# Patient Record
Sex: FEMALE | Race: WHITE | Employment: OTHER | URBAN - METROPOLITAN AREA
[De-identification: names, ages, dates, MRNs, and addresses within clinical notes are randomized per-mention and may not be internally consistent; named-entity substitution may affect disease eponyms.]

---

## 2017-07-25 PROBLEM — I10 ESSENTIAL HYPERTENSION: Status: ACTIVE | Noted: 2017-07-25

## 2017-07-25 PROBLEM — E66.9 OBESITY: Status: ACTIVE | Noted: 2017-07-25

## 2017-07-25 PROBLEM — M79.7 FIBROMYALGIA: Status: ACTIVE | Noted: 2017-07-25

## 2017-07-25 PROBLEM — R60.0 EDEMA EXTREMITIES: Status: ACTIVE | Noted: 2017-07-25

## 2017-08-09 PROBLEM — M75.02 ADHESIVE CAPSULITIS OF LEFT SHOULDER: Status: ACTIVE | Noted: 2017-08-09

## 2017-08-09 PROBLEM — N30.01 ACUTE CYSTITIS WITH HEMATURIA: Status: ACTIVE | Noted: 2017-08-09

## 2017-08-09 PROBLEM — M75.80 ROTATOR CUFF TENDINITIS: Status: ACTIVE | Noted: 2017-08-09

## 2017-08-09 PROBLEM — R73.01 IFG (IMPAIRED FASTING GLUCOSE): Status: ACTIVE | Noted: 2017-08-09

## 2017-09-25 ENCOUNTER — HOSPITAL ENCOUNTER (OUTPATIENT)
Dept: GENERAL RADIOLOGY | Age: 62
Discharge: HOME | End: 2017-09-25
Payer: COMMERCIAL

## 2017-09-25 ENCOUNTER — HOSPITAL ENCOUNTER (OUTPATIENT)
Age: 62
Discharge: HOME | End: 2017-09-25
Payer: COMMERCIAL

## 2017-09-25 DIAGNOSIS — R52 PAIN: ICD-10-CM

## 2017-09-25 PROCEDURE — 72040 X-RAY EXAM NECK SPINE 2-3 VW: CPT

## 2017-09-25 PROCEDURE — 73030 X-RAY EXAM OF SHOULDER: CPT

## 2018-07-09 ENCOUNTER — HOSPITAL ENCOUNTER (OUTPATIENT)
Age: 63
Setting detail: SPECIMEN
Discharge: HOME | End: 2018-07-09
Payer: MEDICARE

## 2018-07-09 DIAGNOSIS — R73.01 IFG (IMPAIRED FASTING GLUCOSE): ICD-10-CM

## 2018-07-09 DIAGNOSIS — Z13.220 SCREENING FOR LIPID DISORDERS: ICD-10-CM

## 2018-07-09 DIAGNOSIS — I10 ESSENTIAL HYPERTENSION: ICD-10-CM

## 2018-07-09 LAB
ALBUMIN SERPL-MCNC: 4.4 G/DL (ref 3.5–5.2)
ALBUMIN/GLOBULIN RATIO: 1.7 (ref 1–2.5)
ALP BLD-CCNC: 74 U/L (ref 35–104)
ALT SERPL-CCNC: 25 U/L (ref 5–33)
ANION GAP SERPL CALCULATED.3IONS-SCNC: 13 MMOL/L (ref 9–17)
AST SERPL-CCNC: 19 U/L
BILIRUB SERPL-MCNC: 0.67 MG/DL (ref 0.3–1.2)
BUN BLDV-MCNC: 21 MG/DL (ref 8–23)
BUN/CREAT BLD: ABNORMAL (ref 9–20)
CALCIUM SERPL-MCNC: 9.6 MG/DL (ref 8.6–10.4)
CHLORIDE BLD-SCNC: 100 MMOL/L (ref 98–107)
CHOLESTEROL/HDL RATIO: 4.2
CHOLESTEROL: 203 MG/DL
CO2: 28 MMOL/L (ref 20–31)
CREAT SERPL-MCNC: 0.71 MG/DL (ref 0.5–0.9)
ESTIMATED AVERAGE GLUCOSE: 126 MG/DL
GFR AFRICAN AMERICAN: >60 ML/MIN
GFR NON-AFRICAN AMERICAN: >60 ML/MIN
GFR SERPL CREATININE-BSD FRML MDRD: ABNORMAL ML/MIN/{1.73_M2}
GFR SERPL CREATININE-BSD FRML MDRD: ABNORMAL ML/MIN/{1.73_M2}
GLUCOSE FASTING: 105 MG/DL (ref 70–99)
HBA1C MFR BLD: 6 % (ref 4–6)
HDLC SERPL-MCNC: 48 MG/DL
LDL CHOLESTEROL: 122 MG/DL (ref 0–130)
POTASSIUM SERPL-SCNC: 4.2 MMOL/L (ref 3.7–5.3)
SODIUM BLD-SCNC: 141 MMOL/L (ref 135–144)
TOTAL PROTEIN: 7 G/DL (ref 6.4–8.3)
TRIGL SERPL-MCNC: 167 MG/DL
VLDLC SERPL CALC-MCNC: ABNORMAL MG/DL (ref 1–30)

## 2018-07-30 ENCOUNTER — HOSPITAL ENCOUNTER (OUTPATIENT)
Dept: GENERAL RADIOLOGY | Facility: CLINIC | Age: 63
Discharge: HOME | End: 2018-08-01
Payer: MEDICARE

## 2018-07-30 ENCOUNTER — HOSPITAL ENCOUNTER (OUTPATIENT)
Dept: MAMMOGRAPHY | Facility: CLINIC | Age: 63
Discharge: HOME | End: 2018-08-01
Payer: MEDICARE

## 2018-07-30 DIAGNOSIS — M25.562 ACUTE PAIN OF LEFT KNEE: ICD-10-CM

## 2018-07-30 DIAGNOSIS — Z12.31 SCREENING MAMMOGRAM, ENCOUNTER FOR: ICD-10-CM

## 2018-07-30 PROCEDURE — 73562 X-RAY EXAM OF KNEE 3: CPT

## 2018-07-30 PROCEDURE — 77067 SCR MAMMO BI INCL CAD: CPT

## 2018-10-10 ENCOUNTER — HOSPITAL ENCOUNTER (OUTPATIENT)
Dept: GENERAL RADIOLOGY | Facility: CLINIC | Age: 63
Discharge: HOME | End: 2018-10-12
Payer: MEDICARE

## 2018-10-10 DIAGNOSIS — R22.41 MASS OF RIGHT FOOT: ICD-10-CM

## 2018-10-10 DIAGNOSIS — M79.671 RIGHT FOOT PAIN: ICD-10-CM

## 2018-10-10 PROCEDURE — 73630 X-RAY EXAM OF FOOT: CPT

## 2018-10-16 ENCOUNTER — OFFICE VISIT (OUTPATIENT)
Dept: PODIATRY | Age: 63
End: 2018-10-16
Payer: MEDICARE

## 2018-10-16 VITALS — WEIGHT: 234 LBS | HEIGHT: 64 IN | BODY MASS INDEX: 39.95 KG/M2

## 2018-10-16 DIAGNOSIS — M79.672 PAIN IN BOTH FEET: Primary | ICD-10-CM

## 2018-10-16 DIAGNOSIS — D17.23 LIPOMA OF RIGHT LOWER EXTREMITY: ICD-10-CM

## 2018-10-16 DIAGNOSIS — R23.8 PIEZOGENIC PEDAL PAPULE: ICD-10-CM

## 2018-10-16 DIAGNOSIS — M79.671 PAIN IN BOTH FEET: Primary | ICD-10-CM

## 2018-10-16 PROCEDURE — 3017F COLORECTAL CA SCREEN DOC REV: CPT | Performed by: PODIATRIST

## 2018-10-16 PROCEDURE — 4040F PNEUMOC VAC/ADMIN/RCVD: CPT | Performed by: PODIATRIST

## 2018-10-16 PROCEDURE — 99203 OFFICE O/P NEW LOW 30 MIN: CPT | Performed by: PODIATRIST

## 2018-10-16 PROCEDURE — G8400 PT W/DXA NO RESULTS DOC: HCPCS | Performed by: PODIATRIST

## 2018-10-16 PROCEDURE — 1036F TOBACCO NON-USER: CPT | Performed by: PODIATRIST

## 2018-10-16 PROCEDURE — G8484 FLU IMMUNIZE NO ADMIN: HCPCS | Performed by: PODIATRIST

## 2018-10-16 PROCEDURE — 1123F ACP DISCUSS/DSCN MKR DOCD: CPT | Performed by: PODIATRIST

## 2018-10-16 PROCEDURE — 1101F PT FALLS ASSESS-DOCD LE1/YR: CPT | Performed by: PODIATRIST

## 2018-10-16 PROCEDURE — 1090F PRES/ABSN URINE INCON ASSESS: CPT | Performed by: PODIATRIST

## 2018-10-16 PROCEDURE — G8417 CALC BMI ABV UP PARAM F/U: HCPCS | Performed by: PODIATRIST

## 2018-10-16 PROCEDURE — G8427 DOCREV CUR MEDS BY ELIG CLIN: HCPCS | Performed by: PODIATRIST

## 2018-10-16 NOTE — PROGRESS NOTES
Bilateral.  Pain present upon palpation of right foot lesion. Medial longitudinal arch, Bilateral WNL. Ankle ROM WNL,Bilateral.    Dorsally contracted digits absent digits 1-5 Bilateral.     Vascular: DP and PT pulses palpable 2/4, Bilateral.  CFT <3 seconds, Bilateral.  Hair growth present to the level of the digits, Bilateral.  Edema absent, Bilateral.  Varicosities absent, Bilateral. Erythema absent, Bilateral    Neurological: Sensation intact to light touch to level of digits, Bilateral.  Protective sensation intact 10/10 sites via 5.07/10g Huntley-Lin Monofilament, Bilateral.  negative Tinel's, Bilateral.  negative Valleix sign, Bilateral.      Integument: Warm, dry, supple, Bilateral.  Open lesion absent, Bilateral.  Interdigital maceration absent to web spaces 1-4, Bilateral.  Nails are normal in length, thickness and color 1-5 bilateral.  Fissures absent, Bilateral.     Asessment: Patient is a 70 y.o. female with:    Diagnosis Orders   1. Pain in both feet     2. Piezogenic pedal papule     3. Lipoma of right lower extremity         Plan: Patient examined and evaluated. Current condition and treatment options discussed in detail. Discussed conservative and surgical options with the patient. Advised pt to her condtion and treatment options. I informed her that the lipoma is a common thing from post menupausal women. The lesions to her heel are fat pad herniation at the plantar heel. Those should not be touched as that is her \"cushion\" to the bottom of her foot. I recommond skipping her laces over her right foot lesion and that should make it go away. If necessary we will have to drain it  Or remove it. Pt is understanding of this. .  Verbal and written instructions given to patient. Contact office with any questions/problems/concerns. RTC in 4week(s).     10/16/2018    Electronically signed by Elena Ko DPM on 10/16/2018 at 9:31 AM  10/16/2018

## 2018-10-30 ENCOUNTER — OFFICE VISIT (OUTPATIENT)
Dept: PODIATRY | Age: 63
End: 2018-10-30
Payer: MEDICARE

## 2018-10-30 VITALS — WEIGHT: 234 LBS | HEIGHT: 64 IN | BODY MASS INDEX: 39.95 KG/M2

## 2018-10-30 DIAGNOSIS — M79.672 PAIN IN BOTH FEET: Primary | ICD-10-CM

## 2018-10-30 DIAGNOSIS — M79.671 PAIN IN BOTH FEET: Primary | ICD-10-CM

## 2018-10-30 DIAGNOSIS — M89.8X7 EXOSTOSIS OF RIGHT FOOT: ICD-10-CM

## 2018-10-30 PROCEDURE — 1090F PRES/ABSN URINE INCON ASSESS: CPT | Performed by: PODIATRIST

## 2018-10-30 PROCEDURE — 1123F ACP DISCUSS/DSCN MKR DOCD: CPT | Performed by: PODIATRIST

## 2018-10-30 PROCEDURE — G8400 PT W/DXA NO RESULTS DOC: HCPCS | Performed by: PODIATRIST

## 2018-10-30 PROCEDURE — 3017F COLORECTAL CA SCREEN DOC REV: CPT | Performed by: PODIATRIST

## 2018-10-30 PROCEDURE — 1036F TOBACCO NON-USER: CPT | Performed by: PODIATRIST

## 2018-10-30 PROCEDURE — G8417 CALC BMI ABV UP PARAM F/U: HCPCS | Performed by: PODIATRIST

## 2018-10-30 PROCEDURE — 99213 OFFICE O/P EST LOW 20 MIN: CPT | Performed by: PODIATRIST

## 2018-10-30 PROCEDURE — G8427 DOCREV CUR MEDS BY ELIG CLIN: HCPCS | Performed by: PODIATRIST

## 2018-10-30 PROCEDURE — G8484 FLU IMMUNIZE NO ADMIN: HCPCS | Performed by: PODIATRIST

## 2018-10-30 PROCEDURE — 1101F PT FALLS ASSESS-DOCD LE1/YR: CPT | Performed by: PODIATRIST

## 2018-10-30 PROCEDURE — 4040F PNEUMOC VAC/ADMIN/RCVD: CPT | Performed by: PODIATRIST

## 2018-12-12 ENCOUNTER — HOSPITAL ENCOUNTER (OUTPATIENT)
Age: 63
Setting detail: SPECIMEN
Discharge: HOME | End: 2018-12-12
Payer: MEDICARE

## 2018-12-12 DIAGNOSIS — R73.01 IFG (IMPAIRED FASTING GLUCOSE): ICD-10-CM

## 2018-12-12 LAB
ESTIMATED AVERAGE GLUCOSE: 120 MG/DL
HBA1C MFR BLD: 5.8 % (ref 4–6)

## 2019-03-06 ENCOUNTER — HOSPITAL ENCOUNTER (OUTPATIENT)
Age: 64
Discharge: HOME | End: 2019-03-06
Payer: MEDICARE

## 2019-03-06 ENCOUNTER — HOSPITAL ENCOUNTER (OUTPATIENT)
Facility: CLINIC | Age: 64
Discharge: HOME | End: 2019-03-06
Payer: MEDICARE

## 2019-03-06 DIAGNOSIS — M79.661 RIGHT CALF PAIN: ICD-10-CM

## 2019-03-06 DIAGNOSIS — R25.2 MUSCLE CRAMP: ICD-10-CM

## 2019-03-06 DIAGNOSIS — M79.661 RIGHT CALF PAIN: Primary | ICD-10-CM

## 2019-03-06 LAB
ANION GAP SERPL CALCULATED.3IONS-SCNC: 10 MMOL/L (ref 9–17)
BUN BLDV-MCNC: 19 MG/DL (ref 8–23)
BUN/CREAT BLD: ABNORMAL (ref 9–20)
CALCIUM SERPL-MCNC: 9.4 MG/DL (ref 8.6–10.4)
CHLORIDE BLD-SCNC: 101 MMOL/L (ref 98–107)
CO2: 28 MMOL/L (ref 20–31)
CREAT SERPL-MCNC: 0.64 MG/DL (ref 0.5–0.9)
D-DIMER QUANTITATIVE: 0.62 MG/L FEU
GFR AFRICAN AMERICAN: >60 ML/MIN
GFR NON-AFRICAN AMERICAN: >60 ML/MIN
GFR SERPL CREATININE-BSD FRML MDRD: ABNORMAL ML/MIN/{1.73_M2}
GFR SERPL CREATININE-BSD FRML MDRD: ABNORMAL ML/MIN/{1.73_M2}
GLUCOSE BLD-MCNC: 141 MG/DL (ref 70–99)
MAGNESIUM: 2 MG/DL (ref 1.6–2.6)
POTASSIUM SERPL-SCNC: 4.3 MMOL/L (ref 3.7–5.3)
SODIUM BLD-SCNC: 139 MMOL/L (ref 135–144)

## 2019-03-06 PROCEDURE — 93971 EXTREMITY STUDY: CPT

## 2019-03-06 PROCEDURE — 85379 FIBRIN DEGRADATION QUANT: CPT

## 2019-03-06 PROCEDURE — 80048 BASIC METABOLIC PNL TOTAL CA: CPT

## 2019-03-06 PROCEDURE — 36415 COLL VENOUS BLD VENIPUNCTURE: CPT

## 2019-03-06 PROCEDURE — 83735 ASSAY OF MAGNESIUM: CPT

## 2019-03-07 ENCOUNTER — HOSPITAL ENCOUNTER (OUTPATIENT)
Facility: CLINIC | Age: 64
Discharge: HOME | End: 2019-03-07
Payer: MEDICARE

## 2019-03-07 DIAGNOSIS — R73.09 ELEVATED GLUCOSE LEVEL: ICD-10-CM

## 2019-03-07 PROCEDURE — 87086 URINE CULTURE/COLONY COUNT: CPT

## 2019-03-07 PROCEDURE — 83036 HEMOGLOBIN GLYCOSYLATED A1C: CPT

## 2019-03-07 PROCEDURE — 36415 COLL VENOUS BLD VENIPUNCTURE: CPT

## 2019-03-08 LAB
CULTURE: NORMAL
Lab: NORMAL
SPECIMEN DESCRIPTION: NORMAL

## 2019-03-09 LAB
ESTIMATED AVERAGE GLUCOSE: 120 MG/DL
HBA1C MFR BLD: 5.8 % (ref 4–6)

## 2019-04-26 ENCOUNTER — OFFICE VISIT (OUTPATIENT)
Dept: BARIATRICS/WEIGHT MGMT | Age: 64
End: 2019-04-26
Payer: MEDICARE

## 2019-04-26 VITALS
SYSTOLIC BLOOD PRESSURE: 128 MMHG | HEART RATE: 68 BPM | RESPIRATION RATE: 18 BRPM | BODY MASS INDEX: 40.33 KG/M2 | DIASTOLIC BLOOD PRESSURE: 72 MMHG | HEIGHT: 64 IN | WEIGHT: 236.2 LBS

## 2019-04-26 DIAGNOSIS — R60.0 EDEMA EXTREMITIES: ICD-10-CM

## 2019-04-26 DIAGNOSIS — E66.01 OBESITY, CLASS III, BMI 40-49.9 (MORBID OBESITY) (HCC): ICD-10-CM

## 2019-04-26 DIAGNOSIS — M25.562 BILATERAL CHRONIC KNEE PAIN: ICD-10-CM

## 2019-04-26 DIAGNOSIS — M25.561 BILATERAL CHRONIC KNEE PAIN: ICD-10-CM

## 2019-04-26 DIAGNOSIS — R73.03 PREDIABETES: ICD-10-CM

## 2019-04-26 DIAGNOSIS — M79.7 FIBROMYALGIA: ICD-10-CM

## 2019-04-26 DIAGNOSIS — J45.909 EXTRINSIC ASTHMA WITHOUT COMPLICATION, UNSPECIFIED ASTHMA SEVERITY, UNSPECIFIED WHETHER PERSISTENT: ICD-10-CM

## 2019-04-26 DIAGNOSIS — G89.29 BILATERAL CHRONIC KNEE PAIN: ICD-10-CM

## 2019-04-26 DIAGNOSIS — I10 ESSENTIAL HYPERTENSION: Primary | ICD-10-CM

## 2019-04-26 DIAGNOSIS — M19.90 ARTHRITIS: ICD-10-CM

## 2019-04-26 PROBLEM — N30.01 ACUTE CYSTITIS WITH HEMATURIA: Status: RESOLVED | Noted: 2017-08-09 | Resolved: 2019-04-26

## 2019-04-26 PROBLEM — M75.02 ADHESIVE CAPSULITIS OF LEFT SHOULDER: Status: RESOLVED | Noted: 2017-08-09 | Resolved: 2019-04-26

## 2019-04-26 PROBLEM — R73.01 IFG (IMPAIRED FASTING GLUCOSE): Status: RESOLVED | Noted: 2017-08-09 | Resolved: 2019-04-26

## 2019-04-26 PROBLEM — M75.80 ROTATOR CUFF TENDINITIS: Status: RESOLVED | Noted: 2017-08-09 | Resolved: 2019-04-26

## 2019-04-26 PROCEDURE — 99204 OFFICE O/P NEW MOD 45 MIN: CPT | Performed by: NURSE PRACTITIONER

## 2019-04-26 NOTE — PROGRESS NOTES
Clinical Induction for Group Lifestyle Balance Program Progress Note    Subjective     The patient is a 70 y.o. female being seen regarding supervised weight loss. The patient's PCP is Du Cummings DO . Highest adult weight was 250-260 lbs and lowest adult weight was 160 lbs. Her Body mass index is 40.54 kg/m². Manny Gilles Her weight goal is 160-170 lbs. The patient reports that her obesity is significantly affecting her life on a daily basis. Weight Loss Program History:   She has tried Weight Watchers, NutraSystem, Calorie Restriction, and Gagan. These attempts have been somewhat successful with weight loss, but have failed to sustain adequate weight loss. The patient has also tried self directed diet and exercise in attempts to sustain weight loss. Comorbid Conditions:  Significant diseases affecting this patient are: HTN, Fibromyalgia, Prediabetes, Extremity Edema, Arthritis, Chronic Migraine, Allergy-Induced Asthma, Bilateral Chronic Knee Pain    Allergies: Allergies   Allergen Reactions    Ceclor [Cefaclor]     Food      All melons      Sulfa Antibiotics        Past Medical History:     Past Medical History:   Diagnosis Date    Anxiety     Arthritis     Bell's palsy     Chronic neck pain     posterior neck since     Chronic pain     low back and bilateral lower extremities    Fibromyalgia     HA (headache)     Headache     off/on since     HTN (hypertension)     Hypertension    .     Past Surgical History:  Past Surgical History:   Procedure Laterality Date    ARM SURGERY      rt arm fatty tumor     SECTION      SHOULDER SURGERY      rt rotator cuff repair    URETHRAL STRICTURE DILATATION      WISDOM TOOTH EXTRACTION         Family History:  Family History   Problem Relation Age of Onset    Other Mother         CAD   Penelope Larch Diabetes Mother     Other Father         CAD    Diabetes Father     Thyroid Disease Sister     Cancer Brother Soft, nontender. Nondistended. Musculoskeletal: Movement x4. Bilateral lower extremity edema, left a. right. Neurological: Gait normal. Alert and oriented to person, place, and time. Psychiatric: Normal mood and affect. Speech and behavior normal. Judgment and thought content normal. Cognition and memory intact. Assessment:       Diagnosis Orders   1. Essential hypertension  CBC Auto Differential    Comprehensive Metabolic Panel    Lipid Panel    TSH without Reflex    Uric Acid    EKG 12 Lead    Hemoglobin A1C   2. Fibromyalgia  CBC Auto Differential    Comprehensive Metabolic Panel    Lipid Panel    TSH without Reflex    Uric Acid    EKG 12 Lead    Hemoglobin A1C   3. Edema extremities  CBC Auto Differential    Comprehensive Metabolic Panel    Lipid Panel    TSH without Reflex    Uric Acid    EKG 12 Lead    Hemoglobin A1C   4. Prediabetes  CBC Auto Differential    Comprehensive Metabolic Panel    Lipid Panel    TSH without Reflex    Uric Acid    EKG 12 Lead    Hemoglobin A1C   5. Obesity, Class III, BMI 40-49.9 (morbid obesity) (HCC)  CBC Auto Differential    Comprehensive Metabolic Panel    Lipid Panel    TSH without Reflex    Uric Acid    EKG 12 Lead    Hemoglobin A1C   6. Arthritis  CBC Auto Differential    Comprehensive Metabolic Panel    Lipid Panel    TSH without Reflex    Uric Acid    EKG 12 Lead    Hemoglobin A1C   7. Chronic migraine  CBC Auto Differential    Comprehensive Metabolic Panel    Lipid Panel    TSH without Reflex    Uric Acid    EKG 12 Lead    Hemoglobin A1C   8. Extrinsic asthma without complication, unspecified asthma severity, unspecified whether persistent  CBC Auto Differential    Comprehensive Metabolic Panel    Lipid Panel    TSH without Reflex    Uric Acid    EKG 12 Lead    Hemoglobin A1C   9. Bilateral chronic knee pain  Hemoglobin A1C       Plan:      Class schedule reviewed. Consent and confidentiality agreement discussed.  Patient aware group medical appointment is voluntary and individual appointments are available as desired. X  ? EKG ordered. X  ? Baseline lab work ordered. ?  Diabetic teaching done. Low blood sugar protocol reviewed with pt. ? Discussed targets and management of blood sugar, Hgb A1C, lipids, and blood pressure. ? Reviewed medications and discussed potential need for adjustments while following the program and losing weight. ?  Smoking cessation discussed. ? Avoidance of alcohol discussed. X  ? Specific questions answered. Follow up:  Return in about 3 months (around 7/16/2019). This encounters orders:  Orders Placed This Encounter   Procedures    CBC Auto Differential     Standing Status:   Future     Standing Expiration Date:   4/25/2020    Comprehensive Metabolic Panel     Standing Status:   Future     Standing Expiration Date:   4/25/2020    Lipid Panel     Standing Status:   Future     Standing Expiration Date:   4/25/2020     Order Specific Question:   Is Patient Fasting?/# of Hours     Answer:   12    TSH without Reflex     Standing Status:   Future     Standing Expiration Date:   4/25/2020    Uric Acid     Standing Status:   Future     Standing Expiration Date:   4/25/2020    Hemoglobin A1C     Standing Status:   Future     Standing Expiration Date:   4/25/2020    EKG 12 Lead     Standing Status:   Future     Standing Expiration Date:   4/26/2020     Order Specific Question:   Reason for Exam?     Answer:   Pre-op       This encounters prescriptions:  No orders of the defined types were placed in this encounter.       Electronically signed by:  Amber Tafoya CNP

## 2019-05-08 ENCOUNTER — HOSPITAL ENCOUNTER (OUTPATIENT)
Facility: CLINIC | Age: 64
Discharge: HOME | End: 2019-05-08
Payer: MEDICARE

## 2019-05-08 PROCEDURE — 93005 ELECTROCARDIOGRAM TRACING: CPT

## 2019-05-09 LAB
EKG ATRIAL RATE: 73 BPM
EKG P AXIS: 21 DEGREES
EKG P-R INTERVAL: 152 MS
EKG Q-T INTERVAL: 410 MS
EKG QRS DURATION: 74 MS
EKG QTC CALCULATION (BAZETT): 451 MS
EKG R AXIS: -8 DEGREES
EKG T AXIS: 21 DEGREES
EKG VENTRICULAR RATE: 73 BPM

## 2019-08-08 ENCOUNTER — HOSPITAL ENCOUNTER (OUTPATIENT)
Facility: CLINIC | Age: 64
Discharge: HOME | End: 2019-08-08
Payer: MEDICARE

## 2019-08-08 DIAGNOSIS — I10 ESSENTIAL HYPERTENSION: ICD-10-CM

## 2019-08-08 DIAGNOSIS — M19.90 ARTHRITIS: ICD-10-CM

## 2019-08-08 DIAGNOSIS — G89.29 BILATERAL CHRONIC KNEE PAIN: ICD-10-CM

## 2019-08-08 DIAGNOSIS — R60.0 EDEMA EXTREMITIES: ICD-10-CM

## 2019-08-08 DIAGNOSIS — J45.909 EXTRINSIC ASTHMA WITHOUT COMPLICATION, UNSPECIFIED ASTHMA SEVERITY, UNSPECIFIED WHETHER PERSISTENT: ICD-10-CM

## 2019-08-08 DIAGNOSIS — R73.03 PREDIABETES: ICD-10-CM

## 2019-08-08 DIAGNOSIS — E66.01 OBESITY, CLASS III, BMI 40-49.9 (MORBID OBESITY) (HCC): ICD-10-CM

## 2019-08-08 DIAGNOSIS — M79.7 FIBROMYALGIA: ICD-10-CM

## 2019-08-08 DIAGNOSIS — M25.561 BILATERAL CHRONIC KNEE PAIN: ICD-10-CM

## 2019-08-08 DIAGNOSIS — M25.562 BILATERAL CHRONIC KNEE PAIN: ICD-10-CM

## 2019-08-08 LAB
ABSOLUTE EOS #: 0.55 K/UL (ref 0–0.44)
ABSOLUTE IMMATURE GRANULOCYTE: 0.03 K/UL (ref 0–0.3)
ABSOLUTE LYMPH #: 1.96 K/UL (ref 1.1–3.7)
ABSOLUTE MONO #: 0.51 K/UL (ref 0.1–1.2)
ALBUMIN SERPL-MCNC: 4.2 G/DL (ref 3.5–5.2)
ALBUMIN/GLOBULIN RATIO: 1.5 (ref 1–2.5)
ALP BLD-CCNC: 79 U/L (ref 35–104)
ALT SERPL-CCNC: 20 U/L (ref 5–33)
ANION GAP SERPL CALCULATED.3IONS-SCNC: 13 MMOL/L (ref 9–17)
AST SERPL-CCNC: 17 U/L
BASOPHILS # BLD: 0 % (ref 0–2)
BASOPHILS ABSOLUTE: 0.03 K/UL (ref 0–0.2)
BILIRUB SERPL-MCNC: 0.7 MG/DL (ref 0.3–1.2)
BUN BLDV-MCNC: 24 MG/DL (ref 8–23)
BUN/CREAT BLD: ABNORMAL (ref 9–20)
CALCIUM SERPL-MCNC: 9.8 MG/DL (ref 8.6–10.4)
CHLORIDE BLD-SCNC: 100 MMOL/L (ref 98–107)
CHOLESTEROL/HDL RATIO: 3.6
CHOLESTEROL: 167 MG/DL
CO2: 28 MMOL/L (ref 20–31)
CREAT SERPL-MCNC: 0.69 MG/DL (ref 0.5–0.9)
DIFFERENTIAL TYPE: ABNORMAL
EOSINOPHILS RELATIVE PERCENT: 8 % (ref 1–4)
ESTIMATED AVERAGE GLUCOSE: 114 MG/DL
GFR AFRICAN AMERICAN: >60 ML/MIN
GFR NON-AFRICAN AMERICAN: >60 ML/MIN
GFR SERPL CREATININE-BSD FRML MDRD: ABNORMAL ML/MIN/{1.73_M2}
GFR SERPL CREATININE-BSD FRML MDRD: ABNORMAL ML/MIN/{1.73_M2}
GLUCOSE BLD-MCNC: 124 MG/DL (ref 70–99)
HBA1C MFR BLD: 5.6 % (ref 4–6)
HCT VFR BLD CALC: 45.8 % (ref 36.3–47.1)
HDLC SERPL-MCNC: 46 MG/DL
HEMOGLOBIN: 14.4 G/DL (ref 11.9–15.1)
IMMATURE GRANULOCYTES: 0 %
LDL CHOLESTEROL: 92 MG/DL (ref 0–130)
LYMPHOCYTES # BLD: 28 % (ref 24–43)
MCH RBC QN AUTO: 30.4 PG (ref 25.2–33.5)
MCHC RBC AUTO-ENTMCNC: 31.4 G/DL (ref 28.4–34.8)
MCV RBC AUTO: 96.6 FL (ref 82.6–102.9)
MONOCYTES # BLD: 7 % (ref 3–12)
NRBC AUTOMATED: 0 PER 100 WBC
PDW BLD-RTO: 13.2 % (ref 11.8–14.4)
PLATELET # BLD: 259 K/UL (ref 138–453)
PLATELET ESTIMATE: ABNORMAL
PMV BLD AUTO: 11.3 FL (ref 8.1–13.5)
POTASSIUM SERPL-SCNC: 4.2 MMOL/L (ref 3.7–5.3)
RBC # BLD: 4.74 M/UL (ref 3.95–5.11)
RBC # BLD: ABNORMAL 10*6/UL
SEG NEUTROPHILS: 57 % (ref 36–65)
SEGMENTED NEUTROPHILS ABSOLUTE COUNT: 4.06 K/UL (ref 1.5–8.1)
SODIUM BLD-SCNC: 141 MMOL/L (ref 135–144)
TOTAL PROTEIN: 7 G/DL (ref 6.4–8.3)
TRIGL SERPL-MCNC: 147 MG/DL
TSH SERPL DL<=0.05 MIU/L-ACNC: 2.08 MIU/L (ref 0.3–5)
URIC ACID: 7.1 MG/DL (ref 2.4–5.7)
VLDLC SERPL CALC-MCNC: NORMAL MG/DL (ref 1–30)
WBC # BLD: 7.1 K/UL (ref 3.5–11.3)
WBC # BLD: ABNORMAL 10*3/UL

## 2019-08-08 PROCEDURE — 84550 ASSAY OF BLOOD/URIC ACID: CPT

## 2019-08-08 PROCEDURE — 84443 ASSAY THYROID STIM HORMONE: CPT

## 2019-08-08 PROCEDURE — 85025 COMPLETE CBC W/AUTO DIFF WBC: CPT

## 2019-08-08 PROCEDURE — 80061 LIPID PANEL: CPT

## 2019-08-08 PROCEDURE — 80053 COMPREHEN METABOLIC PANEL: CPT

## 2019-08-08 PROCEDURE — 83036 HEMOGLOBIN GLYCOSYLATED A1C: CPT

## 2019-08-08 PROCEDURE — 36415 COLL VENOUS BLD VENIPUNCTURE: CPT

## 2019-08-08 PROCEDURE — 93005 ELECTROCARDIOGRAM TRACING: CPT

## 2019-08-09 LAB
EKG ATRIAL RATE: 63 BPM
EKG P AXIS: 18 DEGREES
EKG P-R INTERVAL: 150 MS
EKG Q-T INTERVAL: 418 MS
EKG QRS DURATION: 72 MS
EKG QTC CALCULATION (BAZETT): 427 MS
EKG R AXIS: -4 DEGREES
EKG T AXIS: 17 DEGREES
EKG VENTRICULAR RATE: 63 BPM

## 2019-08-13 ENCOUNTER — OFFICE VISIT (OUTPATIENT)
Dept: BARIATRICS/WEIGHT MGMT | Age: 64
End: 2019-08-13
Payer: MEDICARE

## 2019-08-13 VITALS
HEIGHT: 62 IN | HEART RATE: 72 BPM | DIASTOLIC BLOOD PRESSURE: 70 MMHG | SYSTOLIC BLOOD PRESSURE: 122 MMHG | BODY MASS INDEX: 43.79 KG/M2 | WEIGHT: 238 LBS

## 2019-08-13 DIAGNOSIS — M19.90 ARTHRITIS: ICD-10-CM

## 2019-08-13 DIAGNOSIS — J45.20 MILD INTERMITTENT EXTRINSIC ASTHMA WITHOUT COMPLICATION: ICD-10-CM

## 2019-08-13 DIAGNOSIS — R60.0 EDEMA EXTREMITIES: ICD-10-CM

## 2019-08-13 DIAGNOSIS — M25.561 BILATERAL CHRONIC KNEE PAIN: ICD-10-CM

## 2019-08-13 DIAGNOSIS — M25.562 BILATERAL CHRONIC KNEE PAIN: ICD-10-CM

## 2019-08-13 DIAGNOSIS — R73.03 PREDIABETES: ICD-10-CM

## 2019-08-13 DIAGNOSIS — E66.01 OBESITY, CLASS III, BMI 40-49.9 (MORBID OBESITY) (HCC): ICD-10-CM

## 2019-08-13 DIAGNOSIS — M79.7 FIBROMYALGIA: ICD-10-CM

## 2019-08-13 DIAGNOSIS — G89.29 BILATERAL CHRONIC KNEE PAIN: ICD-10-CM

## 2019-08-13 DIAGNOSIS — I10 ESSENTIAL HYPERTENSION: Primary | ICD-10-CM

## 2019-08-13 PROBLEM — E66.9 OBESITY (BMI 30-39.9): Status: ACTIVE | Noted: 2019-08-13

## 2019-08-13 PROCEDURE — 99213 OFFICE O/P EST LOW 20 MIN: CPT | Performed by: NURSE PRACTITIONER

## 2019-08-13 NOTE — PROGRESS NOTES
gain      Objective:      Physical Exam   Vital signs reviewed. General Appearance: Well-developed and well-nourished. No acute distress. Skin: Warm, dry. Head: Normocephalic. Pulmonary/Chest: Normal respiratory effort. Musculoskeletal: Movement x4. Neurological:  Alert and oriented. Individual goal for this encounter:  Lose weight, be more mobile, have more energy and get off meds. X ? Vital signs reviewed and discussed with patient. X ? Labs/EKG reviewed and discussed with patient. ? Blood sugar log reviewed and discussed with patient. ? Physical activity discussed. ? Medication changes recommended. ? Smoking cessation discussed. X ? Specific questions/concerns addressed. Specific group medical question(s) addressed in this encounter:  Discussion about metabolic syndrome. Group discussion topic for this encounter:    X ? Welcome Jumpstart   ? Be a Fat & Calorie    ? Healthy Eating   ? Move Those Muscles   ? Tip the Calorie Balance   ? Take charge of What's Around You   ? Problem Solving   ? Step Up Your Physical Activity Plan   ? Manage Slips and Self-Defeating Thoughts   ? [address]   ? Make Social Cues Work for Toshia Danielson   ? Ways to Stay Motivated   ? Preparing for Long Term Self-Management   ? Take Charge of Your Lifestyle   ? Mindful Eating, Mindful Movement   ? Manage Your Stress   ? Sit Less for Health   ? More Volume, Fewer Calories   ? Stay Active   ? Balance Your Thoughts   ? Heart Health    ? Looking Back and Looking Forward    Total time:  90 minutes, greater than 50% of visit spent in group counseling/education. Assessment:       Diagnosis Orders   1. Essential hypertension     2. Fibromyalgia     3. Edema extremities     4. Prediabetes     5. Arthritis     6. Mild intermittent extrinsic asthma without complication     7. Chronic migraine     8. Bilateral chronic knee pain     9.  Obesity, Class III, BMI 40-49.9 (morbid obesity) ([address]

## 2019-08-20 ENCOUNTER — OFFICE VISIT (OUTPATIENT)
Dept: BARIATRICS/WEIGHT MGMT | Age: 64
End: 2019-08-20
Payer: MEDICARE

## 2019-08-20 VITALS
HEIGHT: 62 IN | BODY MASS INDEX: 43.24 KG/M2 | WEIGHT: 235 LBS | SYSTOLIC BLOOD PRESSURE: 118 MMHG | DIASTOLIC BLOOD PRESSURE: 72 MMHG | HEART RATE: 76 BPM

## 2019-08-20 DIAGNOSIS — M79.7 FIBROMYALGIA: ICD-10-CM

## 2019-08-20 DIAGNOSIS — M19.90 ARTHRITIS: ICD-10-CM

## 2019-08-20 DIAGNOSIS — I10 ESSENTIAL HYPERTENSION: Primary | ICD-10-CM

## 2019-08-20 DIAGNOSIS — M25.561 BILATERAL CHRONIC KNEE PAIN: ICD-10-CM

## 2019-08-20 DIAGNOSIS — G89.29 BILATERAL CHRONIC KNEE PAIN: ICD-10-CM

## 2019-08-20 DIAGNOSIS — R73.03 PREDIABETES: ICD-10-CM

## 2019-08-20 DIAGNOSIS — J45.20 MILD INTERMITTENT EXTRINSIC ASTHMA WITHOUT COMPLICATION: ICD-10-CM

## 2019-08-20 DIAGNOSIS — R60.0 EDEMA EXTREMITIES: ICD-10-CM

## 2019-08-20 DIAGNOSIS — M25.562 BILATERAL CHRONIC KNEE PAIN: ICD-10-CM

## 2019-08-20 DIAGNOSIS — E66.01 OBESITY, CLASS III, BMI 40-49.9 (MORBID OBESITY) (HCC): ICD-10-CM

## 2019-08-20 PROCEDURE — 99213 OFFICE O/P EST LOW 20 MIN: CPT | Performed by: NURSE PRACTITIONER

## 2019-08-20 NOTE — PROGRESS NOTES
Plan:      Track food and weight. Return to clinic as per group medical appointment schedule. Follow-up:  Return in about 1 week (around 8/27/2019). Orders:  No orders of the defined types were placed in this encounter. Prescriptions:  No orders of the defined types were placed in this encounter.       Electronically signed by:  Conner Chavez CNP

## 2019-08-28 ENCOUNTER — HOSPITAL ENCOUNTER (EMERGENCY)
Age: 64
Discharge: HOME | End: 2019-08-28
Attending: EMERGENCY MEDICINE
Payer: MEDICARE

## 2019-08-28 ENCOUNTER — APPOINTMENT (OUTPATIENT)
Dept: GENERAL RADIOLOGY | Age: 64
End: 2019-08-28
Payer: MEDICARE

## 2019-08-28 VITALS
HEART RATE: 64 BPM | RESPIRATION RATE: 16 BRPM | BODY MASS INDEX: 43.06 KG/M2 | DIASTOLIC BLOOD PRESSURE: 68 MMHG | HEIGHT: 62 IN | TEMPERATURE: 98.1 F | OXYGEN SATURATION: 100 % | WEIGHT: 234 LBS | SYSTOLIC BLOOD PRESSURE: 128 MMHG

## 2019-08-28 DIAGNOSIS — M79.604 LEG PAIN, BILATERAL: Primary | ICD-10-CM

## 2019-08-28 DIAGNOSIS — M79.605 LEG PAIN, BILATERAL: Primary | ICD-10-CM

## 2019-08-28 PROCEDURE — 99283 EMERGENCY DEPT VISIT LOW MDM: CPT

## 2019-08-28 PROCEDURE — 93970 EXTREMITY STUDY: CPT

## 2019-08-28 PROCEDURE — 6370000000 HC RX 637 (ALT 250 FOR IP): Performed by: NURSE PRACTITIONER

## 2019-08-28 PROCEDURE — 72100 X-RAY EXAM L-S SPINE 2/3 VWS: CPT

## 2019-08-28 RX ORDER — CETIRIZINE HYDROCHLORIDE 10 MG/1
10 TABLET ORAL PRN
COMMUNITY
End: 2022-08-29

## 2019-08-28 RX ORDER — COVID-19 ANTIGEN TEST
2 KIT MISCELLANEOUS PRN
COMMUNITY
End: 2019-09-06 | Stop reason: ALTCHOICE

## 2019-08-28 RX ORDER — HYDROCODONE BITARTRATE AND ACETAMINOPHEN 5; 325 MG/1; MG/1
1 TABLET ORAL EVERY 6 HOURS PRN
Qty: 12 TABLET | Refills: 0 | Status: SHIPPED | OUTPATIENT
Start: 2019-08-28 | End: 2019-08-31

## 2019-08-28 RX ORDER — HYDROCODONE BITARTRATE AND ACETAMINOPHEN 5; 325 MG/1; MG/1
1 TABLET ORAL ONCE
Status: COMPLETED | OUTPATIENT
Start: 2019-08-28 | End: 2019-08-28

## 2019-08-28 RX ADMIN — HYDROCODONE BITARTRATE AND ACETAMINOPHEN 1 TABLET: 5; 325 TABLET ORAL at 11:12

## 2019-08-28 ASSESSMENT — PAIN DESCRIPTION - PAIN TYPE
TYPE: ACUTE PAIN
TYPE: ACUTE PAIN

## 2019-08-28 ASSESSMENT — PAIN SCALES - GENERAL
PAINLEVEL_OUTOF10: 8
PAINLEVEL_OUTOF10: 10
PAINLEVEL_OUTOF10: 0

## 2019-08-28 ASSESSMENT — PAIN DESCRIPTION - ORIENTATION: ORIENTATION: RIGHT;LEFT

## 2019-08-28 ASSESSMENT — PAIN DESCRIPTION - DESCRIPTORS: DESCRIPTORS: CONSTANT

## 2019-08-28 ASSESSMENT — PAIN DESCRIPTION - LOCATION: LOCATION: HIP;LEG

## 2019-08-28 NOTE — ED NOTES
Pt states her pain level is now two after receiving pain medication.      Violetta Metz RN  08/28/19 5904

## 2019-08-29 ASSESSMENT — ENCOUNTER SYMPTOMS
VOMITING: 0
SINUS PRESSURE: 0
COUGH: 0
WHEEZING: 0
DIARRHEA: 0
SHORTNESS OF BREATH: 0
NAUSEA: 0
SORE THROAT: 0
ABDOMINAL PAIN: 0
RHINORRHEA: 0
CONSTIPATION: 0
COLOR CHANGE: 0

## 2019-09-06 ENCOUNTER — OFFICE VISIT (OUTPATIENT)
Dept: BARIATRICS/WEIGHT MGMT | Age: 64
End: 2019-09-06
Payer: MEDICARE

## 2019-09-06 VITALS
WEIGHT: 236 LBS | BODY MASS INDEX: 43.43 KG/M2 | DIASTOLIC BLOOD PRESSURE: 82 MMHG | HEIGHT: 62 IN | HEART RATE: 74 BPM | SYSTOLIC BLOOD PRESSURE: 126 MMHG

## 2019-09-06 DIAGNOSIS — J45.909 EXTRINSIC ASTHMA WITHOUT COMPLICATION, UNSPECIFIED ASTHMA SEVERITY, UNSPECIFIED WHETHER PERSISTENT: ICD-10-CM

## 2019-09-06 DIAGNOSIS — M25.562 BILATERAL CHRONIC KNEE PAIN: ICD-10-CM

## 2019-09-06 DIAGNOSIS — E66.01 OBESITY, CLASS III, BMI 40-49.9 (MORBID OBESITY) (HCC): ICD-10-CM

## 2019-09-06 DIAGNOSIS — G89.29 BILATERAL CHRONIC KNEE PAIN: ICD-10-CM

## 2019-09-06 DIAGNOSIS — R73.03 PREDIABETES: ICD-10-CM

## 2019-09-06 DIAGNOSIS — M25.561 BILATERAL CHRONIC KNEE PAIN: ICD-10-CM

## 2019-09-06 DIAGNOSIS — I10 ESSENTIAL HYPERTENSION: Primary | ICD-10-CM

## 2019-09-06 DIAGNOSIS — M19.90 ARTHRITIS: ICD-10-CM

## 2019-09-06 DIAGNOSIS — M79.7 FIBROMYALGIA: ICD-10-CM

## 2019-09-06 PROCEDURE — 99213 OFFICE O/P EST LOW 20 MIN: CPT | Performed by: NURSE PRACTITIONER

## 2019-09-06 RX ORDER — MELOXICAM 15 MG/1
TABLET ORAL
Refills: 1 | Status: ON HOLD | COMMUNITY
Start: 2019-09-04 | End: 2020-04-03

## 2019-09-06 RX ORDER — TRAMADOL HYDROCHLORIDE 50 MG/1
TABLET ORAL
Refills: 0 | Status: ON HOLD | COMMUNITY
Start: 2019-09-04 | End: 2020-04-03

## 2019-09-06 NOTE — PROGRESS NOTES
route EXPIRES IN 5 YEARS FROM ORIGINAL SCRIPT DATE 1 each 0     No current facility-administered medications for this visit. Vital Signs:  /82 (Site: Right Upper Arm, Position: Sitting, Cuff Size: Large Adult)   Pulse 74   Ht 5' 2.01\" (1.575 m)   Wt 236 lb (107 kg)   BMI 43.15 kg/m²     BMI/Height/Weight:  Body mass index is 43.15 kg/m². Review of Systems  Constitutional: No weight loss/gain      Objective:      Physical Exam   Vital signs reviewed. General Appearance: Well-developed and well-nourished. No acute distress. Skin: Warm, dry. Head: Normocephalic. Pulmonary/Chest: Normal respiratory effort. Musculoskeletal: Movement x4. Neurological:  Alert and oriented. Individual goal for this encounter:  Lose weight, be more mobile, have more energy and get off meds. X ? Vital signs reviewed and discussed with patient. ? Labs/EKG reviewed and discussed with patient. ? Blood sugar log reviewed and discussed with patient. ? Physical activity discussed. ? Medication changes recommended. ? Smoking cessation discussed. X ? Specific questions/concerns addressed. Specific group medical question(s) addressed in this encounter:  Discussion about osteoarthritis. Group discussion topic for this encounter:     ? Chanda Wills   ? Be a Fat & Calorie    ? Healthy Eating   ? Move Those Muscles   ? Tip the Calorie Balance   ? Take charge of What's Around You   ? Problem Solving   ? Step Up Your Physical Activity Plan   ? Manage Slips and Self-Defeating Thoughts   ? [address]  X ? Make Social Cues Work for Walt Falk   ? Ways to Stay Motivated   ? Preparing for Long Term Self-Management   ? Take Charge of Your Lifestyle   ? Mindful Eating, Mindful Movement   ? Manage Your Stress   ? Sit Less for Health   ? More Volume, Fewer Calories   ? Stay Active   ? Balance Your Thoughts   ? Heart Health    ?  Looking Back and Looking Forward    Total time:

## 2019-09-13 ENCOUNTER — OFFICE VISIT (OUTPATIENT)
Dept: BARIATRICS/WEIGHT MGMT | Age: 64
End: 2019-09-13
Payer: MEDICARE

## 2019-09-13 VITALS
HEART RATE: 68 BPM | HEIGHT: 62 IN | BODY MASS INDEX: 43.24 KG/M2 | DIASTOLIC BLOOD PRESSURE: 80 MMHG | SYSTOLIC BLOOD PRESSURE: 124 MMHG | WEIGHT: 235 LBS

## 2019-09-13 DIAGNOSIS — M25.561 BILATERAL CHRONIC KNEE PAIN: ICD-10-CM

## 2019-09-13 DIAGNOSIS — M25.562 BILATERAL CHRONIC KNEE PAIN: ICD-10-CM

## 2019-09-13 DIAGNOSIS — G89.29 BILATERAL CHRONIC KNEE PAIN: ICD-10-CM

## 2019-09-13 DIAGNOSIS — I10 ESSENTIAL HYPERTENSION: Primary | ICD-10-CM

## 2019-09-13 DIAGNOSIS — M19.90 ARTHRITIS: ICD-10-CM

## 2019-09-13 DIAGNOSIS — J45.909 EXTRINSIC ASTHMA WITHOUT COMPLICATION, UNSPECIFIED ASTHMA SEVERITY, UNSPECIFIED WHETHER PERSISTENT: ICD-10-CM

## 2019-09-13 DIAGNOSIS — M79.7 FIBROMYALGIA: ICD-10-CM

## 2019-09-13 DIAGNOSIS — E66.01 OBESITY, CLASS III, BMI 40-49.9 (MORBID OBESITY) (HCC): ICD-10-CM

## 2019-09-13 DIAGNOSIS — R73.03 PREDIABETES: ICD-10-CM

## 2019-09-13 PROCEDURE — 99213 OFFICE O/P EST LOW 20 MIN: CPT | Performed by: NURSE PRACTITIONER

## 2019-09-13 NOTE — PROGRESS NOTES
Group Lifestyle Balance Follow-up Progress Note      Subjective     Patient is here for group medical appointment for Group Lifestyle Balance weight management program follow-up for the chronic conditions of HTN, Fibromyalgia, Prediabetes, Extremity Edema, Arthritis, Asthma, Chronic Migraine, Bilateral Chronic Knee Pain. Patient continues on the program and tolerating well. Total weight loss of 1 lb. No current issues. Allergies: Allergies   Allergen Reactions    Other Hives    Ceclor [Cefaclor]     Food      All melons      Sulfa Antibiotics        Past Medical History:     Past Medical History:   Diagnosis Date    Anxiety     Arthritis     Bell's palsy     Chronic neck pain     posterior neck since     Chronic pain     low back and bilateral lower extremities    Fibromyalgia     HA (headache)     Headache     off/on since     HTN (hypertension)     Hypertension    .     Past Surgical History:  Past Surgical History:   Procedure Laterality Date    ARM SURGERY      rt arm fatty tumor     SECTION      JOINT REPLACEMENT Left     knee    SHOULDER SURGERY      rt rotator cuff repair    URETHRAL STRICTURE DILATATION      WISDOM TOOTH EXTRACTION         Family History:  Family History   Problem Relation Age of Onset    Other Mother         CAD   [facility] Diabetes Mother     Other Father         [facility] Diabetes Father     Thyroid Disease Sister     Cancer Brother         prostate    Diabetes Brother     Cancer Maternal Grandmother         uterine       Social History:  Social History     Socioeconomic History    Marital status:      Spouse name: Not on file    Number of children: Not on file    Years of education: Not on file    Highest education level: Not on file   Occupational History    Not on file   Social Needs    Financial resource strain: Not on file    Food insecurity:     Worry: Not on file     Inability: Not on file   [facility] Transportation needs:     Medical: Not on file     Non-medical: Not on file   Tobacco Use    Smoking status: Never Smoker    Smokeless tobacco: Never Used   Substance and Sexual Activity    Alcohol use: No    Drug use: No    Sexual activity: Not on file   Lifestyle    Physical activity:     Days per week: Not on file     Minutes per session: Not on file    Stress: Not on file   Relationships    Social connections:     Talks on phone: Not on file     Gets together: Not on file     Attends Mu-ism service: Not on file     Active member of club or organization: Not on file     Attends meetings of clubs or organizations: Not on file     Relationship status: Not on file    Intimate partner violence:     Fear of current or ex partner: Not on file     Emotionally abused: Not on file     Physically abused: Not on file     Forced sexual activity: Not on file   Other Topics Concern    Not on file   Social History Narrative    Not on file       Current Medications:  Current Outpatient Medications   Medication Sig Dispense Refill    meloxicam (MOBIC) 15 MG tablet TAKE 1 TABLET BY MOUTH ONCE DAILY WITH FOOD  1    traMADol (ULTRAM) 50 MG tablet TAKE 1 TABLET BY MOUTH EVERY 8 HOURS AS NEEDED FOR PAIN  0    loratadine (CLARITIN) 10 MG capsule Take 10 mg by mouth daily      aspirin 81 MG tablet Take 81 mg by mouth daily      cetirizine (ZYRTEC) 10 MG tablet Take 10 mg by mouth daily      Multiple Vitamins-Minerals (MULTIVITAMIN ADULT PO) Take by mouth      losartan-hydrochlorothiazide (HYZAAR) 50-12.5 MG per tablet TAKE 1 TABLET DAILY 90 tablet 1    metoprolol succinate (TOPROL XL) 100 MG extended release tablet TAKE 1 TABLET DAILY 90 tablet 1    docusate sodium (COLACE) 100 MG capsule Take 100 mg by mouth 2 times daily      albuterol sulfate HFA (PROAIR HFA) 108 (90 Base) MCG/ACT inhaler Inhale 2 puffs into the lungs every 4 hours as needed for Wheezing 1 Inhaler 2    Handicap Placard MISC by Does not apply

## 2019-10-04 ENCOUNTER — OFFICE VISIT (OUTPATIENT)
Dept: BARIATRICS/WEIGHT MGMT | Age: 64
End: 2019-10-04
Payer: MEDICARE

## 2019-10-04 VITALS
HEIGHT: 62 IN | SYSTOLIC BLOOD PRESSURE: 118 MMHG | DIASTOLIC BLOOD PRESSURE: 72 MMHG | BODY MASS INDEX: 43.06 KG/M2 | HEART RATE: 82 BPM | WEIGHT: 234 LBS

## 2019-10-04 DIAGNOSIS — R73.03 PREDIABETES: ICD-10-CM

## 2019-10-04 DIAGNOSIS — M25.562 BILATERAL CHRONIC KNEE PAIN: ICD-10-CM

## 2019-10-04 DIAGNOSIS — M25.561 BILATERAL CHRONIC KNEE PAIN: ICD-10-CM

## 2019-10-04 DIAGNOSIS — I10 ESSENTIAL HYPERTENSION: Primary | ICD-10-CM

## 2019-10-04 DIAGNOSIS — E66.01 OBESITY, CLASS III, BMI 40-49.9 (MORBID OBESITY) (HCC): ICD-10-CM

## 2019-10-04 DIAGNOSIS — J45.909 EXTRINSIC ASTHMA WITHOUT COMPLICATION, UNSPECIFIED ASTHMA SEVERITY, UNSPECIFIED WHETHER PERSISTENT: ICD-10-CM

## 2019-10-04 DIAGNOSIS — M79.7 FIBROMYALGIA: ICD-10-CM

## 2019-10-04 DIAGNOSIS — G89.29 BILATERAL CHRONIC KNEE PAIN: ICD-10-CM

## 2019-10-04 DIAGNOSIS — M19.90 ARTHRITIS: ICD-10-CM

## 2019-10-04 PROCEDURE — 99213 OFFICE O/P EST LOW 20 MIN: CPT | Performed by: NURSE PRACTITIONER

## 2019-10-11 ENCOUNTER — OFFICE VISIT (OUTPATIENT)
Dept: BARIATRICS/WEIGHT MGMT | Age: 64
End: 2019-10-11
Payer: MEDICARE

## 2019-10-11 VITALS
HEART RATE: 70 BPM | BODY MASS INDEX: 43.43 KG/M2 | DIASTOLIC BLOOD PRESSURE: 74 MMHG | SYSTOLIC BLOOD PRESSURE: 116 MMHG | WEIGHT: 236 LBS | HEIGHT: 62 IN

## 2019-10-11 DIAGNOSIS — I10 ESSENTIAL HYPERTENSION: Primary | ICD-10-CM

## 2019-10-11 DIAGNOSIS — M25.562 BILATERAL CHRONIC KNEE PAIN: ICD-10-CM

## 2019-10-11 DIAGNOSIS — G89.29 BILATERAL CHRONIC KNEE PAIN: ICD-10-CM

## 2019-10-11 DIAGNOSIS — J45.909 EXTRINSIC ASTHMA WITHOUT COMPLICATION, UNSPECIFIED ASTHMA SEVERITY, UNSPECIFIED WHETHER PERSISTENT: ICD-10-CM

## 2019-10-11 DIAGNOSIS — M79.7 FIBROMYALGIA: ICD-10-CM

## 2019-10-11 DIAGNOSIS — E66.01 OBESITY, CLASS III, BMI 40-49.9 (MORBID OBESITY) (HCC): ICD-10-CM

## 2019-10-11 DIAGNOSIS — M19.90 ARTHRITIS: ICD-10-CM

## 2019-10-11 DIAGNOSIS — R73.03 PREDIABETES: ICD-10-CM

## 2019-10-11 DIAGNOSIS — M25.561 BILATERAL CHRONIC KNEE PAIN: ICD-10-CM

## 2019-10-11 PROCEDURE — 99213 OFFICE O/P EST LOW 20 MIN: CPT | Performed by: NURSE PRACTITIONER

## 2019-10-18 ENCOUNTER — OFFICE VISIT (OUTPATIENT)
Dept: BARIATRICS/WEIGHT MGMT | Age: 64
End: 2019-10-18
Payer: MEDICARE

## 2019-10-18 VITALS
BODY MASS INDEX: 43.24 KG/M2 | HEIGHT: 62 IN | WEIGHT: 235 LBS | SYSTOLIC BLOOD PRESSURE: 120 MMHG | DIASTOLIC BLOOD PRESSURE: 74 MMHG | HEART RATE: 80 BPM

## 2019-10-18 DIAGNOSIS — M79.7 FIBROMYALGIA: ICD-10-CM

## 2019-10-18 DIAGNOSIS — M19.90 ARTHRITIS: ICD-10-CM

## 2019-10-18 DIAGNOSIS — R73.03 PREDIABETES: ICD-10-CM

## 2019-10-18 DIAGNOSIS — G89.29 BILATERAL CHRONIC KNEE PAIN: ICD-10-CM

## 2019-10-18 DIAGNOSIS — E66.01 OBESITY, CLASS III, BMI 40-49.9 (MORBID OBESITY) (HCC): ICD-10-CM

## 2019-10-18 DIAGNOSIS — I10 ESSENTIAL HYPERTENSION: Primary | ICD-10-CM

## 2019-10-18 DIAGNOSIS — M25.561 BILATERAL CHRONIC KNEE PAIN: ICD-10-CM

## 2019-10-18 DIAGNOSIS — J45.909 EXTRINSIC ASTHMA WITHOUT COMPLICATION, UNSPECIFIED ASTHMA SEVERITY, UNSPECIFIED WHETHER PERSISTENT: ICD-10-CM

## 2019-10-18 DIAGNOSIS — M25.562 BILATERAL CHRONIC KNEE PAIN: ICD-10-CM

## 2019-10-18 PROCEDURE — 99213 OFFICE O/P EST LOW 20 MIN: CPT | Performed by: NURSE PRACTITIONER

## 2019-10-25 ENCOUNTER — OFFICE VISIT (OUTPATIENT)
Dept: BARIATRICS/WEIGHT MGMT | Age: 64
End: 2019-10-25
Payer: MEDICARE

## 2019-10-25 VITALS
SYSTOLIC BLOOD PRESSURE: 120 MMHG | WEIGHT: 236 LBS | BODY MASS INDEX: 43.43 KG/M2 | HEIGHT: 62 IN | DIASTOLIC BLOOD PRESSURE: 76 MMHG | HEART RATE: 84 BPM

## 2019-10-25 DIAGNOSIS — G89.29 BILATERAL CHRONIC KNEE PAIN: ICD-10-CM

## 2019-10-25 DIAGNOSIS — M19.90 ARTHRITIS: ICD-10-CM

## 2019-10-25 DIAGNOSIS — J45.909 EXTRINSIC ASTHMA WITHOUT COMPLICATION, UNSPECIFIED ASTHMA SEVERITY, UNSPECIFIED WHETHER PERSISTENT: ICD-10-CM

## 2019-10-25 DIAGNOSIS — E66.01 OBESITY, CLASS III, BMI 40-49.9 (MORBID OBESITY) (HCC): ICD-10-CM

## 2019-10-25 DIAGNOSIS — I10 ESSENTIAL HYPERTENSION: Primary | ICD-10-CM

## 2019-10-25 DIAGNOSIS — R73.03 PREDIABETES: ICD-10-CM

## 2019-10-25 DIAGNOSIS — M25.562 BILATERAL CHRONIC KNEE PAIN: ICD-10-CM

## 2019-10-25 DIAGNOSIS — M25.561 BILATERAL CHRONIC KNEE PAIN: ICD-10-CM

## 2019-10-25 DIAGNOSIS — E66.9 OBESITY (BMI 30-39.9): ICD-10-CM

## 2019-10-25 DIAGNOSIS — M79.7 FIBROMYALGIA: ICD-10-CM

## 2019-10-25 PROCEDURE — 99213 OFFICE O/P EST LOW 20 MIN: CPT | Performed by: NURSE PRACTITIONER

## 2019-12-10 ENCOUNTER — OFFICE VISIT (OUTPATIENT)
Dept: BARIATRICS/WEIGHT MGMT | Age: 64
End: 2019-12-10
Payer: MEDICARE

## 2019-12-10 VITALS
DIASTOLIC BLOOD PRESSURE: 70 MMHG | WEIGHT: 238 LBS | BODY MASS INDEX: 43.53 KG/M2 | HEART RATE: 74 BPM | SYSTOLIC BLOOD PRESSURE: 122 MMHG

## 2019-12-10 DIAGNOSIS — I10 ESSENTIAL HYPERTENSION: Primary | ICD-10-CM

## 2019-12-10 DIAGNOSIS — J45.909 EXTRINSIC ASTHMA WITHOUT COMPLICATION, UNSPECIFIED ASTHMA SEVERITY, UNSPECIFIED WHETHER PERSISTENT: ICD-10-CM

## 2019-12-10 DIAGNOSIS — M79.7 FIBROMYALGIA: ICD-10-CM

## 2019-12-10 DIAGNOSIS — M25.561 BILATERAL CHRONIC KNEE PAIN: ICD-10-CM

## 2019-12-10 DIAGNOSIS — E66.01 OBESITY, CLASS III, BMI 40-49.9 (MORBID OBESITY) (HCC): ICD-10-CM

## 2019-12-10 DIAGNOSIS — M25.562 BILATERAL CHRONIC KNEE PAIN: ICD-10-CM

## 2019-12-10 DIAGNOSIS — M19.90 ARTHRITIS: ICD-10-CM

## 2019-12-10 DIAGNOSIS — R73.03 PREDIABETES: ICD-10-CM

## 2019-12-10 DIAGNOSIS — G89.29 BILATERAL CHRONIC KNEE PAIN: ICD-10-CM

## 2019-12-10 PROCEDURE — 99213 OFFICE O/P EST LOW 20 MIN: CPT | Performed by: NURSE PRACTITIONER

## 2020-01-14 ENCOUNTER — OFFICE VISIT (OUTPATIENT)
Dept: BARIATRICS/WEIGHT MGMT | Age: 65
End: 2020-01-14
Payer: MEDICARE

## 2020-01-14 VITALS
SYSTOLIC BLOOD PRESSURE: 124 MMHG | HEIGHT: 62 IN | BODY MASS INDEX: 43.58 KG/M2 | DIASTOLIC BLOOD PRESSURE: 66 MMHG | WEIGHT: 236.8 LBS | HEART RATE: 72 BPM

## 2020-01-14 PROCEDURE — 99213 OFFICE O/P EST LOW 20 MIN: CPT | Performed by: NURSE PRACTITIONER

## 2020-01-14 NOTE — PROGRESS NOTES
Group Lifestyle Balance Follow-up Progress Note      Subjective     Patient is here for group medical appointment for Group Lifestyle Balance weight management program follow-up for the chronic conditions of HTN, Fibromyalgia, Prediabetes, Extremity Edema, Arthritis, Asthma, Chronic Migraine, Bilateral Chronic Knee Pain. Patient continues on the program and tolerating well. Total weight loss of 0 lbs. No current issues. Allergies: Allergies   Allergen Reactions    Other Hives    Ceclor [Cefaclor]     Food      All melons      Sulfa Antibiotics      Projectile vomiting        Past Medical History:     Past Medical History:   Diagnosis Date    Anxiety     Arthritis     Bell's palsy     Chronic neck pain     posterior neck since     Chronic pain     low back and bilateral lower extremities    Fibromyalgia     HA (headache)     Headache     off/on since     HTN (hypertension)     Hypertension    .     Past Surgical History:  Past Surgical History:   Procedure Laterality Date    ARM SURGERY      rt arm fatty tumor     SECTION      JOINT REPLACEMENT Left     knee    SHOULDER SURGERY      rt rotator cuff repair    URETHRAL STRICTURE DILATATION      WISDOM TOOTH EXTRACTION         Family History:  Family History   Problem Relation Age of Onset    Other Mother         [facility] Diabetes Mother     Other Father         [facility] Diabetes Father     Thyroid Disease Sister     Cancer Brother         prostate    Diabetes Brother     Cancer Maternal Grandmother         uterine       Social History:  Social History     Socioeconomic History    Marital status:      Spouse name: Not on file    Number of children: Not on file    Years of education: Not on file    Highest education level: Not on file   Occupational History    Not on file   Social Needs    Financial resource strain: Not on file    Food insecurity:     Worry: Not on file     Inability: Looking Forward    Total time:  90 minutes, greater than 50% of visit spent in group counseling/education. Assessment:       Diagnosis Orders   1. Essential hypertension     2. Fibromyalgia     3. Obesity, Class III, BMI 40-49.9 (morbid obesity) ([address]     4. Prediabetes     5. Arthritis     6. Extrinsic asthma without complication, unspecified asthma severity, unspecified whether persistent     7. Chronic migraine     8. Bilateral chronic knee pain         Plan:      Track food and weight. Return to clinic as per group medical appointment schedule. Follow-up:  Return in about 1 month (around 2/14/2020). Orders:  No orders of the defined types were placed in this encounter. Prescriptions:  No orders of the defined types were placed in this encounter.       Electronically signed by:  Daria Amlazan CNP

## 2020-02-11 ENCOUNTER — OFFICE VISIT (OUTPATIENT)
Dept: BARIATRICS/WEIGHT MGMT | Age: 65
End: 2020-02-11
Payer: MEDICARE

## 2020-02-11 VITALS
HEART RATE: 74 BPM | BODY MASS INDEX: 43.43 KG/M2 | HEIGHT: 62 IN | SYSTOLIC BLOOD PRESSURE: 122 MMHG | WEIGHT: 236 LBS | DIASTOLIC BLOOD PRESSURE: 70 MMHG

## 2020-02-11 PROCEDURE — 99213 OFFICE O/P EST LOW 20 MIN: CPT | Performed by: NURSE PRACTITIONER

## 2020-02-11 NOTE — PROGRESS NOTES
Not on file    Transportation needs:     Medical: Not on file     Non-medical: Not on file   Tobacco Use    Smoking status: Never Smoker    Smokeless tobacco: Never Used   Substance and Sexual Activity    Alcohol use: No    Drug use: No    Sexual activity: Not on file   Lifestyle    Physical activity:     Days per week: Not on file     Minutes per session: Not on file    Stress: Not on file   Relationships    Social connections:     Talks on phone: Not on file     Gets together: Not on file     Attends Yarsanism service: Not on file     Active member of club or organization: Not on file     Attends meetings of clubs or organizations: Not on file     Relationship status: Not on file    Intimate partner violence:     Fear of current or ex partner: Not on file     Emotionally abused: Not on file     Physically abused: Not on file     Forced sexual activity: Not on file   Other Topics Concern    Not on file   Social History Narrative    Not on file       Current Medications:  Current Outpatient Medications   Medication Sig Dispense Refill    metoprolol succinate (TOPROL XL) 100 MG extended release tablet TAKE 1 TABLET DAILY 90 tablet 3    meloxicam (MOBIC) 15 MG tablet TAKE 1 TABLET BY MOUTH ONCE DAILY WITH FOOD  1    traMADol (ULTRAM) 50 MG tablet TAKE 1 TABLET BY MOUTH EVERY 8 HOURS AS NEEDED FOR PAIN  0    loratadine (CLARITIN) 10 MG capsule Take 10 mg by mouth daily      aspirin 81 MG tablet Take 81 mg by mouth daily      cetirizine (ZYRTEC) 10 MG tablet Take 10 mg by mouth daily      Multiple Vitamins-Minerals (MULTIVITAMIN ADULT PO) Take by mouth      losartan-hydrochlorothiazide (HYZAAR) 50-12.5 MG per tablet TAKE 1 TABLET DAILY 90 tablet 1    docusate sodium (COLACE) 100 MG capsule Take 100 mg by mouth 2 times daily      albuterol sulfate HFA (PROAIR HFA) 108 (90 Base) MCG/ACT inhaler Inhale 2 puffs into the lungs every 4 hours as needed for Wheezing 1 Inhaler 2    Handicap Placard MISC by Forward    Total time:  90 minutes, greater than 50% of visit spent in group counseling/education. Assessment:       Diagnosis Orders   1. Essential hypertension     2. Fibromyalgia     3. Prediabetes     4. Arthritis     5. Chronic migraine     6. Bilateral chronic knee pain     7. Obesity, Class III, BMI 40-49.9 (morbid obesity) ([facility] [address]         Plan:      Track food and weight. Return to clinic as per group medical appointment schedule. Follow-up:  Return in about 1 month (around 3/11/2020). Orders:  No orders of the defined types were placed in this encounter. Prescriptions:  No orders of the defined types were placed in this encounter.       Electronically signed by:  Daria Almazan CNP

## 2020-04-03 ENCOUNTER — HOSPITAL ENCOUNTER (INPATIENT)
Age: 65
LOS: 4 days | Discharge: HOME | DRG: 417 | End: 2020-04-07
Attending: EMERGENCY MEDICINE | Admitting: FAMILY MEDICINE
Payer: MEDICARE

## 2020-04-03 ENCOUNTER — APPOINTMENT (OUTPATIENT)
Dept: CT IMAGING | Age: 65
DRG: 417 | End: 2020-04-03
Payer: MEDICARE

## 2020-04-03 PROBLEM — K85.90 ACUTE PANCREATITIS: Status: ACTIVE | Noted: 2020-04-03

## 2020-04-03 LAB
ABSOLUTE EOS #: 0.24 K/UL (ref 0–0.44)
ABSOLUTE IMMATURE GRANULOCYTE: 0.07 K/UL (ref 0–0.3)
ABSOLUTE LYMPH #: 2.55 K/UL (ref 1.1–3.7)
ABSOLUTE MONO #: 0.72 K/UL (ref 0.1–1.2)
ALBUMIN SERPL-MCNC: 4.5 G/DL (ref 3.5–5.2)
ALBUMIN/GLOBULIN RATIO: NORMAL (ref 1–2.5)
ALP BLD-CCNC: 73 U/L (ref 35–104)
ALT SERPL-CCNC: 24 U/L (ref 5–33)
ANION GAP SERPL CALCULATED.3IONS-SCNC: 15 MMOL/L (ref 9–17)
AST SERPL-CCNC: 27 U/L
BASOPHILS # BLD: 0 % (ref 0–2)
BASOPHILS ABSOLUTE: 0.06 K/UL (ref 0–0.2)
BILIRUB SERPL-MCNC: 0.41 MG/DL (ref 0.3–1.2)
BILIRUBIN DIRECT: 0.18 MG/DL
BILIRUBIN, INDIRECT: 0.23 MG/DL (ref 0–1)
BUN BLDV-MCNC: 26 MG/DL (ref 8–23)
BUN/CREAT BLD: 37 (ref 9–20)
CALCIUM SERPL-MCNC: 9.6 MG/DL (ref 8.6–10.4)
CHLORIDE BLD-SCNC: 99 MMOL/L (ref 98–107)
CO2: 25 MMOL/L (ref 20–31)
CREAT SERPL-MCNC: 0.71 MG/DL (ref 0.5–0.9)
DIFFERENTIAL TYPE: ABNORMAL
EOSINOPHILS RELATIVE PERCENT: 2 % (ref 1–4)
GFR AFRICAN AMERICAN: >60 ML/MIN
GFR NON-AFRICAN AMERICAN: >60 ML/MIN
GFR SERPL CREATININE-BSD FRML MDRD: ABNORMAL ML/MIN/{1.73_M2}
GFR SERPL CREATININE-BSD FRML MDRD: ABNORMAL ML/MIN/{1.73_M2}
GLOBULIN: NORMAL G/DL (ref 1.5–3.8)
GLUCOSE BLD-MCNC: 179 MG/DL (ref 70–99)
HCT VFR BLD CALC: 47.9 % (ref 36.3–47.1)
HEMOGLOBIN: 16 G/DL (ref 11.9–15.1)
IMMATURE GRANULOCYTES: 1 %
LACTIC ACID: 2.6 MMOL/L (ref 0.5–2.2)
LIPASE: >3000 U/L (ref 13–60)
LYMPHOCYTES # BLD: 17 % (ref 24–43)
MCH RBC QN AUTO: 31.7 PG (ref 25.2–33.5)
MCHC RBC AUTO-ENTMCNC: 33.4 G/DL (ref 28.4–34.8)
MCV RBC AUTO: 94.9 FL (ref 82.6–102.9)
MONOCYTES # BLD: 5 % (ref 3–12)
NRBC AUTOMATED: 0 PER 100 WBC
PDW BLD-RTO: 13.2 % (ref 11.8–14.4)
PLATELET # BLD: 249 K/UL (ref 138–453)
PLATELET ESTIMATE: ABNORMAL
PMV BLD AUTO: 10.8 FL (ref 8.1–13.5)
POTASSIUM SERPL-SCNC: 3.9 MMOL/L (ref 3.7–5.3)
RBC # BLD: 5.05 M/UL (ref 3.95–5.11)
RBC # BLD: ABNORMAL 10*6/UL
SEG NEUTROPHILS: 75 % (ref 36–65)
SEGMENTED NEUTROPHILS ABSOLUTE COUNT: 11.53 K/UL (ref 1.5–8.1)
SODIUM BLD-SCNC: 139 MMOL/L (ref 135–144)
TOTAL PROTEIN: 7.3 G/DL (ref 6.4–8.3)
WBC # BLD: 15.2 K/UL (ref 3.5–11.3)
WBC # BLD: ABNORMAL 10*3/UL

## 2020-04-03 PROCEDURE — 6360000002 HC RX W HCPCS: Performed by: SURGERY

## 2020-04-03 PROCEDURE — 83690 ASSAY OF LIPASE: CPT

## 2020-04-03 PROCEDURE — 2580000003 HC RX 258: Performed by: SURGERY

## 2020-04-03 PROCEDURE — 1200000000 HC SEMI PRIVATE

## 2020-04-03 PROCEDURE — 87040 BLOOD CULTURE FOR BACTERIA: CPT

## 2020-04-03 PROCEDURE — 2580000003 HC RX 258: Performed by: EMERGENCY MEDICINE

## 2020-04-03 PROCEDURE — 96375 TX/PRO/DX INJ NEW DRUG ADDON: CPT

## 2020-04-03 PROCEDURE — 83605 ASSAY OF LACTIC ACID: CPT

## 2020-04-03 PROCEDURE — 99285 EMERGENCY DEPT VISIT HI MDM: CPT

## 2020-04-03 PROCEDURE — 74177 CT ABD & PELVIS W/CONTRAST: CPT

## 2020-04-03 PROCEDURE — 85025 COMPLETE CBC W/AUTO DIFF WBC: CPT

## 2020-04-03 PROCEDURE — 2500000003 HC RX 250 WO HCPCS: Performed by: NURSE PRACTITIONER

## 2020-04-03 PROCEDURE — 96374 THER/PROPH/DIAG INJ IV PUSH: CPT

## 2020-04-03 PROCEDURE — 6360000004 HC RX CONTRAST MEDICATION: Performed by: EMERGENCY MEDICINE

## 2020-04-03 PROCEDURE — 6360000002 HC RX W HCPCS: Performed by: EMERGENCY MEDICINE

## 2020-04-03 PROCEDURE — 2580000003 HC RX 258: Performed by: NURSE PRACTITIONER

## 2020-04-03 PROCEDURE — 80076 HEPATIC FUNCTION PANEL: CPT

## 2020-04-03 PROCEDURE — 99222 1ST HOSP IP/OBS MODERATE 55: CPT | Performed by: NURSE PRACTITIONER

## 2020-04-03 PROCEDURE — 80048 BASIC METABOLIC PNL TOTAL CA: CPT

## 2020-04-03 RX ORDER — LOSARTAN POTASSIUM AND HYDROCHLOROTHIAZIDE 12.5; 5 MG/1; MG/1
1 TABLET ORAL DAILY
Status: DISCONTINUED | OUTPATIENT
Start: 2020-04-04 | End: 2020-04-03 | Stop reason: ALTCHOICE

## 2020-04-03 RX ORDER — SODIUM CHLORIDE 0.9 % (FLUSH) 0.9 %
10 SYRINGE (ML) INJECTION EVERY 12 HOURS SCHEDULED
Status: DISCONTINUED | OUTPATIENT
Start: 2020-04-03 | End: 2020-04-06

## 2020-04-03 RX ORDER — SODIUM CHLORIDE 0.9 % (FLUSH) 0.9 %
10 SYRINGE (ML) INJECTION PRN
Status: DISCONTINUED | OUTPATIENT
Start: 2020-04-03 | End: 2020-04-07 | Stop reason: HOSPADM

## 2020-04-03 RX ORDER — HYDROCODONE BITARTRATE AND ACETAMINOPHEN 5; 325 MG/1; MG/1
1 TABLET ORAL EVERY 4 HOURS PRN
Status: DISCONTINUED | OUTPATIENT
Start: 2020-04-03 | End: 2020-04-07 | Stop reason: HOSPADM

## 2020-04-03 RX ORDER — ACETAMINOPHEN 325 MG/1
650 TABLET ORAL EVERY 4 HOURS PRN
Status: DISCONTINUED | OUTPATIENT
Start: 2020-04-03 | End: 2020-04-07 | Stop reason: HOSPADM

## 2020-04-03 RX ORDER — ONDANSETRON 2 MG/ML
4 INJECTION INTRAMUSCULAR; INTRAVENOUS EVERY 6 HOURS PRN
Status: DISCONTINUED | OUTPATIENT
Start: 2020-04-03 | End: 2020-04-07 | Stop reason: HOSPADM

## 2020-04-03 RX ORDER — SODIUM CHLORIDE 9 MG/ML
INJECTION, SOLUTION INTRAVENOUS CONTINUOUS
Status: DISCONTINUED | OUTPATIENT
Start: 2020-04-03 | End: 2020-04-03

## 2020-04-03 RX ORDER — MAGNESIUM SULFATE 1 G/100ML
1 INJECTION INTRAVENOUS PRN
Status: DISCONTINUED | OUTPATIENT
Start: 2020-04-03 | End: 2020-04-07 | Stop reason: HOSPADM

## 2020-04-03 RX ORDER — METOPROLOL SUCCINATE 50 MG/1
100 TABLET, EXTENDED RELEASE ORAL DAILY
Status: DISCONTINUED | OUTPATIENT
Start: 2020-04-04 | End: 2020-04-07 | Stop reason: HOSPADM

## 2020-04-03 RX ORDER — ASPIRIN 81 MG/1
81 TABLET ORAL DAILY
Status: DISCONTINUED | OUTPATIENT
Start: 2020-04-04 | End: 2020-04-03

## 2020-04-03 RX ORDER — MORPHINE SULFATE 4 MG/ML
4 INJECTION, SOLUTION INTRAMUSCULAR; INTRAVENOUS ONCE
Status: COMPLETED | OUTPATIENT
Start: 2020-04-03 | End: 2020-04-03

## 2020-04-03 RX ORDER — DOCUSATE SODIUM 100 MG/1
100 CAPSULE, LIQUID FILLED ORAL 2 TIMES DAILY
Status: DISCONTINUED | OUTPATIENT
Start: 2020-04-03 | End: 2020-04-07 | Stop reason: HOSPADM

## 2020-04-03 RX ORDER — HYDROCODONE BITARTRATE AND ACETAMINOPHEN 5; 325 MG/1; MG/1
2 TABLET ORAL EVERY 4 HOURS PRN
Status: DISCONTINUED | OUTPATIENT
Start: 2020-04-03 | End: 2020-04-07 | Stop reason: HOSPADM

## 2020-04-03 RX ORDER — ONDANSETRON 2 MG/ML
4 INJECTION INTRAMUSCULAR; INTRAVENOUS ONCE
Status: COMPLETED | OUTPATIENT
Start: 2020-04-03 | End: 2020-04-03

## 2020-04-03 RX ORDER — LISINOPRIL AND HYDROCHLOROTHIAZIDE 20; 12.5 MG/1; MG/1
1 TABLET ORAL DAILY
Status: DISCONTINUED | OUTPATIENT
Start: 2020-04-04 | End: 2020-04-07 | Stop reason: HOSPADM

## 2020-04-03 RX ORDER — 0.9 % SODIUM CHLORIDE 0.9 %
500 INTRAVENOUS SOLUTION INTRAVENOUS ONCE
Status: COMPLETED | OUTPATIENT
Start: 2020-04-03 | End: 2020-04-03

## 2020-04-03 RX ORDER — PROMETHAZINE HYDROCHLORIDE 12.5 MG/1
12.5 TABLET ORAL EVERY 6 HOURS PRN
Status: DISCONTINUED | OUTPATIENT
Start: 2020-04-03 | End: 2020-04-07 | Stop reason: HOSPADM

## 2020-04-03 RX ORDER — CETIRIZINE HYDROCHLORIDE 10 MG/1
10 TABLET ORAL DAILY
Status: DISCONTINUED | OUTPATIENT
Start: 2020-04-04 | End: 2020-04-07 | Stop reason: HOSPADM

## 2020-04-03 RX ORDER — 0.9 % SODIUM CHLORIDE 0.9 %
80 INTRAVENOUS SOLUTION INTRAVENOUS ONCE
Status: COMPLETED | OUTPATIENT
Start: 2020-04-03 | End: 2020-04-03

## 2020-04-03 RX ORDER — SODIUM CHLORIDE, SODIUM LACTATE, POTASSIUM CHLORIDE, CALCIUM CHLORIDE 600; 310; 30; 20 MG/100ML; MG/100ML; MG/100ML; MG/100ML
INJECTION, SOLUTION INTRAVENOUS CONTINUOUS
Status: DISCONTINUED | OUTPATIENT
Start: 2020-04-03 | End: 2020-04-05

## 2020-04-03 RX ORDER — POTASSIUM CHLORIDE 7.45 MG/ML
10 INJECTION INTRAVENOUS PRN
Status: DISCONTINUED | OUTPATIENT
Start: 2020-04-03 | End: 2020-04-07 | Stop reason: HOSPADM

## 2020-04-03 RX ADMIN — ONDANSETRON 4 MG: 2 INJECTION INTRAMUSCULAR; INTRAVENOUS at 19:32

## 2020-04-03 RX ADMIN — Medication 10 ML: at 20:09

## 2020-04-03 RX ADMIN — SODIUM CHLORIDE: 9 INJECTION, SOLUTION INTRAVENOUS at 22:15

## 2020-04-03 RX ADMIN — SODIUM CHLORIDE 500 ML: 9 INJECTION, SOLUTION INTRAVENOUS at 21:04

## 2020-04-03 RX ADMIN — FAMOTIDINE 20 MG: 10 INJECTION INTRAVENOUS at 22:34

## 2020-04-03 RX ADMIN — SODIUM CHLORIDE 80 ML: 9 INJECTION, SOLUTION INTRAVENOUS at 20:09

## 2020-04-03 RX ADMIN — IOPAMIDOL 75 ML: 755 INJECTION, SOLUTION INTRAVENOUS at 20:10

## 2020-04-03 RX ADMIN — HYDROMORPHONE HYDROCHLORIDE 0.25 MG: 1 INJECTION, SOLUTION INTRAMUSCULAR; INTRAVENOUS; SUBCUTANEOUS at 22:38

## 2020-04-03 RX ADMIN — AMPICILLIN SODIUM AND SULBACTAM SODIUM 1.5 G: 1; .5 INJECTION, POWDER, FOR SOLUTION INTRAMUSCULAR; INTRAVENOUS at 21:13

## 2020-04-03 RX ADMIN — Medication 10 ML: at 22:34

## 2020-04-03 RX ADMIN — SODIUM CHLORIDE, POTASSIUM CHLORIDE, SODIUM LACTATE AND CALCIUM CHLORIDE: 600; 310; 30; 20 INJECTION, SOLUTION INTRAVENOUS at 22:40

## 2020-04-03 RX ADMIN — MORPHINE SULFATE 4 MG: 4 INJECTION, SOLUTION INTRAMUSCULAR; INTRAVENOUS at 19:32

## 2020-04-03 ASSESSMENT — PAIN - FUNCTIONAL ASSESSMENT: PAIN_FUNCTIONAL_ASSESSMENT: ACTIVITIES ARE NOT PREVENTED

## 2020-04-03 ASSESSMENT — PAIN DESCRIPTION - DIRECTION: RADIATING_TOWARDS: RIGHT FLANK

## 2020-04-03 ASSESSMENT — ENCOUNTER SYMPTOMS
ABDOMINAL PAIN: 1
VOMITING: 1
NAUSEA: 1

## 2020-04-03 ASSESSMENT — PAIN DESCRIPTION - ORIENTATION: ORIENTATION: RIGHT

## 2020-04-03 ASSESSMENT — PAIN DESCRIPTION - FREQUENCY: FREQUENCY: CONTINUOUS

## 2020-04-03 ASSESSMENT — PAIN SCALES - GENERAL
PAINLEVEL_OUTOF10: 3
PAINLEVEL_OUTOF10: 9
PAINLEVEL_OUTOF10: 3
PAINLEVEL_OUTOF10: 10
PAINLEVEL_OUTOF10: 3
PAINLEVEL_OUTOF10: 6

## 2020-04-03 ASSESSMENT — PAIN DESCRIPTION - PAIN TYPE: TYPE: ACUTE PAIN

## 2020-04-03 ASSESSMENT — PAIN DESCRIPTION - ONSET: ONSET: ON-GOING

## 2020-04-03 ASSESSMENT — PAIN DESCRIPTION - PROGRESSION: CLINICAL_PROGRESSION: NOT CHANGED

## 2020-04-03 ASSESSMENT — PAIN DESCRIPTION - DESCRIPTORS: DESCRIPTORS: STABBING;SHARP

## 2020-04-03 ASSESSMENT — PAIN DESCRIPTION - LOCATION: LOCATION: ABDOMEN

## 2020-04-03 NOTE — ED NOTES
Pt complains of flank pain. Pt resting in bed no s/s of distress.       Enrique Brown RN  04/03/20 1949

## 2020-04-03 NOTE — ED PROVIDER NOTES
EMERGENCY DEPARTMENT ENCOUNTER    Pt Name: Amelia Lomeli  MRN: [id number]  [place] 1955  Date of evaluation: 4/3/20  CHIEF COMPLAINT       Chief Complaint   Patient presents with    Flank Pain     right x1 hour, seen for same issue 1 month ago [facility]- possible gallstones    Sweats     HISTORY OF PRESENT ILLNESS   38-year-old female presents emergency department with right upper quadrant/epigastric abdominal pain. Patient states that pain is been going on for the last 1 day. She has had issues with abdominal pain before. She was told it may be something related to her gallbladder but was unsure. She states that she had been seen at [facility] a few weeks ago for similar symptoms. She states that symptoms were not as bad at that time and she was discharged home. REVIEW OF SYSTEMS     Review of Systems   Constitutional: Positive for diaphoresis. Gastrointestinal: Positive for abdominal pain, nausea and vomiting. All other systems reviewed and are negative.     PASTMEDICAL HISTORY     Past Medical History:   Diagnosis Date    Anxiety     Arthritis     Bell's palsy     Chronic neck pain     posterior neck since     Chronic pain     low back and bilateral lower extremities    Fibromyalgia     HA (headache)     Headache     off/on since     HTN (hypertension)     Hypertension      SURGICAL HISTORY       Past Surgical History:   Procedure Laterality Date    ARM SURGERY      rt arm fatty tumor     SECTION      JOINT REPLACEMENT Left     knee    SHOULDER SURGERY      rt rotator cuff repair    URETHRAL STRICTURE DILATATION      WISDOM TOOTH EXTRACTION       CURRENT MEDICATIONS       Previous Medications    ALBUTEROL SULFATE HFA (PROAIR HFA) 108 (90 BASE) MCG/ACT INHALER    Inhale 2 puffs into the lungs every 4 hours as needed for Wheezing    ASPIRIN 81 MG TABLET    Take 81 mg by mouth daily    CETIRIZINE (ZYRTEC) 10 MG TABLET    Take 10 mg by mouth daily All other components within normal limits   LIPASE - Abnormal; Notable for the following components:    Lipase >3,000 (*)     All other components within normal limits   CULTURE, BLOOD 1   CULTURE, BLOOD 1   HEPATIC FUNCTION PANEL   LACTIC ACID       EMERGENCY DEPARTMENTCOURSE:         Vitals:    Vitals:    04/03/20 1847   BP: 133/68   Pulse: 61   Resp: 22   Temp: 97.7 °F (36.5 °C)   TempSrc: Oral   SpO2: 99%   Weight: 236 lb (107 kg)   Height: 5' 2\" (1.575 m)       The patient was given the following medications while in the emergency department:  Orders Placed This Encounter   Medications    morphine sulfate (PF) injection 4 mg    ondansetron (ZOFRAN) injection 4 mg    iopamidol (ISOVUE-370) 76 % injection 80 mL    sodium chloride flush 0.9 % injection 10 mL    0.9 % sodium chloride bolus    ampicillin-sulbactam (UNASYN) 1.5 g IVPB minibag    0.9 % sodium chloride bolus     CONSULTS:  IP CONSULT TO GENERAL SURGERY  IP CONSULT TO INTERNAL MEDICINE    FINAL IMPRESSION    No diagnosis found.       DISPOSITION/PLAN   DISPOSITION Admitted 04/03/2020 09:08:50 PM      PATIENT REFERRED TO:  Bhanu Santizo [id number]  [address]  New Prescriptions    No medications on file     Nicole Garza MD  Attending Emergency Physician                 Chaim White MD  04/03/20 4546

## 2020-04-04 ENCOUNTER — APPOINTMENT (OUTPATIENT)
Dept: ULTRASOUND IMAGING | Age: 65
DRG: 417 | End: 2020-04-04
Payer: MEDICARE

## 2020-04-04 PROBLEM — K85.10 ACUTE BILIARY PANCREATITIS: Status: ACTIVE | Noted: 2020-04-04

## 2020-04-04 PROBLEM — K80.00 CALCULUS OF GALLBLADDER WITH ACUTE CHOLECYSTITIS: Status: ACTIVE | Noted: 2020-04-04

## 2020-04-04 LAB
ABSOLUTE EOS #: 0.16 K/UL (ref 0–0.44)
ABSOLUTE IMMATURE GRANULOCYTE: 0.05 K/UL (ref 0–0.3)
ABSOLUTE LYMPH #: 2.39 K/UL (ref 1.1–3.7)
ABSOLUTE MONO #: 0.77 K/UL (ref 0.1–1.2)
ALBUMIN SERPL-MCNC: 4 G/DL (ref 3.5–5.2)
ALBUMIN/GLOBULIN RATIO: ABNORMAL (ref 1–2.5)
ALP BLD-CCNC: 66 U/L (ref 35–104)
ALT SERPL-CCNC: 51 U/L (ref 5–33)
AMYLASE: 373 U/L (ref 28–100)
ANION GAP SERPL CALCULATED.3IONS-SCNC: 12 MMOL/L (ref 9–17)
AST SERPL-CCNC: 41 U/L
BASOPHILS # BLD: 0 % (ref 0–2)
BASOPHILS ABSOLUTE: 0.04 K/UL (ref 0–0.2)
BILIRUB SERPL-MCNC: 0.53 MG/DL (ref 0.3–1.2)
BILIRUBIN URINE: NEGATIVE
BUN BLDV-MCNC: 21 MG/DL (ref 8–23)
BUN/CREAT BLD: 38 (ref 9–20)
CALCIUM IONIZED: 1.22 MMOL/L (ref 1.13–1.33)
CALCIUM SERPL-MCNC: 9.1 MG/DL (ref 8.6–10.4)
CHLORIDE BLD-SCNC: 103 MMOL/L (ref 98–107)
CO2: 26 MMOL/L (ref 20–31)
COLOR: YELLOW
COMMENT UA: NORMAL
CREAT SERPL-MCNC: 0.55 MG/DL (ref 0.5–0.9)
DIFFERENTIAL TYPE: ABNORMAL
EOSINOPHILS RELATIVE PERCENT: 2 % (ref 1–4)
GFR AFRICAN AMERICAN: >60 ML/MIN
GFR NON-AFRICAN AMERICAN: >60 ML/MIN
GFR SERPL CREATININE-BSD FRML MDRD: ABNORMAL ML/MIN/{1.73_M2}
GFR SERPL CREATININE-BSD FRML MDRD: ABNORMAL ML/MIN/{1.73_M2}
GLUCOSE BLD-MCNC: 108 MG/DL (ref 70–99)
GLUCOSE URINE: NEGATIVE
HCT VFR BLD CALC: 43.9 % (ref 36.3–47.1)
HEMOGLOBIN: 14.5 G/DL (ref 11.9–15.1)
IMMATURE GRANULOCYTES: 1 %
INR BLD: 1
KETONES, URINE: NEGATIVE
LACTIC ACID, SEPSIS WHOLE BLOOD: NORMAL MMOL/L (ref 0.5–1.9)
LACTIC ACID, SEPSIS: 1.6 MMOL/L (ref 0.5–1.9)
LEUKOCYTE ESTERASE, URINE: NEGATIVE
LIPASE: 1396 U/L (ref 13–60)
LYMPHOCYTES # BLD: 23 % (ref 24–43)
MAGNESIUM: 1.8 MG/DL (ref 1.6–2.6)
MCH RBC QN AUTO: 31.4 PG (ref 25.2–33.5)
MCHC RBC AUTO-ENTMCNC: 33 G/DL (ref 28.4–34.8)
MCV RBC AUTO: 95 FL (ref 82.6–102.9)
MONOCYTES # BLD: 8 % (ref 3–12)
NITRITE, URINE: NEGATIVE
NRBC AUTOMATED: 0 PER 100 WBC
PDW BLD-RTO: 13.4 % (ref 11.8–14.4)
PH UA: 6.5 (ref 5–8)
PHOSPHORUS: 2.8 MG/DL (ref 2.6–4.5)
PLATELET # BLD: 207 K/UL (ref 138–453)
PLATELET ESTIMATE: ABNORMAL
PMV BLD AUTO: 10.6 FL (ref 8.1–13.5)
POTASSIUM SERPL-SCNC: 3.9 MMOL/L (ref 3.7–5.3)
PROTEIN UA: NEGATIVE
PROTHROMBIN TIME: 10.7 SEC (ref 9.7–11.6)
RBC # BLD: 4.62 M/UL (ref 3.95–5.11)
RBC # BLD: ABNORMAL 10*6/UL
SEG NEUTROPHILS: 66 % (ref 36–65)
SEGMENTED NEUTROPHILS ABSOLUTE COUNT: 6.83 K/UL (ref 1.5–8.1)
SODIUM BLD-SCNC: 141 MMOL/L (ref 135–144)
SPECIFIC GRAVITY UA: 1.02 (ref 1–1.03)
TOTAL PROTEIN: 6.9 G/DL (ref 6.4–8.3)
TRIGL SERPL-MCNC: 211 MG/DL
TURBIDITY: CLEAR
URINE HGB: NEGATIVE
UROBILINOGEN, URINE: NORMAL
WBC # BLD: 10.2 K/UL (ref 3.5–11.3)
WBC # BLD: ABNORMAL 10*3/UL

## 2020-04-04 PROCEDURE — 1200000000 HC SEMI PRIVATE

## 2020-04-04 PROCEDURE — 6360000002 HC RX W HCPCS: Performed by: SURGERY

## 2020-04-04 PROCEDURE — 84100 ASSAY OF PHOSPHORUS: CPT

## 2020-04-04 PROCEDURE — 85610 PROTHROMBIN TIME: CPT

## 2020-04-04 PROCEDURE — 85025 COMPLETE CBC W/AUTO DIFF WBC: CPT

## 2020-04-04 PROCEDURE — 81003 URINALYSIS AUTO W/O SCOPE: CPT

## 2020-04-04 PROCEDURE — 83735 ASSAY OF MAGNESIUM: CPT

## 2020-04-04 PROCEDURE — 99232 SBSQ HOSP IP/OBS MODERATE 35: CPT | Performed by: FAMILY MEDICINE

## 2020-04-04 PROCEDURE — 2580000003 HC RX 258: Performed by: SURGERY

## 2020-04-04 PROCEDURE — 83690 ASSAY OF LIPASE: CPT

## 2020-04-04 PROCEDURE — 82330 ASSAY OF CALCIUM: CPT

## 2020-04-04 PROCEDURE — 83605 ASSAY OF LACTIC ACID: CPT

## 2020-04-04 PROCEDURE — 84478 ASSAY OF TRIGLYCERIDES: CPT

## 2020-04-04 PROCEDURE — 6370000000 HC RX 637 (ALT 250 FOR IP): Performed by: NURSE PRACTITIONER

## 2020-04-04 PROCEDURE — 36415 COLL VENOUS BLD VENIPUNCTURE: CPT

## 2020-04-04 PROCEDURE — 82150 ASSAY OF AMYLASE: CPT

## 2020-04-04 PROCEDURE — 76705 ECHO EXAM OF ABDOMEN: CPT

## 2020-04-04 PROCEDURE — 80053 COMPREHEN METABOLIC PANEL: CPT

## 2020-04-04 PROCEDURE — 2500000003 HC RX 250 WO HCPCS: Performed by: NURSE PRACTITIONER

## 2020-04-04 RX ADMIN — HYDROMORPHONE HYDROCHLORIDE 0.25 MG: 1 INJECTION, SOLUTION INTRAMUSCULAR; INTRAVENOUS; SUBCUTANEOUS at 05:25

## 2020-04-04 RX ADMIN — AMPICILLIN SODIUM AND SULBACTAM SODIUM 3 G: 2; 1 INJECTION, POWDER, FOR SOLUTION INTRAMUSCULAR; INTRAVENOUS at 21:12

## 2020-04-04 RX ADMIN — FAMOTIDINE 20 MG: 10 INJECTION INTRAVENOUS at 10:57

## 2020-04-04 RX ADMIN — ENOXAPARIN SODIUM 30 MG: 30 INJECTION SUBCUTANEOUS at 20:16

## 2020-04-04 RX ADMIN — METOPROLOL SUCCINATE 100 MG: 50 TABLET, EXTENDED RELEASE ORAL at 10:57

## 2020-04-04 RX ADMIN — SODIUM CHLORIDE, POTASSIUM CHLORIDE, SODIUM LACTATE AND CALCIUM CHLORIDE: 600; 310; 30; 20 INJECTION, SOLUTION INTRAVENOUS at 13:46

## 2020-04-04 RX ADMIN — FAMOTIDINE 20 MG: 10 INJECTION INTRAVENOUS at 20:16

## 2020-04-04 RX ADMIN — AMPICILLIN SODIUM AND SULBACTAM SODIUM 3 G: 2; 1 INJECTION, POWDER, FOR SOLUTION INTRAMUSCULAR; INTRAVENOUS at 15:29

## 2020-04-04 RX ADMIN — DOCUSATE SODIUM 100 MG: 100 CAPSULE, LIQUID FILLED ORAL at 10:57

## 2020-04-04 RX ADMIN — AMPICILLIN SODIUM AND SULBACTAM SODIUM 3 G: 2; 1 INJECTION, POWDER, FOR SOLUTION INTRAMUSCULAR; INTRAVENOUS at 02:32

## 2020-04-04 RX ADMIN — HYDROCODONE BITARTRATE AND ACETAMINOPHEN 2 TABLET: 5; 325 TABLET ORAL at 13:46

## 2020-04-04 RX ADMIN — SODIUM CHLORIDE, POTASSIUM CHLORIDE, SODIUM LACTATE AND CALCIUM CHLORIDE: 600; 310; 30; 20 INJECTION, SOLUTION INTRAVENOUS at 05:15

## 2020-04-04 RX ADMIN — HYDROMORPHONE HYDROCHLORIDE 0.25 MG: 1 INJECTION, SOLUTION INTRAMUSCULAR; INTRAVENOUS; SUBCUTANEOUS at 08:18

## 2020-04-04 RX ADMIN — DOCUSATE SODIUM 100 MG: 100 CAPSULE, LIQUID FILLED ORAL at 20:16

## 2020-04-04 RX ADMIN — SODIUM CHLORIDE, POTASSIUM CHLORIDE, SODIUM LACTATE AND CALCIUM CHLORIDE: 600; 310; 30; 20 INJECTION, SOLUTION INTRAVENOUS at 23:56

## 2020-04-04 RX ADMIN — ENOXAPARIN SODIUM 30 MG: 30 INJECTION SUBCUTANEOUS at 10:57

## 2020-04-04 RX ADMIN — AMPICILLIN SODIUM AND SULBACTAM SODIUM 3 G: 2; 1 INJECTION, POWDER, FOR SOLUTION INTRAMUSCULAR; INTRAVENOUS at 11:08

## 2020-04-04 RX ADMIN — HYDROCODONE BITARTRATE AND ACETAMINOPHEN 2 TABLET: 5; 325 TABLET ORAL at 21:13

## 2020-04-04 RX ADMIN — LISINOPRIL AND HYDROCHLOROTHIAZIDE 1 TABLET: 12.5; 2 TABLET ORAL at 10:57

## 2020-04-04 ASSESSMENT — PAIN DESCRIPTION - PAIN TYPE: TYPE: ACUTE PAIN

## 2020-04-04 ASSESSMENT — ENCOUNTER SYMPTOMS
ABDOMINAL PAIN: 1
RECTAL PAIN: 0
SHORTNESS OF BREATH: 0
NAUSEA: 1
VOMITING: 0
WHEEZING: 0
BACK PAIN: 1
RHINORRHEA: 0
BLOOD IN STOOL: 0
ANAL BLEEDING: 0
SORE THROAT: 0
COUGH: 0
CHEST TIGHTNESS: 0
ALLERGIC/IMMUNOLOGIC NEGATIVE: 1
RESPIRATORY NEGATIVE: 1
EYES NEGATIVE: 1
ABDOMINAL DISTENTION: 0
CONSTIPATION: 0
VOMITING: 1
DIARRHEA: 0

## 2020-04-04 ASSESSMENT — PAIN SCALES - GENERAL
PAINLEVEL_OUTOF10: 7
PAINLEVEL_OUTOF10: 4
PAINLEVEL_OUTOF10: 4
PAINLEVEL_OUTOF10: 6
PAINLEVEL_OUTOF10: 3
PAINLEVEL_OUTOF10: 0
PAINLEVEL_OUTOF10: 8
PAINLEVEL_OUTOF10: 4

## 2020-04-04 ASSESSMENT — PAIN DESCRIPTION - DESCRIPTORS: DESCRIPTORS: SHARP;STABBING

## 2020-04-04 ASSESSMENT — PAIN DESCRIPTION - LOCATION
LOCATION: ABDOMEN

## 2020-04-04 ASSESSMENT — PAIN DESCRIPTION - FREQUENCY: FREQUENCY: CONTINUOUS

## 2020-04-04 ASSESSMENT — PAIN DESCRIPTION - ONSET: ONSET: ON-GOING

## 2020-04-04 ASSESSMENT — PAIN DESCRIPTION - ORIENTATION
ORIENTATION: RIGHT;UPPER
ORIENTATION: RIGHT;UPPER

## 2020-04-04 ASSESSMENT — PAIN DESCRIPTION - PROGRESSION: CLINICAL_PROGRESSION: NOT CHANGED

## 2020-04-04 ASSESSMENT — PAIN DESCRIPTION - DIRECTION: RADIATING_TOWARDS: RIGHT FLANK

## 2020-04-04 ASSESSMENT — PAIN - FUNCTIONAL ASSESSMENT: PAIN_FUNCTIONAL_ASSESSMENT: ACTIVITIES ARE NOT PREVENTED

## 2020-04-04 NOTE — PROGRESS NOTES
[address]      Daily Progress Note     Admit Date: 4/3/2020  Bed/Room No.  2007/2007-02  Admitting Physician : Marnell Sever, MD  Code Status :[facility] Day:  LOS: 1 day   Chief Complaint:     Chief Complaint   Patient presents with    Flank Pain     right x1 hour, seen for same issue 1 month ago [facility]- possible gallstones    Sweats     Principal Problem:    Acute biliary pancreatitis  Active Problems:    Essential hypertension    Fibromyalgia    Obesity, Class III, BMI 40-49.9 (morbid obesity) ([address]    Acute pancreatitis    Calculus of gallbladder with acute cholecystitis  Resolved Problems:    * No resolved hospital problems. *    Subjective : Interval History/Significant events :  04/04/20    Patient is doing OK , she continues to have epigastric and RUQ abd pain . Denies any nausea. She is breathing without any difficulty. Vitals - Stable afebrile  Labs - stable     Nursing notes , Consults notes reviewed. Overnight events and updates discussed with Nursing staff . Background History:         Efren Burciaga is 75 y.o. female  Who was admitted to the hospital on 4/3/2020 for treatment of Acute biliary pancreatitis. Patient came to emergency room with acute onset right upper quadrant abdominal pain associated with nausea, vomiting. Pain was rated to back. She had been at [facility] about a month ago with similar issue and was diagnosed with chronic cholecystitis. Initial CT abdomen showed gallbladder distention and trace pericholecystic fluid concerning for cholecystitis. WBC was elevated at 15.2, lipase> 3000, bilirubin normal.  Patient was admitted for further treatment of acute biliary pancreatitis.       PMH:  Past Medical History:   Diagnosis Date    Anxiety     Arthritis     Bell's palsy     Chronic neck pain     posterior neck since fall April 2014    Chronic pain     low back and bilateral lower extremities    Fibromyalgia     HA (headache)     Headache off/on since fall April 2014    HTN (hypertension)     Hypertension       Allergies: Allergies   Allergen Reactions    Other Hives    Ceclor [Cefaclor]     Food      All melons      Sulfa Antibiotics      Projectile vomiting       Medications :  enoxaparin, 30 mg, Subcutaneous, BID  cetirizine, 10 mg, Oral, Daily  docusate sodium, 100 mg, Oral, BID  lisinopril-hydroCHLOROthiazide, 1 tablet, Oral, Daily  metoprolol succinate, 100 mg, Oral, Daily  sodium chloride flush, 10 mL, Intravenous, 2 times per day  famotidine (PEPCID) injection, 20 mg, Intravenous, BID  ampicillin-sulbactam, 3 g, Intravenous, Q6H        Review of Systems   Review of Systems   Constitutional: Negative for appetite change, fatigue, fever and unexpected weight change. HENT: Negative for congestion, rhinorrhea and sneezing. Eyes: Negative for visual disturbance. Respiratory: Negative for cough, chest tightness, shortness of breath and wheezing. Cardiovascular: Negative for chest pain and palpitations. Gastrointestinal: Positive for abdominal pain and nausea. Negative for blood in stool, constipation, diarrhea and vomiting. Genitourinary: Negative for dysuria, enuresis, frequency and hematuria. Musculoskeletal: Negative for arthralgias and myalgias. Skin: Negative for rash. Neurological: Negative for dizziness, weakness, light-headedness and headaches. Hematological: Does not bruise/bleed easily. Psychiatric/Behavioral: Negative for dysphoric mood and sleep disturbance.      Objective :      Current Vitals : Temp: 98.1 °F (36.7 °C),  Pulse: 60, Resp: 18, BP: (!) 141/71, SpO2: 98 %  Last 24 Hrs Vitals   Patient Vitals for the past 24 hrs:   BP Temp Temp src Pulse Resp SpO2 Height Weight   04/04/20 0743 (!) 141/71 98.1 °F (36.7 °C) Oral 60 18 98 % -- --   04/04/20 0424 -- -- -- -- -- -- -- 240 lb (108.9 kg)   04/03/20 2154 137/82 97.7 °F (36.5 °C) Oral 68 18 97 % -- --   04/03/20 1847 133/68 97.7 °F (36.5 °C) Oral 61 Recent Labs     04/03/20 1912 04/04/20  0648    141   K 3.9 3.9   CL 99 103   CO2 25 26   GLUCOSE 179* 108*   BUN 26* 21   CREATININE 0.71 0.55   MG  --  1.8   CALCIUM 9.6 9.1   PHOS  --  2.8     Recent Labs     04/03/20 1912 04/04/20  0648   PROT 7.3 6.9   LABALBU 4.5 4.0   AST 27 41*   ALT 24 51*   ALKPHOS 73 66   BILITOT 0.41 0.53   BILIDIR 0.18  --    AMYLASE  --  373*   LIPASE >3,000* 1,396*   TRIG  --  211*          Specific Gravity, UA   Date Value Ref Range Status   04/04/2020 1.020 1.005 - 1.030 Final     Protein, UA   Date Value Ref Range Status   04/04/2020 NEGATIVE NEGATIVE Final     Nitrite, Urine   Date Value Ref Range Status   04/04/2020 NEGATIVE NEGATIVE Final     Leukocyte Esterase, Urine   Date Value Ref Range Status   04/04/2020 NEGATIVE NEGATIVE Final       Imaging / Clinical Data :-   Ct Abdomen Pelvis W Iv Contrast Additional Contrast? None    Result Date: 4/3/2020  Gallbladder distension and trace pericholecystic fluid. Please correlate for cholecystitis. Consider gallbladder ultrasound. Clinical Course : stable  Assessment and Plan  :        Acute biliary pancreatitis -bowel rest, IV antibiotics, follow ultrasound gallbladder. Patient will need cholecystectomy. General surgery following. Essential hypertension - controlled. Fibromyalgia -   Anxiety disorder       Continue to monitor vitals , Intake / output ,  Cell count , HGB , Kidney function, oxygenation  as indicated . Plan and updates discussed with patient ,  answers  explained to satisfaction.    Plan discussed with Staff  RN     (Please note that portions of this note were completed with a voice recognition program. Efforts were made to edit the dictations but occasionally words are mis-transcribed.)      Stanislaw Chen MD  4/4/2020

## 2020-04-04 NOTE — PROGRESS NOTES
Pt admitted to room 2007 from ER at 2150. Patient alert and oriented x4. Oriented to room and call light/tv controls. Bed in lowest position, wheels locked, 2/4 side rails up  Call light in reach, room free of clutter, adequate lighting provided.

## 2020-04-04 NOTE — PLAN OF CARE
Problem: Falls - Risk of:  Goal: Will remain free from falls  Description: Will remain free from falls  4/4/2020 0143 by Elaine Kirk RN  Outcome: Ongoing  4/4/2020 0143 by Elaine Kirk RN  Note: Siderails up x 2  Hourly rounding  Call light in reach  Remains free from falls and accidental injury at this time   Floor free from obstacles  Bed is locked and in lowest position  Adequate lighting provided  Patient independent. Gait Steady. 4/4/2020 0142 by Elaine Kirk RN  Outcome: Ongoing  Goal: Absence of physical injury  Description: Absence of physical injury  4/4/2020 0143 by Elaine Kirk RN  Outcome: Ongoing  4/4/2020 0142 by Elaine Kirk RN  Outcome: Ongoing     Problem: Pain:  Goal: Pain level will decrease  Description: Pain level will decrease  4/4/2020 0143 by Elaine Kirk RN  Outcome: Ongoing  4/4/2020 0143 by Elaine Kirk RN  Note: Pain level assessment complete. Patient educated on pain scale and control interventions  PRN pain medication given per patient request-Dilaudid  Patient instructed to call out with new onset of pain or unrelieved pain    4/4/2020 0142 by Elaine Kirk RN  Outcome: Ongoing  Goal: Control of acute pain  Description: Control of acute pain  4/4/2020 0143 by Elaine Kirk RN  Outcome: Ongoing  4/4/2020 0142 by Elaine Kirk RN  Outcome: Ongoing  Goal: Control of chronic pain  Description: Control of chronic pain  4/4/2020 0143 by Elaine Kirk RN  Outcome: Ongoing  4/4/2020 0142 by Elaine Kirk RN  Outcome: Ongoing     Problem: Activity:  Goal: Risk for activity intolerance will decrease  Description: Risk for activity intolerance will decrease  4/4/2020 0143 by Elaine Kirk RN  Outcome: Ongoing  4/4/2020 0142 by Elaine Kirk RN  Outcome: Ongoing     Problem:  Bowel/Gastric:  Goal: Bowel function will improve  Description: Bowel function will improve  4/4/2020 0143 by Elaine Kirk RN  Outcome:

## 2020-04-04 NOTE — PLAN OF CARE
Problem: Falls - Risk of:  Goal: Will remain free from falls  Description: Will remain free from falls  4/4/2020 0143 by Jaime Patricia RN  Outcome: Ongoing  4/4/2020 0143 by Jaime Patricia RN  Note: Siderails up x 2  Hourly rounding  Call light in reach  Remains free from falls and accidental injury at this time   Floor free from obstacles  Bed is locked and in lowest position  Adequate lighting provided  Patient independent. Gait Steady.   4/4/2020 0142 by Jaime Patricia RN  Outcome: Ongoing

## 2020-04-04 NOTE — CONSULTS
0.18  --    AMYLASE  --  373*   LIPASE >3,000* 1,396*   TRIG  --  211*     IMAGING:   tive   EXAMINATION:   CT OF THE ABDOMEN AND PELVIS WITH CONTRAST 4/3/2020 7:52 pm       TECHNIQUE:   CT of the abdomen and pelvis was performed with the administration of   intravenous contrast. Multiplanar reformatted images are provided for review. Dose modulation, iterative reconstruction, and/or weight based adjustment of   the mA/kV was utilized to reduce the radiation dose to as low as reasonably   achievable.       COMPARISON:   CT abdomen and pelvis from 2011       HISTORY:   ORDERING SYSTEM PROVIDED HISTORY: abd pain, RUQ   TECHNOLOGIST PROVIDED HISTORY:       abd pain, RUQ   Reason for Exam: abd pain, ruq   Acuity: Acute   Type of Exam: Initial   Additional signs and symptoms: colic pain   Relevant Medical/Surgical History: htn, obesity,        FINDINGS:   Lower Chest: Dependent changes.  No airspace consolidation.  The heart is   upper limits of normal in size.       Organs: Fatty liver.  No focal hepatic lesion.  Gallbladder distension.    Trace pericholecystic fluid.  Borderline splenomegaly.  The pancreas is   normal.  No adrenal lesions.  Symmetric kidneys.  Lobulated contours of the   kidneys.  No renal collecting system dilatation.       GI/Bowel: Normal air-filled appendix.  Moderate colonic stool.  No dilated   small bowel.  No evidence of bowel obstruction.  Collapsed stomach limiting   its evaluation.       Pelvis: Bladder wall thickening.  Please correlate for cystitis.  Uterus is   normal.  No pelvic mass or nodularity.       Peritoneum/Retroperitoneum: Calcified plaquing of the aorta.  Normal caliber   aorta.  No adenopathy, free fluid, or free intraperitoneal air.       Bones/Soft Tissues: No focal soft tissue swelling.  No inguinal   lymphadenopathy.  Symmetric muscles and soft tissues.  The osseous structures   appear intact.           Impression   Gallbladder distension and trace pericholecystic fluid.  Please correlate for   cholecystitis.  Consider gallbladder ultrasound. ASSESSMENT:  1.)  Probable acute biliary pancreatitis. She seems to be improved compared to presentation but is still having pain and elevated lipase and amylase. A repeat ultrasound of the gallbladder is pending but reportedly gallstones were seen on outside ultrasound done in February. 2.)  Given the physical exam findings of tenderness over the gallbladder and the CT scan finding of a distended gallbladder with some mild pericholecystic fluid there are also signs consistent with a degree of acute cholecystitis. 3.)  Morbid obesity with BMI greater than 40  4.)  Hypertension    Active Hospital Problems    Diagnosis Date Noted    Acute biliary pancreatitis [K85.10] 04/04/2020    Calculus of gallbladder with acute cholecystitis [K80.00] 04/04/2020    Obesity, Class III, BMI 40-49.9 (morbid obesity) ([facility] [address] [E66.01] 03/14/2019    Essential hypertension [I10] 07/25/2017    Fibromyalgia [M79.7] 07/25/2017       PLAN:  The plan is for bowel rest and supportive care until resolution of her pancreatitis. An ultrasound of the gallbladder is pending as is today's white blood cell count. Would recommend continuing antibiotics to treat for suspected additional issue of acute cholecystitis. Once signs of pancreatitis have significantly resolved then she should proceed to cholecystectomy with intraoperative cholangiogram prior to hospital discharge. Keep n.p.o. for now but it is okay to take meds with a sip of water. Okay for Lovenox at this time and hold just prior to surgery.     .    Electronically signed by Herberth Abarca MD on 4/4/2020 at 8:37 AM     Copy sent to Dr. Nicole Blackmon DO

## 2020-04-04 NOTE — H&P
Conjunctiva/sclera: Conjunctivae normal.      Pupils: Pupils are equal, round, and reactive to light. Neck:      Musculoskeletal: Normal range of motion and neck supple. No neck rigidity or muscular tenderness. Vascular: No carotid bruit. Cardiovascular:      Rate and Rhythm: Normal rate and regular rhythm. Pulses: Normal pulses. Heart sounds: Normal heart sounds. No murmur. No friction rub. No gallop. Pulmonary:      Effort: Pulmonary effort is normal. No respiratory distress. Breath sounds: No stridor. Rales present. No wheezing or rhonchi. Comments: Rales at bases  Chest:      Chest wall: No tenderness. Abdominal:      General: Bowel sounds are normal. There is no distension. Palpations: Abdomen is soft. There is no mass. Tenderness: There is abdominal tenderness. There is no guarding or rebound. Hernia: No hernia is present. Musculoskeletal:         General: No swelling, tenderness, deformity or signs of injury. Right lower leg: Edema present. Left lower leg: Edema present. Comments: Trace edema   Lymphadenopathy:      Cervical: No cervical adenopathy. Skin:     General: Skin is warm and dry. Capillary Refill: Capillary refill takes less than 2 seconds. Coloration: Skin is not jaundiced or pale. Findings: No bruising, erythema, lesion or rash. Neurological:      General: No focal deficit present. Mental Status: She is alert and oriented to person, place, and time. Sensory: No sensory deficit. Motor: No weakness. Coordination: Coordination normal.   Psychiatric:         Mood and Affect: Mood normal.         Behavior: Behavior normal.         Thought Content:  Thought content normal.         Judgment: Judgment normal.         Investigations:      Laboratory Testing:  Recent Results (from the past 24 hour(s))   CBC Auto Differential    Collection Time: 04/03/20  7:12 PM   Result Value Ref Range    WBC 15.2 (H) Value Ref Range    Lipase >3,000 (H) 13 - 60 U/L   Lactic Acid    Collection Time: 04/03/20  9:02 PM   Result Value Ref Range    Lactic Acid 2.6 (H) 0.5 - 2.2 mmol/L       Imaging/Diagnostics:  Ct Abdomen Pelvis W Iv Contrast Additional Contrast? None    Result Date: 4/3/2020  Gallbladder distension and trace pericholecystic fluid. Please correlate for cholecystitis. Consider gallbladder ultrasound. Assessment :      Hospital Problems           Last Modified POA    * (Principal) Acute pancreatitis 4/4/2020 Yes    Essential hypertension 4/3/2020 Yes    Fibromyalgia 4/4/2020 Yes    Obesity, Class III, BMI 40-49.9 (morbid obesity) ([facility] [address] 4/3/2020 Yes          Plan:     Patient status inpatient in the  Med/Surge    Admit to inpatient on [facility] general surgery  Complete gallbladder ultrasound  Labs: CBC, CMP, amylase, ionized calcium, lipase, magnesium, phosphorus, PT/INR, triglycerides  Obtain urine for urinalysis and culture  Replete electrolytes as needed  Antimicrobials: Unasyn  Maintain n.p.o. status  IV hydration of lactated Ringer's at 150 mL/h  Continue home medication  Pain management: Norco or Dilaudid  Nausea management: Phenergan or Zofran    Consultations:   [address] TO INTERNAL MEDICINE    Patient is admitted as inpatient status because of co-morbidities listed above, severity of signs and symptoms as outlined, requirement for current medical therapies and most importantly because of direct risk to patient if care not provided in a hospital setting.     KILLIAN Dodd CNP  4/4/2020  3:37 AM    Copy sent to Dr. Bipin Wright DO

## 2020-04-05 LAB
ABSOLUTE EOS #: 0.27 K/UL (ref 0–0.44)
ABSOLUTE IMMATURE GRANULOCYTE: 0.03 K/UL (ref 0–0.3)
ABSOLUTE LYMPH #: 2.15 K/UL (ref 1.1–3.7)
ABSOLUTE MONO #: 0.62 K/UL (ref 0.1–1.2)
ALBUMIN SERPL-MCNC: 3.4 G/DL (ref 3.5–5.2)
ALBUMIN/GLOBULIN RATIO: ABNORMAL (ref 1–2.5)
ALP BLD-CCNC: 57 U/L (ref 35–104)
ALT SERPL-CCNC: 30 U/L (ref 5–33)
AMYLASE: 66 U/L (ref 28–100)
ANION GAP SERPL CALCULATED.3IONS-SCNC: 12 MMOL/L (ref 9–17)
AST SERPL-CCNC: 19 U/L
BASOPHILS # BLD: 0 % (ref 0–2)
BASOPHILS ABSOLUTE: <0.03 K/UL (ref 0–0.2)
BILIRUB SERPL-MCNC: 0.54 MG/DL (ref 0.3–1.2)
BILIRUBIN DIRECT: 0.17 MG/DL
BILIRUBIN, INDIRECT: 0.37 MG/DL (ref 0–1)
BUN BLDV-MCNC: 16 MG/DL (ref 8–23)
CALCIUM SERPL-MCNC: 8.7 MG/DL (ref 8.6–10.4)
CHLORIDE BLD-SCNC: 100 MMOL/L (ref 98–107)
CO2: 26 MMOL/L (ref 20–31)
CREAT SERPL-MCNC: 0.59 MG/DL (ref 0.5–0.9)
DIFFERENTIAL TYPE: NORMAL
EOSINOPHILS RELATIVE PERCENT: 4 % (ref 1–4)
GFR AFRICAN AMERICAN: >60 ML/MIN
GFR NON-AFRICAN AMERICAN: >60 ML/MIN
GFR SERPL CREATININE-BSD FRML MDRD: ABNORMAL ML/MIN/{1.73_M2}
GFR SERPL CREATININE-BSD FRML MDRD: ABNORMAL ML/MIN/{1.73_M2}
GLUCOSE BLD-MCNC: 96 MG/DL (ref 70–99)
HCT VFR BLD CALC: 40.6 % (ref 36.3–47.1)
HEMOGLOBIN: 13.2 G/DL (ref 11.9–15.1)
IMMATURE GRANULOCYTES: 0 %
LIPASE: 59 U/L (ref 13–60)
LYMPHOCYTES # BLD: 30 % (ref 24–43)
MCH RBC QN AUTO: 31.2 PG (ref 25.2–33.5)
MCHC RBC AUTO-ENTMCNC: 32.5 G/DL (ref 28.4–34.8)
MCV RBC AUTO: 96 FL (ref 82.6–102.9)
MONOCYTES # BLD: 9 % (ref 3–12)
NRBC AUTOMATED: 0 PER 100 WBC
PDW BLD-RTO: 13.4 % (ref 11.8–14.4)
PLATELET # BLD: 186 K/UL (ref 138–453)
PLATELET ESTIMATE: NORMAL
PMV BLD AUTO: 11 FL (ref 8.1–13.5)
POTASSIUM SERPL-SCNC: 3.6 MMOL/L (ref 3.7–5.3)
RBC # BLD: 4.23 M/UL (ref 3.95–5.11)
RBC # BLD: NORMAL 10*6/UL
SEG NEUTROPHILS: 57 % (ref 36–65)
SEGMENTED NEUTROPHILS ABSOLUTE COUNT: 4.12 K/UL (ref 1.5–8.1)
SODIUM BLD-SCNC: 138 MMOL/L (ref 135–144)
TOTAL PROTEIN: 5.6 G/DL (ref 6.4–8.3)
WBC # BLD: 7.2 K/UL (ref 3.5–11.3)
WBC # BLD: NORMAL 10*3/UL

## 2020-04-05 PROCEDURE — 6360000002 HC RX W HCPCS: Performed by: SURGERY

## 2020-04-05 PROCEDURE — 80053 COMPREHEN METABOLIC PANEL: CPT

## 2020-04-05 PROCEDURE — 2500000003 HC RX 250 WO HCPCS: Performed by: NURSE PRACTITIONER

## 2020-04-05 PROCEDURE — 2500000003 HC RX 250 WO HCPCS: Performed by: SURGERY

## 2020-04-05 PROCEDURE — 83690 ASSAY OF LIPASE: CPT

## 2020-04-05 PROCEDURE — 36415 COLL VENOUS BLD VENIPUNCTURE: CPT

## 2020-04-05 PROCEDURE — 6370000000 HC RX 637 (ALT 250 FOR IP): Performed by: NURSE PRACTITIONER

## 2020-04-05 PROCEDURE — 85025 COMPLETE CBC W/AUTO DIFF WBC: CPT

## 2020-04-05 PROCEDURE — 82150 ASSAY OF AMYLASE: CPT

## 2020-04-05 PROCEDURE — 99232 SBSQ HOSP IP/OBS MODERATE 35: CPT | Performed by: FAMILY MEDICINE

## 2020-04-05 PROCEDURE — 2580000003 HC RX 258: Performed by: SURGERY

## 2020-04-05 PROCEDURE — 82248 BILIRUBIN DIRECT: CPT

## 2020-04-05 PROCEDURE — 6360000002 HC RX W HCPCS: Performed by: NURSE PRACTITIONER

## 2020-04-05 PROCEDURE — 1200000000 HC SEMI PRIVATE

## 2020-04-05 RX ORDER — DEXTROSE, SODIUM CHLORIDE, AND POTASSIUM CHLORIDE 5; .9; .15 G/100ML; G/100ML; G/100ML
INJECTION INTRAVENOUS CONTINUOUS
Status: DISCONTINUED | OUTPATIENT
Start: 2020-04-05 | End: 2020-04-06 | Stop reason: ALTCHOICE

## 2020-04-05 RX ORDER — POTASSIUM CHLORIDE 7.45 MG/ML
10 INJECTION INTRAVENOUS
Status: DISPENSED | OUTPATIENT
Start: 2020-04-05 | End: 2020-04-05

## 2020-04-05 RX ADMIN — LISINOPRIL AND HYDROCHLOROTHIAZIDE 1 TABLET: 12.5; 2 TABLET ORAL at 08:59

## 2020-04-05 RX ADMIN — HYDROCODONE BITARTRATE AND ACETAMINOPHEN 2 TABLET: 5; 325 TABLET ORAL at 16:47

## 2020-04-05 RX ADMIN — AMPICILLIN SODIUM AND SULBACTAM SODIUM 3 G: 2; 1 INJECTION, POWDER, FOR SOLUTION INTRAMUSCULAR; INTRAVENOUS at 20:21

## 2020-04-05 RX ADMIN — AMPICILLIN SODIUM AND SULBACTAM SODIUM 3 G: 2; 1 INJECTION, POWDER, FOR SOLUTION INTRAMUSCULAR; INTRAVENOUS at 03:20

## 2020-04-05 RX ADMIN — DOCUSATE SODIUM 100 MG: 100 CAPSULE, LIQUID FILLED ORAL at 08:59

## 2020-04-05 RX ADMIN — CETIRIZINE HYDROCHLORIDE 10 MG: 10 TABLET, FILM COATED ORAL at 08:59

## 2020-04-05 RX ADMIN — DEXTROSE, SODIUM CHLORIDE, AND POTASSIUM CHLORIDE: 5; .9; .15 INJECTION INTRAVENOUS at 21:30

## 2020-04-05 RX ADMIN — ENOXAPARIN SODIUM 30 MG: 30 INJECTION SUBCUTANEOUS at 08:59

## 2020-04-05 RX ADMIN — METOPROLOL SUCCINATE 100 MG: 50 TABLET, EXTENDED RELEASE ORAL at 08:59

## 2020-04-05 RX ADMIN — DOCUSATE SODIUM 100 MG: 100 CAPSULE, LIQUID FILLED ORAL at 20:21

## 2020-04-05 RX ADMIN — FAMOTIDINE 20 MG: 10 INJECTION INTRAVENOUS at 20:21

## 2020-04-05 RX ADMIN — HYDROCODONE BITARTRATE AND ACETAMINOPHEN 1 TABLET: 5; 325 TABLET ORAL at 22:55

## 2020-04-05 RX ADMIN — ONDANSETRON 4 MG: 2 INJECTION INTRAMUSCULAR; INTRAVENOUS at 07:15

## 2020-04-05 RX ADMIN — POTASSIUM CHLORIDE 10 MEQ: 7.46 INJECTION, SOLUTION INTRAVENOUS at 11:07

## 2020-04-05 RX ADMIN — HYDROMORPHONE HYDROCHLORIDE 0.5 MG: 1 INJECTION, SOLUTION INTRAMUSCULAR; INTRAVENOUS; SUBCUTANEOUS at 00:29

## 2020-04-05 RX ADMIN — AMPICILLIN SODIUM AND SULBACTAM SODIUM 3 G: 2; 1 INJECTION, POWDER, FOR SOLUTION INTRAMUSCULAR; INTRAVENOUS at 15:33

## 2020-04-05 RX ADMIN — POTASSIUM CHLORIDE 10 MEQ: 7.46 INJECTION, SOLUTION INTRAVENOUS at 10:03

## 2020-04-05 RX ADMIN — DEXTROSE, SODIUM CHLORIDE, AND POTASSIUM CHLORIDE: 5; .9; .15 INJECTION INTRAVENOUS at 10:01

## 2020-04-05 RX ADMIN — AMPICILLIN SODIUM AND SULBACTAM SODIUM 3 G: 2; 1 INJECTION, POWDER, FOR SOLUTION INTRAMUSCULAR; INTRAVENOUS at 08:59

## 2020-04-05 RX ADMIN — FAMOTIDINE 20 MG: 10 INJECTION INTRAVENOUS at 08:59

## 2020-04-05 RX ADMIN — POTASSIUM CHLORIDE 10 MEQ: 7.46 INJECTION, SOLUTION INTRAVENOUS at 11:09

## 2020-04-05 ASSESSMENT — PAIN SCALES - GENERAL
PAINLEVEL_OUTOF10: 8
PAINLEVEL_OUTOF10: 2
PAINLEVEL_OUTOF10: 8
PAINLEVEL_OUTOF10: 5

## 2020-04-05 ASSESSMENT — PAIN DESCRIPTION - FREQUENCY
FREQUENCY: CONTINUOUS
FREQUENCY: CONTINUOUS

## 2020-04-05 ASSESSMENT — ENCOUNTER SYMPTOMS
BLOOD IN STOOL: 0
CONSTIPATION: 0
COUGH: 0
NAUSEA: 1
VOMITING: 0
SHORTNESS OF BREATH: 0
DIARRHEA: 0
ABDOMINAL PAIN: 1
RHINORRHEA: 0
WHEEZING: 0
CHEST TIGHTNESS: 0

## 2020-04-05 ASSESSMENT — PAIN DESCRIPTION - ORIENTATION
ORIENTATION: RIGHT;UPPER
ORIENTATION: RIGHT;OUTER

## 2020-04-05 ASSESSMENT — PAIN DESCRIPTION - ONSET
ONSET: ON-GOING
ONSET: ON-GOING

## 2020-04-05 ASSESSMENT — PAIN - FUNCTIONAL ASSESSMENT
PAIN_FUNCTIONAL_ASSESSMENT: ACTIVITIES ARE NOT PREVENTED
PAIN_FUNCTIONAL_ASSESSMENT: ACTIVITIES ARE NOT PREVENTED

## 2020-04-05 ASSESSMENT — PAIN DESCRIPTION - DESCRIPTORS
DESCRIPTORS: DULL
DESCRIPTORS: SHARP;STABBING

## 2020-04-05 ASSESSMENT — PAIN DESCRIPTION - PROGRESSION: CLINICAL_PROGRESSION: NOT CHANGED

## 2020-04-05 ASSESSMENT — PAIN DESCRIPTION - DIRECTION: RADIATING_TOWARDS: RIGHT FLANK

## 2020-04-05 ASSESSMENT — PAIN DESCRIPTION - LOCATION
LOCATION: ABDOMEN
LOCATION: ABDOMEN

## 2020-04-05 ASSESSMENT — PAIN DESCRIPTION - PAIN TYPE
TYPE: ACUTE PAIN
TYPE: ACUTE PAIN

## 2020-04-05 NOTE — PLAN OF CARE
Problem: Falls - Risk of:  Goal: Will remain free from falls  Description: Will remain free from falls  Outcome: Ongoing  Goal: Absence of physical injury  Description: Absence of physical injury  Outcome: Ongoing     Problem: Pain:  Goal: Pain level will decrease  Description: Pain level will decrease  Outcome: Ongoing  Goal: Control of acute pain  Description: Control of acute pain  Outcome: Ongoing  Goal: Control of chronic pain  Description: Control of chronic pain  Outcome: Ongoing     Problem: Activity:  Goal: Risk for activity intolerance will decrease  Description: Risk for activity intolerance will decrease  4/5/2020 0113 by Oliva Avalos RN  Outcome: Ongoing  4/4/2020 1406 by Marybel Sykes RN  Outcome: Ongoing  4/4/2020 1406 by Marybel Sykes RN  Outcome: Ongoing     Problem:  Bowel/Gastric:  Goal: Bowel function will improve  Description: Bowel function will improve  4/5/2020 0113 by Oliva Avalos RN  Outcome: Ongoing  4/4/2020 1406 by Marybel Sykes RN  Outcome: Ongoing  4/4/2020 1406 by Marybel Sykes RN  Outcome: Ongoing  Goal: Occurrences of vomiting will decrease  Description: Occurrences of vomiting will decrease  4/5/2020 0113 by Oliva Avalos RN  Outcome: Ongoing  4/4/2020 1406 by Marybel Sykes RN  Outcome: Ongoing     Problem: Fluid Volume:  Goal: Maintenance of adequate hydration will improve  Description: Maintenance of adequate hydration will improve  Outcome: Ongoing  Goal: Signs and symptoms of dehydration will decrease  Description: Signs and symptoms of dehydration will decrease  Outcome: Ongoing     Problem: Sensory:  Goal: Pain level will decrease  Description: Pain level will decrease  Outcome: Ongoing

## 2020-04-05 NOTE — PROGRESS NOTES
(headache)     Headache     off/on since fall April 2014    HTN (hypertension)     Hypertension       Allergies: Allergies   Allergen Reactions    Other Hives    Ceclor [Cefaclor]     Food      All melons      Sulfa Antibiotics      Projectile vomiting       Medications :  enoxaparin, 30 mg, Subcutaneous, BID  cetirizine, 10 mg, Oral, Daily  docusate sodium, 100 mg, Oral, BID  lisinopril-hydroCHLOROthiazide, 1 tablet, Oral, Daily  metoprolol succinate, 100 mg, Oral, Daily  sodium chloride flush, 10 mL, Intravenous, 2 times per day  famotidine (PEPCID) injection, 20 mg, Intravenous, BID  ampicillin-sulbactam, 3 g, Intravenous, Q6H        Review of Systems   Review of Systems   Constitutional: Negative for appetite change, fatigue, fever and unexpected weight change. HENT: Negative for congestion, rhinorrhea and sneezing. Eyes: Negative for visual disturbance. Respiratory: Negative for cough, chest tightness, shortness of breath and wheezing. Cardiovascular: Negative for chest pain and palpitations. Gastrointestinal: Positive for abdominal pain and nausea. Negative for blood in stool, constipation, diarrhea and vomiting. Genitourinary: Negative for dysuria, enuresis, frequency and hematuria. Musculoskeletal: Negative for arthralgias and myalgias. Skin: Negative for rash. Neurological: Negative for dizziness, weakness, light-headedness and headaches. Hematological: Does not bruise/bleed easily. Psychiatric/Behavioral: Negative for dysphoric mood and sleep disturbance.      Objective :      Current Vitals : Temp: 98.2 °F (36.8 °C),  Pulse: 61, Resp: 18, BP: 126/60, SpO2: 94 %  Last 24 Hrs Vitals   Patient Vitals for the past 24 hrs:   BP Temp Temp src Pulse Resp SpO2 Weight   04/05/20 0734 126/60 98.2 °F (36.8 °C) Oral 61 18 94 % --   04/05/20 0313 -- -- -- -- -- -- 240 lb 9.6 oz (109.1 kg)   04/04/20 1922 132/65 97.3 °F (36.3 °C) Oral 57 18 98 % --     Intake / output   04/04 0701 -

## 2020-04-05 NOTE — PLAN OF CARE
Problem: Bowel/Gastric:  Goal: Bowel function will improve  Description: Bowel function will improve  4/5/2020 1351 by Marcy Medina RN  Outcome: Ongoing  4/5/2020 0113 by Justus Franks RN  Outcome: Ongoing     Problem:  Bowel/Gastric:  Goal: Occurrences of vomiting will decrease  Description: Occurrences of vomiting will decrease  4/5/2020 1351 by Marcy Medina RN  Outcome: Ongoing  4/5/2020 0113 by Justus Franks RN  Outcome: Ongoing     Problem: Fluid Volume:  Goal: Maintenance of adequate hydration will improve  Description: Maintenance of adequate hydration will improve  4/5/2020 0113 by Justus Franks RN  Outcome: Ongoing

## 2020-04-06 ENCOUNTER — ANESTHESIA EVENT (OUTPATIENT)
Dept: OPERATING ROOM | Age: 65
DRG: 417 | End: 2020-04-06
Payer: MEDICARE

## 2020-04-06 ENCOUNTER — ANESTHESIA (OUTPATIENT)
Dept: OPERATING ROOM | Age: 65
DRG: 417 | End: 2020-04-06
Payer: MEDICARE

## 2020-04-06 ENCOUNTER — APPOINTMENT (OUTPATIENT)
Dept: GENERAL RADIOLOGY | Age: 65
DRG: 417 | End: 2020-04-06
Payer: MEDICARE

## 2020-04-06 VITALS — DIASTOLIC BLOOD PRESSURE: 58 MMHG | OXYGEN SATURATION: 98 % | TEMPERATURE: 96.1 F | SYSTOLIC BLOOD PRESSURE: 99 MMHG

## 2020-04-06 LAB
ABSOLUTE EOS #: 0.29 K/UL (ref 0–0.44)
ABSOLUTE IMMATURE GRANULOCYTE: 0.03 K/UL (ref 0–0.3)
ABSOLUTE LYMPH #: 2.36 K/UL (ref 1.1–3.7)
ABSOLUTE MONO #: 0.64 K/UL (ref 0.1–1.2)
ALBUMIN SERPL-MCNC: 3.5 G/DL (ref 3.5–5.2)
ALBUMIN/GLOBULIN RATIO: ABNORMAL (ref 1–2.5)
ALP BLD-CCNC: 61 U/L (ref 35–104)
ALT SERPL-CCNC: 24 U/L (ref 5–33)
AMYLASE: 41 U/L (ref 28–100)
ANION GAP SERPL CALCULATED.3IONS-SCNC: 13 MMOL/L (ref 9–17)
AST SERPL-CCNC: 19 U/L
BASOPHILS # BLD: 0 % (ref 0–2)
BASOPHILS ABSOLUTE: <0.03 K/UL (ref 0–0.2)
BILIRUB SERPL-MCNC: 0.59 MG/DL (ref 0.3–1.2)
BILIRUBIN DIRECT: 0.17 MG/DL
BILIRUBIN, INDIRECT: 0.42 MG/DL (ref 0–1)
BUN BLDV-MCNC: 10 MG/DL (ref 8–23)
CALCIUM SERPL-MCNC: 8.8 MG/DL (ref 8.6–10.4)
CHLORIDE BLD-SCNC: 105 MMOL/L (ref 98–107)
CO2: 24 MMOL/L (ref 20–31)
CREAT SERPL-MCNC: 0.75 MG/DL (ref 0.5–0.9)
DIFFERENTIAL TYPE: NORMAL
EOSINOPHILS RELATIVE PERCENT: 4 % (ref 1–4)
GFR AFRICAN AMERICAN: >60 ML/MIN
GFR NON-AFRICAN AMERICAN: >60 ML/MIN
GFR SERPL CREATININE-BSD FRML MDRD: ABNORMAL ML/MIN/{1.73_M2}
GFR SERPL CREATININE-BSD FRML MDRD: ABNORMAL ML/MIN/{1.73_M2}
GLUCOSE BLD-MCNC: 110 MG/DL (ref 70–99)
HCT VFR BLD CALC: 42.4 % (ref 36.3–47.1)
HEMOGLOBIN: 13.6 G/DL (ref 11.9–15.1)
IMMATURE GRANULOCYTES: 0 %
LYMPHOCYTES # BLD: 35 % (ref 24–43)
MCH RBC QN AUTO: 31.5 PG (ref 25.2–33.5)
MCHC RBC AUTO-ENTMCNC: 32.1 G/DL (ref 28.4–34.8)
MCV RBC AUTO: 98.1 FL (ref 82.6–102.9)
MONOCYTES # BLD: 9 % (ref 3–12)
NRBC AUTOMATED: 0 PER 100 WBC
PDW BLD-RTO: 13.2 % (ref 11.8–14.4)
PLATELET # BLD: 156 K/UL (ref 138–453)
PLATELET ESTIMATE: NORMAL
PMV BLD AUTO: 10.9 FL (ref 8.1–13.5)
POTASSIUM SERPL-SCNC: 4 MMOL/L (ref 3.7–5.3)
RBC # BLD: 4.32 M/UL (ref 3.95–5.11)
RBC # BLD: NORMAL 10*6/UL
SEG NEUTROPHILS: 52 % (ref 36–65)
SEGMENTED NEUTROPHILS ABSOLUTE COUNT: 3.51 K/UL (ref 1.5–8.1)
SODIUM BLD-SCNC: 142 MMOL/L (ref 135–144)
TOTAL PROTEIN: 6.1 G/DL (ref 6.4–8.3)
WBC # BLD: 6.9 K/UL (ref 3.5–11.3)
WBC # BLD: NORMAL 10*3/UL

## 2020-04-06 PROCEDURE — 6360000004 HC RX CONTRAST MEDICATION: Performed by: SURGERY

## 2020-04-06 PROCEDURE — BF101ZZ FLUOROSCOPY OF BILE DUCTS USING LOW OSMOLAR CONTRAST: ICD-10-PCS | Performed by: SURGERY

## 2020-04-06 PROCEDURE — 1200000000 HC SEMI PRIVATE

## 2020-04-06 PROCEDURE — 6370000000 HC RX 637 (ALT 250 FOR IP): Performed by: SURGERY

## 2020-04-06 PROCEDURE — C1894 INTRO/SHEATH, NON-LASER: HCPCS | Performed by: SURGERY

## 2020-04-06 PROCEDURE — 2580000003 HC RX 258: Performed by: SURGERY

## 2020-04-06 PROCEDURE — 3700000001 HC ADD 15 MINUTES (ANESTHESIA): Performed by: SURGERY

## 2020-04-06 PROCEDURE — 82150 ASSAY OF AMYLASE: CPT

## 2020-04-06 PROCEDURE — 3600000013 HC SURGERY LEVEL 3 ADDTL 15MIN: Performed by: SURGERY

## 2020-04-06 PROCEDURE — 2500000003 HC RX 250 WO HCPCS: Performed by: SURGERY

## 2020-04-06 PROCEDURE — 2580000003 HC RX 258: Performed by: NURSE ANESTHETIST, CERTIFIED REGISTERED

## 2020-04-06 PROCEDURE — 7100000001 HC PACU RECOVERY - ADDTL 15 MIN: Performed by: SURGERY

## 2020-04-06 PROCEDURE — 99232 SBSQ HOSP IP/OBS MODERATE 35: CPT | Performed by: FAMILY MEDICINE

## 2020-04-06 PROCEDURE — 6370000000 HC RX 637 (ALT 250 FOR IP): Performed by: NURSE PRACTITIONER

## 2020-04-06 PROCEDURE — 3700000000 HC ANESTHESIA ATTENDED CARE: Performed by: SURGERY

## 2020-04-06 PROCEDURE — 2500000003 HC RX 250 WO HCPCS: Performed by: NURSE ANESTHETIST, CERTIFIED REGISTERED

## 2020-04-06 PROCEDURE — 94640 AIRWAY INHALATION TREATMENT: CPT

## 2020-04-06 PROCEDURE — 2720000010 HC SURG SUPPLY STERILE: Performed by: SURGERY

## 2020-04-06 PROCEDURE — 2709999900 HC NON-CHARGEABLE SUPPLY: Performed by: SURGERY

## 2020-04-06 PROCEDURE — 80053 COMPREHEN METABOLIC PANEL: CPT

## 2020-04-06 PROCEDURE — 6360000002 HC RX W HCPCS: Performed by: SURGERY

## 2020-04-06 PROCEDURE — 82248 BILIRUBIN DIRECT: CPT

## 2020-04-06 PROCEDURE — 0FT44ZZ RESECTION OF GALLBLADDER, PERCUTANEOUS ENDOSCOPIC APPROACH: ICD-10-PCS | Performed by: SURGERY

## 2020-04-06 PROCEDURE — 3600000003 HC SURGERY LEVEL 3 BASE: Performed by: SURGERY

## 2020-04-06 PROCEDURE — 3209999900 FLUORO FOR SURGICAL PROCEDURES

## 2020-04-06 PROCEDURE — 74300 X-RAY BILE DUCTS/PANCREAS: CPT

## 2020-04-06 PROCEDURE — 36415 COLL VENOUS BLD VENIPUNCTURE: CPT

## 2020-04-06 PROCEDURE — 6360000002 HC RX W HCPCS: Performed by: NURSE ANESTHETIST, CERTIFIED REGISTERED

## 2020-04-06 PROCEDURE — 7100000000 HC PACU RECOVERY - FIRST 15 MIN: Performed by: SURGERY

## 2020-04-06 PROCEDURE — 85025 COMPLETE CBC W/AUTO DIFF WBC: CPT

## 2020-04-06 PROCEDURE — 88304 TISSUE EXAM BY PATHOLOGIST: CPT

## 2020-04-06 RX ORDER — DEXTROSE, SODIUM CHLORIDE, AND POTASSIUM CHLORIDE 5; .45; .15 G/100ML; G/100ML; G/100ML
INJECTION INTRAVENOUS CONTINUOUS
Status: DISCONTINUED | OUTPATIENT
Start: 2020-04-06 | End: 2020-04-07

## 2020-04-06 RX ORDER — LABETALOL HYDROCHLORIDE 5 MG/ML
5 INJECTION, SOLUTION INTRAVENOUS EVERY 10 MIN PRN
Status: DISCONTINUED | OUTPATIENT
Start: 2020-04-06 | End: 2020-04-06 | Stop reason: HOSPADM

## 2020-04-06 RX ORDER — DEXAMETHASONE SODIUM PHOSPHATE 10 MG/ML
INJECTION INTRAMUSCULAR; INTRAVENOUS PRN
Status: DISCONTINUED | OUTPATIENT
Start: 2020-04-06 | End: 2020-04-06 | Stop reason: SDUPTHER

## 2020-04-06 RX ORDER — OXYCODONE HYDROCHLORIDE AND ACETAMINOPHEN 5; 325 MG/1; MG/1
2 TABLET ORAL PRN
Status: DISCONTINUED | OUTPATIENT
Start: 2020-04-06 | End: 2020-04-06 | Stop reason: HOSPADM

## 2020-04-06 RX ORDER — OXYCODONE HYDROCHLORIDE AND ACETAMINOPHEN 5; 325 MG/1; MG/1
1 TABLET ORAL PRN
Status: DISCONTINUED | OUTPATIENT
Start: 2020-04-06 | End: 2020-04-06 | Stop reason: HOSPADM

## 2020-04-06 RX ORDER — BUPIVACAINE HYDROCHLORIDE AND EPINEPHRINE 5; 5 MG/ML; UG/ML
INJECTION, SOLUTION EPIDURAL; INTRACAUDAL; PERINEURAL PRN
Status: DISCONTINUED | OUTPATIENT
Start: 2020-04-06 | End: 2020-04-06 | Stop reason: HOSPADM

## 2020-04-06 RX ORDER — HYDRALAZINE HYDROCHLORIDE 20 MG/ML
5 INJECTION INTRAMUSCULAR; INTRAVENOUS EVERY 10 MIN PRN
Status: DISCONTINUED | OUTPATIENT
Start: 2020-04-06 | End: 2020-04-06 | Stop reason: HOSPADM

## 2020-04-06 RX ORDER — FENTANYL CITRATE 50 UG/ML
25 INJECTION, SOLUTION INTRAMUSCULAR; INTRAVENOUS EVERY 5 MIN PRN
Status: DISCONTINUED | OUTPATIENT
Start: 2020-04-06 | End: 2020-04-06 | Stop reason: HOSPADM

## 2020-04-06 RX ORDER — ONDANSETRON 2 MG/ML
INJECTION INTRAMUSCULAR; INTRAVENOUS PRN
Status: DISCONTINUED | OUTPATIENT
Start: 2020-04-06 | End: 2020-04-06 | Stop reason: SDUPTHER

## 2020-04-06 RX ORDER — MIDAZOLAM HYDROCHLORIDE 1 MG/ML
INJECTION INTRAMUSCULAR; INTRAVENOUS PRN
Status: DISCONTINUED | OUTPATIENT
Start: 2020-04-06 | End: 2020-04-06 | Stop reason: SDUPTHER

## 2020-04-06 RX ORDER — ALBUTEROL SULFATE 90 UG/1
2 AEROSOL, METERED RESPIRATORY (INHALATION) EVERY 4 HOURS PRN
Status: DISCONTINUED | OUTPATIENT
Start: 2020-04-06 | End: 2020-04-07 | Stop reason: HOSPADM

## 2020-04-06 RX ORDER — SODIUM CHLORIDE, SODIUM LACTATE, POTASSIUM CHLORIDE, CALCIUM CHLORIDE 600; 310; 30; 20 MG/100ML; MG/100ML; MG/100ML; MG/100ML
INJECTION, SOLUTION INTRAVENOUS CONTINUOUS PRN
Status: DISCONTINUED | OUTPATIENT
Start: 2020-04-06 | End: 2020-04-06 | Stop reason: SDUPTHER

## 2020-04-06 RX ORDER — LIDOCAINE HYDROCHLORIDE 20 MG/ML
INJECTION, SOLUTION EPIDURAL; INFILTRATION; INTRACAUDAL; PERINEURAL PRN
Status: DISCONTINUED | OUTPATIENT
Start: 2020-04-06 | End: 2020-04-06 | Stop reason: SDUPTHER

## 2020-04-06 RX ORDER — ONDANSETRON 2 MG/ML
4 INJECTION INTRAMUSCULAR; INTRAVENOUS
Status: DISCONTINUED | OUTPATIENT
Start: 2020-04-06 | End: 2020-04-06 | Stop reason: HOSPADM

## 2020-04-06 RX ORDER — FENTANYL CITRATE 50 UG/ML
INJECTION, SOLUTION INTRAMUSCULAR; INTRAVENOUS PRN
Status: DISCONTINUED | OUTPATIENT
Start: 2020-04-06 | End: 2020-04-06 | Stop reason: SDUPTHER

## 2020-04-06 RX ORDER — PROMETHAZINE HYDROCHLORIDE 25 MG/ML
6.25 INJECTION, SOLUTION INTRAMUSCULAR; INTRAVENOUS
Status: DISCONTINUED | OUTPATIENT
Start: 2020-04-06 | End: 2020-04-06 | Stop reason: HOSPADM

## 2020-04-06 RX ORDER — HYDROMORPHONE HCL 110MG/55ML
0.5 PATIENT CONTROLLED ANALGESIA SYRINGE INTRAVENOUS EVERY 5 MIN PRN
Status: DISCONTINUED | OUTPATIENT
Start: 2020-04-06 | End: 2020-04-06 | Stop reason: HOSPADM

## 2020-04-06 RX ORDER — ROCURONIUM BROMIDE 10 MG/ML
INJECTION, SOLUTION INTRAVENOUS PRN
Status: DISCONTINUED | OUTPATIENT
Start: 2020-04-06 | End: 2020-04-06 | Stop reason: SDUPTHER

## 2020-04-06 RX ORDER — PROPOFOL 10 MG/ML
INJECTION, EMULSION INTRAVENOUS PRN
Status: DISCONTINUED | OUTPATIENT
Start: 2020-04-06 | End: 2020-04-06 | Stop reason: SDUPTHER

## 2020-04-06 RX ADMIN — DEXAMETHASONE SODIUM PHOSPHATE 10 MG: 10 INJECTION INTRAMUSCULAR; INTRAVENOUS at 08:05

## 2020-04-06 RX ADMIN — HYDROCODONE BITARTRATE AND ACETAMINOPHEN 2 TABLET: 5; 325 TABLET ORAL at 19:55

## 2020-04-06 RX ADMIN — CETIRIZINE HYDROCHLORIDE 10 MG: 10 TABLET, FILM COATED ORAL at 12:22

## 2020-04-06 RX ADMIN — FENTANYL CITRATE 100 MCG: 50 INJECTION, SOLUTION INTRAMUSCULAR; INTRAVENOUS at 08:39

## 2020-04-06 RX ADMIN — ALBUTEROL SULFATE 2 PUFF: 90 AEROSOL, METERED RESPIRATORY (INHALATION) at 05:14

## 2020-04-06 RX ADMIN — ROCURONIUM BROMIDE 50 MG: 10 INJECTION, SOLUTION INTRAVENOUS at 08:04

## 2020-04-06 RX ADMIN — ROCURONIUM BROMIDE 30 MG: 10 INJECTION, SOLUTION INTRAVENOUS at 08:39

## 2020-04-06 RX ADMIN — METOPROLOL SUCCINATE 100 MG: 50 TABLET, EXTENDED RELEASE ORAL at 12:22

## 2020-04-06 RX ADMIN — AMPICILLIN SODIUM AND SULBACTAM SODIUM 3 G: 2; 1 INJECTION, POWDER, FOR SOLUTION INTRAMUSCULAR; INTRAVENOUS at 16:06

## 2020-04-06 RX ADMIN — ONDANSETRON 4 MG: 2 INJECTION, SOLUTION INTRAMUSCULAR; INTRAVENOUS at 08:15

## 2020-04-06 RX ADMIN — LIDOCAINE HYDROCHLORIDE 100 MG: 20 INJECTION, SOLUTION EPIDURAL; INFILTRATION; INTRACAUDAL; PERINEURAL at 08:04

## 2020-04-06 RX ADMIN — AMPICILLIN SODIUM AND SULBACTAM SODIUM 3 G: 2; 1 INJECTION, POWDER, FOR SOLUTION INTRAMUSCULAR; INTRAVENOUS at 22:23

## 2020-04-06 RX ADMIN — FENTANYL CITRATE 100 MCG: 50 INJECTION, SOLUTION INTRAMUSCULAR; INTRAVENOUS at 08:04

## 2020-04-06 RX ADMIN — FENTANYL CITRATE 50 MCG: 50 INJECTION, SOLUTION INTRAMUSCULAR; INTRAVENOUS at 09:54

## 2020-04-06 RX ADMIN — POTASSIUM CHLORIDE, DEXTROSE MONOHYDRATE AND SODIUM CHLORIDE: 150; 5; 450 INJECTION, SOLUTION INTRAVENOUS at 12:11

## 2020-04-06 RX ADMIN — HYDROCODONE BITARTRATE AND ACETAMINOPHEN 2 TABLET: 5; 325 TABLET ORAL at 12:21

## 2020-04-06 RX ADMIN — SUGAMMADEX 200 MG: 100 INJECTION, SOLUTION INTRAVENOUS at 09:49

## 2020-04-06 RX ADMIN — AMPICILLIN SODIUM AND SULBACTAM SODIUM 3 G: 2; 1 INJECTION, POWDER, FOR SOLUTION INTRAMUSCULAR; INTRAVENOUS at 08:38

## 2020-04-06 RX ADMIN — DOCUSATE SODIUM 100 MG: 100 CAPSULE, LIQUID FILLED ORAL at 19:55

## 2020-04-06 RX ADMIN — FAMOTIDINE 20 MG: 10 INJECTION INTRAVENOUS at 19:55

## 2020-04-06 RX ADMIN — MIDAZOLAM 2 MG: 1 INJECTION INTRAMUSCULAR; INTRAVENOUS at 07:57

## 2020-04-06 RX ADMIN — SODIUM CHLORIDE, POTASSIUM CHLORIDE, SODIUM LACTATE AND CALCIUM CHLORIDE: 600; 310; 30; 20 INJECTION, SOLUTION INTRAVENOUS at 07:57

## 2020-04-06 RX ADMIN — LISINOPRIL AND HYDROCHLOROTHIAZIDE 1 TABLET: 12.5; 2 TABLET ORAL at 12:22

## 2020-04-06 RX ADMIN — PROPOFOL 150 MG: 10 INJECTION, EMULSION INTRAVENOUS at 08:04

## 2020-04-06 RX ADMIN — AMPICILLIN SODIUM AND SULBACTAM SODIUM 3 G: 2; 1 INJECTION, POWDER, FOR SOLUTION INTRAMUSCULAR; INTRAVENOUS at 03:15

## 2020-04-06 ASSESSMENT — PULMONARY FUNCTION TESTS
PIF_VALUE: 24
PIF_VALUE: 30
PIF_VALUE: 24
PIF_VALUE: 30
PIF_VALUE: 19
PIF_VALUE: 29
PIF_VALUE: 19
PIF_VALUE: 29
PIF_VALUE: 28
PIF_VALUE: 4
PIF_VALUE: 29
PIF_VALUE: 27
PIF_VALUE: 27
PIF_VALUE: 21
PIF_VALUE: 30
PIF_VALUE: 22
PIF_VALUE: 29
PIF_VALUE: 23
PIF_VALUE: 23
PIF_VALUE: 21
PIF_VALUE: 19
PIF_VALUE: 30
PIF_VALUE: 23
PIF_VALUE: 29
PIF_VALUE: 23
PIF_VALUE: 23
PIF_VALUE: 22
PIF_VALUE: 29
PIF_VALUE: 24
PIF_VALUE: 23
PIF_VALUE: 22
PIF_VALUE: 23
PIF_VALUE: 29
PIF_VALUE: 5
PIF_VALUE: 23
PIF_VALUE: 31
PIF_VALUE: 28
PIF_VALUE: 29
PIF_VALUE: 23
PIF_VALUE: 29
PIF_VALUE: 29
PIF_VALUE: 1
PIF_VALUE: 22
PIF_VALUE: 19
PIF_VALUE: 21
PIF_VALUE: 29
PIF_VALUE: 30
PIF_VALUE: 22
PIF_VALUE: 31
PIF_VALUE: 29
PIF_VALUE: 22
PIF_VALUE: 21
PIF_VALUE: 29
PIF_VALUE: 23
PIF_VALUE: 23
PIF_VALUE: 29
PIF_VALUE: 30
PIF_VALUE: 2
PIF_VALUE: 22
PIF_VALUE: 23
PIF_VALUE: 23
PIF_VALUE: 22
PIF_VALUE: 29
PIF_VALUE: 3
PIF_VALUE: 29
PIF_VALUE: 5
PIF_VALUE: 29
PIF_VALUE: 23
PIF_VALUE: 24
PIF_VALUE: 23
PIF_VALUE: 29
PIF_VALUE: 23
PIF_VALUE: 28
PIF_VALUE: 30
PIF_VALUE: 29
PIF_VALUE: 28
PIF_VALUE: 30
PIF_VALUE: 19
PIF_VALUE: 1
PIF_VALUE: 19
PIF_VALUE: 30
PIF_VALUE: 23
PIF_VALUE: 19
PIF_VALUE: 22
PIF_VALUE: 23
PIF_VALUE: 23
PIF_VALUE: 30
PIF_VALUE: 30
PIF_VALUE: 28
PIF_VALUE: 23
PIF_VALUE: 29
PIF_VALUE: 21
PIF_VALUE: 28
PIF_VALUE: 21
PIF_VALUE: 22
PIF_VALUE: 24
PIF_VALUE: 27
PIF_VALUE: 28
PIF_VALUE: 23
PIF_VALUE: 23
PIF_VALUE: 22
PIF_VALUE: 29
PIF_VALUE: 22
PIF_VALUE: 23
PIF_VALUE: 29
PIF_VALUE: 29
PIF_VALUE: 27
PIF_VALUE: 25
PIF_VALUE: 3
PIF_VALUE: 22
PIF_VALUE: 29
PIF_VALUE: 28
PIF_VALUE: 23
PIF_VALUE: 24
PIF_VALUE: 29
PIF_VALUE: 21
PIF_VALUE: 19
PIF_VALUE: 21
PIF_VALUE: 23
PIF_VALUE: 23
PIF_VALUE: 29
PIF_VALUE: 19

## 2020-04-06 ASSESSMENT — ENCOUNTER SYMPTOMS
VOMITING: 0
CHEST TIGHTNESS: 0
COUGH: 0
NAUSEA: 0
WHEEZING: 0
BLOOD IN STOOL: 0
DIARRHEA: 0
CONSTIPATION: 0
ABDOMINAL PAIN: 1
SHORTNESS OF BREATH: 0
RHINORRHEA: 0

## 2020-04-06 ASSESSMENT — PAIN DESCRIPTION - DESCRIPTORS: DESCRIPTORS: PATIENT UNABLE TO DESCRIBE

## 2020-04-06 ASSESSMENT — PAIN SCALES - GENERAL
PAINLEVEL_OUTOF10: 9
PAINLEVEL_OUTOF10: 8
PAINLEVEL_OUTOF10: 7

## 2020-04-06 ASSESSMENT — PAIN DESCRIPTION - PROGRESSION
CLINICAL_PROGRESSION: NOT CHANGED

## 2020-04-06 ASSESSMENT — PAIN DESCRIPTION - FREQUENCY: FREQUENCY: INTERMITTENT

## 2020-04-06 ASSESSMENT — PAIN DESCRIPTION - PAIN TYPE: TYPE: SURGICAL PAIN

## 2020-04-06 ASSESSMENT — PAIN DESCRIPTION - LOCATION: LOCATION: ABDOMEN

## 2020-04-06 ASSESSMENT — PAIN - FUNCTIONAL ASSESSMENT: PAIN_FUNCTIONAL_ASSESSMENT: ACTIVITIES ARE NOT PREVENTED

## 2020-04-06 ASSESSMENT — PAIN DESCRIPTION - ONSET: ONSET: UNABLE TO TELL

## 2020-04-06 NOTE — PROGRESS NOTES
Physical Therapy  DATE: 2020    NAME: Tyree Montiel  MRN: [id number]   : 1955    Patient not seen this date for Physical Therapy due to:  [] Blood transfusion in progress  [] Hemodialysis  []  Patient Declined  [] Spine Precautions   [] Strict Bedrest  [x] Surgery/ Procedure :  Lap/[place]. Check   [] Testing      [] Other        [] PT being discontinued at this time. Patient independent. No further needs. [] PT being discontinued at this time as the patient has been transferred to palliative care. No further needs.     John Vargas, PT

## 2020-04-06 NOTE — PLAN OF CARE
Problem: Falls - Risk of:  Goal: Will remain free from falls  Description: Will remain free from falls  4/6/2020 0201 by oZila Kaufman RN  Outcome: Ongoing  4/5/2020 1351 by Jeaneth Winchester RN  Outcome: Ongoing  Goal: Absence of physical injury  Description: Absence of physical injury  4/6/2020 0201 by Zoila Kaufman RN  Outcome: Ongoing  4/5/2020 1351 by Jeaneth Winchester RN  Outcome: Ongoing     Problem: Pain:  Goal: Pain level will decrease  Description: Pain level will decrease  Outcome: Ongoing  Goal: Control of acute pain  Description: Control of acute pain  Outcome: Ongoing  Goal: Control of chronic pain  Description: Control of chronic pain  Outcome: Ongoing     Problem: Activity:  Goal: Risk for activity intolerance will decrease  Description: Risk for activity intolerance will decrease  4/6/2020 0201 by Zoila Kaufman RN  Outcome: Ongoing  4/5/2020 1351 by Jeaneth Winchester RN  Outcome: Ongoing     Problem:  Bowel/Gastric:  Goal: Bowel function will improve  Description: Bowel function will improve  4/6/2020 0201 by Zoila Kaufman RN  Outcome: Ongoing  4/5/2020 1351 by Jeaneth Winchester RN  Outcome: Ongoing  Goal: Occurrences of vomiting will decrease  Description: Occurrences of vomiting will decrease  4/6/2020 0201 by Zoila Kaufman RN  Outcome: Ongoing  4/5/2020 1351 by Jeaneth Winchester RN  Outcome: Ongoing     Problem: Fluid Volume:  Goal: Maintenance of adequate hydration will improve  Description: Maintenance of adequate hydration will improve  Outcome: Ongoing  Goal: Signs and symptoms of dehydration will decrease  Description: Signs and symptoms of dehydration will decrease  Outcome: Ongoing     Problem: Sensory:  Goal: Pain level will decrease  Description: Pain level will decrease  Outcome: Ongoing

## 2020-04-06 NOTE — OP NOTE
Operative Note      Patient: Thu Maldonado  YOB: 1955  MRN: [id number]    Date of Procedure: 4/6/2020    Pre-Op Diagnosis: biliary pancreatitis, acute on chronic cholecystitis, cholelithiasis    Post-Op Diagnosis: Same, normal intraoperative cholangiogram.       Procedure(s):  CHOLECYSTECTOMY LAPAROSCOPIC WITH CHOLANGIOGRAM    Surgeon(s):  Mcihael Colindres MD    Assistant:   Surgical Assistant: Wendy Estevez    Anesthesia: General    Estimated Blood Loss (mL): 10 ml    Replaced:      Complications: None    Specimens:   ID Type Source Tests Collected by Time Destination   A : GALLBALDDER Tissue Gallbladder SURGICAL PATHOLOGY Michael Colindres MD 4/6/2020 2555        Drains:   Urethral Catheter Double-lumen 16 fr (Active)       [REMOVED] NG/OG/NJ/NE Tube Orogastric 16 fr Center mouth (Removed)       Findings: Linda Duffy is a 78-year-old female with known gallstones. She had a sudden episode of epigastric and right upper quadrant pain on April 3 and came to the emergency room where she was found to have a lipase of 3000 and a white blood cell count of 16,000. She had epigastric pain radiating through to the back and right upper quadrant pain. Subsequent work-up was consistent with biliary pancreatitis and mild acute on chronic cholecystitis showing a very distended gallbladder with mild wall thickening and edema. Numerous small gallstones were present within the gallbladder by ultrasound. Over the next 48 hours for back pain and elevated lipase and amylase rapidly came to normal.  She still had some mild residual right upper quadrant pain. Liver function tests and bilirubin were normal.  It was recommended that she proceed to cholecystectomy, laparoscopic approach, with intraoperative cholangiography and the pathophysiology of biliary pancreatitis and the reason for cholecystectomy same admission in order to prevent repeat episodes of pancreatitis was explained to her.   She understands indications as well as the risks and agrees to the procedure. Detailed Description of Procedure: With the patient supine on the operating table and under general anesthesia the abdomen was prepped and draped in usual sterile fashion. The patient had been on Unasyn 3 g IV for her acute findings ever since admission and she was given her most recent dose just prior to skin incision and after induction of anesthesia. The plan is for discontinuation of antibiotics within less than 24 hours postoperatively. EPC cuffs were placed just prior to the induction of anesthesia and utilized throughout for DVT prophylaxis. Access to the abdominal cavity was obtained with a standard open approach making a small skin incision just above the umbilicus and inserting the Sorto Schiller balloon trocar under direct vision. Pneumoperitoneum was established. 5 mm trochars were placed under direct vision in the epigastrium and right subcostal area for a total of 3 additional ports. A smoke evacuator was connected to the filter and suction to control evacuation of the pneumoperitoneum and cautery smoke. Inspection of the abdominal cavity showed a normal appearance to the viscera where visualized. The liver was smooth. The gallbladder was quite distended and showed some edema consistent with acute on chronic cholecystitis. There were adhesions of omentum up to the gallbladder as well. The gallbladder was grasped in the fundus with an atraumatic grasper and the omentum was taken down by blunt dissection and judicious use of the electrocautery. The neck of the gallbladder was then also grasped. The area of the triangle of Calot was exposed and then the peritoneum overlying the triangle of Calot was opened with the hook electrocautery. The lower portion of the gallbladder was slowly taken off of the liver bed to facilitate a critical view.   With careful dissection it soon became evident that the first structure encountered was a port site utilizing endoscopic extraction bag and maintaining visualization with a 5 mm scope in the epigastric port. The specimen was sent to pathology in formalin without opening. Trochars were replaced and pneumoperitoneum was reestablished. The gallbladder bed and katerine hepatis were inspected. The clips occluding the cystic duct and cystic artery were intact with no signs of bleeding or bile leak. Gallbladder bed showed excellent hemostasis. The right upper quadrant was irrigated with saline solution and all irrigant removed by suction. Pneumoperitoneum was then allowed to slowly decrease through the smoke evacuator and the 5 mm ports were removed under direct vision. The umbilical 10 mm port was removed last when there was little or no residual pneumoperitoneum so as to minimize any expulsion of pneumoperitoneum or blood or other intraperitoneal contaminants during extraction of the ports. The umbilical port site was closed with a figure-of-eight suture of 0 PDS. All port sites were injected with 0.5% bupivacaine with epinephrine during the procedure to aid in postoperative analgesia. The skin at all port sites was closed with a combination of interrupted subcuticular sutures of 4-0 Monocryl and dermal adhesive. The patient tolerated the procedure well and was transferred to the postoperative care area stable and in good condition with plans to return to her hospital bed postoperatively.       Electronically signed by Derrek Rosales MD on 4/6/2020 at 9:59 AM

## 2020-04-06 NOTE — ANESTHESIA PRE PROCEDURE
KILLIAN Mueller CNP   10 mg at 04/05/20 0859    [MAR Hold] docusate sodium (COLACE) capsule 100 mg  100 mg Oral BID KILLIAN Reyes CNP   100 mg at 04/05/20 2021    [MAR Hold] lisinopril-hydroCHLOROthiazide (PRINZIDE;ZESTORETIC) 20-12.5 MG per tablet 1 tablet  1 tablet Oral Daily KILLIAN Reyes CNP   1 tablet at 04/05/20 0859    [MAR Hold] metoprolol succinate (TOPROL XL) extended release tablet 100 mg  100 mg Oral Daily KILLIAN Reyes CNP   100 mg at 04/05/20 0859    [MAR Hold] sodium chloride flush 0.9 % injection 10 mL  10 mL Intravenous 2 times per day KILLIAN Reyes CNP        [facility] Hold] sodium chloride flush 0.9 % injection 10 mL  10 mL Intravenous PRN KILLIAN Reyes CNP        [facility] Hold] potassium chloride 10 mEq/100 mL IVPB (Peripheral Line)  10 mEq Intravenous PRN KILLIAN Reyes  mL/hr at 04/05/20 1109 10 mEq at 04/05/20 1109    [MAR Hold] magnesium sulfate 1 g in dextrose 5% 100 mL IVPB  1 g Intravenous PRN KILLIAN Reyes CNP        [MAR Hold] acetaminophen (TYLENOL) tablet 650 mg  650 mg Oral Q4H PRN KILLIAN Reyes CNP        [MAR Hold] HYDROcodone-acetaminophen (NORCO) 5-325 MG per tablet 1 tablet  1 tablet Oral Q4H PRN KILLIAN Reyes CNP   1 tablet at 04/05/20 2255    Or    [MAR Hold] HYDROcodone-acetaminophen (NORCO) 5-325 MG per tablet 2 tablet  2 tablet Oral Q4H PRN KILLIAN Reyes CNP   2 tablet at 04/05/20 1647    [MAR Hold] promethazine (PHENERGAN) tablet 12.5 mg  12.5 mg Oral Q6H PRN KILLIAN Reyes CNP        Or    [MAR Hold] ondansetron (ZOFRAN) injection 4 mg  4 mg Intravenous Q6H PRN KILLIAN Reyes CNP   4 mg at 04/05/20 0715    [MAR Hold] famotidine (PEPCID) injection 20 mg  20 mg Intravenous BID KILLIAN Reyes CNP   20 mg at 04/05/20 2021    [MAR Hold]  04/06/2020    CO2 24 04/06/2020    BUN 10 04/06/2020    CREATININE 0.75 04/06/2020    GFRAA >60 04/06/2020    LABGLOM >60 04/06/2020    GLUCOSE 110 04/06/2020    PROT 6.1 04/06/2020    CALCIUM 8.8 04/06/2020    BILITOT 0.59 04/06/2020    ALKPHOS 61 04/06/2020    AST 19 04/06/2020    ALT 24 04/06/2020       POC Tests: No results for input(s): POCGLU, POCNA, POCK, POCCL, POCBUN, POCHEMO, POCHCT in the last 72 hours. Coags:   Lab Results   Component Value Date    PROTIME 10.7 04/04/2020    INR 1.0 04/04/2020    APTT 22.9 03/12/2015       HCG (If Applicable): No results found for: PREGTESTUR, PREGSERUM, HCG, HCGQUANT     ABGs: No results found for: PHART, PO2ART, DWB1RXM, HCI4QBC, BEART, F5FFSZZP     Type & Screen (If Applicable):  No results found for: LABABO, [address]    Anesthesia Evaluation  Patient summary reviewed and Nursing notes reviewed no history of anesthetic complications:   Airway: Mallampati: II  TM distance: >3 FB   Neck ROM: full  Mouth opening: > = 3 FB Dental: normal exam         Pulmonary:normal exam    (+) asthma:                            Cardiovascular:  Exercise tolerance: no interval change,   (+) hypertension:,     (-) past MI, CAD and CABG/stent    ECG reviewed    Rate: normal                    Neuro/Psych:   (+) headaches:,             GI/Hepatic/Renal:        (-) GERD       Endo/Other:        (-) diabetes mellitus               Abdominal:   (+) obese,         Vascular:                                        Anesthesia Plan      general     ASA 3       Induction: intravenous. Anesthetic plan and risks discussed with patient. Plan discussed with CRNA.     Attending anesthesiologist reviewed and agrees with Pre Eval content              Eliezer Torres DO   4/6/2020

## 2020-04-07 VITALS
BODY MASS INDEX: 46.01 KG/M2 | SYSTOLIC BLOOD PRESSURE: 126 MMHG | HEIGHT: 62 IN | OXYGEN SATURATION: 96 % | HEART RATE: 114 BPM | RESPIRATION RATE: 19 BRPM | WEIGHT: 250 LBS | DIASTOLIC BLOOD PRESSURE: 65 MMHG | TEMPERATURE: 97.6 F

## 2020-04-07 PROBLEM — K85.90 ACUTE PANCREATITIS: Status: RESOLVED | Noted: 2020-04-03 | Resolved: 2020-04-07

## 2020-04-07 PROBLEM — K85.10 ACUTE BILIARY PANCREATITIS: Status: RESOLVED | Noted: 2020-04-04 | Resolved: 2020-04-07

## 2020-04-07 PROBLEM — K80.00 CALCULUS OF GALLBLADDER WITH ACUTE CHOLECYSTITIS: Status: RESOLVED | Noted: 2020-04-04 | Resolved: 2020-04-07

## 2020-04-07 LAB
ABSOLUTE EOS #: <0.03 K/UL (ref 0–0.44)
ABSOLUTE IMMATURE GRANULOCYTE: 0.07 K/UL (ref 0–0.3)
ABSOLUTE LYMPH #: 1.16 K/UL (ref 1.1–3.7)
ABSOLUTE MONO #: 0.9 K/UL (ref 0.1–1.2)
ALBUMIN SERPL-MCNC: 3.6 G/DL (ref 3.5–5.2)
ALBUMIN/GLOBULIN RATIO: ABNORMAL (ref 1–2.5)
ALP BLD-CCNC: 61 U/L (ref 35–104)
ALT SERPL-CCNC: 53 U/L (ref 5–33)
AMYLASE: 35 U/L (ref 28–100)
ANION GAP SERPL CALCULATED.3IONS-SCNC: 12 MMOL/L (ref 9–17)
AST SERPL-CCNC: 38 U/L
BASOPHILS # BLD: 0 % (ref 0–2)
BASOPHILS ABSOLUTE: <0.03 K/UL (ref 0–0.2)
BILIRUB SERPL-MCNC: 0.28 MG/DL (ref 0.3–1.2)
BILIRUBIN DIRECT: 0.09 MG/DL
BILIRUBIN, INDIRECT: 0.19 MG/DL (ref 0–1)
BUN BLDV-MCNC: 12 MG/DL (ref 8–23)
CALCIUM SERPL-MCNC: 8.7 MG/DL (ref 8.6–10.4)
CHLORIDE BLD-SCNC: 102 MMOL/L (ref 98–107)
CO2: 23 MMOL/L (ref 20–31)
CREAT SERPL-MCNC: 0.77 MG/DL (ref 0.5–0.9)
DIFFERENTIAL TYPE: ABNORMAL
EOSINOPHILS RELATIVE PERCENT: 0 % (ref 1–4)
GFR AFRICAN AMERICAN: >60 ML/MIN
GFR NON-AFRICAN AMERICAN: >60 ML/MIN
GFR SERPL CREATININE-BSD FRML MDRD: ABNORMAL ML/MIN/{1.73_M2}
GFR SERPL CREATININE-BSD FRML MDRD: ABNORMAL ML/MIN/{1.73_M2}
GLUCOSE BLD-MCNC: 202 MG/DL (ref 70–99)
HCT VFR BLD CALC: 38.3 % (ref 36.3–47.1)
HEMOGLOBIN: 12.8 G/DL (ref 11.9–15.1)
IMMATURE GRANULOCYTES: 1 %
LYMPHOCYTES # BLD: 10 % (ref 24–43)
MCH RBC QN AUTO: 31.7 PG (ref 25.2–33.5)
MCHC RBC AUTO-ENTMCNC: 33.4 G/DL (ref 28.4–34.8)
MCV RBC AUTO: 94.8 FL (ref 82.6–102.9)
MONOCYTES # BLD: 8 % (ref 3–12)
NRBC AUTOMATED: 0 PER 100 WBC
PDW BLD-RTO: 13.2 % (ref 11.8–14.4)
PLATELET # BLD: 195 K/UL (ref 138–453)
PLATELET ESTIMATE: ABNORMAL
PMV BLD AUTO: 11 FL (ref 8.1–13.5)
POTASSIUM SERPL-SCNC: 4.3 MMOL/L (ref 3.7–5.3)
RBC # BLD: 4.04 M/UL (ref 3.95–5.11)
RBC # BLD: ABNORMAL 10*6/UL
SEG NEUTROPHILS: 82 % (ref 36–65)
SEGMENTED NEUTROPHILS ABSOLUTE COUNT: 9.57 K/UL (ref 1.5–8.1)
SODIUM BLD-SCNC: 137 MMOL/L (ref 135–144)
TOTAL PROTEIN: 5.9 G/DL (ref 6.4–8.3)
WBC # BLD: 11.7 K/UL (ref 3.5–11.3)
WBC # BLD: ABNORMAL 10*3/UL

## 2020-04-07 PROCEDURE — 97110 THERAPEUTIC EXERCISES: CPT

## 2020-04-07 PROCEDURE — 97530 THERAPEUTIC ACTIVITIES: CPT

## 2020-04-07 PROCEDURE — 82248 BILIRUBIN DIRECT: CPT

## 2020-04-07 PROCEDURE — 97166 OT EVAL MOD COMPLEX 45 MIN: CPT

## 2020-04-07 PROCEDURE — 80053 COMPREHEN METABOLIC PANEL: CPT

## 2020-04-07 PROCEDURE — 2500000003 HC RX 250 WO HCPCS: Performed by: SURGERY

## 2020-04-07 PROCEDURE — 97535 SELF CARE MNGMENT TRAINING: CPT

## 2020-04-07 PROCEDURE — 97162 PT EVAL MOD COMPLEX 30 MIN: CPT

## 2020-04-07 PROCEDURE — 82150 ASSAY OF AMYLASE: CPT

## 2020-04-07 PROCEDURE — 99232 SBSQ HOSP IP/OBS MODERATE 35: CPT | Performed by: FAMILY MEDICINE

## 2020-04-07 PROCEDURE — 6370000000 HC RX 637 (ALT 250 FOR IP): Performed by: SURGERY

## 2020-04-07 PROCEDURE — 97116 GAIT TRAINING THERAPY: CPT

## 2020-04-07 PROCEDURE — 36415 COLL VENOUS BLD VENIPUNCTURE: CPT

## 2020-04-07 PROCEDURE — 94640 AIRWAY INHALATION TREATMENT: CPT

## 2020-04-07 PROCEDURE — 85025 COMPLETE CBC W/AUTO DIFF WBC: CPT

## 2020-04-07 RX ORDER — DOCUSATE SODIUM 100 MG/1
100 CAPSULE, LIQUID FILLED ORAL 2 TIMES DAILY PRN
Qty: 40 CAPSULE | Refills: 0 | Status: SHIPPED | OUTPATIENT
Start: 2020-04-07 | End: 2021-08-05

## 2020-04-07 RX ORDER — DOCUSATE SODIUM 100 MG/1
100 CAPSULE, LIQUID FILLED ORAL 2 TIMES DAILY
Qty: 60 CAPSULE | Refills: 0 | Status: SHIPPED | OUTPATIENT
Start: 2020-04-07 | End: 2020-04-07 | Stop reason: HOSPADM

## 2020-04-07 RX ORDER — OXYCODONE HYDROCHLORIDE AND ACETAMINOPHEN 5; 325 MG/1; MG/1
1 TABLET ORAL EVERY 6 HOURS PRN
Qty: 16 TABLET | Refills: 0 | Status: SHIPPED | OUTPATIENT
Start: 2020-04-07 | End: 2020-04-12

## 2020-04-07 RX ADMIN — MAGNESIUM HYDROXIDE 30 ML: 400 SUSPENSION ORAL at 11:43

## 2020-04-07 RX ADMIN — ALBUTEROL SULFATE 2 PUFF: 90 AEROSOL, METERED RESPIRATORY (INHALATION) at 09:55

## 2020-04-07 RX ADMIN — LISINOPRIL AND HYDROCHLOROTHIAZIDE 1 TABLET: 12.5; 2 TABLET ORAL at 08:24

## 2020-04-07 RX ADMIN — CETIRIZINE HYDROCHLORIDE 10 MG: 10 TABLET, FILM COATED ORAL at 08:24

## 2020-04-07 RX ADMIN — METOPROLOL SUCCINATE 100 MG: 50 TABLET, EXTENDED RELEASE ORAL at 08:24

## 2020-04-07 RX ADMIN — HYDROCODONE BITARTRATE AND ACETAMINOPHEN 2 TABLET: 5; 325 TABLET ORAL at 08:29

## 2020-04-07 RX ADMIN — HYDROCODONE BITARTRATE AND ACETAMINOPHEN 2 TABLET: 5; 325 TABLET ORAL at 02:14

## 2020-04-07 RX ADMIN — POTASSIUM CHLORIDE, DEXTROSE MONOHYDRATE AND SODIUM CHLORIDE: 150; 5; 450 INJECTION, SOLUTION INTRAVENOUS at 03:37

## 2020-04-07 RX ADMIN — FAMOTIDINE 20 MG: 10 INJECTION INTRAVENOUS at 08:24

## 2020-04-07 RX ADMIN — DOCUSATE SODIUM 100 MG: 100 CAPSULE, LIQUID FILLED ORAL at 08:24

## 2020-04-07 ASSESSMENT — ENCOUNTER SYMPTOMS
COUGH: 0
ABDOMINAL PAIN: 1
VOMITING: 0
DIARRHEA: 0
CHEST TIGHTNESS: 0
CONSTIPATION: 0
WHEEZING: 0
NAUSEA: 0
SHORTNESS OF BREATH: 0
BLOOD IN STOOL: 0
RHINORRHEA: 0

## 2020-04-07 ASSESSMENT — PAIN SCALES - GENERAL
PAINLEVEL_OUTOF10: 7
PAINLEVEL_OUTOF10: 7

## 2020-04-07 ASSESSMENT — PAIN DESCRIPTION - PAIN TYPE: TYPE: CHRONIC PAIN

## 2020-04-07 ASSESSMENT — PAIN DESCRIPTION - LOCATION: LOCATION: GENERALIZED

## 2020-04-07 ASSESSMENT — PAIN DESCRIPTION - DESCRIPTORS: DESCRIPTORS: ACHING

## 2020-04-07 NOTE — DISCHARGE INSTR - ACTIVITY
You may resume being around machinery 2 days after discontinuing narcotic pain medications. Do not drive until off of prescription pain meds. No lifting or straining over 10 lbs for two weeks. After that may increase activity according to comfort level.

## 2020-04-07 NOTE — DISCHARGE SUMMARY
[facility] [place] - INPATIENT      Discharge Summary     Patient ID: Nuvia Garcia  :  1955   MRN: [id number]     ACCOUNT:  [de-identified]   Patient Location :   Patient's PCP: Peg Lovett DO  Admit Date: 4/3/2020   Discharge Date: 2020 . Length of Stay: 4  Code Status:  Full Code  Admitting Physician: Zuleyma Fung MD  Discharge Physician: Zuleyma Fung MD     Active Discharge Diagnosis :     Primary Problem  Acute biliary pancreatitis      [facility]    Diagnosis Date Noted    Obesity, Class III, BMI 40-49.9 (morbid obesity) ([facility] [address] [E66.01] 2019    Essential hypertension [I10] 2017    Fibromyalgia [M79.7] 2017       Admission Condition:  fair     Discharged Condition: stable    Hospital Stay:     Hospital Course:  Nuvia Garcia is a 75 y.o. female who was admitted for the management of   Acute biliary pancreatitis , presented to ER with Flank Pain (right x1 hour, seen for same issue 1 month ago [facility]- possible gallstones) and Sweats  Patient came to emergency room with acute onset right upper quadrant abdominal pain associated with nausea, vomiting. Pain was rated to back. She had been at [facility] about a month ago with similar issue and was diagnosed with chronic cholecystitis. Initial CT abdomen showed gallbladder distention and trace pericholecystic fluid concerning for cholecystitis. WBC was elevated at 15.2, lipase> 3000, bilirubin normal.  Patient was admitted for further treatment of acute biliary pancreatitis. Patient underwent Lap [place] on 20. Patient was tolerating diet at discharge. Significant therapeutic interventions:   Acute biliary pancreatitis - s/p Lap [place] -  Low Fat diet . Essential hypertension - controlled.  Fibromyalgia -   Anxiety disorder  -        Significant Diagnostic Studies:   Labs / Micro:/Radiology  Recent Labs     20  0435 20  0530 20  0631   WBC 11.7* 6.9 7.2

## 2020-04-07 NOTE — PROGRESS NOTES
Orientation  Orientation  Overall Orientation Status: Within Normal Limits  Social/Functional History  Social/Functional History  Lives With: Spouse  Type of Home: House  Home Layout: One level, Laundry in basement  Home Access: Stairs to enter with rails  Entrance Stairs - Number of Steps: 4  Bathroom Shower/Tub: Tub/Shower unit  Bathroom Equipment: Grab bars in shower, Shower chair  Home Equipment: Rolling walker, Cane  ADL Assistance: Independent  Homemaking Assistance: Independent(Pt. cooks, spouse cleans and does laundry in basement)  Ambulation Assistance: Independent  Transfer Assistance: Independent  Active : Yes  Occupation: Retired  Type of occupation: [employer]: [employer], gardening, puzzles  Cognition   Cognition  Overall Cognitive Status: [facility]  Cognition Comment: Hx anxiety    Objective     Observation/Palpation  Posture: Good  Observation: IV, abdominal binder  Edema: swelling Rt. hand    AROM RLE (degrees)  RLE AROM: WFL  AROM LLE (degrees)  LLE AROM : WFL  AROM RUE (degrees)  RUE AROM : WFL  AROM LUE (degrees)  LUE AROM : WFL  Strength RLE  Strength RLE: WFL  Strength LLE  Strength LLE: WFL  Strength RUE  Strength RUE: WFL  Strength LUE  Strength LUE: WFL  Tone RLE  RLE Tone: Normotonic  Tone LLE  LLE Tone: Normotonic  Sensation  Overall Sensation Status: WFL  Bed mobility  Rolling to Left: Stand by assistance  Rolling to Right: Stand by assistance  Supine to Sit: Stand by assistance  Sit to Supine: Stand by assistance  Comment: Practiced log rolling tech. to protect abdomen- good demo supine <>sit  Transfers  Sit to Stand: Stand by assistance  Stand to sit: Stand by assistance  Ambulation  Ambulation?: Yes  Ambulation 1  Surface: level tile  Device: No Device  Quality of Gait: Pt. guarded during gait but steady; mentioned she liked that the handrails were there if she needed them. Discussed using her st. cane at home and pt. in agreement. Distance: 200ft.

## 2020-04-07 NOTE — PLAN OF CARE
Problem: Falls - Risk of:  Goal: Will remain free from falls  Description: Will remain free from falls  Outcome: Ongoing  Goal: Absence of physical injury  Description: Absence of physical injury  Outcome: Ongoing     Problem: Pain:  Goal: Pain level will decrease  Description: Pain level will decrease  Outcome: Ongoing  Goal: Control of acute pain  Description: Control of acute pain  Outcome: Ongoing  Goal: Control of chronic pain  Description: Control of chronic pain  Outcome: Ongoing     Problem: Activity:  Goal: Risk for activity intolerance will decrease  Description: Risk for activity intolerance will decrease  Outcome: Ongoing     Problem:  Bowel/Gastric:  Goal: Bowel function will improve  Description: Bowel function will improve  Outcome: Ongoing  Goal: Occurrences of vomiting will decrease  Description: Occurrences of vomiting will decrease  Outcome: Ongoing     Problem: Fluid Volume:  Goal: Maintenance of adequate hydration will improve  Description: Maintenance of adequate hydration will improve  Outcome: Ongoing  Goal: Signs and symptoms of dehydration will decrease  Description: Signs and symptoms of dehydration will decrease  Outcome: Ongoing     Problem: Sensory:  Goal: Pain level will decrease  Description: Pain level will decrease  Outcome: Ongoing

## 2020-04-07 NOTE — PROGRESS NOTES
CLINICAL PHARMACY NOTE: MEDS TO [address] Select Patient?: Yes  Total # of Prescriptions Filled: 2   The following medications were delivered to the patient:  · PERCOCET 5-325  Total # of Interventions Completed: 0  Time Spent (min): 30    Additional Documentation:  PT BOUGHT OTC BOTTLE OF STOOL SOFTENER 100MG

## 2020-04-07 NOTE — PROGRESS NOTES
Writer reviewed discharge instructions with patient. Patient verbalize understanding, signature obtained. Patient  discharged home with belongings, scripts for Colace and Percocet.

## 2020-04-07 NOTE — PROGRESS NOTES
nila  Patient Goals   Patient goals : to go home       Therapy Time   Individual Concurrent Group Co-treatment   Time In [address]         Time Out 0940         Minutes [address]

## 2020-04-07 NOTE — PROGRESS NOTES
(!) 153/75 -- -- 64 14 100 % --   04/06/20 1115 (!) 150/76 -- -- 62 14 98 % --   04/06/20 1100 (!) 151/76 -- -- 67 14 97 % --   04/06/20 1045 (!) 145/73 -- -- 67 14 98 % --   04/06/20 1030 135/74 -- -- 67 15 98 % --   04/06/20 1015 121/61 -- -- 66 14 99 % --   04/06/20 1005 (!) 124/52 97.3 °F (36.3 °C) Temporal 68 11 97 % --     Intake / output   04/06 0701 - 04/07 0700  In: 900 [I.V.:900]  Out: 70 [Urine:60]  Physical Exam:  Physical Exam  Vitals signs and nursing note reviewed. Constitutional:       General: She is not in acute distress. Appearance: She is not diaphoretic. HENT:      Head: Normocephalic and atraumatic. Nose:      Right Sinus: No maxillary sinus tenderness or frontal sinus tenderness. Left Sinus: No maxillary sinus tenderness or frontal sinus tenderness. Mouth/Throat:      Pharynx: No oropharyngeal exudate. Eyes:      General: No scleral icterus. Conjunctiva/sclera: Conjunctivae normal.      Pupils: Pupils are equal, round, and reactive to light. Neck:      Musculoskeletal: Full passive range of motion without pain and neck supple. Thyroid: No thyromegaly. Vascular: No JVD. Cardiovascular:      Rate and Rhythm: Normal rate and regular rhythm. Pulses:           Dorsalis pedis pulses are 2+ on the right side and 2+ on the left side. Heart sounds: Normal heart sounds. No murmur. Pulmonary:      Effort: Pulmonary effort is normal.      Breath sounds: Normal breath sounds. No wheezing or rales. Abdominal:      Palpations: Abdomen is soft. There is no mass. Tenderness: There is abdominal tenderness in the right upper quadrant and epigastric area. Comments: Lap surgical wound    Lymphadenopathy:      Head:      Right side of head: No submandibular adenopathy. Left side of head: No submandibular adenopathy. Cervical: No cervical adenopathy. Skin:     General: Skin is warm.    Neurological:      Mental Status: She is alert and

## 2020-04-09 LAB
CULTURE: NORMAL
CULTURE: NORMAL
Lab: NORMAL
Lab: NORMAL
SPECIMEN DESCRIPTION: NORMAL
SPECIMEN DESCRIPTION: NORMAL
SURGICAL PATHOLOGY REPORT: NORMAL

## 2020-05-27 PROBLEM — K25.9 GASTRIC ULCER: Status: ACTIVE | Noted: 2018-03-22

## 2020-05-27 PROBLEM — R73.9 HYPERGLYCEMIA: Status: ACTIVE | Noted: 2019-06-12

## 2020-06-09 ENCOUNTER — OFFICE VISIT (OUTPATIENT)
Dept: BARIATRICS/WEIGHT MGMT | Age: 65
End: 2020-06-09
Payer: MEDICARE

## 2020-06-09 VITALS
HEART RATE: 82 BPM | WEIGHT: 235 LBS | SYSTOLIC BLOOD PRESSURE: 128 MMHG | HEIGHT: 62 IN | BODY MASS INDEX: 43.24 KG/M2 | DIASTOLIC BLOOD PRESSURE: 76 MMHG | TEMPERATURE: 97 F

## 2020-06-09 PROCEDURE — 99213 OFFICE O/P EST LOW 20 MIN: CPT | Performed by: NURSE PRACTITIONER

## 2020-09-01 ENCOUNTER — HOSPITAL ENCOUNTER (OUTPATIENT)
Facility: CLINIC | Age: 65
Discharge: HOME | End: 2020-09-01
Payer: MEDICARE

## 2020-09-01 LAB
-: ABNORMAL
ABSOLUTE EOS #: 0.43 K/UL (ref 0–0.44)
ABSOLUTE IMMATURE GRANULOCYTE: 0.04 K/UL (ref 0–0.3)
ABSOLUTE LYMPH #: 2.21 K/UL (ref 1.1–3.7)
ABSOLUTE MONO #: 0.51 K/UL (ref 0.1–1.2)
ALBUMIN SERPL-MCNC: 4.1 G/DL (ref 3.5–5.2)
ALBUMIN/GLOBULIN RATIO: 1.5 (ref 1–2.5)
ALP BLD-CCNC: 74 U/L (ref 35–104)
ALT SERPL-CCNC: 23 U/L (ref 5–33)
AMORPHOUS: ABNORMAL
AMYLASE: 55 U/L (ref 28–100)
ANION GAP SERPL CALCULATED.3IONS-SCNC: 12 MMOL/L (ref 9–17)
AST SERPL-CCNC: 20 U/L
BACTERIA: ABNORMAL
BASOPHILS # BLD: 0 % (ref 0–2)
BASOPHILS ABSOLUTE: 0.03 K/UL (ref 0–0.2)
BILIRUB SERPL-MCNC: 0.62 MG/DL (ref 0.3–1.2)
BILIRUBIN URINE: NEGATIVE
BUN BLDV-MCNC: 25 MG/DL (ref 8–23)
BUN/CREAT BLD: ABNORMAL (ref 9–20)
CALCIUM SERPL-MCNC: 9.6 MG/DL (ref 8.6–10.4)
CASTS UA: ABNORMAL /LPF (ref 0–8)
CHLORIDE BLD-SCNC: 103 MMOL/L (ref 98–107)
CO2: 26 MMOL/L (ref 20–31)
COLOR: YELLOW
COMMENT UA: ABNORMAL
CREAT SERPL-MCNC: 0.6 MG/DL (ref 0.5–0.9)
CRYSTALS, UA: ABNORMAL /HPF
DIFFERENTIAL TYPE: ABNORMAL
EOSINOPHILS RELATIVE PERCENT: 6 % (ref 1–4)
EPITHELIAL CELLS UA: ABNORMAL /HPF (ref 0–5)
GFR AFRICAN AMERICAN: >60 ML/MIN
GFR NON-AFRICAN AMERICAN: >60 ML/MIN
GFR SERPL CREATININE-BSD FRML MDRD: ABNORMAL ML/MIN/{1.73_M2}
GFR SERPL CREATININE-BSD FRML MDRD: ABNORMAL ML/MIN/{1.73_M2}
GLUCOSE BLD-MCNC: 120 MG/DL (ref 70–99)
GLUCOSE URINE: NEGATIVE
HCT VFR BLD CALC: 48 % (ref 36.3–47.1)
HEMOGLOBIN: 15.8 G/DL (ref 11.9–15.1)
IMMATURE GRANULOCYTES: 1 %
KETONES, URINE: NEGATIVE
LEUKOCYTE ESTERASE, URINE: ABNORMAL
LIPASE: 32 U/L (ref 13–60)
LYMPHOCYTES # BLD: 32 % (ref 24–43)
MCH RBC QN AUTO: 31.4 PG (ref 25.2–33.5)
MCHC RBC AUTO-ENTMCNC: 32.9 G/DL (ref 28.4–34.8)
MCV RBC AUTO: 95.4 FL (ref 82.6–102.9)
MONOCYTES # BLD: 7 % (ref 3–12)
MUCUS: ABNORMAL
NITRITE, URINE: POSITIVE
NRBC AUTOMATED: 0 PER 100 WBC
OTHER OBSERVATIONS UA: ABNORMAL
PDW BLD-RTO: 13 % (ref 11.8–14.4)
PH UA: 5.5 (ref 5–8)
PLATELET # BLD: 239 K/UL (ref 138–453)
PLATELET ESTIMATE: ABNORMAL
PMV BLD AUTO: 11.4 FL (ref 8.1–13.5)
POTASSIUM SERPL-SCNC: 5.3 MMOL/L (ref 3.7–5.3)
PROTEIN UA: NEGATIVE
RBC # BLD: 5.03 M/UL (ref 3.95–5.11)
RBC # BLD: ABNORMAL 10*6/UL
RBC UA: ABNORMAL /HPF (ref 0–4)
RENAL EPITHELIAL, UA: ABNORMAL /HPF
SEG NEUTROPHILS: 54 % (ref 36–65)
SEGMENTED NEUTROPHILS ABSOLUTE COUNT: 3.63 K/UL (ref 1.5–8.1)
SODIUM BLD-SCNC: 141 MMOL/L (ref 135–144)
SPECIFIC GRAVITY UA: 1.02 (ref 1–1.03)
TOTAL PROTEIN: 6.9 G/DL (ref 6.4–8.3)
TRICHOMONAS: ABNORMAL
TURBIDITY: ABNORMAL
URINE HGB: NEGATIVE
UROBILINOGEN, URINE: NORMAL
WBC # BLD: 6.9 K/UL (ref 3.5–11.3)
WBC # BLD: ABNORMAL 10*3/UL
WBC UA: ABNORMAL /HPF (ref 0–5)
YEAST: ABNORMAL

## 2020-09-01 PROCEDURE — 82150 ASSAY OF AMYLASE: CPT

## 2020-09-01 PROCEDURE — 36415 COLL VENOUS BLD VENIPUNCTURE: CPT

## 2020-09-01 PROCEDURE — 80053 COMPREHEN METABOLIC PANEL: CPT

## 2020-09-01 PROCEDURE — 83690 ASSAY OF LIPASE: CPT

## 2020-09-01 PROCEDURE — 87088 URINE BACTERIA CULTURE: CPT

## 2020-09-01 PROCEDURE — 85025 COMPLETE CBC W/AUTO DIFF WBC: CPT

## 2020-09-01 PROCEDURE — 87186 SC STD MICRODIL/AGAR DIL: CPT

## 2020-09-01 PROCEDURE — 81001 URINALYSIS AUTO W/SCOPE: CPT

## 2020-09-01 PROCEDURE — 87086 URINE CULTURE/COLONY COUNT: CPT

## 2020-09-03 LAB
CULTURE: ABNORMAL
Lab: ABNORMAL
SPECIMEN DESCRIPTION: ABNORMAL

## 2020-09-29 ENCOUNTER — HOSPITAL ENCOUNTER (OUTPATIENT)
Dept: GENERAL RADIOLOGY | Facility: CLINIC | Age: 65
Discharge: HOME | End: 2020-10-01
Payer: MEDICARE

## 2020-09-29 PROCEDURE — 72100 X-RAY EXAM L-S SPINE 2/3 VWS: CPT

## 2021-08-09 ENCOUNTER — HOSPITAL ENCOUNTER (OUTPATIENT)
Facility: CLINIC | Age: 66
Discharge: HOME | End: 2021-08-09
Payer: MEDICARE

## 2021-08-09 DIAGNOSIS — E78.5 DYSLIPIDEMIA: ICD-10-CM

## 2021-08-09 DIAGNOSIS — I10 ESSENTIAL HYPERTENSION: ICD-10-CM

## 2021-08-09 DIAGNOSIS — Z12.31 SCREENING MAMMOGRAM, ENCOUNTER FOR: ICD-10-CM

## 2021-08-09 DIAGNOSIS — R73.03 PREDIABETES: ICD-10-CM

## 2021-08-09 LAB
ABSOLUTE EOS #: 0.44 K/UL (ref 0–0.44)
ABSOLUTE IMMATURE GRANULOCYTE: 0.04 K/UL (ref 0–0.3)
ABSOLUTE LYMPH #: 2.4 K/UL (ref 1.1–3.7)
ABSOLUTE MONO #: 0.56 K/UL (ref 0.1–1.2)
ALBUMIN SERPL-MCNC: 4.1 G/DL (ref 3.5–5.2)
ALBUMIN/GLOBULIN RATIO: 1.6 (ref 1–2.5)
ALP BLD-CCNC: 79 U/L (ref 35–104)
ALT SERPL-CCNC: 21 U/L (ref 5–33)
ANION GAP SERPL CALCULATED.3IONS-SCNC: 12 MMOL/L (ref 9–17)
AST SERPL-CCNC: 16 U/L
BASOPHILS # BLD: 0 % (ref 0–2)
BASOPHILS ABSOLUTE: 0.03 K/UL (ref 0–0.2)
BILIRUB SERPL-MCNC: 0.56 MG/DL (ref 0.3–1.2)
BUN BLDV-MCNC: 20 MG/DL (ref 8–23)
BUN/CREAT BLD: ABNORMAL (ref 9–20)
CALCIUM SERPL-MCNC: 9.1 MG/DL (ref 8.6–10.4)
CHLORIDE BLD-SCNC: 103 MMOL/L (ref 98–107)
CHOLESTEROL/HDL RATIO: 4.3
CHOLESTEROL: 195 MG/DL
CO2: 25 MMOL/L (ref 20–31)
CREAT SERPL-MCNC: 0.58 MG/DL (ref 0.5–0.9)
DIFFERENTIAL TYPE: ABNORMAL
EOSINOPHILS RELATIVE PERCENT: 6 % (ref 1–4)
ESTIMATED AVERAGE GLUCOSE: 120 MG/DL
GFR AFRICAN AMERICAN: >60 ML/MIN
GFR NON-AFRICAN AMERICAN: >60 ML/MIN
GFR SERPL CREATININE-BSD FRML MDRD: ABNORMAL ML/MIN/{1.73_M2}
GFR SERPL CREATININE-BSD FRML MDRD: ABNORMAL ML/MIN/{1.73_M2}
GLUCOSE FASTING: 125 MG/DL (ref 70–99)
HBA1C MFR BLD: 5.8 % (ref 4–6)
HCT VFR BLD CALC: 46.9 % (ref 36.3–47.1)
HDLC SERPL-MCNC: 45 MG/DL
HEMOGLOBIN: 15.1 G/DL (ref 11.9–15.1)
IMMATURE GRANULOCYTES: 1 %
LDL CHOLESTEROL: 122 MG/DL (ref 0–130)
LYMPHOCYTES # BLD: 30 % (ref 24–43)
MCH RBC QN AUTO: 30.8 PG (ref 25.2–33.5)
MCHC RBC AUTO-ENTMCNC: 32.2 G/DL (ref 28.4–34.8)
MCV RBC AUTO: 95.5 FL (ref 82.6–102.9)
MONOCYTES # BLD: 7 % (ref 3–12)
NRBC AUTOMATED: 0 PER 100 WBC
PDW BLD-RTO: 13.5 % (ref 11.8–14.4)
PLATELET # BLD: 236 K/UL (ref 138–453)
PLATELET ESTIMATE: ABNORMAL
PMV BLD AUTO: 11.2 FL (ref 8.1–13.5)
POTASSIUM SERPL-SCNC: 4.5 MMOL/L (ref 3.7–5.3)
RBC # BLD: 4.91 M/UL (ref 3.95–5.11)
RBC # BLD: ABNORMAL 10*6/UL
SEG NEUTROPHILS: 56 % (ref 36–65)
SEGMENTED NEUTROPHILS ABSOLUTE COUNT: 4.42 K/UL (ref 1.5–8.1)
SODIUM BLD-SCNC: 140 MMOL/L (ref 135–144)
TOTAL PROTEIN: 6.6 G/DL (ref 6.4–8.3)
TRIGL SERPL-MCNC: 140 MG/DL
VLDLC SERPL CALC-MCNC: NORMAL MG/DL (ref 1–30)
WBC # BLD: 7.9 K/UL (ref 3.5–11.3)
WBC # BLD: ABNORMAL 10*3/UL

## 2021-08-09 PROCEDURE — 83036 HEMOGLOBIN GLYCOSYLATED A1C: CPT

## 2021-08-09 PROCEDURE — 80061 LIPID PANEL: CPT

## 2021-08-09 PROCEDURE — 36415 COLL VENOUS BLD VENIPUNCTURE: CPT

## 2021-08-09 PROCEDURE — 80053 COMPREHEN METABOLIC PANEL: CPT

## 2021-08-09 PROCEDURE — 85025 COMPLETE CBC W/AUTO DIFF WBC: CPT

## 2021-11-02 ENCOUNTER — HOSPITAL ENCOUNTER (EMERGENCY)
Age: 66
Discharge: HOME | DRG: 177 | End: 2021-11-02
Attending: EMERGENCY MEDICINE
Payer: MEDICARE

## 2021-11-02 ENCOUNTER — APPOINTMENT (OUTPATIENT)
Dept: CT IMAGING | Age: 66
DRG: 177 | End: 2021-11-02
Payer: MEDICARE

## 2021-11-02 VITALS
TEMPERATURE: 98.3 F | RESPIRATION RATE: 18 BRPM | WEIGHT: 236 LBS | DIASTOLIC BLOOD PRESSURE: 92 MMHG | BODY MASS INDEX: 43.43 KG/M2 | SYSTOLIC BLOOD PRESSURE: 138 MMHG | HEART RATE: 80 BPM | HEIGHT: 62 IN | OXYGEN SATURATION: 96 %

## 2021-11-02 DIAGNOSIS — R10.84 GENERALIZED ABDOMINAL PAIN: ICD-10-CM

## 2021-11-02 DIAGNOSIS — R11.2 NON-INTRACTABLE VOMITING WITH NAUSEA, UNSPECIFIED VOMITING TYPE: Primary | ICD-10-CM

## 2021-11-02 LAB
-: ABNORMAL
-: NORMAL
ABSOLUTE EOS #: <0.03 K/UL (ref 0–0.44)
ABSOLUTE IMMATURE GRANULOCYTE: 0.03 K/UL (ref 0–0.3)
ABSOLUTE LYMPH #: 1.75 K/UL (ref 1.1–3.7)
ABSOLUTE MONO #: 0.91 K/UL (ref 0.1–1.2)
ALBUMIN SERPL-MCNC: 3.9 G/DL (ref 3.5–5.2)
ALBUMIN/GLOBULIN RATIO: ABNORMAL (ref 1–2.5)
ALP BLD-CCNC: 59 U/L (ref 35–104)
ALT SERPL-CCNC: 28 U/L (ref 5–33)
AMORPHOUS: ABNORMAL
ANION GAP SERPL CALCULATED.3IONS-SCNC: 11 MMOL/L (ref 9–17)
AST SERPL-CCNC: 24 U/L
BACTERIA: ABNORMAL
BASOPHILS # BLD: 0 % (ref 0–2)
BASOPHILS ABSOLUTE: <0.03 K/UL (ref 0–0.2)
BILIRUB SERPL-MCNC: 0.39 MG/DL (ref 0.3–1.2)
BILIRUBIN URINE: NEGATIVE
BUN BLDV-MCNC: 15 MG/DL (ref 8–23)
BUN/CREAT BLD: 28 (ref 9–20)
CALCIUM SERPL-MCNC: 8.9 MG/DL (ref 8.6–10.4)
CASTS UA: ABNORMAL /LPF
CHLORIDE BLD-SCNC: 101 MMOL/L (ref 98–107)
CO2: 23 MMOL/L (ref 20–31)
COLOR: YELLOW
COMMENT UA: ABNORMAL
CREAT SERPL-MCNC: 0.54 MG/DL (ref 0.5–0.9)
CRYSTALS, UA: ABNORMAL /HPF
DIFFERENTIAL TYPE: ABNORMAL
EOSINOPHILS RELATIVE PERCENT: 0 % (ref 1–4)
EPITHELIAL CELLS UA: ABNORMAL /HPF (ref 0–5)
GFR AFRICAN AMERICAN: >60 ML/MIN
GFR NON-AFRICAN AMERICAN: >60 ML/MIN
GFR SERPL CREATININE-BSD FRML MDRD: ABNORMAL ML/MIN/{1.73_M2}
GFR SERPL CREATININE-BSD FRML MDRD: ABNORMAL ML/MIN/{1.73_M2}
GLUCOSE BLD-MCNC: 108 MG/DL (ref 70–99)
GLUCOSE URINE: NEGATIVE
HCT VFR BLD CALC: 47.7 % (ref 36.3–47.1)
HEMOGLOBIN: 16.1 G/DL (ref 11.9–15.1)
IMMATURE GRANULOCYTES: 0 %
KETONES, URINE: NEGATIVE
LEUKOCYTE ESTERASE, URINE: NEGATIVE
LIPASE: 21 U/L (ref 13–60)
LYMPHOCYTES # BLD: 24 % (ref 24–43)
MCH RBC QN AUTO: 31.3 PG (ref 25.2–33.5)
MCHC RBC AUTO-ENTMCNC: 33.8 G/DL (ref 28.4–34.8)
MCV RBC AUTO: 92.8 FL (ref 82.6–102.9)
MONOCYTES # BLD: 12 % (ref 3–12)
MUCUS: ABNORMAL
NITRITE, URINE: NEGATIVE
NRBC AUTOMATED: 0 PER 100 WBC
OTHER OBSERVATIONS UA: ABNORMAL
PDW BLD-RTO: 13.4 % (ref 11.8–14.4)
PH UA: 5.5 (ref 5–8)
PLATELET # BLD: 211 K/UL (ref 138–453)
PLATELET ESTIMATE: ABNORMAL
PMV BLD AUTO: 11 FL (ref 8.1–13.5)
POTASSIUM SERPL-SCNC: 3.8 MMOL/L (ref 3.7–5.3)
PROTEIN UA: NEGATIVE
RBC # BLD: 5.14 M/UL (ref 3.95–5.11)
RBC # BLD: ABNORMAL 10*6/UL
RBC UA: ABNORMAL /HPF (ref 0–2)
REASON FOR REJECTION: NORMAL
RENAL EPITHELIAL, UA: ABNORMAL /HPF
SEG NEUTROPHILS: 64 % (ref 36–65)
SEGMENTED NEUTROPHILS ABSOLUTE COUNT: 4.61 K/UL (ref 1.5–8.1)
SODIUM BLD-SCNC: 135 MMOL/L (ref 135–144)
SPECIFIC GRAVITY UA: 1.03 (ref 1–1.03)
TOTAL PROTEIN: 6.3 G/DL (ref 6.4–8.3)
TRICHOMONAS: ABNORMAL
TURBIDITY: ABNORMAL
URINE HGB: NEGATIVE
UROBILINOGEN, URINE: NORMAL
WBC # BLD: 7.3 K/UL (ref 3.5–11.3)
WBC # BLD: ABNORMAL 10*3/UL
WBC UA: ABNORMAL /HPF (ref 0–5)
YEAST: ABNORMAL
ZZ NTE CLEAN UP: ORDERED TEST: NORMAL
ZZ NTE WITH NAME CLEAN UP: SPECIMEN SOURCE: NORMAL

## 2021-11-02 PROCEDURE — 6360000004 HC RX CONTRAST MEDICATION: Performed by: EMERGENCY MEDICINE

## 2021-11-02 PROCEDURE — 81001 URINALYSIS AUTO W/SCOPE: CPT

## 2021-11-02 PROCEDURE — 85025 COMPLETE CBC W/AUTO DIFF WBC: CPT

## 2021-11-02 PROCEDURE — 96374 THER/PROPH/DIAG INJ IV PUSH: CPT

## 2021-11-02 PROCEDURE — 83690 ASSAY OF LIPASE: CPT

## 2021-11-02 PROCEDURE — 99283 EMERGENCY DEPT VISIT LOW MDM: CPT

## 2021-11-02 PROCEDURE — 6360000002 HC RX W HCPCS: Performed by: EMERGENCY MEDICINE

## 2021-11-02 PROCEDURE — 6360000002 HC RX W HCPCS: Performed by: PHYSICIAN ASSISTANT

## 2021-11-02 PROCEDURE — 74177 CT ABD & PELVIS W/CONTRAST: CPT

## 2021-11-02 PROCEDURE — 96375 TX/PRO/DX INJ NEW DRUG ADDON: CPT

## 2021-11-02 PROCEDURE — 80053 COMPREHEN METABOLIC PANEL: CPT

## 2021-11-02 PROCEDURE — 2580000003 HC RX 258: Performed by: EMERGENCY MEDICINE

## 2021-11-02 PROCEDURE — 96361 HYDRATE IV INFUSION ADD-ON: CPT

## 2021-11-02 PROCEDURE — 2580000003 HC RX 258: Performed by: PHYSICIAN ASSISTANT

## 2021-11-02 RX ORDER — HYDROCODONE BITARTRATE AND ACETAMINOPHEN 5; 325 MG/1; MG/1
1-2 TABLET ORAL EVERY 8 HOURS PRN
Qty: 12 TABLET | Refills: 0 | Status: ON HOLD | OUTPATIENT
Start: 2021-11-02 | End: 2021-11-27 | Stop reason: HOSPADM

## 2021-11-02 RX ORDER — 0.9 % SODIUM CHLORIDE 0.9 %
1000 INTRAVENOUS SOLUTION INTRAVENOUS ONCE
Status: COMPLETED | OUTPATIENT
Start: 2021-11-02 | End: 2021-11-02

## 2021-11-02 RX ORDER — SODIUM CHLORIDE 0.9 % (FLUSH) 0.9 %
10 SYRINGE (ML) INJECTION PRN
Status: DISCONTINUED | OUTPATIENT
Start: 2021-11-02 | End: 2021-11-02 | Stop reason: HOSPADM

## 2021-11-02 RX ORDER — MORPHINE SULFATE 4 MG/ML
4 INJECTION, SOLUTION INTRAMUSCULAR; INTRAVENOUS ONCE
Status: COMPLETED | OUTPATIENT
Start: 2021-11-02 | End: 2021-11-02

## 2021-11-02 RX ORDER — HYDROMORPHONE HYDROCHLORIDE 1 MG/ML
1 INJECTION, SOLUTION INTRAMUSCULAR; INTRAVENOUS; SUBCUTANEOUS ONCE
Status: COMPLETED | OUTPATIENT
Start: 2021-11-02 | End: 2021-11-02

## 2021-11-02 RX ORDER — 0.9 % SODIUM CHLORIDE 0.9 %
80 INTRAVENOUS SOLUTION INTRAVENOUS ONCE
Status: COMPLETED | OUTPATIENT
Start: 2021-11-02 | End: 2021-11-02

## 2021-11-02 RX ORDER — ONDANSETRON 2 MG/ML
4 INJECTION INTRAMUSCULAR; INTRAVENOUS ONCE
Status: COMPLETED | OUTPATIENT
Start: 2021-11-02 | End: 2021-11-02

## 2021-11-02 RX ORDER — ONDANSETRON 4 MG/1
4 TABLET, ORALLY DISINTEGRATING ORAL EVERY 8 HOURS PRN
Qty: 20 TABLET | Refills: 0 | Status: SHIPPED | OUTPATIENT
Start: 2021-11-02 | End: 2021-12-23

## 2021-11-02 RX ADMIN — SODIUM CHLORIDE 80 ML: 9 INJECTION, SOLUTION INTRAVENOUS at 16:22

## 2021-11-02 RX ADMIN — IOPAMIDOL 75 ML: 755 INJECTION, SOLUTION INTRAVENOUS at 16:22

## 2021-11-02 RX ADMIN — SODIUM CHLORIDE, PRESERVATIVE FREE 10 ML: 5 INJECTION INTRAVENOUS at 16:22

## 2021-11-02 RX ADMIN — MORPHINE SULFATE 4 MG: 4 INJECTION, SOLUTION INTRAMUSCULAR; INTRAVENOUS at 14:40

## 2021-11-02 RX ADMIN — SODIUM CHLORIDE 1000 ML: 9 INJECTION, SOLUTION INTRAVENOUS at 14:40

## 2021-11-02 RX ADMIN — HYDROMORPHONE HYDROCHLORIDE 1 MG: 1 INJECTION, SOLUTION INTRAMUSCULAR; INTRAVENOUS; SUBCUTANEOUS at 15:40

## 2021-11-02 RX ADMIN — ONDANSETRON 4 MG: 2 INJECTION INTRAMUSCULAR; INTRAVENOUS at 14:40

## 2021-11-02 ASSESSMENT — PAIN SCALES - GENERAL
PAINLEVEL_OUTOF10: 8
PAINLEVEL_OUTOF10: 10
PAINLEVEL_OUTOF10: 8

## 2021-11-02 ASSESSMENT — PAIN DESCRIPTION - LOCATION: LOCATION: ABDOMEN;FLANK

## 2021-11-02 ASSESSMENT — PAIN DESCRIPTION - FREQUENCY: FREQUENCY: CONTINUOUS

## 2021-11-02 ASSESSMENT — PAIN DESCRIPTION - ORIENTATION: ORIENTATION: RIGHT

## 2021-11-02 ASSESSMENT — PAIN DESCRIPTION - PAIN TYPE: TYPE: ACUTE PAIN

## 2021-11-02 NOTE — ED PROVIDER NOTES
Patient's evaluation and treatment discussed with SANDRA Cooper. I am in agreement with patient's care as noted. Patient presents with complaint of nasal congestion with rhinorrhea and occasional episodes of nausea and vomiting 3 days ago. She reports that starting yesterday she began experiencing worsening nausea and vomiting associate with abdominal pain which is worsened today. She indicates that abdominal pain is in the upper abdomen bilaterally and radiates into her back. She denies any fevers or chills. No hematemesis, hematochezia, melena. No cough or shortness of breath. No dysuria, hematuria, frequency, urgency. Past medical history is positive for hypertension, fibromyalgia and peptic ulcer disease. Awake, alert, coop, responsive. Patient appears to be in moderate distress secondary to discomfort. Speech fluent, normal comprehension. Lungs clear bilaterally. No rales, rhonchi, wheezes, stridor, retractions. Cardiac-S1S2, RRR, no murmur, rub, or gallop. Abdomen soft, nondistended, with diffuse tenderness more prominent in the upper abdominal region. There is no guarding, rebound. No organomegaly, mass, bruit. Normal bowel sounds. Femoral artery pulses are normal and symmetric. No femoral bruits noted. No CVA tenderness. CBC and urinalysis have been reviewed and are are unremarkable. Additional lab work is pending. CT of the abdomen is pending. Impression: Upper abdominal pain radiating to the back. Plan: We will provide additional analgesic medications while awaiting the remainder the blood work and a CT of the abdomen. Lab results have been reviewed and do not offer any obvious diagnostic abnormalities. CT interpretation has also been reviewed and also does not provide any obvious diagnostic findings. On reassessment the patient reports that her symptoms of improved significantly.   She is reporting only mild discomfort and minimal nausea at the time of reassessment. Impression: Abdominal and back pain and nausea vomiting. Plan: Patient will be discharged with prescriptions for analgesics and antiemetics. She is advised to follow-up with her primary care provider as soon as possible and to return to the emergency department should her symptoms worsen, recur, progress.        Raffy Castrejon MD  11/02/21 [address] MD  11/03/21 1141

## 2021-11-02 NOTE — ED PROVIDER NOTES
[address] [place] [address] ED  [facility]      Pt Name: Reinier Samuels  MRN: [id number]  [place] 1955  Date ofevaluation: 11/2/2021  Provider: Dileep Matos Dr       Chief Complaint   Patient presents with    Nausea    Emesis     since saturday    Generalized Body Aches    Constipation         HISTORY OF PRESENT ILLNESS  (Location/Symptom, Timing/Onset, Context/Setting, Quality, Duration, Modifying Factors, Severity.)   Reinier Samuels is a 74 y.o. female who presents to the emergency department with evaluation started. Reports generalized body aches as well. No fever chills. No bloody or black stool. No definitely aggravating factors. Nursing Notes were reviewed.     ALLERGIES     Other, Ceclor [cefaclor], Food, and Sulfa antibiotics    CURRENT MEDICATIONS       Previous Medications    ACETAMINOPHEN (TYLENOL) 500 MG TABLET    Take 500 mg by mouth every 6 hours as needed for Pain    ALBUTEROL SULFATE HFA (VENTOLIN HFA) 108 (90 BASE) MCG/ACT INHALER    Inhale 2 puffs into the lungs 4 times daily as needed for Wheezing    CETIRIZINE (ZYRTEC) 10 MG TABLET    Take 10 mg by mouth as needed for Allergies     LISINOPRIL-HYDROCHLOROTHIAZIDE (PRINZIDE;ZESTORETIC) 20-12.5 MG PER TABLET    TAKE 1 TABLET DAILY    METOPROLOL SUCCINATE (TOPROL XL) 100 MG EXTENDED RELEASE TABLET    TAKE 1 TABLET DAILY    MULTIPLE VITAMINS-MINERALS (MULTIVITAMIN ADULT PO)    Take 1 tablet by mouth daily     PROAIR  (90 BASE) MCG/ACT INHALER    INHALE 2 PUFFS BY MOUTH EVERY 4 HOURS AS NEEDED FOR WHEEZING       PAST MEDICAL HISTORY         Diagnosis Date    Anxiety     Arthritis     Bell's palsy     Chronic neck pain     posterior neck since fall April 2014    Chronic pain     low back and bilateral lower extremities    Fibromyalgia     HA (headache)     Headache     off/on since fall April 2014    HTN (hypertension)     Hypertension        SURGICAL HISTORY           Procedure Laterality Date    ARM SURGERY      rt arm fatty tumor     SECTION      CHOLECYSTECTOMY  2020    CHOLECYSTECTOMY, LAPAROSCOPIC N/A 2020    CHOLECYSTECTOMY LAPAROSCOPIC WITH GRAM performed [address] [facility]enan Whalen [facility] [address]     knee    SHOULDER SURGERY      rt rotator cuff repair    URETHRAL STRICTURE DILATATION      WISDOM TOOTH EXTRACTION           FAMILY HISTORY           Problem Relation Age of Onset    Other Mother         CAD   [facility] Diabetes Mother     Other Father         CAD    Diabetes Father     Thyroid Disease Sister     Cancer Brother         prostate    Diabetes Brother     Cancer Maternal Grandmother         uterine     Family Status   Relation Name Status    Mother  (Not Specified)    Father  (Not Specified)    Sister  (Not Specified)    Brother  (Not Specified)    MGM  (Not Specified)        SOCIAL HISTORY      reports that she has never smoked. She has never used smokeless tobacco. She reports that she does not drink alcohol and does not use drugs. REVIEW OFSYSTEMS    (2-9 systems for level 4, 10 or more for level 5)   Review of Systems    Except as noted above the remainder of the review of systems was reviewed and negative. PHYSICAL EXAM    (up to 7 for level 4, 8 or more for level 5)     ED Triage Vitals [21 1311]   BP Temp Temp Source Pulse Resp SpO2 Height Weight   (!) 138/92 98.3 °F (36.8 °C) Oral 80 18 96 % 5' 2\" (1.575 m) 236 lb (107 kg)      Physical Exam  Constitutional:       Appearance: She is well-developed. HENT:      Head: Normocephalic and atraumatic. Cardiovascular:      Rate and Rhythm: Normal rate and regular rhythm. Pulmonary:      Effort: Pulmonary effort is normal.      Breath sounds: Normal breath sounds. Abdominal:      General: There is no distension. Palpations: Abdomen is soft. Tenderness: There is no abdominal tenderness. Musculoskeletal:         General: Normal range of motion. Cervical back: Normal range of motion and neck supple. Skin:     General: Skin is warm. Findings: No rash. Neurological:      Mental Status: She is alert and oriented to person, place, and time. Psychiatric:         Behavior: Behavior normal.                 DIAGNOSTIC RESULTS     EKG: All EKG's are interpreted by the Emergency Department Physician who either signs or Co-signs this chart in the absence of a cardiologist.        RADIOLOGY:   Non-plain film images such as CT, Ultrasound and MRI are read by the radiologist. Plain radiographic images arevisualized and preliminarily interpreted by the emergency physician with the below findings:        Interpretation per the Radiologist below, if available at thetime of this note:          ED BEDSIDE ULTRASOUND:   Performed by ED Physician - none    LABS:  Labs Reviewed   CBC WITH AUTO DIFFERENTIAL - Abnormal; Notable for the following components:       Result Value    RBC 5.14 (*)     Hemoglobin 16.1 (*)     Hematocrit 47.7 (*)     Eosinophils % 0 (*)     All other components within normal limits   URINALYSIS - Abnormal; Notable for the following components:    Turbidity UA SLIGHTLY CLOUDY (*)     Specific Gravity, UA 1.035 (*)     All other components within normal limits   MICROSCOPIC URINALYSIS - Abnormal; Notable for the following components:    Mucus, UA 1+ (*)     All other components within normal limits   COMPREHENSIVE METABOLIC PANEL - Abnormal; Notable for the following components:    Glucose 108 (*)     Bun/Cre Ratio 28 (*)     Total Protein 6.3 (*)     All other components within normal limits   SPECIMEN REJECTION   LIPASE       All other labs were within normal range or not returned as of this dictation.     EMERGENCY DEPARTMENT COURSE and DIFFERENTIAL DIAGNOSIS/MDM:   Vitals:    Vitals:    11/02/21 1311   BP: (!) 138/92   Pulse: 80   Resp: 18   Temp: 98.3 °F (36.8 °C)   TempSrc: Oral   SpO2: 96%   Weight: 236 lb (107 kg)   Height: 5' 2\" (1.575 m)     Patient feels much better. Labs and imaging unremarkable. Patient will be treated symptomatically and discharged home. CONSULTS:  None    PROCEDURES:  Procedures        FINAL IMPRESSION      1. Non-intractable vomiting with nausea, unspecified vomiting type    2. Generalized abdominal pain          DISPOSITION/PLAN   DISPOSITION Decision To Discharge 11/02/2021 05:13:18 PM      PATIENTREFERRED TO:   Mary Ann Jarvis DO  [address] [place] [address]  [phone]    In 3 days        DISCHARGE MEDICATIONS:     New Prescriptions    HYDROCODONE-ACETAMINOPHEN (NORCO) 5-325 MG PER TABLET    Take 1-2 tablets by mouth every 8 hours as needed for Pain for up to 3 days.     ONDANSETRON (ZOFRAN ODT) 4 MG DISINTEGRATING TABLET    Take 1 tablet by mouth every 8 hours as needed for Nausea           (Please note that portions of this note were completed with a voice recognition program.  Efforts were made to edit thedictations but occasionally words are mis-transcribed.)    ELAINE Vasquez PA-C  11/02/21 [id number]

## 2021-11-02 NOTE — ED NOTES
Discharge instructions and follow up care reviewed with patient and all questions answered at this time. Patient stable and ambulatory at time of discharge.       Killian Fernández RN  11/02/21 1646

## 2021-11-03 ENCOUNTER — APPOINTMENT (OUTPATIENT)
Dept: GENERAL RADIOLOGY | Age: 66
DRG: 177 | End: 2021-11-03
Payer: MEDICARE

## 2021-11-03 ENCOUNTER — HOSPITAL ENCOUNTER (INPATIENT)
Age: 66
LOS: 23 days | Discharge: HOME HEALTH | DRG: 177 | End: 2021-11-27
Attending: STUDENT IN AN ORGANIZED HEALTH CARE EDUCATION/TRAINING PROGRAM | Admitting: INTERNAL MEDICINE
Payer: MEDICARE

## 2021-11-03 DIAGNOSIS — U07.1 COVID-19: Primary | ICD-10-CM

## 2021-11-03 LAB
SARS-COV-2, RAPID: DETECTED
SPECIMEN DESCRIPTION: ABNORMAL

## 2021-11-03 PROCEDURE — 99285 EMERGENCY DEPT VISIT HI MDM: CPT

## 2021-11-03 PROCEDURE — 71045 X-RAY EXAM CHEST 1 VIEW: CPT

## 2021-11-03 PROCEDURE — 6360000002 HC RX W HCPCS: Performed by: STUDENT IN AN ORGANIZED HEALTH CARE EDUCATION/TRAINING PROGRAM

## 2021-11-03 PROCEDURE — 96372 THER/PROPH/DIAG INJ SC/IM: CPT

## 2021-11-03 PROCEDURE — 87635 SARS-COV-2 COVID-19 AMP PRB: CPT

## 2021-11-03 PROCEDURE — 6370000000 HC RX 637 (ALT 250 FOR IP): Performed by: STUDENT IN AN ORGANIZED HEALTH CARE EDUCATION/TRAINING PROGRAM

## 2021-11-03 RX ORDER — OXYCODONE HYDROCHLORIDE AND ACETAMINOPHEN 5; 325 MG/1; MG/1
1 TABLET ORAL ONCE
Status: COMPLETED | OUTPATIENT
Start: 2021-11-03 | End: 2021-11-03

## 2021-11-03 RX ORDER — ACETAMINOPHEN 500 MG
1000 TABLET ORAL ONCE
Status: COMPLETED | OUTPATIENT
Start: 2021-11-04 | End: 2021-11-04

## 2021-11-03 RX ORDER — PROMETHAZINE HYDROCHLORIDE 25 MG/ML
25 INJECTION, SOLUTION INTRAMUSCULAR; INTRAVENOUS ONCE
Status: COMPLETED | OUTPATIENT
Start: 2021-11-03 | End: 2021-11-03

## 2021-11-03 RX ORDER — DEXAMETHASONE SODIUM PHOSPHATE 10 MG/ML
6 INJECTION, SOLUTION INTRAMUSCULAR; INTRAVENOUS EVERY 24 HOURS
Status: DISCONTINUED | OUTPATIENT
Start: 2021-11-04 | End: 2021-11-04 | Stop reason: SDUPTHER

## 2021-11-03 RX ADMIN — PROMETHAZINE HYDROCHLORIDE 25 MG: 25 INJECTION INTRAMUSCULAR; INTRAVENOUS at 23:21

## 2021-11-03 RX ADMIN — OXYCODONE AND ACETAMINOPHEN 1 TABLET: 5; 325 TABLET ORAL at 23:21

## 2021-11-03 ASSESSMENT — PAIN SCALES - GENERAL
PAINLEVEL_OUTOF10: 9
PAINLEVEL_OUTOF10: 9

## 2021-11-04 PROBLEM — U07.1 COVID-19: Status: ACTIVE | Noted: 2021-11-04

## 2021-11-04 LAB
ABSOLUTE EOS #: <0.03 K/UL (ref 0–0.44)
ABSOLUTE IMMATURE GRANULOCYTE: 0.03 K/UL (ref 0–0.3)
ABSOLUTE LYMPH #: 1.76 K/UL (ref 1.1–3.7)
ABSOLUTE MONO #: 0.9 K/UL (ref 0.1–1.2)
ALBUMIN SERPL-MCNC: 4.3 G/DL (ref 3.5–5.2)
ALBUMIN/GLOBULIN RATIO: ABNORMAL (ref 1–2.5)
ALP BLD-CCNC: 81 U/L (ref 35–104)
ALT SERPL-CCNC: 46 U/L (ref 5–33)
ANION GAP SERPL CALCULATED.3IONS-SCNC: 13 MMOL/L (ref 9–17)
AST SERPL-CCNC: 32 U/L
BASOPHILS # BLD: 0 % (ref 0–2)
BASOPHILS ABSOLUTE: <0.03 K/UL (ref 0–0.2)
BILIRUB SERPL-MCNC: 0.53 MG/DL (ref 0.3–1.2)
BUN BLDV-MCNC: 17 MG/DL (ref 8–23)
BUN/CREAT BLD: 22 (ref 9–20)
C-REACTIVE PROTEIN: 27.9 MG/L (ref 0–5)
CALCIUM SERPL-MCNC: 8.9 MG/DL (ref 8.6–10.4)
CHLORIDE BLD-SCNC: 93 MMOL/L (ref 98–107)
CO2: 25 MMOL/L (ref 20–31)
CREAT SERPL-MCNC: 0.78 MG/DL (ref 0.5–0.9)
D-DIMER QUANTITATIVE: 0.48 MG/L FEU (ref 0–0.59)
DIFFERENTIAL TYPE: ABNORMAL
EOSINOPHILS RELATIVE PERCENT: 0 % (ref 1–4)
FERRITIN: 676 UG/L (ref 13–150)
FIBRINOGEN: 353 MG/DL (ref 179–518)
GFR AFRICAN AMERICAN: >60 ML/MIN
GFR NON-AFRICAN AMERICAN: >60 ML/MIN
GFR SERPL CREATININE-BSD FRML MDRD: ABNORMAL ML/MIN/{1.73_M2}
GFR SERPL CREATININE-BSD FRML MDRD: ABNORMAL ML/MIN/{1.73_M2}
GLUCOSE BLD-MCNC: 110 MG/DL (ref 70–99)
HCT VFR BLD CALC: 45.6 % (ref 36.3–47.1)
HEMOGLOBIN: 14.9 G/DL (ref 11.9–15.1)
IMMATURE GRANULOCYTES: 0 %
INR BLD: 1.1
LACTATE DEHYDROGENASE: 168 U/L (ref 135–214)
LYMPHOCYTES # BLD: 21 % (ref 24–43)
MCH RBC QN AUTO: 30.8 PG (ref 25.2–33.5)
MCHC RBC AUTO-ENTMCNC: 32.7 G/DL (ref 28.4–34.8)
MCV RBC AUTO: 94.2 FL (ref 82.6–102.9)
MONOCYTES # BLD: 11 % (ref 3–12)
NRBC AUTOMATED: 0 PER 100 WBC
PARTIAL THROMBOPLASTIN TIME: 29.8 SEC (ref 23.9–33.8)
PDW BLD-RTO: 13.6 % (ref 11.8–14.4)
PLATELET # BLD: 198 K/UL (ref 138–453)
PLATELET ESTIMATE: ABNORMAL
PMV BLD AUTO: 10.8 FL (ref 8.1–13.5)
POTASSIUM SERPL-SCNC: 3.6 MMOL/L (ref 3.7–5.3)
PROCALCITONIN: 0.13 NG/ML
PROCALCITONIN: 0.14 NG/ML
PROTHROMBIN TIME: 14 SEC (ref 11.5–14.2)
RBC # BLD: 4.84 M/UL (ref 3.95–5.11)
RBC # BLD: ABNORMAL 10*6/UL
SEG NEUTROPHILS: 68 % (ref 36–65)
SEGMENTED NEUTROPHILS ABSOLUTE COUNT: 5.54 K/UL (ref 1.5–8.1)
SODIUM BLD-SCNC: 131 MMOL/L (ref 135–144)
TOTAL PROTEIN: 7.2 G/DL (ref 6.4–8.3)
WBC # BLD: 8.3 K/UL (ref 3.5–11.3)
WBC # BLD: ABNORMAL 10*3/UL

## 2021-11-04 PROCEDURE — 85610 PROTHROMBIN TIME: CPT

## 2021-11-04 PROCEDURE — 6370000000 HC RX 637 (ALT 250 FOR IP): Performed by: NURSE PRACTITIONER

## 2021-11-04 PROCEDURE — 99222 1ST HOSP IP/OBS MODERATE 55: CPT | Performed by: STUDENT IN AN ORGANIZED HEALTH CARE EDUCATION/TRAINING PROGRAM

## 2021-11-04 PROCEDURE — 85379 FIBRIN DEGRADATION QUANT: CPT

## 2021-11-04 PROCEDURE — 85730 THROMBOPLASTIN TIME PARTIAL: CPT

## 2021-11-04 PROCEDURE — 82728 ASSAY OF FERRITIN: CPT

## 2021-11-04 PROCEDURE — 97162 PT EVAL MOD COMPLEX 30 MIN: CPT

## 2021-11-04 PROCEDURE — 84145 PROCALCITONIN (PCT): CPT

## 2021-11-04 PROCEDURE — 2060000000 HC ICU INTERMEDIATE R&B

## 2021-11-04 PROCEDURE — 97116 GAIT TRAINING THERAPY: CPT

## 2021-11-04 PROCEDURE — 97166 OT EVAL MOD COMPLEX 45 MIN: CPT

## 2021-11-04 PROCEDURE — 6360000002 HC RX W HCPCS: Performed by: NURSE PRACTITIONER

## 2021-11-04 PROCEDURE — 83615 LACTATE (LD) (LDH) ENZYME: CPT

## 2021-11-04 PROCEDURE — 80053 COMPREHEN METABOLIC PANEL: CPT

## 2021-11-04 PROCEDURE — 6370000000 HC RX 637 (ALT 250 FOR IP): Performed by: STUDENT IN AN ORGANIZED HEALTH CARE EDUCATION/TRAINING PROGRAM

## 2021-11-04 PROCEDURE — 97530 THERAPEUTIC ACTIVITIES: CPT

## 2021-11-04 PROCEDURE — 2580000003 HC RX 258: Performed by: NURSE PRACTITIONER

## 2021-11-04 PROCEDURE — 86140 C-REACTIVE PROTEIN: CPT

## 2021-11-04 PROCEDURE — 85025 COMPLETE CBC W/AUTO DIFF WBC: CPT

## 2021-11-04 PROCEDURE — 97535 SELF CARE MNGMENT TRAINING: CPT

## 2021-11-04 PROCEDURE — 85384 FIBRINOGEN ACTIVITY: CPT

## 2021-11-04 PROCEDURE — 36415 COLL VENOUS BLD VENIPUNCTURE: CPT

## 2021-11-04 PROCEDURE — 2580000003 HC RX 258: Performed by: STUDENT IN AN ORGANIZED HEALTH CARE EDUCATION/TRAINING PROGRAM

## 2021-11-04 RX ORDER — ONDANSETRON 2 MG/ML
4 INJECTION INTRAMUSCULAR; INTRAVENOUS EVERY 6 HOURS PRN
Status: DISCONTINUED | OUTPATIENT
Start: 2021-11-04 | End: 2021-11-27 | Stop reason: HOSPADM

## 2021-11-04 RX ORDER — DOCUSATE SODIUM 100 MG/1
100 CAPSULE, LIQUID FILLED ORAL DAILY
Status: DISCONTINUED | OUTPATIENT
Start: 2021-11-04 | End: 2021-11-12

## 2021-11-04 RX ORDER — SODIUM CHLORIDE 9 MG/ML
INJECTION, SOLUTION INTRAVENOUS CONTINUOUS
Status: DISCONTINUED | OUTPATIENT
Start: 2021-11-04 | End: 2021-11-05

## 2021-11-04 RX ORDER — SODIUM CHLORIDE 0.9 % (FLUSH) 0.9 %
10 SYRINGE (ML) INJECTION PRN
Status: DISCONTINUED | OUTPATIENT
Start: 2021-11-04 | End: 2021-11-27 | Stop reason: HOSPADM

## 2021-11-04 RX ORDER — ACETAMINOPHEN 650 MG/1
650 SUPPOSITORY RECTAL EVERY 6 HOURS PRN
Status: DISCONTINUED | OUTPATIENT
Start: 2021-11-04 | End: 2021-11-27 | Stop reason: HOSPADM

## 2021-11-04 RX ORDER — SODIUM CHLORIDE 9 MG/ML
25 INJECTION, SOLUTION INTRAVENOUS PRN
Status: DISCONTINUED | OUTPATIENT
Start: 2021-11-04 | End: 2021-11-27 | Stop reason: HOSPADM

## 2021-11-04 RX ORDER — ACETAMINOPHEN 325 MG/1
650 TABLET ORAL EVERY 6 HOURS PRN
Status: DISCONTINUED | OUTPATIENT
Start: 2021-11-04 | End: 2021-11-27 | Stop reason: HOSPADM

## 2021-11-04 RX ORDER — VITAMIN B COMPLEX
2000 TABLET ORAL DAILY
Status: DISCONTINUED | OUTPATIENT
Start: 2021-11-04 | End: 2021-11-27 | Stop reason: HOSPADM

## 2021-11-04 RX ORDER — ONDANSETRON 4 MG/1
4 TABLET, ORALLY DISINTEGRATING ORAL EVERY 8 HOURS PRN
Status: DISCONTINUED | OUTPATIENT
Start: 2021-11-04 | End: 2021-11-27 | Stop reason: HOSPADM

## 2021-11-04 RX ORDER — LISINOPRIL AND HYDROCHLOROTHIAZIDE 20; 12.5 MG/1; MG/1
1 TABLET ORAL DAILY
Status: DISCONTINUED | OUTPATIENT
Start: 2021-11-04 | End: 2021-11-16

## 2021-11-04 RX ORDER — METOPROLOL SUCCINATE 50 MG/1
100 TABLET, EXTENDED RELEASE ORAL DAILY
Status: DISCONTINUED | OUTPATIENT
Start: 2021-11-04 | End: 2021-11-16

## 2021-11-04 RX ORDER — DEXAMETHASONE SODIUM PHOSPHATE 10 MG/ML
6 INJECTION, SOLUTION INTRAMUSCULAR; INTRAVENOUS EVERY 24 HOURS
Status: DISCONTINUED | OUTPATIENT
Start: 2021-11-04 | End: 2021-11-05 | Stop reason: ALTCHOICE

## 2021-11-04 RX ORDER — SODIUM CHLORIDE 0.9 % (FLUSH) 0.9 %
5-40 SYRINGE (ML) INJECTION EVERY 12 HOURS SCHEDULED
Status: DISCONTINUED | OUTPATIENT
Start: 2021-11-04 | End: 2021-11-27 | Stop reason: HOSPADM

## 2021-11-04 RX ORDER — GUAIFENESIN/DEXTROMETHORPHAN 100-10MG/5
5 SYRUP ORAL EVERY 4 HOURS PRN
Status: DISCONTINUED | OUTPATIENT
Start: 2021-11-04 | End: 2021-11-27 | Stop reason: HOSPADM

## 2021-11-04 RX ADMIN — ACETAMINOPHEN 1000 MG: 500 TABLET ORAL at 04:00

## 2021-11-04 RX ADMIN — Medication 2000 UNITS: at 08:58

## 2021-11-04 RX ADMIN — ACETAMINOPHEN 650 MG: 325 TABLET ORAL at 09:05

## 2021-11-04 RX ADMIN — SODIUM CHLORIDE, PRESERVATIVE FREE 10 ML: 5 INJECTION INTRAVENOUS at 20:52

## 2021-11-04 RX ADMIN — DOCUSATE SODIUM 100 MG: 100 CAPSULE, LIQUID FILLED ORAL at 08:57

## 2021-11-04 RX ADMIN — ONDANSETRON 4 MG: 2 INJECTION INTRAMUSCULAR; INTRAVENOUS at 08:54

## 2021-11-04 RX ADMIN — ACETAMINOPHEN 650 MG: 325 TABLET ORAL at 23:23

## 2021-11-04 RX ADMIN — ENOXAPARIN SODIUM 30 MG: 100 INJECTION SUBCUTANEOUS at 08:55

## 2021-11-04 RX ADMIN — LISINOPRIL AND HYDROCHLOROTHIAZIDE 1 TABLET: 12.5; 2 TABLET ORAL at 08:58

## 2021-11-04 RX ADMIN — ENOXAPARIN SODIUM 30 MG: 100 INJECTION SUBCUTANEOUS at 20:51

## 2021-11-04 RX ADMIN — ONDANSETRON 4 MG: 2 INJECTION INTRAMUSCULAR; INTRAVENOUS at 15:41

## 2021-11-04 RX ADMIN — METOPROLOL SUCCINATE 100 MG: 50 TABLET, EXTENDED RELEASE ORAL at 08:58

## 2021-11-04 ASSESSMENT — PAIN DESCRIPTION - LOCATION
LOCATION: ABDOMEN
LOCATION: ABDOMEN
LOCATION: ABDOMEN;BACK

## 2021-11-04 ASSESSMENT — PAIN SCALES - GENERAL
PAINLEVEL_OUTOF10: 0
PAINLEVEL_OUTOF10: 3
PAINLEVEL_OUTOF10: 7
PAINLEVEL_OUTOF10: 0
PAINLEVEL_OUTOF10: 8
PAINLEVEL_OUTOF10: 0
PAINLEVEL_OUTOF10: 4

## 2021-11-04 ASSESSMENT — ENCOUNTER SYMPTOMS
BACK PAIN: 0
COUGH: 1
ABDOMINAL PAIN: 0
SHORTNESS OF BREATH: 0
EYE DISCHARGE: 0
EYE REDNESS: 0

## 2021-11-04 ASSESSMENT — PAIN DESCRIPTION - FREQUENCY: FREQUENCY: INTERMITTENT

## 2021-11-04 ASSESSMENT — PAIN DESCRIPTION - DIRECTION: RADIATING_TOWARDS: DENIES

## 2021-11-04 ASSESSMENT — PAIN DESCRIPTION - ORIENTATION: ORIENTATION: RIGHT;LEFT

## 2021-11-04 ASSESSMENT — PAIN DESCRIPTION - DESCRIPTORS: DESCRIPTORS: DISCOMFORT

## 2021-11-04 ASSESSMENT — PAIN DESCRIPTION - PAIN TYPE
TYPE: ACUTE PAIN

## 2021-11-04 ASSESSMENT — PAIN DESCRIPTION - ONSET: ONSET: GRADUAL

## 2021-11-04 NOTE — H&P
[facility]  Office: [address], DO, Talonjaime Mo, DO, Axel Watts, DO, Brook Dobson Blood, DO, Austen Daniels MD, Nay Montes MD, Yannick Bedoya MD, Pam Richardson MD, Haylie Lopez MD, Eliud Ortega MD, Tristan Olmedo MD, Erika Weldon MD, Omar Moss, DO, Ayanna Pereyra, DO, Kassidy Amin MD,  Lazara March DO, Esequiel Kelly MD, Robbie Valle MD, Rio Sevilla MD, Jami Conner MD, Kameron Obando MD, Josse Oakes MD, Jermaine Van, [facility], [facility], CNP, Sarah Zarate, CNP, Nando Shepard, CNS, Ailene Lesches, CNP, Jacqui Beck, CNP, Zoie Mcgrath, CNP, Gabrielle Dewitt, CNP, Brittanie Orr, CNP, Sangita Espinosa, CNP, Henry Bahena PA-C, Krupa Villanueva, [facility], Janie Osorio, CNP, Dilan Scruggs, CNP, Spring Mejia, CNP, Don Blackmon, CNP, Tamia Funez, CNP, Celeste Morrison, CNP, Kathy Garcia, CNP         [facility]    HISTORY AND PHYSICAL EXAMINATION            Date:   11/4/2021  Patient name:  El Valencia  Date of admission:  11/3/2021 10:05 PM  MRN:   [id number]  Account:  [de-identified]  YOB: 1955  PCP:    Mayo Yeung DO  Room:   [place] [address]  Code Status:    Full Code    Chief Complaint:     Chief Complaint   Patient presents with    Abdominal Pain     lower    Nausea    Back Pain    Headache       History Obtained From:     patient    History of Present Illness:     El Valencia is a 74 y.o. female with a past medical history of hypertension and obesity who presented to the emergency department on 11/3/2021 complaining of headache, nausea and abdominal pain. She is not vaccinated against COVID-19. In the ED, the patient was febrile (Tmax 102.2) and ill-appearing. Rapid COVID-19 test was positive. Chest x-ray was remarkable for interstitial prominence and bibasilar opacities consistent with pneumonia. The patient is admitted to internal medicine for further management of COVID-19 pneumonia.      Past Medical History:     Past Medical History:   Diagnosis Date    Anxiety     Arthritis     Bell's palsy     Chronic neck pain     posterior neck since     Chronic pain     low back and bilateral lower extremities    Fibromyalgia     HA (headache)     Headache     off/on since     HTN (hypertension)     Hypertension         Past Surgical History:     Past Surgical History:   Procedure Laterality Date    ARM SURGERY      rt arm fatty tumor     SECTION      CHOLECYSTECTOMY  2020    CHOLECYSTECTOMY, LAPAROSCOPIC N/A 2020    CHOLECYSTECTOMY LAPAROSCOPIC WITH GRAM performed by Venu Hightower MD at [place] [address] Left     knee    SHOULDER SURGERY      rt rotator cuff repair    URETHRAL STRICTURE DILATATION      WISDOM TOOTH EXTRACTION          Medications Prior to Admission:     Prior to Admission medications    Medication Sig Start Date End Date Taking? Authorizing Provider   ondansetron (ZOFRAN ODT) 4 MG disintegrating tablet Take 1 tablet by mouth every 8 hours as needed for Nausea 21   Inna De La Torre PA-C   HYDROcodone-acetaminophen [facility]) 5-325 MG per tablet Take 1-2 tablets by mouth every 8 hours as needed for Pain for up to 3 days.  21  Inna De La Torre PA-C   albuterol sulfate HFA (VENTOLIN HFA) 108 (90 Base) MCG/ACT inhaler Inhale 2 puffs into the lungs 4 times daily as needed for Wheezing 10/4/21   Amber Locke DO   acetaminophen (TYLENOL) 500 MG tablet Take 500 mg by mouth every 6 hours as needed for Pain    Historical Provider, MD   lisinopril-hydroCHLOROthiazide (PRINZIDE;ZESTORETIC) 20-12.5 MG per tablet TAKE 1 TABLET DAILY 21   Ivette Brown DO   metoprolol succinate (TOPROL XL) 100 MG extended release tablet TAKE 1 TABLET DAILY 21   Paulette Locke DO   PROAIR  (16 Base) MCG/ACT inhaler INHALE 2 PUFFS BY MOUTH EVERY 4 HOURS AS NEEDED FOR WHEEZING 20   Amber Johnson DO Cornelia   cetirizine (ZYRTEC) 10 MG tablet Take 10 mg by mouth as needed for Allergies     Historical Provider, MD   Multiple Vitamins-Minerals (MULTIVITAMIN ADULT PO) Take 1 tablet by mouth daily     Historical Provider, MD        Allergies: Other, Ceclor [cefaclor], Food, and Sulfa antibiotics    Social History:     Tobacco:    reports that she has never smoked. She has never used smokeless tobacco.  Alcohol:      reports no history of alcohol use. Drug Use:  reports no history of drug use. Family History:     Family History   Problem Relation Age of Onset    Other Mother         CAD   Eth[facility] Arvind Diabetes Mother     Other Father         CAD    Diabetes Father     Thyroid Disease Sister     Cancer Brother         prostate    Diabetes Brother     Cancer Maternal Grandmother         uterine       Review of Systems:     Positive and Negative as described in HPI. CONSTITUTIONAL:  Positive for fever/chills. HEENT:  negative for vision, hearing changes, runny nose, throat pain  RESPIRATORY:  Positive for mild shortness of breath and wheezing. CARDIOVASCULAR:  negative for chest pain, palpitations  GASTROINTESTINAL:  Positive for abdominal pain and constipation. GENITOURINARY:  negative for difficulty of urination, burning with urination, frequency   INTEGUMENT:  negative for rash, skin lesions, easy bruising   HEMATOLOGIC/LYMPHATIC:  negative for swelling/edema   ALLERGIC/IMMUNOLOGIC:  negative for urticaria , itching  ENDOCRINE:  negative increase in drinking, increase in urination, hot or cold intolerance  MUSCULOSKELETAL:  Positive for myalgias and arthralgias.    NEUROLOGICAL:  negative for headaches, dizziness, lightheadedness, numbness, pain, tingling extremities  BEHAVIOR/PSYCH:  negative for depression, anxiety    Physical Exam:   /71   Pulse 66   Temp 102.2 °F (39 °C) (Oral)   Resp 16   Ht 5' 2\" (1.575 m)   Wt 236 lb (107 kg)   SpO2 93%   BMI 43.16 kg/m²   Temp (24hrs), Av.2 24 - 43 %    Monocytes 11 3 - 12 %    Eosinophils % 0 (L) 1 - 4 %    Basophils 0 0 - 2 %    Immature Granulocytes 0 0 %    Segs Absolute 5.54 1.50 - 8.10 k/uL    Absolute Lymph # 1.76 1.10 - 3.70 k/uL    Absolute Mono # 0.90 0.10 - 1.20 k/uL    Absolute Eos # <0.03 0.00 - 0.44 k/uL    Basophils Absolute <0.03 0.00 - 0.20 k/uL    Absolute Immature Granulocyte 0.03 0.00 - 0.30 k/uL    WBC Morphology NOT REPORTED     RBC Morphology NOT REPORTED     Platelet Estimate NOT REPORTED    Protime-INR    Collection Time: 11/04/21  1:12 AM   Result Value Ref Range    Protime 14.0 11.5 - 14.2 sec    INR 1.1    APTT    Collection Time: 11/04/21  1:12 AM   Result Value Ref Range    PTT 29.8 23.9 - 33.8 sec   Fibrinogen    Collection Time: 11/04/21  1:12 AM   Result Value Ref Range    Fibrinogen 353 179 - 518 mg/dL   Lactate Dehydrogenase    Collection Time: 11/04/21  1:12 AM   Result Value Ref Range     135 - 214 U/L   Ferritin    Collection Time: 11/04/21  1:12 AM   Result Value Ref Range    Ferritin 676 (H) 13 - 150 ug/L   Comprehensive Metabolic Panel w/ Reflex to MG    Collection Time: 11/04/21  1:12 AM   Result Value Ref Range    Glucose 110 (H) 70 - 99 mg/dL    BUN 17 8 - 23 mg/dL    CREATININE 0.78 0.50 - 0.90 mg/dL    Bun/Cre Ratio 22 (H) 9 - 20    Calcium 8.9 8.6 - 10.4 mg/dL    Sodium 131 (L) 135 - 144 mmol/L    Potassium 3.6 (L) 3.7 - 5.3 mmol/L    Chloride 93 (L) 98 - 107 mmol/L    CO2 25 20 - 31 mmol/L    Anion Gap 13 9 - 17 mmol/L    Alkaline Phosphatase 81 35 - 104 U/L    ALT 46 (H) 5 - 33 U/L    AST 32 (H) <32 U/L    Total Bilirubin 0.53 0.3 - 1.2 mg/dL    Total Protein 7.2 6.4 - 8.3 g/dL    Albumin 4.3 3.5 - 5.2 g/dL    Albumin/Globulin Ratio NOT REPORTED 1.0 - 2.5    GFR Non-African American >60 >60 mL/min    GFR African American >60 >60 mL/min    GFR Comment          GFR Staging NOT REPORTED    D-Dimer, Quantitative    Collection Time: 11/04/21  5:42 AM   Result Value Ref Range    D-Dimer, Quant 0.48 0.00 - 0.59 mg/L FEU       Imaging/Diagnostics:  CT ABDOMEN PELVIS W IV CONTRAST Additional Contrast? None    Result Date: 11/2/2021  Nondistended scattered fluid-filled bowel loops without evidence of obstruction or bowel wall thickening, favoring enteritis. No appendicitis, urinary obstruction or small bowel obstruction. Other incidental findings as above including hiatal hernia. XR CHEST PORTABLE    Result Date: 11/3/2021  Interstitial prominence and bibasilar opacities, which could be seen with pneumonia or edema. Assessment :      Hospital Problems         Last Modified POA    COVID-19 11/4/2021 Yes          Plan:     Patient status inpatient in the Progressive Unit/Step down    COVID-19 PNA   -Continue dexamethasone 6 mg daily   -Vitamin D supplementation   -CRP pending   -Supplemental O2 as needed   -Maintenance fluids at 100 mL/hr   -Lovenox 30 mg bid   -Daily CBC  -Daily BMP     Constipation   -Start daily docusate     HTN   -Continue home Prinzide   -Continue home metoprolol 100 mg daily     Morbid obesity 2/2 excessive caloric intake   -Will  on dietary modifications when appropriate     Consultations:   IP CONSULT TO HOSPITALIST    Patient is admitted as inpatient status because of co-morbidities listed above, severity of signs and symptoms as outlined, requirement for current medical therapies and most importantly because of direct risk to patient if care not provided in a hospital setting. Expected length of stay > 48 hours.     Tyree Aguilar MD  11/4/2021  8:24 AM    Copy sent to Dr. Manuel Paul DO

## 2021-11-04 NOTE — ED NOTES
Dr Geoffrey Wei at bedside discussing patients test results. Patient weaned off O2, will monitor SPO2 off of oxygen.        Nile Prado RN  11/03/21 2722

## 2021-11-04 NOTE — ED PROVIDER NOTES
[facility] ED  Emergency Department Encounter     Pt Name: Jarrett Mcclellan  MRN: [id number]  [place] 1955  Date of evaluation: 21  PCP:  Jacqualine Cockayne, [address] [place] [address]       Chief Complaint   Patient presents with    Abdominal Pain     lower    Nausea    Back Pain    Headache       HISTORY OFPRESENT ILLNESS  (Location/Symptom, Timing/Onset, Context/Setting, Quality, Duration, Modifying Basia Mantle.)      Jarrett Mcclellan is a 74 y.o. female who presents with reported abdominal pain, nausea, vomiting. Recently seen yesterday. Extensive work-up performed. All negative. Feeling better with symptomatic treatment. Stating now she has had worsening epigastric pain, headache, nausea, vomiting denies Covid vaccination. Patient denying significant shortness of breath or chest pain. Pain is epigastric and flank. Mild. Worse with nausea and vomiting. Endorsing decreased oral intake    PAST MEDICAL / SURGICAL / SOCIAL / FAMILY HISTORY      has a past medical history of Anxiety, Arthritis, Bell's palsy, Chronic neck pain, Chronic pain, Fibromyalgia, HA (headache), Headache, HTN (hypertension), and Hypertension. has a past surgical history that includes  section; shoulder surgery; Urethra dilation; Arm Surgery; [place] tooth extraction; joint replacement (Left); Cholecystectomy, laparoscopic (N/A, 2020); and Cholecystectomy (2020).     Social History     Socioeconomic History    Marital status:      Spouse name: Not on file    Number of children: Not on file    Years of education: Not on file    Highest education level: Not on file   Occupational History    Not on file   Tobacco Use    Smoking status: Never Smoker    Smokeless tobacco: Never Used   Substance and Sexual Activity    Alcohol use: No    Drug use: No    Sexual activity: Not on file   Other Topics Concern    Not on file   Social History Narrative    Not on file     Social Determinants of Health     Financial Resource Strain: Low Risk     Difficulty of Paying Living Expenses: Not hard at all   Food Insecurity: No Food Insecurity    Worried About Running Out of Food in the Last Year: Never true    Lucrecia of Food in the Last Year: Never true   Transportation Needs:     Lack of Transportation (Medical):  Lack of Transportation (Non-Medical):    Physical Activity:     Days of Exercise per Week:     Minutes of Exercise per Session:    Stress:     Feeling of Stress :    Social Connections:     Frequency of Communication with Friends and Family:     Frequency of Social Gatherings with Friends and Family:     Attends Jewish Services:     Active Member of Clubs or Organizations:     Attends Club or Organization Meetings:     Marital Status:    Intimate Partner Violence:     Fear of Current or Ex-Partner:     Emotionally Abused:     Physically Abused:     Sexually Abused:        Family History   Problem Relation Age of Onset    Other Mother         CAD   [facility] Diabetes Mother     Other Father         CAD    Diabetes Father     Thyroid Disease Sister     Cancer Brother         prostate    Diabetes Brother     Cancer Maternal Grandmother         uterine       Allergies: Other, Ceclor [cefaclor], Food, and Sulfa antibiotics    Home Medications:  Prior to Admission medications    Medication Sig Start Date End Date Taking? Authorizing Provider   ondansetron (ZOFRAN ODT) 4 MG disintegrating tablet Take 1 tablet by mouth every 8 hours as needed for Nausea 11/2/21   Vinod Mercado PA-C   HYDROcodone-acetaminophen [facility]) 5-325 MG per tablet Take 1-2 tablets by mouth every 8 hours as needed for Pain for up to 3 days.  11/2/21 11/5/21  Vinod Mercado PA-C   albuterol sulfate HFA (VENTOLIN HFA) 108 (90 Base) MCG/ACT inhaler Inhale 2 puffs into the lungs 4 times daily as needed for Wheezing 10/4/21   Carmela Locke,    acetaminophen (TYLENOL) 500 MG tablet Take 500 mg by mouth every 6 hours as needed for Pain    Historical Provider, MD   lisinopril-hydroCHLOROthiazide (PRINZIDE;ZESTORETIC) 20-12.5 MG per tablet TAKE 1 TABLET DAILY 8/2/21   Liza Locke,    metoprolol succinate (TOPROL XL) 100 MG extended release tablet TAKE 1 TABLET DAILY 2/4/21   Paulette Locke DO   PROAIR  (90 Base) MCG/ACT inhaler INHALE 2 PUFFS BY MOUTH EVERY 4 HOURS AS NEEDED FOR WHEEZING 12/14/20   Paulette Locke DO   cetirizine (ZYRTEC) 10 MG tablet Take 10 mg by mouth as needed for Allergies     Historical Provider, MD   Multiple Vitamins-Minerals (MULTIVITAMIN ADULT PO) Take 1 tablet by mouth daily     Historical Provider, MD       REVIEW OF SYSTEMS    (2-9 systems for level 4, 10 or more for level 5)      Review of Systems   Constitutional: Positive for fatigue and fever. Negative for chills. Eyes: Negative for discharge and redness. Respiratory: Positive for cough. Negative for shortness of breath. Cardiovascular: Negative for chest pain. Gastrointestinal: Negative for abdominal pain. Genitourinary: Negative for flank pain. Musculoskeletal: Negative for back pain. Skin: Negative for rash. Allergic/Immunologic: Positive for environmental allergies. Neurological: Negative for headaches. Psychiatric/Behavioral: Negative for agitation and confusion. PHYSICAL EXAM   (up to 7 for level 4, 8 or more for level 5)     INITIAL VITALS:    height is 5' 2\" (1.575 m) and weight is 236 lb (107 kg). Her oral temperature is 98 °F (36.7 °C). Her blood pressure is 118/76 and her pulse is 63. Her respiration is 12 and oxygen saturation is 96%. Physical Exam  Vitals and nursing note reviewed. Constitutional:       Appearance: She is well-developed. HENT:      Head: Normocephalic and atraumatic. Nose: Nose normal.      Mouth/Throat:      Mouth: Mucous membranes are moist.   Eyes:      General: No scleral icterus.      Conjunctiva/sclera: Conjunctivae normal.      Pupils: Pupils are equal, round, and reactive to light. Neck:      Trachea: No tracheal deviation. Cardiovascular:      Rate and Rhythm: Normal rate and regular rhythm. Heart sounds: Normal heart sounds. No murmur heard. No friction rub. No gallop. Pulmonary:      Effort: Pulmonary effort is normal. No respiratory distress. Breath sounds: Normal breath sounds. No wheezing or rales. Abdominal:      General: There is no distension. Palpations: Abdomen is soft. There is no mass. Tenderness: There is abdominal tenderness. There is no guarding. Hernia: No hernia is present. Comments: Minimal epigastric tenderness to the palpation, no guarding no mass no rebound   Musculoskeletal:         General: Normal range of motion. Cervical back: Neck supple. Skin:     General: Skin is warm and dry. Findings: No erythema or rash. Neurological:      Mental Status: She is alert and oriented to person, place, and time. Psychiatric:         Behavior: Behavior normal.         DIFFERENTIAL  DIAGNOSIS     PLAN (LABS / IMAGING / EKG):  Orders Placed This Encounter   Procedures    COVID-19, Rapid    Respiratory Culture    XR CHEST PORTABLE    CBC Auto Differential    Protime-INR    APTT    Procalcitonin    Fibrinogen    C-Reactive Protein    Lactate Dehydrogenase    Ferritin    Comprehensive Metabolic Panel w/ Reflex to MG    Protime-INR    Fibrinogen    APTT    Procalcitonin    D-Dimer, Quantitative    Troponin    Lactic Acid, Plasma    Vitamin D 25 Hydroxy    Procalcitonin    CBC with DIFF    Basic Metabolic Panel w/ Reflex to MG    ADULT DIET;  Regular    PPE Instructions    Vital signs per unit routine    Notify physician    Up as tolerated    Daily weights    Intake and output    Place intermittent pneumatic compression device    PPE Instructions    Encourage deep breathing and coughing every two hours while awake    Telemetry monitoring - continuous duration    Full Code    Inpatient consult to Hospitalist    Droplet Plus Isolation    Droplet Plus Isolation    OT eval and treat    PT evaluation and treat    Initiate Oxygen Therapy Protocol    Respiratory care evaluation only    Initiate Oxygen Therapy Protocol    Pulse Oximetry Spot Check    PATIENT STATUS (FROM ED OR OR/PROCEDURAL) Inpatient       MEDICATIONS ORDERED:  Orders Placed This Encounter   Medications    oxyCODONE-acetaminophen (PERCOCET) 5-325 MG per tablet 1 tablet    promethazine (PHENERGAN) injection 25 mg    DISCONTD: dexamethasone (PF) (DECADRON) injection 6 mg    acetaminophen (TYLENOL) tablet 1,000 mg    metoprolol succinate (TOPROL XL) extended release tablet 100 mg    lisinopril-hydroCHLOROthiazide (PRINZIDE;ZESTORETIC) 20-12.5 MG per tablet 1 tablet    sodium chloride flush 0.9 % injection 5-40 mL    sodium chloride flush 0.9 % injection 10 mL    0.9 % sodium chloride infusion    OR Linked Order Group     ondansetron (ZOFRAN-ODT) disintegrating tablet 4 mg     ondansetron (ZOFRAN) injection 4 mg    magnesium hydroxide (MILK OF MAGNESIA) 400 MG/5ML suspension 30 mL    enoxaparin (LOVENOX) injection 30 mg    OR Linked Order Group     acetaminophen (TYLENOL) tablet 650 mg     acetaminophen (TYLENOL) suppository 650 mg    guaiFENesin-dextromethorphan (ROBITUSSIN DM) 100-10 MG/5ML syrup 5 mL    dexamethasone (PF) (DECADRON) injection 6 mg    Vitamin D (CHOLECALCIFEROL) tablet 2,000 Units    0.9 % sodium chloride infusion    docusate sodium (COLACE) capsule 100 mg       DDX: Covid versus UTI versus other intra-abdominal injury    Initial MDM/Plan: 74 y.o. female who presents with recent negative abdominal work-up. Patient with fever generalized symptoms. Will check Covid testing.     DIAGNOSTIC RESULTS / EMERGENCY DEPARTMENT COURSE / MDM     LABS:  Labs Reviewed   COVID-19, RAPID - Abnormal; Notable for the following components: Result Value    SARS-CoV-2, Rapid DETECTED (*)     All other components within normal limits   CBC WITH AUTO DIFFERENTIAL - Abnormal; Notable for the following components:    Seg Neutrophils 68 (*)     Lymphocytes 21 (*)     Eosinophils % 0 (*)     All other components within normal limits   FERRITIN - Abnormal; Notable for the following components:    Ferritin 676 (*)     All other components within normal limits   COMPREHENSIVE METABOLIC PANEL W/ REFLEX TO MG FOR LOW K - Abnormal; Notable for the following components:    Glucose 110 (*)     Bun/Cre Ratio 22 (*)     Sodium 131 (*)     Potassium 3.6 (*)     Chloride 93 (*)     ALT 46 (*)     AST 32 (*)     All other components within normal limits   CULTURE, RESPIRATORY   PROTIME-INR   APTT   FIBRINOGEN   LACTATE DEHYDROGENASE   D-DIMER, QUANTITATIVE   PROCALCITONIN   C-REACTIVE PROTEIN   PROCALCITONIN         RADIOLOGY:  XR CHEST PORTABLE    Result Date: 11/3/2021  EXAMINATION: ONE XRAY VIEW OF THE CHEST 11/3/2021 10:30 pm COMPARISON: 03/12/2015 HISTORY: ORDERING SYSTEM PROVIDED HISTORY: Cough, fever TECHNOLOGIST PROVIDED HISTORY: Cough, fever Reason for Exam: Cough, fever x 2 days Acuity: Acute Type of Exam: Initial FINDINGS: Cardiomediastinal silhouette is unchanged in size. There is no pleural effusion or pneumothorax. There is mild interstitial prominence and bibasilar opacities. No acute osseous abnormality. Interstitial prominence and bibasilar opacities, which could be seen with pneumonia or edema. EMERGENCY DEPARTMENT COURSE:  ED Course as of Nov 04 0936   Wed Nov 03, 2021 2322 SpO2: 92 % [MS]   2323 Temp: 102.2 °F (39 °C) [MS]   2323 SARS-CoV-2, Rapid(!): DETECTED [MS]   2323 Patient positive for Covid. Oxygen remaining in the low 90s. We will trial off oxygen and assess. If remaining hypoxic will need admission for Covid    [MS]   2347 Patient observed after learning Covid positive.   She has no shortness of breath however oxygen is remaining 88% with no oxygen. Is not vaccinated. Start steroids. Covid labs. Admission    [MS]      ED Course User Index  [MS] Serena Bob DO   Patient positive for Covid months likely etiology of symptomatology. Although patient not complaining of shortness of breath is attempted to discharge patient with symptomatic treatment observed oxygen saturation with good Plath of 88%. And no lung history. Will start on steroids. Covid labs. I discussed with her admission to hospital she has high risk for worsening. Agreeable. Started on 2 L oxygen. PROCEDURES:  None    CONSULTS:  IP CONSULT TO HOSPITALIST    CRITICAL CARE:  0    FINAL IMPRESSION      1. COVID-19          DISPOSITION / PLAN     DISPOSITION Admitted 11/04/2021 02:15:14 AM        PATIENTREFERRED TO:  No follow-up provider specified.     DISCHARGE MEDICATIONS:  New Prescriptions    No medications on file       Serena Bob DO  EmergencyMedicine Attending    (Please note that portions of this note were completed with a voice recognition program.  Efforts were made to edit the dictations but occasionally words are mis-transcribed.)       Serena Bob DO  11/04/21 2112

## 2021-11-04 NOTE — PROGRESS NOTES
Transitions of Care Pharmacy Service   Medication Review    The patient's list of current home medications has been reviewed. Attempted to interview patient via telephone due to COVID isolation, but pt did not answer. Her PCP office prescribes in [facility] and she appears adherent with prescription meds per pharmacy refill report. Unable to assess use of OTC products at this time. Source(s) of information: Epic, Surescripts refill report      Please feel free to call me with any questions about this encounter. Thank you. Arben Bethea [facility]   Transitions of Care Pharmacy Service  Phone:  [phone]  Fax: [phone]      Electronically signed by Arben Bethea [facility] on 11/4/2021 at 6:27 PM         Prior to Admission medications    Medication Sig       ondansetron (ZOFRAN ODT) 4 MG disintegrating tablet Take 1 tablet by mouth every 8 hours as needed for Nausea       HYDROcodone-acetaminophen (NORCO) 5-325 MG per tablet Take 1-2 tablets by mouth every 8 hours as needed for Pain for up to 3 days.        albuterol sulfate HFA (VENTOLIN HFA) 108 (90 Base) MCG/ACT inhaler Inhale 2 puffs into the lungs 4 times daily as needed for Wheezing       acetaminophen (TYLENOL) 500 MG tablet Take 500 mg by mouth every 6 hours as needed for Pain       lisinopril-hydroCHLOROthiazide (PRINZIDE;ZESTORETIC) 20-12.5 MG per tablet TAKE 1 TABLET DAILY       metoprolol succinate (TOPROL XL) 100 MG extended release tablet TAKE 1 TABLET DAILY               cetirizine (ZYRTEC) 10 MG tablet Take 10 mg by mouth as needed for Allergies        Multiple Vitamins-Minerals (MULTIVITAMIN ADULT PO) Take 1 tablet by mouth daily

## 2021-11-04 NOTE — PROGRESS NOTES
Occupational Therapy   Occupational Therapy Initial Assessment  Date: 2021   Patient Name: Anna Velásquez  MRN: [id number]     : 1955    RN Irena Zarate reports patient is medically stable for therapy treatment this date. Chart reviewed prior to treatment and patient is agreeable for therapy. All lines intact and patient positioned comfortably at end of treatment. All patient needs addressed prior to ending therapy session. Date of Service: 2021    Discharge Recommendations:  Patient would benefit from continued therapy after discharge   Due to recent hospitalization and medical condition, pt would benefit from additional therapy at time of discharge to ensure safety. Please refer to the AM-PAC score for current functional status. OT Equipment Recommendations  Equipment Needed:  (CTA)    Assessment   Performance deficits / Impairments: Decreased functional mobility ; Decreased ADL status; Decreased strength;Decreased endurance;Decreased high-level IADLs;Decreased balance;Decreased posture  Assessment: Pt would benefit from continued skilled OT services to increase safety and [place] during functional tasks to return home at prior level of function as able. Prognosis: Good  Decision Making: Medium Complexity  OT Education: OT Role;Transfer Training;Energy Conservation; ADL Adaptive Strategies; Plan of Care  Patient Education: safety in function, fall prevention/call light use, benefits of being oob, recommendations for continued therapy, pursed lip breathing tech  REQUIRES OT FOLLOW UP: Yes  Activity Tolerance  Activity Tolerance: Patient Tolerated treatment well  Activity Tolerance: fair +  Safety Devices  Safety Devices in place: Yes  Type of devices: Nurse notified;Gait belt;Bed alarm in place;Call light within reach; Patient at risk for falls; Left in bed           Patient Diagnosis(es): The encounter diagnosis was COVID-19.     has a past medical history of Anxiety, Arthritis, Bell's palsy, Chronic neck pain, Chronic pain, Fibromyalgia, HA (headache), Headache, HTN (hypertension), and Hypertension. has a past surgical history that includes  section; shoulder surgery; Urethra dilation; Arm Surgery; [place] tooth extraction; joint replacement (Left); Cholecystectomy, laparoscopic (N/A, 2020); and Cholecystectomy (2020). PER H&P: Gypsy Saleh a 74 y. o. female with a past medical history of hypertension and obesity who presented to the emergency department on 11/3/2021 complaining of headache, nausea and abdominal pain. She is not vaccinated against COVID-19. In the ED, the patient was febrile (Tmax 102.2) and ill-appearing. Rapid COVID-19 test was positive. Chest x-ray was remarkable for interstitial prominence and bibasilar opacities consistent with pneumonia.  The patient is admitted to internal medicine for further management of COVID-19 pneumonia.      Restrictions  Restrictions/Precautions  Restrictions/Precautions: General Precautions, Fall Risk  Position Activity Restriction  Other position/activity restrictions: 2L O2 per nc, COVID +, RUE IV    Subjective   General  Chart Reviewed: Yes  Patient assessed for rehabilitation services?: Yes  Family / Caregiver Present: No  Subjective  Subjective: Pt resting in bed in ER agreeable to OT eval  Patient Currently in Pain: Yes  Comments / Details: Pt reports she has constant pain in her B LE's from fibromyalgia    Social/Functional History  Social/Functional History  Lives With: Spouse  Type of Home: House  Home Layout: Laundry in basement, One level  Home Access: Stairs to enter with rails  Entrance Stairs - Number of Steps: 4-5  Bathroom Shower/Tub: Tub/Shower unit  Bathroom Toilet: Standard  Bathroom Equipment: Shower chair, Grab bars in shower  Home Equipment: Cane, Rolling walker  ADL Assistance: [address]: Independent (shares chores with )  Homemaking Responsibilities: Yes  Ambulation Assistance: Independent (uses cane when needed outside of house)  Transfer Assistance: Independent  Active : Yes  Occupation: Retired  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[employer], gardening  Additional Comments: Pt denies any recent falls, reports 1 fall a year ago       Objective   Vision: Impaired (Pt denies any recent visual changes)  Vision Exceptions: Wears glasses at all times  Hearing: Within functional limits (reports her hearing is muffled when she has migranes)    Orientation  Overall Orientation Status: Within Functional Limits  Observation/Palpation  Posture: Fair  Observation: 2L 02 per nc, telelemetry, BMI 43  Edema: none       Balance  Sitting Balance: Stand by assistance  Standing Balance: Minimal assistance  Standing Balance  Time: standing rufina ~ 2 min  Activity: functional mobility  Functional Mobility  Functional - Mobility Device: No device  Activity:  (bed forward 5 steps and back)  Assist Level: Minimal assistance  Functional Mobility Comments: Pt given Min verbal cues for pacing self, upright posture, awareness/assist with lines, and scanning environment all to increase safety. Toilet Transfers  Toilet Transfer: Unable to assess  Toilet Transfers Comments: Pt with no needs at this time       ADL  Feeding: Setup  Grooming: Setup;Stand by assistance  UE Bathing: Setup;Stand by assistance  LE Bathing: Setup;Minimal assistance  UE Dressing: Setup;Minimal assistance (with hosp gown)  LE Dressing: Setup;Minimal assistance (to don B shoes sitting on EOB)  Toileting: Minimal assistance       Tone RUE  RUE Tone: Normotonic  Tone LUE  LUE Tone: Normotonic  Coordination  Movements Are Fluid And Coordinated: Yes        Bed mobility  Rolling to Left: Independent  Rolling to Right: Independent  Supine to Sit: Stand by assistance  Sit to Supine: Stand by assistance  Scooting: Stand by assistance  Comment: Pt with good use of bedrails, increased time/effort to complete. Transfers  Sit to stand: Contact guard assistance  Stand to sit: Contact guard assistance  Transfer Comments: Pt required Min verbal cues for pacing self, slowing down movements, awareness/assist with lines and controlled stand to sit all to increase safety. Cognition  Overall Cognitive Status: WFL        Sensation  Overall Sensation Status: Impaired (constant numbness/tingling in fingers)        LUE AROM (degrees)  LUE AROM : WFL  RUE AROM (degrees)  RUE AROM : WFL  LUE Strength  Gross LUE Strength: WFL  LUE Strength Comment: ~ 4/5  RUE Strength  Gross RUE Strength: WFL  RUE Strength Comment: ~ 4/5               Plan   Plan  Times per week: 4-5x/wk 1x/day as rufina  Current Treatment Recommendations: Strengthening, Endurance Training, Patient/Caregiver Education & Training, Equipment Evaluation, Education, & procurement, Self-Care / ADL, Home Management Training, Functional Mobility Training, Safety Education & Training                                        AM-PAC Score: 16             Goals  Short term goals  Time Frame for Short term goals: By discharge, pt to demo  Short term goal 1: ADL transfers and functional mobility to SBA with use of AD as needed. Short term goal 2: UB ADLS to Set up and LB ADLs SBA with use of AD/AE as needed. Short term goal 3: toileting to SBA with use of AD/grab bars as needed. Short term goal 4: increased B UE strength by 1/2 grade to assist with functional tasks/I with B UE HEP with use of handouts as needed. Short term goal 5: bed mobility to Mod I with use of bedrails as needed. Long term goals  Long term goal 1: Pt to be I with fall prevention educaimargie, Covid specific education, EC/WS tech, recommendations for AE with use of handouts as needed. Patient Goals   Patient goals : To feel better!        Therapy Time   Individual Concurrent Group Co-treatment   Time In 5010         Time Out 1446         Minutes 41          tx time: 27 min       Anupama Arreguin OT

## 2021-11-04 NOTE — ED NOTES
Patient presents to ED with complaints of lower abdominal pain and back pain with associated nausea and headache. Patient was seen here yesterday and had full workup done at that time. Patient returns today febrile and reports that she is slightly SOB.         Ap Nath RN  11/04/21 5729

## 2021-11-04 NOTE — PROGRESS NOTES
that includes  section; shoulder surgery; Urethra dilation; Arm Surgery; [place] tooth extraction; joint replacement (Left); Cholecystectomy, laparoscopic (N/A, 2020); and Cholecystectomy (2020). Restrictions  Restrictions/Precautions  Restrictions/Precautions: General Precautions, Fall Risk  Position Activity Restriction  Other position/activity restrictions: 2L O2 per nc, COVID +, RUE IV  Vision/Hearing        Subjective  General  Chart Reviewed: Yes  Patient assessed for rehabilitation services?: Yes  Additional Pertinent Hx: [place] palsy, fibromyalgia, BMI 43  Response To Previous Treatment: Not applicable  Family / Caregiver Present: No  Diagnosis: COVID  Follows Commands: Within Functional Limits  General Comment  Comments: RN reports patient medically apporpriate for PT. Subjective  Subjective: Patient pleasant and agreeable to PT.      Pre Treatment Pain Screening  Intervention List: Patient able to continue with treatment    Orientation  Orientation  Overall Orientation Status: Within Normal Limits  Social/Functional History  Social/Functional History  Lives With: Spouse  Type of Home: House  Home Layout: Laundry in basement, One level  Home Access: Stairs to enter with rails  Entrance Stairs - Number of Steps: 4-5  Bathroom Shower/Tub: Tub/Shower unit  Bathroom Toilet: Standard  Bathroom Equipment: Shower chair, Grab bars in shower  Home Equipment: Cane, Rolling walker  ADL Assistance: [address]: Independent (shares chores with )  Homemaking Responsibilities: Yes  Ambulation Assistance: Independent (uses cane when needed outside of house)  Transfer Assistance: Independent  Active : Yes  Occupation: Retired  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[employer], gardening  Additional Comments: Pt denies any recent falls, reports 1 fall a year ago  Cognition   Cognition  Overall Cognitive Status: WFL    Objective Observation/Palpation  Observation: 02, telelemetry, BMI 43    AROM RLE (degrees)  RLE AROM: WFL  AROM LLE (degrees)  LLE AROM : WFL  Strength RLE  Comment: hip 4-/5, knee 4-/5, ankle 4/5  Strength LLE  Comment: hip 4-/5, knee 4-/5, ankle 4/5  Strength Other  Other: MMT limited by patient report that BLE very sensitive to touch due to fibromyalgia  Tone RLE  RLE Tone: Normotonic  Tone LLE  LLE Tone: Normotonic  Motor Control  Gross Motor?: WFL     Bed mobility  Rolling to Left: Independent  Rolling to Right: Independent  Supine to Sit: Stand by assistance  Sit to Supine: Stand by assistance  Scooting: Stand by assistance  Comment: required increased time and effort to complete bed mobility  Transfers  Sit to Stand: Contact guard assistance  Stand to sit: Contact guard assistance  Bed to Chair: Contact guard assistance  Stand Pivot Transfers: Contact guard assistance  Comment: No AD, steady with transfers. Ambulation  Ambulation?: Yes  Ambulation 1  Surface: level tile  Device: No Device  Assistance: Minimal assistance  Quality of Gait: Patient mildly unsteady, reports mild dizziness with amb, UEs in low gaurd position  Gait Deviations: Slow Sharona; Increased FRANNIE; Decreased step length;Decreased step height;Shuffles;Decreased head and trunk rotation  Distance: 4 feet forward and back     Balance  Sitting - Static: Good  Sitting - Dynamic: Good  Standing - Static: Fair;+  Standing - Dynamic: [address]  Times per week: 1-2x/d, 5-6 d/wk  Current Treatment Recommendations: Strengthening, Balance Training, Functional Mobility Training, Transfer Training, Endurance Training, Gait Training, Stair training, Patient/Caregiver Education & Training, Safety Education & Training, Home Exercise Program  Safety Devices  Type of devices:  All fall risk precautions in place, Gait belt, Nurse notified, Call light within reach, Left in bed    OutComes Score    AM-PAC Score  AM-PAC Inpatient Mobility Raw Score : 17 (11/04/21 1533)  AM-PAC Inpatient T-Scale Score : 42.13 (11/04/21 1533)  Mobility Inpatient CMS 0-100% Score: 50.57 (11/04/21 1533)  Mobility Inpatient CMS G-Code Modifier : CK (11/04/21 1533)          Goals  Short term goals  Time Frame for Short term goals: 12 visits  Short term goal 1: Patient will be indep with bed mobility and transfers. Short term goal 2: Patient will amb 100 feet indep with appropriate AD. Short term goal 3: Patient will negotiate 4 stairs with rails and supervision. Short term goal 4: Patient will tolerate 30 minutes of ther-ex and ther-act. Short term goal 5: Patient will be indep with HEP.   Patient Goals   Patient goals : Improve breathing and walking       Therapy Time   Individual Concurrent Group Co-treatment   Time In 1411         Time Out 1451         Minutes 40         Timed Code Treatment Minutes: [address], PT

## 2021-11-05 LAB
ABSOLUTE EOS #: <0.03 K/UL (ref 0–0.44)
ABSOLUTE IMMATURE GRANULOCYTE: 0.04 K/UL (ref 0–0.3)
ABSOLUTE LYMPH #: 1.28 K/UL (ref 1.1–3.7)
ABSOLUTE MONO #: 0.63 K/UL (ref 0.1–1.2)
ANION GAP SERPL CALCULATED.3IONS-SCNC: 11 MMOL/L (ref 9–17)
BASOPHILS # BLD: 0 % (ref 0–2)
BASOPHILS ABSOLUTE: <0.03 K/UL (ref 0–0.2)
BUN BLDV-MCNC: 19 MG/DL (ref 8–23)
BUN/CREAT BLD: 38 (ref 9–20)
CALCIUM SERPL-MCNC: 8.4 MG/DL (ref 8.6–10.4)
CHLORIDE BLD-SCNC: 106 MMOL/L (ref 98–107)
CO2: 22 MMOL/L (ref 20–31)
CREAT SERPL-MCNC: 0.5 MG/DL (ref 0.5–0.9)
D-DIMER QUANTITATIVE: 0.56 MG/L FEU (ref 0–0.59)
DIFFERENTIAL TYPE: ABNORMAL
EOSINOPHILS RELATIVE PERCENT: 0 % (ref 1–4)
FIBRINOGEN: 372 MG/DL (ref 179–518)
GFR AFRICAN AMERICAN: >60 ML/MIN
GFR NON-AFRICAN AMERICAN: >60 ML/MIN
GFR SERPL CREATININE-BSD FRML MDRD: ABNORMAL ML/MIN/{1.73_M2}
GFR SERPL CREATININE-BSD FRML MDRD: ABNORMAL ML/MIN/{1.73_M2}
GLUCOSE BLD-MCNC: 148 MG/DL (ref 70–99)
HCT VFR BLD CALC: 42.1 % (ref 36.3–47.1)
HEMOGLOBIN: 14.4 G/DL (ref 11.9–15.1)
IMMATURE GRANULOCYTES: 0 %
INR BLD: 1
LACTIC ACID, WHOLE BLOOD: NORMAL MMOL/L (ref 0.7–2.1)
LACTIC ACID: 0.9 MMOL/L (ref 0.5–2.2)
LYMPHOCYTES # BLD: 13 % (ref 24–43)
MCH RBC QN AUTO: 31.6 PG (ref 25.2–33.5)
MCHC RBC AUTO-ENTMCNC: 34.2 G/DL (ref 28.4–34.8)
MCV RBC AUTO: 92.5 FL (ref 82.6–102.9)
MONOCYTES # BLD: 6 % (ref 3–12)
NRBC AUTOMATED: 0 PER 100 WBC
PARTIAL THROMBOPLASTIN TIME: 30.4 SEC (ref 23.9–33.8)
PDW BLD-RTO: 13.2 % (ref 11.8–14.4)
PLATELET # BLD: 149 K/UL (ref 138–453)
PLATELET ESTIMATE: ABNORMAL
PMV BLD AUTO: 11 FL (ref 8.1–13.5)
POTASSIUM SERPL-SCNC: 3.8 MMOL/L (ref 3.7–5.3)
PROTHROMBIN TIME: 13.5 SEC (ref 11.5–14.2)
RBC # BLD: 4.55 M/UL (ref 3.95–5.11)
RBC # BLD: ABNORMAL 10*6/UL
SEG NEUTROPHILS: 81 % (ref 36–65)
SEGMENTED NEUTROPHILS ABSOLUTE COUNT: 7.81 K/UL (ref 1.5–8.1)
SODIUM BLD-SCNC: 139 MMOL/L (ref 135–144)
TROPONIN INTERP: NORMAL
TROPONIN T: NORMAL NG/ML
TROPONIN, HIGH SENSITIVITY: 7 NG/L (ref 0–14)
VITAMIN D 25-HYDROXY: 33.5 NG/ML (ref 30–100)
WBC # BLD: 9.8 K/UL (ref 3.5–11.3)
WBC # BLD: ABNORMAL 10*3/UL

## 2021-11-05 PROCEDURE — 2580000003 HC RX 258: Performed by: STUDENT IN AN ORGANIZED HEALTH CARE EDUCATION/TRAINING PROGRAM

## 2021-11-05 PROCEDURE — 2580000003 HC RX 258: Performed by: NURSE PRACTITIONER

## 2021-11-05 PROCEDURE — 85384 FIBRINOGEN ACTIVITY: CPT

## 2021-11-05 PROCEDURE — 99232 SBSQ HOSP IP/OBS MODERATE 35: CPT | Performed by: STUDENT IN AN ORGANIZED HEALTH CARE EDUCATION/TRAINING PROGRAM

## 2021-11-05 PROCEDURE — 6370000000 HC RX 637 (ALT 250 FOR IP): Performed by: STUDENT IN AN ORGANIZED HEALTH CARE EDUCATION/TRAINING PROGRAM

## 2021-11-05 PROCEDURE — 36415 COLL VENOUS BLD VENIPUNCTURE: CPT

## 2021-11-05 PROCEDURE — 6360000002 HC RX W HCPCS: Performed by: NURSE PRACTITIONER

## 2021-11-05 PROCEDURE — 85025 COMPLETE CBC W/AUTO DIFF WBC: CPT

## 2021-11-05 PROCEDURE — 85610 PROTHROMBIN TIME: CPT

## 2021-11-05 PROCEDURE — 85379 FIBRIN DEGRADATION QUANT: CPT

## 2021-11-05 PROCEDURE — 2060000000 HC ICU INTERMEDIATE R&B

## 2021-11-05 PROCEDURE — 83605 ASSAY OF LACTIC ACID: CPT

## 2021-11-05 PROCEDURE — 85730 THROMBOPLASTIN TIME PARTIAL: CPT

## 2021-11-05 PROCEDURE — 82306 VITAMIN D 25 HYDROXY: CPT

## 2021-11-05 PROCEDURE — 6370000000 HC RX 637 (ALT 250 FOR IP): Performed by: NURSE PRACTITIONER

## 2021-11-05 PROCEDURE — 84484 ASSAY OF TROPONIN QUANT: CPT

## 2021-11-05 PROCEDURE — 80048 BASIC METABOLIC PNL TOTAL CA: CPT

## 2021-11-05 RX ORDER — DEXAMETHASONE 4 MG/1
6 TABLET ORAL EVERY 24 HOURS
Status: DISCONTINUED | OUTPATIENT
Start: 2021-11-06 | End: 2021-11-10

## 2021-11-05 RX ORDER — OXYCODONE HYDROCHLORIDE AND ACETAMINOPHEN 5; 325 MG/1; MG/1
1 TABLET ORAL EVERY 4 HOURS PRN
Status: DISCONTINUED | OUTPATIENT
Start: 2021-11-05 | End: 2021-11-27 | Stop reason: HOSPADM

## 2021-11-05 RX ORDER — MIRTAZAPINE 15 MG/1
7.5 TABLET, FILM COATED ORAL NIGHTLY
Status: DISCONTINUED | OUTPATIENT
Start: 2021-11-05 | End: 2021-11-27 | Stop reason: HOSPADM

## 2021-11-05 RX ADMIN — ENOXAPARIN SODIUM 30 MG: 100 INJECTION SUBCUTANEOUS at 08:16

## 2021-11-05 RX ADMIN — LISINOPRIL AND HYDROCHLOROTHIAZIDE 1 TABLET: 12.5; 2 TABLET ORAL at 08:16

## 2021-11-05 RX ADMIN — ENOXAPARIN SODIUM 30 MG: 100 INJECTION SUBCUTANEOUS at 20:21

## 2021-11-05 RX ADMIN — OXYCODONE AND ACETAMINOPHEN 1 TABLET: 5; 325 TABLET ORAL at 11:43

## 2021-11-05 RX ADMIN — Medication 2000 UNITS: at 08:16

## 2021-11-05 RX ADMIN — DOCUSATE SODIUM 100 MG: 100 CAPSULE, LIQUID FILLED ORAL at 08:16

## 2021-11-05 RX ADMIN — SODIUM CHLORIDE, PRESERVATIVE FREE 10 ML: 5 INJECTION INTRAVENOUS at 20:21

## 2021-11-05 RX ADMIN — MIRTAZAPINE 7.5 MG: 15 TABLET, FILM COATED ORAL at 20:21

## 2021-11-05 RX ADMIN — OXYCODONE AND ACETAMINOPHEN 1 TABLET: 5; 325 TABLET ORAL at 22:10

## 2021-11-05 RX ADMIN — METOPROLOL SUCCINATE 100 MG: 50 TABLET, EXTENDED RELEASE ORAL at 08:16

## 2021-11-05 RX ADMIN — SODIUM CHLORIDE, PRESERVATIVE FREE 10 ML: 5 INJECTION INTRAVENOUS at 08:16

## 2021-11-05 ASSESSMENT — PAIN DESCRIPTION - FREQUENCY
FREQUENCY: INTERMITTENT
FREQUENCY: INTERMITTENT

## 2021-11-05 ASSESSMENT — PAIN DESCRIPTION - ONSET
ONSET: SUDDEN
ONSET: GRADUAL

## 2021-11-05 ASSESSMENT — PAIN DESCRIPTION - PAIN TYPE
TYPE: ACUTE PAIN
TYPE: ACUTE PAIN
TYPE: CHRONIC PAIN

## 2021-11-05 ASSESSMENT — PAIN SCALES - GENERAL
PAINLEVEL_OUTOF10: 10
PAINLEVEL_OUTOF10: 2
PAINLEVEL_OUTOF10: 8
PAINLEVEL_OUTOF10: 0

## 2021-11-05 ASSESSMENT — PAIN - FUNCTIONAL ASSESSMENT
PAIN_FUNCTIONAL_ASSESSMENT: PREVENTS OR INTERFERES SOME ACTIVE ACTIVITIES AND ADLS
PAIN_FUNCTIONAL_ASSESSMENT: ACTIVITIES ARE NOT PREVENTED

## 2021-11-05 ASSESSMENT — PAIN DESCRIPTION - PROGRESSION: CLINICAL_PROGRESSION: GRADUALLY WORSENING

## 2021-11-05 ASSESSMENT — PAIN DESCRIPTION - ORIENTATION
ORIENTATION: LOWER
ORIENTATION: RIGHT

## 2021-11-05 ASSESSMENT — PAIN DESCRIPTION - DESCRIPTORS
DESCRIPTORS: THROBBING
DESCRIPTORS: ACHING;DISCOMFORT

## 2021-11-05 ASSESSMENT — PAIN DESCRIPTION - LOCATION
LOCATION: ABDOMEN;BACK

## 2021-11-05 ASSESSMENT — PAIN DESCRIPTION - DIRECTION: RADIATING_TOWARDS: DENIES

## 2021-11-05 NOTE — PROGRESS NOTES
[facility]  Office: [address], DO, Aayush Denney, DO, Sarah Rainey, DO, Gabriel Dempsey Blood, DO, Cordella Holstein, MD, Almas Marti MD, Twila Roque MD, Juan Manuel Mckinney MD, Renella Boeck, MD, Keith Gibbs MD, Nai Mckeon MD, Myrna De La Vega MD, Stefanie Dyer, DO, Reji Chang, DO, Shelley Gonzalez MD,  Annabelle Daly DO, Aysha Gonzales MD, Tiffanie Medina MD, Tano Grijalva MD, Diana Fiore MD, Andrea Kwok MD, Chencho Devi MD, Val Will, [facility], [facility], CNP, Ellen Henson, CNP, Licha Hackett, CNS, Tristan Quiroz, CNP, Ashli Dumont, CNP, Flavia Bland, CNP, Janie Hanley, CNP, Wenyd Doll, [facility], Idris Churchill, CNP, Sandra Gamma, PA-C, Timothy Esquivel, [facility], Caleb Erickson, CNP, Tiarra Patiño, CNP, Alcon Lombardo, CNP, Brett Murillo, CNP, Amanda Mclaughlin, CNP, Pravin Cortes, CNP, Chun Valenzuela, [facility]    Progress Note    11/5/2021    2:07 PM    Name:   Judie Zuñiga  MRN:     [id number]     [place]:      [de-identified]   Room:   [address] [place] [address] Day:  1  Admit Date:  11/3/2021 10:05 PM    PCP:   Fiona Banks DO  Code Status:  Full Code    Subjective:     C/C:   Chief Complaint   Patient presents with    Abdominal Pain     lower    Nausea    Back Pain    Headache     Interval History Status: improved. Patient was seen and examined at bedside this AM. She reports feeling slightly better and states her breathing is improved. Denies fever/chills, nausea/vomiting or chest pain. Brief History:     Judie Zuñiga is a 74 y.o. female with a past medical history of hypertension and obesity who presented to the emergency department on 11/3/2021 complaining of headache, nausea and abdominal pain. She is not vaccinated against COVID-19. In the ED, the patient was febrile (Tmax 102.2) and ill-appearing. Rapid COVID-19 test was positive.  Chest x-ray was remarkable for interstitial prominence and bibasilar opacities consistent with pneumonia. The patient is admitted to internal medicine for further management of COVID-19 pneumonia. Review of Systems:     Constitutional:  negative for chills, fevers, sweats  Respiratory:  Positive for shortness of breath. Cardiovascular:  negative for chest pain, chest pressure/discomfort, lower extremity edema, palpitations  Gastrointestinal:  negative for abdominal pain, constipation, diarrhea, nausea, vomiting  Neurological:  negative for dizziness, headache    Medications: Allergies: Allergies   Allergen Reactions    Other Hives    Ceclor [Cefaclor]     Food      All melons      Sulfa Antibiotics      Projectile vomiting        Current Meds:   Scheduled Meds:    [START ON 11/6/2021] dexamethasone  6 mg Oral Q24H    mirtazapine  7.5 mg Oral Nightly    metoprolol succinate  100 mg Oral Daily    lisinopril-hydroCHLOROthiazide  1 tablet Oral Daily    sodium chloride flush  5-40 mL IntraVENous 2 times per day    enoxaparin  30 mg SubCUTAneous BID    Vitamin D  2,000 Units Oral Daily    docusate sodium  100 mg Oral Daily    influenza virus vaccine  0.5 mL IntraMUSCular Prior to discharge     Continuous Infusions:    sodium chloride       PRN Meds: oxyCODONE-acetaminophen, sodium chloride flush, sodium chloride, ondansetron **OR** ondansetron, magnesium hydroxide, acetaminophen **OR** acetaminophen, guaiFENesin-dextromethorphan    Data:     Past Medical History:   has a past medical history of Anxiety, Arthritis, Bell's palsy, Chronic neck pain, Chronic pain, Fibromyalgia, HA (headache), Headache, HTN (hypertension), and Hypertension. Social History:   reports that she has never smoked. She has never used smokeless tobacco. She reports that she does not drink alcohol and does not use drugs.      Family History:   Family History   Problem Relation Age of Onset    Other Mother         CAD   Amraa Alu Diabetes Mother     Other Father         CAD    Diabetes Father     Thyroid Disease Sister     Cancer Brother         prostate    Diabetes Brother     Cancer Maternal Grandmother         uterine       Vitals:  BP (!) 143/82   Pulse 66   Temp 97.7 °F (36.5 °C) (Oral)   Resp 20   Ht 5' 2\" (1.575 m)   Wt 241 lb 6.5 oz (109.5 kg)   SpO2 96%   BMI 44.15 kg/m²   Temp (24hrs), Av °F (36.7 °C), Min:97.6 °F (36.4 °C), Max:98.3 °F (36.8 °C)    No results for input(s): POCGLU in the last 72 hours. I/O (24Hr):     Intake/Output Summary (Last 24 hours) at 2021 1407  Last data filed at 2021 1051  Gross per 24 hour   Intake --   Output 1100 ml   Net -1100 ml       Labs:  Hematology:  Recent Labs     21  1438 21  0112 21  0542 21  0526   WBC 7.3 8.3  --  9.8   RBC 5.14* 4.84  --  4.55   HGB 16.1* 14.9  --  14.4   HCT 47.7* 45.6  --  42.1   MCV 92.8 94.2  --  92.5   MCH 31.3 30.8  --  31.6   MCHC 33.8 32.7  --  34.2   RDW 13.4 13.6  --  13.2    198  --  149   MPV 11.0 10.8  --  11.0   CRP  --  27.9*  --   --    INR  --  1.1  --  1.0   DDIMER  --   --  0.48 0.56     Chemistry:  Recent Labs     21  1518 21  0112 21  0526    131* 139   K 3.8 3.6* 3.8    93* 106   CO2 23 25 22   GLUCOSE 108* 110* 148*   BUN 15 17 19   CREATININE 0.54 0.78 0.50   ANIONGAP 11 13 11   LABGLOM >60 >60 >60   GFRAA >60 >60 >60   CALCIUM 8.9 8.9 8.4*   TROPHS  --   --  7   LACTACIDWB  --   --  NOT REPORTED     Recent Labs     21  1518 21  0112   PROT 6.3* 7.2   LABALBU 3.9 4.3   AST 24 32*   ALT 28 46*   LDH  --  168   ALKPHOS 59 81   BILITOT 0.39 0.53   LIPASE 21  --      ABG:No results found for: POCPH, PHART, PH, POCPCO2, GCI1RJQ, PCO2, POCPO2, PO2ART, PO2, POCHCO3, IAS9WOG, HCO3, NBEA, PBEA, BEART, BE, THGBART, THB, ZQY6AOE, BCSF7FDY, G2WHYBPL, O2SAT, FIO2  Lab Results   Component Value Date/Time    SPECIAL NOT REPORTED 2020 09:46 PM     Lab Results   Component Value Date/Time    CULTURE ESCHERICHIA COLI >[id number] CFU/ML (A) 09/01/2020 09:46 PM       Radiology:  CT ABDOMEN PELVIS W IV CONTRAST Additional Contrast? None    Result Date: 11/2/2021  Nondistended scattered fluid-filled bowel loops without evidence of obstruction or bowel wall thickening, favoring enteritis. No appendicitis, urinary obstruction or small bowel obstruction. Other incidental findings as above including hiatal hernia. XR CHEST PORTABLE    Result Date: 11/3/2021  Interstitial prominence and bibasilar opacities, which could be seen with pneumonia or edema. Physical Examination:        General appearance:  alert, cooperative and no distress  Mental Status:  oriented to person, place and time and normal affect  Lungs:  Decreased breath sounds bilaterally.    Heart:  regular rate and rhythm, no murmur  Abdomen:  soft, nontender, nondistended, normal bowel sounds, no masses, hepatomegaly, splenomegaly  Extremities:  no edema, redness, tenderness in the calves  Skin:  no gross lesions, rashes, induration    Assessment:        Hospital Problems         Last Modified POA    COVID-19 11/4/2021 Yes          Plan:        Patient status inpatient in the Progressive Unit/Step down     COVID-19 PNA   -Continue dexamethasone 6 mg daily   -Vitamin D supplementation   -CRP mildly elevated 27.9   -Supplemental O2 as needed   -Lovenox 30 mg bid   -Daily CBC  -Daily BMP      Constipation   -Start daily docusate      HTN   -Continue home Prinzide   -Continue home metoprolol 100 mg daily      Morbid obesity 2/2 excessive caloric intake   -Will  on dietary modifications when appropriate     Renita Mendez MD  11/5/2021  2:07 PM

## 2021-11-05 NOTE — PLAN OF CARE
Problem: Pain:  Goal: Pain level will decrease  Description: Pain level will decrease  Outcome: Ongoing   Pt c/o abd pain/cramping and back pain d/t coughing. Pt able to rate pain appropriately . Medicated with prn med. Will continue to assess pain level with hourly rounding . Percocet effective pain control. Problem: Falls - Risk of:  Goal: Will remain free from falls  Description: Will remain free from falls  Outcome: Ongoing   Fall risk assessment done, bed locked in low position, call light in reach at all times and encouraged to use for assist. Pathway to bathroom clutter-free and non skid socks on. Continue with hourly rounding, monitor for change. Pt remains free of falls/injury Pt with steady gait observed.  General weakness noted

## 2021-11-05 NOTE — ACP (ADVANCE CARE PLANNING)
Advance Care Planning     Advance Care Planning Activator (Inpatient)  Conversation Note      Date of ACP Conversation: 11/5/2021     Conversation Conducted with: Patient with Decision Making Capacity    ACP Activator: Keiko Paz RN        Health Care Decision Maker:     Current Designated Health Care Decision Maker:     Click here to complete Healthcare Decision Makers including section of the Healthcare Decision Maker Relationship (ie \"Primary\")  Today we documented Decision Maker(s) consistent with Legal Next of Kin hierarchy. Care Preferences    Ventilation: \"If you were in your present state of health and suddenly became very ill and were unable to breathe on your own, what would your preference be about the use of a ventilator (breathing machine) if it were available to you? \"      Would the patient desire the use of ventilator (breathing machine)?: yes    \"If your health worsens and it becomes clear that your chance of recovery is unlikely, what would your preference be about the use of a ventilator (breathing machine) if it were available to you? \"     Would the patient desire the use of ventilator (breathing machine)?: Yes      Resuscitation  \"CPR works best to restart the heart when there is a sudden event, like a heart attack, in someone who is otherwise healthy. Unfortunately, CPR does not typically restart the heart for people who have serious health conditions or who are very sick. \"    \"In the event your heart stopped as a result of an underlying serious health condition, would you want attempts to be made to restart your heart (answer \"yes\" for attempt to resuscitate) or would you prefer a natural death (answer \"no\" for do not attempt to resuscitate)? \" yes       [x] Yes   [] No   Educated Patient / Estel Agueda regarding differences between Advance Directives and portable DNR orders.     Length of ACP Conversation in minutes:      Conversation Outcomes:  [x] ACP discussion completed  [] Existing advance directive reviewed with patient; no changes to patient's previously recorded wishes  [] New Advance Directive completed  [] Portable Do Not Rescitate prepared for Provider review and signature  [] POLST/POST/MOLST/MOST prepared for Provider review and signature      Follow-up plan:    [] Schedule follow-up conversation to continue planning  [] Referred individual to Provider for additional questions/concerns   [] Advised patient/agent/surrogate to review completed ACP document and update if needed with changes in condition, patient preferences or care setting    [x] This note routed to one or more involved healthcare providers

## 2021-11-05 NOTE — PLAN OF CARE
Problem: Falls - Risk of:  Goal: Will remain free from falls  Description: Will remain free from falls  Outcome: Ongoing  Note: Pt remains free from falls, fall risk education reinforced this shift. Problem: Pain:  Goal: Pain level will decrease  Description: Pain level will decrease  Outcome: Ongoing  Note: Pt verbalizes adequate management of pain this shift. Problem: Airway Clearance - Ineffective  Goal: Achieve or maintain patent airway  Outcome: Ongoing  Note: Pt maintains patent airway this shift. Problem: Body Temperature -  Risk of, Imbalanced  Goal: Ability to maintain a body temperature within defined limits  Outcome: Ongoing  Note: Pt afebrile this shift. Problem: Isolation Precautions - Risk of Spread of Infection  Goal: Prevent transmission of infection  Outcome: Ongoing  Note: Droplet plus isolation maintained this shift.

## 2021-11-05 NOTE — CARE COORDINATION
Case Management Initial Discharge Plan  Aldo Louie,         Readmission Risk              Risk of Unplanned Readmission:  11             Met with:patient to discuss discharge plans. Information verified: address, contacts, phone number, , insurance Yes  PCP: Mirtha Kim DO  Date of last visit:     Insurance Provider: Enrrique Reyes     Discharge Planning  Current Residence:  Private home   Living Arrangements:  Spouse/Significant Other       Home has 1 stories/4 stairs to climb   Support Systems:  Spouse/Significant Other, Children       Current Services PTA:  None   Agency: none      Patient able to perform ADL's:Independent  DME in home:  Cane, walker shower seat and grab bars   DME used to aid ambulation prior to admission:   [employer]   DME used during admission:  [employer]     Potential Assistance Needed:  N/A    [facility] [address] [facility]    Potential Assistance Purchasing Medications:  No  Does patient want to participate in local refill/ meds to beds program?  No    Patient agreeable to home care: No  [place] of choice provided:  no      Type of Home Care Services:  None  Patient expects to be discharged to:   home     Prior SNF/Rehab Placement and Facility: none   Agreeable to SNF/Rehab: No  [place] of choice provided: n/a   Evaluation: n/a    Expected Discharge date:      Follow Up Appointment: Best Day/ Time:  any     Transportation provider: per family   Transportation arrangements needed for discharge: No    Discharge Plan:   Met with patient to complete a discharge assessment. She is admitted for COVID and is currently on 2 liters. Patient is normally independent, sore at times due to fibromyalgia. She uses cane outside of the home and has a walker from her past TKA. She has had home care when her knee was done     Will have to monitor for any home 02 on discharge. Do not anticipate any skilled needs at this time but will follow closely.       Electronically signed by Misty Saucedo RN on 11/5/21 at 3:39 PM EDT

## 2021-11-06 LAB
ABSOLUTE EOS #: <0.03 K/UL (ref 0–0.44)
ABSOLUTE IMMATURE GRANULOCYTE: 0.03 K/UL (ref 0–0.3)
ABSOLUTE LYMPH #: 0.85 K/UL (ref 1.1–3.7)
ABSOLUTE MONO #: 0.53 K/UL (ref 0.1–1.2)
ANION GAP SERPL CALCULATED.3IONS-SCNC: 13 MMOL/L (ref 9–17)
BASOPHILS # BLD: 0 % (ref 0–2)
BASOPHILS ABSOLUTE: <0.03 K/UL (ref 0–0.2)
BUN BLDV-MCNC: 19 MG/DL (ref 8–23)
BUN/CREAT BLD: 29 (ref 9–20)
CALCIUM SERPL-MCNC: 8.4 MG/DL (ref 8.6–10.4)
CHLORIDE BLD-SCNC: 102 MMOL/L (ref 98–107)
CO2: 25 MMOL/L (ref 20–31)
CREAT SERPL-MCNC: 0.66 MG/DL (ref 0.5–0.9)
DIFFERENTIAL TYPE: ABNORMAL
EOSINOPHILS RELATIVE PERCENT: 0 % (ref 1–4)
GFR AFRICAN AMERICAN: >60 ML/MIN
GFR NON-AFRICAN AMERICAN: >60 ML/MIN
GFR SERPL CREATININE-BSD FRML MDRD: ABNORMAL ML/MIN/{1.73_M2}
GFR SERPL CREATININE-BSD FRML MDRD: ABNORMAL ML/MIN/{1.73_M2}
GLUCOSE BLD-MCNC: 140 MG/DL (ref 70–99)
HCT VFR BLD CALC: 42.3 % (ref 36.3–47.1)
HEMOGLOBIN: 13.8 G/DL (ref 11.9–15.1)
IMMATURE GRANULOCYTES: 0 %
LYMPHOCYTES # BLD: 11 % (ref 24–43)
MCH RBC QN AUTO: 31.1 PG (ref 25.2–33.5)
MCHC RBC AUTO-ENTMCNC: 32.6 G/DL (ref 28.4–34.8)
MCV RBC AUTO: 95.3 FL (ref 82.6–102.9)
MONOCYTES # BLD: 7 % (ref 3–12)
NRBC AUTOMATED: 0 PER 100 WBC
PDW BLD-RTO: 13.4 % (ref 11.8–14.4)
PLATELET # BLD: 144 K/UL (ref 138–453)
PLATELET ESTIMATE: ABNORMAL
PMV BLD AUTO: 11 FL (ref 8.1–13.5)
POTASSIUM SERPL-SCNC: 4.2 MMOL/L (ref 3.7–5.3)
PROCALCITONIN: 0.11 NG/ML
RBC # BLD: 4.44 M/UL (ref 3.95–5.11)
RBC # BLD: ABNORMAL 10*6/UL
SEG NEUTROPHILS: 82 % (ref 36–65)
SEGMENTED NEUTROPHILS ABSOLUTE COUNT: 6.56 K/UL (ref 1.5–8.1)
SODIUM BLD-SCNC: 140 MMOL/L (ref 135–144)
WBC # BLD: 8 K/UL (ref 3.5–11.3)
WBC # BLD: ABNORMAL 10*3/UL

## 2021-11-06 PROCEDURE — 85025 COMPLETE CBC W/AUTO DIFF WBC: CPT

## 2021-11-06 PROCEDURE — 6370000000 HC RX 637 (ALT 250 FOR IP): Performed by: NURSE PRACTITIONER

## 2021-11-06 PROCEDURE — 80048 BASIC METABOLIC PNL TOTAL CA: CPT

## 2021-11-06 PROCEDURE — 36415 COLL VENOUS BLD VENIPUNCTURE: CPT

## 2021-11-06 PROCEDURE — 6360000002 HC RX W HCPCS: Performed by: NURSE PRACTITIONER

## 2021-11-06 PROCEDURE — 97530 THERAPEUTIC ACTIVITIES: CPT

## 2021-11-06 PROCEDURE — 99232 SBSQ HOSP IP/OBS MODERATE 35: CPT | Performed by: STUDENT IN AN ORGANIZED HEALTH CARE EDUCATION/TRAINING PROGRAM

## 2021-11-06 PROCEDURE — 2500000003 HC RX 250 WO HCPCS: Performed by: STUDENT IN AN ORGANIZED HEALTH CARE EDUCATION/TRAINING PROGRAM

## 2021-11-06 PROCEDURE — 6360000002 HC RX W HCPCS: Performed by: STUDENT IN AN ORGANIZED HEALTH CARE EDUCATION/TRAINING PROGRAM

## 2021-11-06 PROCEDURE — 6370000000 HC RX 637 (ALT 250 FOR IP): Performed by: STUDENT IN AN ORGANIZED HEALTH CARE EDUCATION/TRAINING PROGRAM

## 2021-11-06 PROCEDURE — 2060000000 HC ICU INTERMEDIATE R&B

## 2021-11-06 PROCEDURE — 97110 THERAPEUTIC EXERCISES: CPT

## 2021-11-06 PROCEDURE — 84145 PROCALCITONIN (PCT): CPT

## 2021-11-06 PROCEDURE — 97116 GAIT TRAINING THERAPY: CPT

## 2021-11-06 PROCEDURE — 2580000003 HC RX 258: Performed by: NURSE PRACTITIONER

## 2021-11-06 RX ORDER — METOPROLOL TARTRATE 5 MG/5ML
5 INJECTION INTRAVENOUS EVERY 4 HOURS PRN
Status: DISCONTINUED | OUTPATIENT
Start: 2021-11-06 | End: 2021-11-27 | Stop reason: HOSPADM

## 2021-11-06 RX ADMIN — ENOXAPARIN SODIUM 30 MG: 100 INJECTION SUBCUTANEOUS at 09:10

## 2021-11-06 RX ADMIN — ANTI-FUNGAL POWDER MICONAZOLE NITRATE TALC FREE: 1.42 POWDER TOPICAL at 21:20

## 2021-11-06 RX ADMIN — SODIUM CHLORIDE, PRESERVATIVE FREE 10 ML: 5 INJECTION INTRAVENOUS at 09:10

## 2021-11-06 RX ADMIN — DOCUSATE SODIUM 100 MG: 100 CAPSULE, LIQUID FILLED ORAL at 09:09

## 2021-11-06 RX ADMIN — GUAIFENESIN AND DEXTROMETHORPHAN 5 ML: 100; 10 SYRUP ORAL at 21:32

## 2021-11-06 RX ADMIN — OXYCODONE AND ACETAMINOPHEN 1 TABLET: 5; 325 TABLET ORAL at 11:54

## 2021-11-06 RX ADMIN — ENOXAPARIN SODIUM 30 MG: 100 INJECTION SUBCUTANEOUS at 21:20

## 2021-11-06 RX ADMIN — LISINOPRIL AND HYDROCHLOROTHIAZIDE 1 TABLET: 12.5; 2 TABLET ORAL at 09:09

## 2021-11-06 RX ADMIN — ONDANSETRON 4 MG: 4 TABLET, ORALLY DISINTEGRATING ORAL at 20:06

## 2021-11-06 RX ADMIN — SODIUM CHLORIDE, PRESERVATIVE FREE 10 ML: 5 INJECTION INTRAVENOUS at 20:07

## 2021-11-06 RX ADMIN — Medication 2000 UNITS: at 09:09

## 2021-11-06 RX ADMIN — DEXAMETHASONE 6 MG: 4 TABLET ORAL at 03:00

## 2021-11-06 RX ADMIN — MIRTAZAPINE 7.5 MG: 15 TABLET, FILM COATED ORAL at 21:20

## 2021-11-06 RX ADMIN — GUAIFENESIN AND DEXTROMETHORPHAN 5 ML: 100; 10 SYRUP ORAL at 11:54

## 2021-11-06 RX ADMIN — METOPROLOL SUCCINATE 100 MG: 50 TABLET, EXTENDED RELEASE ORAL at 09:10

## 2021-11-06 ASSESSMENT — PAIN SCALES - GENERAL
PAINLEVEL_OUTOF10: 0
PAINLEVEL_OUTOF10: 2
PAINLEVEL_OUTOF10: 1
PAINLEVEL_OUTOF10: 5
PAINLEVEL_OUTOF10: 0

## 2021-11-06 NOTE — PROGRESS NOTES
Physical Therapy  Facility/Department: [facility] PROGRESSIVE CARE  Daily Treatment Note  NAME: Fred Flor  : 1955  MRN: [id number]    Date of Service: 2021    Discharge Recommendations:  Patient would benefit from continued therapy after discharge     Due to recent hospitalization and medical condition, pt would benefit from additional therapy at time of discharge to ensure safety. Please refer to the AM-PAC score for current functional status. Assessment   Body structures, Functions, Activity limitations: Decreased functional mobility ; Decreased ADL status; Decreased strength; Decreased endurance; Decreased balance  Assessment: Patient able to maintain SpO2 94-96% during all mobility tasks this date. Patient is progressing towards ambulation goals this date. patient would benefit from continued skilled PT to maximize return to PLOF  Prognosis: Good  Decision Making: Medium Complexity  PT Education: Goals; PT Role; Plan of Care; Transfer Training; General Safety; Gait Training  Activity Tolerance  Activity Tolerance: Patient Tolerated treatment well  Activity Tolerance: Patient with increased fatigue and endurance this date. Patient Diagnosis(es): The encounter diagnosis was COVID-19.     has a past medical history of Anxiety, Arthritis, Bell's palsy, Chronic neck pain, Chronic pain, Fibromyalgia, HA (headache), Headache, HTN (hypertension), and Hypertension. has a past surgical history that includes  section; shoulder surgery; Urethra dilation; Arm Surgery; [place] tooth extraction; joint replacement (Left); Cholecystectomy, laparoscopic (N/A, 2020); and Cholecystectomy (2020). Restrictions  Restrictions/Precautions  Restrictions/Precautions: General Precautions, Fall Risk  Position Activity Restriction  Other position/activity restrictions: 2L O2 per nc, COVID +, RUE IV  Subjective   General  Chart Reviewed:  Yes  Additional Pertinent Hx: [place] palsy, fibromyalgia, BMI 43  Response To Previous Treatment: Not applicable  Family / Caregiver Present: No  Subjective  Subjective: Patient pleasant and agreeable to PT. General Comment  Comments: RYAN Kline reports patient medically apporpriate for PT. Pain Screening  Patient Currently in Pain: Denies  Vital Signs  Patient Currently in Pain: Denies       Orientation  Orientation  Overall Orientation Status: Within Normal Limits  Cognition      Objective   Bed mobility  Rolling to Left: Independent  Rolling to Right: Independent  Supine to Sit: Stand by assistance  Sit to Supine: Stand by assistance  Scooting: Stand by assistance  Comment: Patient requires increased time and encouragement to complete all mobility tasks this date. Patient with proper hand placement and progression to seated EOB posture. Patient requires MIN vc's to scootch out to EOB and place feet STANLEY on floor for increased safety and stability in EOB tasks. Transfers  Sit to Stand: Contact guard assistance  Stand to sit: Contact guard assistance  Bed to Chair: Contact guard assistance  Stand Pivot Transfers: Contact guard assistance  Comment: No AD, steady with transfers patient with minimal furniture surfing noted this date. Patient requires increased encouragement to maximize functional mobility this date. Ambulation  Ambulation?: Yes  Ambulation 1  Surface: level tile  Device: No Device  Assistance: Minimal assistance  Quality of Gait: Patient mildly unsteady, reports mild dizziness with amb, UEs in low gaurd position  Gait Deviations: Slow Sharona; Increased FRANNIE; Decreased step length; Decreased step height; Shuffles; Decreased head and trunk rotation  Distance: 15 feet to restroom and 15 feet back to bed  Comments: Patient with MIN furniture surfing noted this date. Patient requires MOD verbal cues for standing posture. Patient requires increased encouragement to maximize endurance. Neuromuscular Education  NDT Treatment: Gait ; Lower extremity;  Sitting; Standing  Balance  Sitting - Static: Good  Sitting - Dynamic: Good  Standing - Static: Fair; +  Standing - Dynamic: Fair  Comments: Patient unable to complete single leg stance this date due to fatigue. Exercises  Gluteal Sets: 10 x1 seated  Hip Abduction: 10 x1 seated STANLEY  Knee Long Arc Quad: 10 x1 seated STANLEY  Ankle Pumps: 10 x1 seated STANLEY  Comments: Patient educated on the importance of AROM ther ex to promote joint congruency and stability and balance in mobility tasks this date. Comment: Patient sids on toilet and washes self up therapist assists patient with set up and patient works accross mid line performing self care and therapeutic activities    AM-PAC Score  AM-PAC Inpatient Mobility Raw Score : 17 (11/06/21 1225)  AM-PAC Inpatient T-Scale Score : 42.13 (11/06/21 1225)  Mobility Inpatient CMS 0-100% Score: 50.57 (11/06/21 1225)  Mobility Inpatient CMS G-Code Modifier : CK (11/06/21 1225)          Goals  Short term goals  Time Frame for Short term goals: 12 visits  Short term goal 1: Patient will be indep with bed mobility and transfers. Short term goal 2: Patient will amb 100 feet indep with appropriate AD. Short term goal 3: Patient will negotiate 4 stairs with rails and supervision. Short term goal 4: Patient will tolerate 30 minutes of ther-ex and ther-act. Short term goal 5: Patient will be indep with HEP. Patient Goals   Patient goals : Improve breathing and walking    Plan    Plan  Times per week: 1-2x/d, 5-6 d/wk  Current Treatment Recommendations: Strengthening, Balance Training, Functional Mobility Training, Transfer Training, Endurance Training, Gait Training, Stair training, Patient/Caregiver Education & Training, Safety Education & Training, Home Exercise Program  Safety Devices  Type of devices:  All fall risk precautions in place, Gait belt, Nurse notified, Call light within reach, Left in bed     Therapy Time   Individual Concurrent Group Co-treatment   Time In 1130         Time

## 2021-11-06 NOTE — PLAN OF CARE
Problem: Falls - Risk of:  Goal: Will remain free from falls  Description: Will remain free from falls  11/5/2021 2112 by Megan Escobar RN  Outcome: Ongoing  Note: Pt free from falls, fall risk education reinforced this shift. Problem: Pain:  Goal: Pain level will decrease  Description: Pain level will decrease  11/5/2021 2112 by Megan Escobar RN  Outcome: Ongoing  Note: Pt denies pain this shift. Problem: Breathing Pattern - Ineffective  Goal: Ability to achieve and maintain a regular respiratory rate  Outcome: Ongoing  Note: Pt's respiratory rate is within normal limits this shift. Problem: Breathing Pattern - Ineffective  Goal: Ability to achieve and maintain a regular respiratory rate  Outcome: Ongoing  Note: Pt's respiratory rate is within normal limits this shift. Problem: Body Temperature -  Risk of, Imbalanced  Goal: Ability to maintain a body temperature within defined limits  Outcome: Ongoing  Note: Pt afebrile this shift.

## 2021-11-06 NOTE — PROGRESS NOTES
[facility]  Office: [address], DO, Mauri Connell, DO, Angela Prasad, DO, [facility], DO, Brittny Bravo MD, Radha Peacock MD, Kennis Osler, MD, Brandi Nogueira MD, Toshia Corey MD, Angelique Bright MD, Erin Lutz MD, Maida Carter MD, Alexandru Tapia, DO, Makenna Perea, DO, Madeleine Ross MD,  Олег Walton DO, Enrique Bay MD, Kat Muniz MD, Gennaro Peters MD, Gabbie Coelho MD, Cristiane Russo MD, Becky Christian MD, Betty Jerez [facility], [facility], CNP, Catherine Shankar, CNP, Lela Paz, CNS, Randell Bryant, CNP, Jaspal Kiran, CNP, Chang Rajan, CNP, Jordana Ny, CNP, Giovany Cruztingham, CNP, Pawel Palma, CNP, Stefanie Romano PA-C, Mg Saavedra, [facility], Susanna Ashley, CNP, Jemima Durán, CNP, Casey Acevedo, CNP, Viv Morales, CNP, Marilyn Concepcion, CNP, Saúl Kim, [facility], Pam Field, Trivedi [facility]    Progress Note    11/6/2021    11:47 AM    Name:   Ashia Monroe  MRN:     [id number]     Tod Min:      [de-identified]   Room:   [address] [place] [address] Day:  2  Admit Date:  11/3/2021 10:05 PM    PCP:   Annabelle Samayoa DO  Code Status:  Full Code    Subjective:     C/C:   Chief Complaint   Patient presents with    Abdominal Pain     lower    Nausea    Back Pain    Headache     Interval History Status: not changed. Patient was seen and examined at bedside this AM. She reports feeling \"off\" today and complains of shortness of breath, headache and generalized weakness. Currently maintaining oxygen saturations on 2 L NC. Brief History:     Ashia Monroe is a 74 y.o. female with a past medical history of hypertension and obesity who presented to the emergency department on 11/3/2021 complaining of headache, nausea and abdominal pain. She is not vaccinated against COVID-19. In the ED, the patient was febrile (Tmax 102.2) and ill-appearing. Rapid COVID-19 test was positive.  Chest x-ray was remarkable for interstitial prominence and bibasilar opacities consistent with pneumonia. The patient is admitted to internal medicine for further management of COVID-19 pneumonia. Review of Systems:     Constitutional:  negative for chills, fevers, sweats  Respiratory:  Positive for shortness of breath. Cardiovascular:  negative for chest pain, chest pressure/discomfort, lower extremity edema, palpitations  Gastrointestinal:  negative for abdominal pain, constipation, diarrhea, nausea, vomiting  Neurological: Positive for headache and dizziness     Medications: Allergies: Allergies   Allergen Reactions    Other Hives    Ceclor [Cefaclor]     Food      All melons      Sulfa Antibiotics      Projectile vomiting        Current Meds:   Scheduled Meds:    dexamethasone  6 mg Oral Q24H    mirtazapine  7.5 mg Oral Nightly    metoprolol succinate  100 mg Oral Daily    lisinopril-hydroCHLOROthiazide  1 tablet Oral Daily    sodium chloride flush  5-40 mL IntraVENous 2 times per day    enoxaparin  30 mg SubCUTAneous BID    Vitamin D  2,000 Units Oral Daily    docusate sodium  100 mg Oral Daily    influenza virus vaccine  0.5 mL IntraMUSCular Prior to discharge     Continuous Infusions:    sodium chloride       PRN Meds: oxyCODONE-acetaminophen, sodium chloride flush, sodium chloride, ondansetron **OR** ondansetron, magnesium hydroxide, acetaminophen **OR** acetaminophen, guaiFENesin-dextromethorphan    Data:     Past Medical History:   has a past medical history of Anxiety, Arthritis, Bell's palsy, Chronic neck pain, Chronic pain, Fibromyalgia, HA (headache), Headache, HTN (hypertension), and Hypertension. Social History:   reports that she has never smoked. She has never used smokeless tobacco. She reports that she does not drink alcohol and does not use drugs.      Family History:   Family History   Problem Relation Age of Onset    Other Mother         CAD   Kamilahjoy Malcolmn Diabetes Mother     Other Father         CAD    Diabetes Father     Thyroid Disease Sister     Cancer Brother         prostate    Diabetes Brother     Cancer Maternal Grandmother         uterine       Vitals:  /65   Pulse 71   Temp 98.8 °F (37.1 °C) (Oral)   Resp 16   Ht 5' 2\" (1.575 m)   Wt 244 lb 11.4 oz (111 kg)   SpO2 95%   BMI 44.76 kg/m²   Temp (24hrs), Av.5 °F (36.9 °C), Min:97.7 °F (36.5 °C), Max:99.1 °F (37.3 °C)    No results for input(s): POCGLU in the last 72 hours. I/O (24Hr):     Intake/Output Summary (Last 24 hours) at 2021 1147  Last data filed at 2021 0947  Gross per 24 hour   Intake 360 ml   Output 1150 ml   Net -790 ml       Labs:  Hematology:  Recent Labs     2142 21  05   WBC 8.3  --  9.8 8.0   RBC 4.84  --  4.55 4.44   HGB 14.9  --  14.4 13.8   HCT 45.6  --  42.1 42.3   MCV 94.2  --  92.5 95.3   MCH 30.8  --  31.6 31.1   MCHC 32.7  --  34.2 32.6   RDW 13.6  --  13.2 13.4     --  149 144   MPV 10.8  --  11.0 11.0   CRP 27.9*  --   --   --    INR 1.1  --  1.0  --    DDIMER  --  0.48 0.56  --      Chemistry:  Recent Labs     21  05   * 139 140   K 3.6* 3.8 4.2   CL 93* 106 102   CO2 25 22 25   GLUCOSE 110* 148* 140*   BUN 17 19 19   CREATININE 0.78 0.50 0.66   ANIONGAP 13 11 13   LABGLOM >60 >60 >60   GFRAA >60 >60 >60   CALCIUM 8.9 8.4* 8.4*   TROPHS  --  7  --    LACTACIDWB  --  NOT REPORTED  --      Recent Labs     21  0112   PROT 7.2   LABALBU 4.3   AST 32*   ALT 46*      ALKPHOS 81   BILITOT 0.53     ABG:No results found for: POCPH, PHART, PH, POCPCO2, DDJ7LIY, PCO2, POCPO2, PO2ART, PO2, POCHCO3, XSI4SQW, HCO3, NBEA, PBEA, BEART, BE, THGBART, THB, BQN6DHJ, UZKB3POS, B2MBFPNO, O2SAT, FIO2  Lab Results   Component Value Date/Time    SPECIAL NOT REPORTED 2020 09:46 PM     Lab Results   Component Value Date/Time    CULTURE ESCHERICHIA COLI >[id number] CFU/ML (A) 2020 09:46 PM       Radiology:  CT ABDOMEN PELVIS W IV CONTRAST Additional Contrast? None    Result Date: 11/2/2021  Nondistended scattered fluid-filled bowel loops without evidence of obstruction or bowel wall thickening, favoring enteritis. No appendicitis, urinary obstruction or small bowel obstruction. Other incidental findings as above including hiatal hernia. XR CHEST PORTABLE    Result Date: 11/3/2021  Interstitial prominence and bibasilar opacities, which could be seen with pneumonia or edema. Physical Examination:        General appearance:  alert, cooperative and no distress  Mental Status:  oriented to person, place and time and normal affect  Lungs:  Decreased breath sounds bilaterally.    Heart:  regular rate and rhythm, no murmur  Abdomen:  soft, nontender, nondistended, normal bowel sounds, no masses, hepatomegaly, splenomegaly  Extremities:  no edema, redness, tenderness in the calves  Skin:  no gross lesions, rashes, induration    Assessment:        Hospital Problems           Last Modified POA    COVID-19 11/4/2021 Yes          Plan:        Patient status inpatient in the Progressive Unit/Step down     COVID-19 PNA   -Continue dexamethasone 6 mg daily   -Vitamin D supplementation   -CRP mildly elevated 27.9   -Supplemental O2 as needed   -Lovenox 30 mg bid   -Daily CBC  -Daily BMP      Constipation   -Start daily docusate      HTN   -Continue home Prinzide   -Continue home metoprolol 100 mg daily      Morbid obesity 2/2 excessive caloric intake   -Will  on dietary modifications when appropriate     Barb Dubon MD  11/6/2021  11:47 AM Normal rate, regular rhythm.  Heart sounds S1, S2.  No murmurs, rubs or gallops.

## 2021-11-06 NOTE — PLAN OF CARE
Problem: Airway Clearance - Ineffective  Goal: Achieve or maintain patent airway  Outcome: Ongoing     Problem: Gas Exchange - Impaired  Goal: Absence of hypoxia  Outcome: Ongoing     Problem: Breathing Pattern - Ineffective  Goal: Ability to achieve and maintain a regular respiratory rate  11/6/2021 1016 by Milla Winkler RN  Outcome: Ongoing

## 2021-11-07 LAB
ABSOLUTE EOS #: <0.03 K/UL (ref 0–0.44)
ABSOLUTE IMMATURE GRANULOCYTE: 0.05 K/UL (ref 0–0.3)
ABSOLUTE LYMPH #: 1.18 K/UL (ref 1.1–3.7)
ABSOLUTE MONO #: 0.47 K/UL (ref 0.1–1.2)
ANION GAP SERPL CALCULATED.3IONS-SCNC: 12 MMOL/L (ref 9–17)
BASOPHILS # BLD: 0 % (ref 0–2)
BASOPHILS ABSOLUTE: <0.03 K/UL (ref 0–0.2)
BUN BLDV-MCNC: 17 MG/DL (ref 8–23)
BUN/CREAT BLD: 29 (ref 9–20)
CALCIUM SERPL-MCNC: 8.3 MG/DL (ref 8.6–10.4)
CHLORIDE BLD-SCNC: 99 MMOL/L (ref 98–107)
CO2: 25 MMOL/L (ref 20–31)
CREAT SERPL-MCNC: 0.58 MG/DL (ref 0.5–0.9)
DIFFERENTIAL TYPE: ABNORMAL
EOSINOPHILS RELATIVE PERCENT: 0 % (ref 1–4)
GFR AFRICAN AMERICAN: >60 ML/MIN
GFR NON-AFRICAN AMERICAN: >60 ML/MIN
GFR SERPL CREATININE-BSD FRML MDRD: ABNORMAL ML/MIN/{1.73_M2}
GFR SERPL CREATININE-BSD FRML MDRD: ABNORMAL ML/MIN/{1.73_M2}
GLUCOSE BLD-MCNC: 156 MG/DL (ref 70–99)
HCT VFR BLD CALC: 39.3 % (ref 36.3–47.1)
HEMOGLOBIN: 13 G/DL (ref 11.9–15.1)
IMMATURE GRANULOCYTES: 1 %
LYMPHOCYTES # BLD: 14 % (ref 24–43)
MCH RBC QN AUTO: 31.1 PG (ref 25.2–33.5)
MCHC RBC AUTO-ENTMCNC: 33.1 G/DL (ref 28.4–34.8)
MCV RBC AUTO: 94 FL (ref 82.6–102.9)
MONOCYTES # BLD: 6 % (ref 3–12)
NRBC AUTOMATED: 0 PER 100 WBC
PDW BLD-RTO: 13.6 % (ref 11.8–14.4)
PLATELET # BLD: 151 K/UL (ref 138–453)
PLATELET ESTIMATE: ABNORMAL
PMV BLD AUTO: 11.5 FL (ref 8.1–13.5)
POTASSIUM SERPL-SCNC: 3.7 MMOL/L (ref 3.7–5.3)
RBC # BLD: 4.18 M/UL (ref 3.95–5.11)
RBC # BLD: ABNORMAL 10*6/UL
SEG NEUTROPHILS: 79 % (ref 36–65)
SEGMENTED NEUTROPHILS ABSOLUTE COUNT: 6.56 K/UL (ref 1.5–8.1)
SODIUM BLD-SCNC: 136 MMOL/L (ref 135–144)
WBC # BLD: 8.3 K/UL (ref 3.5–11.3)
WBC # BLD: ABNORMAL 10*3/UL

## 2021-11-07 PROCEDURE — 36415 COLL VENOUS BLD VENIPUNCTURE: CPT

## 2021-11-07 PROCEDURE — 6370000000 HC RX 637 (ALT 250 FOR IP): Performed by: NURSE PRACTITIONER

## 2021-11-07 PROCEDURE — 99232 SBSQ HOSP IP/OBS MODERATE 35: CPT | Performed by: STUDENT IN AN ORGANIZED HEALTH CARE EDUCATION/TRAINING PROGRAM

## 2021-11-07 PROCEDURE — 2060000000 HC ICU INTERMEDIATE R&B

## 2021-11-07 PROCEDURE — 6360000002 HC RX W HCPCS: Performed by: STUDENT IN AN ORGANIZED HEALTH CARE EDUCATION/TRAINING PROGRAM

## 2021-11-07 PROCEDURE — 2580000003 HC RX 258: Performed by: NURSE PRACTITIONER

## 2021-11-07 PROCEDURE — 6370000000 HC RX 637 (ALT 250 FOR IP): Performed by: STUDENT IN AN ORGANIZED HEALTH CARE EDUCATION/TRAINING PROGRAM

## 2021-11-07 PROCEDURE — 80048 BASIC METABOLIC PNL TOTAL CA: CPT

## 2021-11-07 PROCEDURE — 85025 COMPLETE CBC W/AUTO DIFF WBC: CPT

## 2021-11-07 PROCEDURE — 6360000002 HC RX W HCPCS: Performed by: NURSE PRACTITIONER

## 2021-11-07 PROCEDURE — 2500000003 HC RX 250 WO HCPCS: Performed by: NURSE PRACTITIONER

## 2021-11-07 RX ADMIN — GUAIFENESIN AND DEXTROMETHORPHAN 5 ML: 100; 10 SYRUP ORAL at 20:11

## 2021-11-07 RX ADMIN — DOCUSATE SODIUM 100 MG: 100 CAPSULE, LIQUID FILLED ORAL at 09:30

## 2021-11-07 RX ADMIN — ANTI-FUNGAL POWDER MICONAZOLE NITRATE TALC FREE: 1.42 POWDER TOPICAL at 09:30

## 2021-11-07 RX ADMIN — ENOXAPARIN SODIUM 30 MG: 100 INJECTION SUBCUTANEOUS at 20:11

## 2021-11-07 RX ADMIN — MIRTAZAPINE 7.5 MG: 15 TABLET, FILM COATED ORAL at 20:11

## 2021-11-07 RX ADMIN — METOPROLOL SUCCINATE 100 MG: 50 TABLET, EXTENDED RELEASE ORAL at 09:30

## 2021-11-07 RX ADMIN — OXYCODONE AND ACETAMINOPHEN 1 TABLET: 5; 325 TABLET ORAL at 01:51

## 2021-11-07 RX ADMIN — OXYCODONE AND ACETAMINOPHEN 1 TABLET: 5; 325 TABLET ORAL at 20:11

## 2021-11-07 RX ADMIN — SODIUM CHLORIDE, PRESERVATIVE FREE 10 ML: 5 INJECTION INTRAVENOUS at 00:15

## 2021-11-07 RX ADMIN — SODIUM CHLORIDE, PRESERVATIVE FREE 10 ML: 5 INJECTION INTRAVENOUS at 09:30

## 2021-11-07 RX ADMIN — DEXAMETHASONE 6 MG: 4 TABLET ORAL at 03:06

## 2021-11-07 RX ADMIN — Medication 2000 UNITS: at 09:30

## 2021-11-07 RX ADMIN — LISINOPRIL AND HYDROCHLOROTHIAZIDE 1 TABLET: 12.5; 2 TABLET ORAL at 09:30

## 2021-11-07 RX ADMIN — ENOXAPARIN SODIUM 30 MG: 100 INJECTION SUBCUTANEOUS at 09:30

## 2021-11-07 RX ADMIN — GUAIFENESIN AND DEXTROMETHORPHAN 5 ML: 100; 10 SYRUP ORAL at 09:30

## 2021-11-07 RX ADMIN — METOPROLOL TARTRATE 5 MG: 5 INJECTION INTRAVENOUS at 00:15

## 2021-11-07 RX ADMIN — SODIUM CHLORIDE, PRESERVATIVE FREE 10 ML: 5 INJECTION INTRAVENOUS at 20:22

## 2021-11-07 RX ADMIN — ANTI-FUNGAL POWDER MICONAZOLE NITRATE TALC FREE: 1.42 POWDER TOPICAL at 20:11

## 2021-11-07 RX ADMIN — OXYCODONE AND ACETAMINOPHEN 1 TABLET: 5; 325 TABLET ORAL at 09:30

## 2021-11-07 ASSESSMENT — PAIN SCALES - GENERAL
PAINLEVEL_OUTOF10: 6
PAINLEVEL_OUTOF10: 8
PAINLEVEL_OUTOF10: 0
PAINLEVEL_OUTOF10: 8
PAINLEVEL_OUTOF10: 6
PAINLEVEL_OUTOF10: 4

## 2021-11-07 ASSESSMENT — PAIN DESCRIPTION - ORIENTATION
ORIENTATION: LEFT

## 2021-11-07 ASSESSMENT — PAIN DESCRIPTION - LOCATION
LOCATION: CHEST

## 2021-11-07 ASSESSMENT — PAIN DESCRIPTION - DESCRIPTORS
DESCRIPTORS: TIGHTNESS;SHARP
DESCRIPTORS: TIGHTNESS;SHARP
DESCRIPTORS: ACHING

## 2021-11-07 ASSESSMENT — PAIN DESCRIPTION - PROGRESSION
CLINICAL_PROGRESSION: GRADUALLY IMPROVING
CLINICAL_PROGRESSION: NOT CHANGED
CLINICAL_PROGRESSION: NOT CHANGED

## 2021-11-07 ASSESSMENT — PAIN DESCRIPTION - ONSET
ONSET: SUDDEN
ONSET: SUDDEN

## 2021-11-07 ASSESSMENT — PAIN DESCRIPTION - FREQUENCY
FREQUENCY: INTERMITTENT
FREQUENCY: INTERMITTENT

## 2021-11-07 NOTE — PLAN OF CARE
Problem: Falls - Risk of:  Goal: Will remain free from falls  Description: Will remain free from falls  11/7/2021 1106 by Sukhi Mckinley RN  Outcome: Ongoing     Problem: Pain:  Goal: Control of acute pain  Description: Control of acute pain  11/7/2021 1106 by Sukhi Mckinley RN  Outcome: Ongoing     Problem: Airway Clearance - Ineffective  Goal: Achieve or maintain patent airway  11/7/2021 1106 by Sukhi Mckinley RN  Outcome: Ongoing     Problem: Gas Exchange - Impaired  Goal: Promote optimal lung function  11/7/2021 1106 by Sukhi Mckinley RN  Outcome: Ongoing     Problem: Breathing Pattern - Ineffective  Goal: Ability to achieve and maintain a regular respiratory rate  11/7/2021 1106 by Sukhi Mckinley RN  Outcome: Ongoing

## 2021-11-07 NOTE — FLOWSHEET NOTE
11/07/21 0215   Provider Notification   Reason for Communication Review case   Provider Name Meg Valdez   Provider Notification Advance Practice Clinician (CNS/NP/CNM/CRNA/PA)   Method of Communication Secure Message   Response No new orders   Notification Time 0215     Patient called out complaining of 8/10 angina following a coughing episode. Described as tightening sensation with brief SOB that was intermittent. VS stable, PRN Percocet administered. Patient has no needs at this time.  Will continue to monitor closely per NP.

## 2021-11-07 NOTE — PLAN OF CARE
Problem: Falls - Risk of:  Goal: Will remain free from falls  Description: Will remain free from falls  Outcome: Ongoing     Problem: IP BALANCE  Goal: LTG - Patient will maintain balance to allow for safe/functional mobility  Outcome: Ongoing     Problem: Pain:  Goal: Control of acute pain  Description: Control of acute pain  Outcome: Ongoing     Problem: Gas Exchange - Impaired  Goal: Absence of hypoxia  Outcome: Ongoing     Problem: Isolation Precautions - Risk of Spread of Infection  Goal: Prevent transmission of infection  Outcome: Ongoing     Problem: Fatigue  Goal: Verbalize increase energy and improved vitality  Outcome: Ongoing     Problem: Loneliness or Risk for Loneliness  Goal: Demonstrate positive use of time alone when socialization is not possible  Outcome: Ongoing

## 2021-11-07 NOTE — PROGRESS NOTES
[facility]  Office: [address], DO, Leslie Calzada, DO, Leslie Anders, DO, [facility], DO, Thomas Mcneil MD, Chary Garcia MD, Michelle Greene MD, Arleen Fair MD, Franky Mcconnell MD, Montserrat August MD, Bubba Calderon MD, Yaa Paz MD, Gualberto Villasenor, DO, Sarahi Woodard, DO, Cyrus Awad MD,  Kalin Amaya DO, Trang Allen MD, Fabiano Mills MD, Chadwick Fraire MD, Barb Dubon MD, Michelle Grady MD, Rabia Triana MD, Koko Dennis [facility], [facility], CNP, Rebecca Lanier, CNP, Kwasi Briceno, CNS, Kanwal Moise, CNP, Harish Bravo, CNP, Veronica Guzman, CNP, Tracey Pedroza, CNP, Leena Ruiz, CNP, Lucía Arguelles, CNP, ELIZABETH WatersC, Steven Cardona, [facility], Radha Murrell, CNP, Paulo Reddy, CNP, Aster Saldaña, CNP, Alfredo Collaugust, CNP, Erasmo Koroma, CNP, Segundo Uribe, CNP, [facility]    Progress Note    11/7/2021    10:17 AM    Name:   Parvez Finley  MRN:     [id number]     [place]:      [de-identified]   Room:   [address] [place] [address] Day:  3  Admit Date:  11/3/2021 10:05 PM    PCP:   Aura Johnson DO  Code Status:  Full Code    Subjective:     C/C:   Chief Complaint   Patient presents with    Abdominal Pain     lower    Nausea    Back Pain    Headache     Interval History Status: not changed. Patient was seen and examined at bedside this AM. She reports feeling \"okay\" but continues to complain of fever/chills, shortness of breath and cough. Currently maintaining O2 saturations on 2 L NC. Plan to continue to wean oxygen with anticipated discharge tomorrow AM.     Brief History:     Parvez Finley is a 74 y.o. female with a past medical history of hypertension and obesity who presented to the emergency department on 11/3/2021 complaining of headache, nausea and abdominal pain. She is not vaccinated against COVID-19. In the ED, the patient was febrile (Tmax 102.2) and ill-appearing.  Rapid COVID-19 test was positive. Chest x-ray was remarkable for interstitial prominence and bibasilar opacities consistent with pneumonia. The patient is admitted to internal medicine for further management of COVID-19 pneumonia. Review of Systems:     Constitutional:  negative for chills, fevers, sweats  Respiratory:  Positive for shortness of breath. Cardiovascular:  negative for chest pain, chest pressure/discomfort, lower extremity edema, palpitations  Gastrointestinal:  negative for abdominal pain, constipation, diarrhea, nausea, vomiting  Neurological: Positive for headache and dizziness     Medications: Allergies: Allergies   Allergen Reactions    Other Hives    Ceclor [Cefaclor]     Food      All melons      Sulfa Antibiotics      Projectile vomiting        Current Meds:   Scheduled Meds:    miconazole   Topical BID    dexamethasone  6 mg Oral Q24H    mirtazapine  7.5 mg Oral Nightly    metoprolol succinate  100 mg Oral Daily    lisinopril-hydroCHLOROthiazide  1 tablet Oral Daily    sodium chloride flush  5-40 mL IntraVENous 2 times per day    enoxaparin  30 mg SubCUTAneous BID    Vitamin D  2,000 Units Oral Daily    docusate sodium  100 mg Oral Daily    influenza virus vaccine  0.5 mL IntraMUSCular Prior to discharge     Continuous Infusions:    sodium chloride       PRN Meds: metoprolol, oxyCODONE-acetaminophen, sodium chloride flush, sodium chloride, ondansetron **OR** ondansetron, magnesium hydroxide, acetaminophen **OR** acetaminophen, guaiFENesin-dextromethorphan    Data:     Past Medical History:   has a past medical history of Anxiety, Arthritis, Bell's palsy, Chronic neck pain, Chronic pain, Fibromyalgia, HA (headache), Headache, HTN (hypertension), and Hypertension. Social History:   reports that she has never smoked. She has never used smokeless tobacco. She reports that she does not drink alcohol and does not use drugs.      Family History:   Family History   Problem Relation Age of Onset    Other Mother         CAD   [facility] Diabetes Mother     Other Father         CAD    Diabetes Father     Thyroid Disease Sister     Cancer Brother         prostate    Diabetes Brother     Cancer Maternal Grandmother         uterine       Vitals:  /68   Pulse 67   Temp 98.6 °F (37 °C) (Oral)   Resp 16   Ht 5' 2\" (1.575 m)   Wt 245 lb 1.6 oz (111.2 kg)   SpO2 96%   BMI 44.83 kg/m²   Temp (24hrs), Av.6 °F (37 °C), Min:97.9 °F (36.6 °C), Max:99.5 °F (37.5 °C)    No results for input(s): POCGLU in the last 72 hours. I/O (24Hr): Intake/Output Summary (Last 24 hours) at 2021 1017  Last data filed at 2021 2315  Gross per 24 hour   Intake 600 ml   Output 1500 ml   Net -900 ml       Labs:  Hematology:  Recent Labs     21   WBC 9.8 8.0 8.3   RBC 4.55 4.44 4.18   HGB 14.4 13.8 13.0   HCT 42.1 42.3 39.3   MCV 92.5 95.3 94.0   MCH 31.6 31.1 31.1   MCHC 34.2 32.6 33.1   RDW 13.2 13.4 13.6    144 151   MPV 11.0 11.0 11.5   INR 1.0  --   --    DDIMER 0.56  --   --      Chemistry:  Recent Labs     21    140 136   K 3.8 4.2 3.7    102 99   CO2 22 25 25   GLUCOSE 148* 140* 156*   BUN 19 19 17   CREATININE 0.50 0.66 0.58   ANIONGAP 11 13 12   LABGLOM >60 >60 >60   GFRAA >60 >60 >60   CALCIUM 8.4* 8.4* 8.3*   TROPHS 7  --   --    LACTACIDWB NOT REPORTED  --   --      No results for input(s): PROT, LABALBU, LABA1C, E0SLRQM, T2GVXOH, FT4, TSH, AST, ALT, LDH, GGT, ALKPHOS, LABGGT, BILITOT, BILIDIR, AMMONIA, AMYLASE, LIPASE, LACTATE, CHOL, HDL, LDLCHOLESTEROL, CHOLHDLRATIO, TRIG, VLDL, MVG08EO, PHENYTOIN, PHENYF, URICACID, POCGLU in the last 72 hours.   ABG:No results found for: POCPH, PHART, PH, POCPCO2, CHW2NHV, PCO2, POCPO2, PO2ART, PO2, POCHCO3, BFN5MEJ, HCO3, NBEA, PBEA, BEART, BE, THGBART, THB, YPU3NWQ, PCXB4UHY, K1TITUXG, O2SAT, FIO2  Lab Results   Component Value Date/Time

## 2021-11-08 LAB
ABSOLUTE EOS #: <0.03 K/UL (ref 0–0.44)
ABSOLUTE IMMATURE GRANULOCYTE: 0.06 K/UL (ref 0–0.3)
ABSOLUTE LYMPH #: 1.47 K/UL (ref 1.1–3.7)
ABSOLUTE MONO #: 0.4 K/UL (ref 0.1–1.2)
ANION GAP SERPL CALCULATED.3IONS-SCNC: 15 MMOL/L (ref 9–17)
BASOPHILS # BLD: 0 % (ref 0–2)
BASOPHILS ABSOLUTE: <0.03 K/UL (ref 0–0.2)
BUN BLDV-MCNC: 16 MG/DL (ref 8–23)
BUN/CREAT BLD: 26 (ref 9–20)
CALCIUM SERPL-MCNC: 8.7 MG/DL (ref 8.6–10.4)
CHLORIDE BLD-SCNC: 97 MMOL/L (ref 98–107)
CO2: 23 MMOL/L (ref 20–31)
CREAT SERPL-MCNC: 0.61 MG/DL (ref 0.5–0.9)
DIFFERENTIAL TYPE: ABNORMAL
EOSINOPHILS RELATIVE PERCENT: 0 % (ref 1–4)
GFR AFRICAN AMERICAN: >60 ML/MIN
GFR NON-AFRICAN AMERICAN: >60 ML/MIN
GFR SERPL CREATININE-BSD FRML MDRD: ABNORMAL ML/MIN/{1.73_M2}
GFR SERPL CREATININE-BSD FRML MDRD: ABNORMAL ML/MIN/{1.73_M2}
GLUCOSE BLD-MCNC: 109 MG/DL (ref 65–105)
GLUCOSE BLD-MCNC: 137 MG/DL (ref 65–105)
GLUCOSE BLD-MCNC: 207 MG/DL (ref 70–99)
GLUCOSE BLD-MCNC: 274 MG/DL (ref 65–105)
HCT VFR BLD CALC: 46.3 % (ref 36.3–47.1)
HEMOGLOBIN: 15.2 G/DL (ref 11.9–15.1)
IMMATURE GRANULOCYTES: 1 %
LYMPHOCYTES # BLD: 15 % (ref 24–43)
MCH RBC QN AUTO: 30.8 PG (ref 25.2–33.5)
MCHC RBC AUTO-ENTMCNC: 32.8 G/DL (ref 28.4–34.8)
MCV RBC AUTO: 93.9 FL (ref 82.6–102.9)
MONOCYTES # BLD: 4 % (ref 3–12)
NRBC AUTOMATED: 0 PER 100 WBC
PDW BLD-RTO: 13.3 % (ref 11.8–14.4)
PLATELET # BLD: 171 K/UL (ref 138–453)
PLATELET ESTIMATE: ABNORMAL
PMV BLD AUTO: 11 FL (ref 8.1–13.5)
POTASSIUM SERPL-SCNC: 4.3 MMOL/L (ref 3.7–5.3)
RBC # BLD: 4.93 M/UL (ref 3.95–5.11)
RBC # BLD: ABNORMAL 10*6/UL
SEG NEUTROPHILS: 80 % (ref 36–65)
SEGMENTED NEUTROPHILS ABSOLUTE COUNT: 7.78 K/UL (ref 1.5–8.1)
SODIUM BLD-SCNC: 135 MMOL/L (ref 135–144)
WBC # BLD: 9.7 K/UL (ref 3.5–11.3)
WBC # BLD: ABNORMAL 10*3/UL

## 2021-11-08 PROCEDURE — 97110 THERAPEUTIC EXERCISES: CPT

## 2021-11-08 PROCEDURE — 6360000002 HC RX W HCPCS: Performed by: NURSE PRACTITIONER

## 2021-11-08 PROCEDURE — 99232 SBSQ HOSP IP/OBS MODERATE 35: CPT | Performed by: STUDENT IN AN ORGANIZED HEALTH CARE EDUCATION/TRAINING PROGRAM

## 2021-11-08 PROCEDURE — 85025 COMPLETE CBC W/AUTO DIFF WBC: CPT

## 2021-11-08 PROCEDURE — 6370000000 HC RX 637 (ALT 250 FOR IP): Performed by: STUDENT IN AN ORGANIZED HEALTH CARE EDUCATION/TRAINING PROGRAM

## 2021-11-08 PROCEDURE — 97530 THERAPEUTIC ACTIVITIES: CPT

## 2021-11-08 PROCEDURE — 6360000002 HC RX W HCPCS: Performed by: STUDENT IN AN ORGANIZED HEALTH CARE EDUCATION/TRAINING PROGRAM

## 2021-11-08 PROCEDURE — 2060000000 HC ICU INTERMEDIATE R&B

## 2021-11-08 PROCEDURE — 6370000000 HC RX 637 (ALT 250 FOR IP): Performed by: NURSE PRACTITIONER

## 2021-11-08 PROCEDURE — 80048 BASIC METABOLIC PNL TOTAL CA: CPT

## 2021-11-08 PROCEDURE — 97116 GAIT TRAINING THERAPY: CPT

## 2021-11-08 PROCEDURE — 36415 COLL VENOUS BLD VENIPUNCTURE: CPT

## 2021-11-08 PROCEDURE — 2580000003 HC RX 258: Performed by: NURSE PRACTITIONER

## 2021-11-08 PROCEDURE — 82947 ASSAY GLUCOSE BLOOD QUANT: CPT

## 2021-11-08 PROCEDURE — 86140 C-REACTIVE PROTEIN: CPT

## 2021-11-08 RX ORDER — DEXTROSE MONOHYDRATE 50 MG/ML
100 INJECTION, SOLUTION INTRAVENOUS PRN
Status: DISCONTINUED | OUTPATIENT
Start: 2021-11-08 | End: 2021-11-27 | Stop reason: HOSPADM

## 2021-11-08 RX ORDER — NICOTINE POLACRILEX 4 MG
15 LOZENGE BUCCAL PRN
Status: DISCONTINUED | OUTPATIENT
Start: 2021-11-08 | End: 2021-11-27 | Stop reason: HOSPADM

## 2021-11-08 RX ORDER — DEXTROSE MONOHYDRATE 25 G/50ML
12.5 INJECTION, SOLUTION INTRAVENOUS PRN
Status: DISCONTINUED | OUTPATIENT
Start: 2021-11-08 | End: 2021-11-27 | Stop reason: HOSPADM

## 2021-11-08 RX ADMIN — Medication 2000 UNITS: at 09:21

## 2021-11-08 RX ADMIN — LISINOPRIL AND HYDROCHLOROTHIAZIDE 1 TABLET: 12.5; 2 TABLET ORAL at 09:20

## 2021-11-08 RX ADMIN — ENOXAPARIN SODIUM 30 MG: 100 INJECTION SUBCUTANEOUS at 09:20

## 2021-11-08 RX ADMIN — OXYCODONE AND ACETAMINOPHEN 1 TABLET: 5; 325 TABLET ORAL at 06:21

## 2021-11-08 RX ADMIN — ANTI-FUNGAL POWDER MICONAZOLE NITRATE TALC FREE: 1.42 POWDER TOPICAL at 09:19

## 2021-11-08 RX ADMIN — ACETAMINOPHEN 650 MG: 325 TABLET ORAL at 20:26

## 2021-11-08 RX ADMIN — SODIUM CHLORIDE, PRESERVATIVE FREE 10 ML: 5 INJECTION INTRAVENOUS at 20:13

## 2021-11-08 RX ADMIN — DOCUSATE SODIUM 100 MG: 100 CAPSULE, LIQUID FILLED ORAL at 09:20

## 2021-11-08 RX ADMIN — MIRTAZAPINE 7.5 MG: 15 TABLET, FILM COATED ORAL at 20:12

## 2021-11-08 RX ADMIN — OXYCODONE AND ACETAMINOPHEN 1 TABLET: 5; 325 TABLET ORAL at 01:06

## 2021-11-08 RX ADMIN — SODIUM CHLORIDE, PRESERVATIVE FREE 10 ML: 5 INJECTION INTRAVENOUS at 09:20

## 2021-11-08 RX ADMIN — DEXAMETHASONE 6 MG: 4 TABLET ORAL at 01:07

## 2021-11-08 RX ADMIN — INSULIN LISPRO 3 UNITS: 100 INJECTION, SOLUTION INTRAVENOUS; SUBCUTANEOUS at 12:34

## 2021-11-08 RX ADMIN — ONDANSETRON 4 MG: 2 INJECTION INTRAMUSCULAR; INTRAVENOUS at 20:12

## 2021-11-08 RX ADMIN — ANTI-FUNGAL POWDER MICONAZOLE NITRATE TALC FREE: 1.42 POWDER TOPICAL at 20:14

## 2021-11-08 RX ADMIN — ENOXAPARIN SODIUM 30 MG: 100 INJECTION SUBCUTANEOUS at 20:13

## 2021-11-08 RX ADMIN — GUAIFENESIN AND DEXTROMETHORPHAN 5 ML: 100; 10 SYRUP ORAL at 06:21

## 2021-11-08 RX ADMIN — GUAIFENESIN AND DEXTROMETHORPHAN 5 ML: 100; 10 SYRUP ORAL at 01:07

## 2021-11-08 RX ADMIN — METOPROLOL SUCCINATE 100 MG: 50 TABLET, EXTENDED RELEASE ORAL at 09:20

## 2021-11-08 ASSESSMENT — PAIN SCALES - GENERAL
PAINLEVEL_OUTOF10: 4
PAINLEVEL_OUTOF10: 6
PAINLEVEL_OUTOF10: 6
PAINLEVEL_OUTOF10: 3
PAINLEVEL_OUTOF10: 1

## 2021-11-08 NOTE — PROGRESS NOTES
opacities consistent with pneumonia. The patient is admitted to internal medicine for further management of COVID-19 pneumonia. Review of Systems:     Constitutional:  negative for chills, fevers, sweats  Respiratory:  Positive for shortness of breath. Cardiovascular:  negative for chest pain, chest pressure/discomfort, lower extremity edema, palpitations  Gastrointestinal:  negative for abdominal pain, constipation, diarrhea, nausea, vomiting  Neurological: Positive for headache and dizziness     Medications: Allergies: Allergies   Allergen Reactions    Other Hives    Ceclor [Cefaclor]     Food      All melons      Sulfa Antibiotics      Projectile vomiting        Current Meds:   Scheduled Meds:    insulin lispro  0-6 Units SubCUTAneous TID WC    insulin lispro  0-3 Units SubCUTAneous Nightly    miconazole   Topical BID    dexamethasone  6 mg Oral Q24H    mirtazapine  7.5 mg Oral Nightly    metoprolol succinate  100 mg Oral Daily    lisinopril-hydroCHLOROthiazide  1 tablet Oral Daily    sodium chloride flush  5-40 mL IntraVENous 2 times per day    enoxaparin  30 mg SubCUTAneous BID    Vitamin D  2,000 Units Oral Daily    docusate sodium  100 mg Oral Daily    influenza virus vaccine  0.5 mL IntraMUSCular Prior to discharge     Continuous Infusions:    dextrose      sodium chloride       PRN Meds: glucose, dextrose, glucagon (rDNA), dextrose, metoprolol, oxyCODONE-acetaminophen, sodium chloride flush, sodium chloride, ondansetron **OR** ondansetron, magnesium hydroxide, acetaminophen **OR** acetaminophen, guaiFENesin-dextromethorphan    Data:     Past Medical History:   has a past medical history of Anxiety, Arthritis, Bell's palsy, Chronic neck pain, Chronic pain, Fibromyalgia, HA (headache), Headache, HTN (hypertension), and Hypertension. Social History:   reports that she has never smoked.  She has never used smokeless tobacco. She reports that she does not drink alcohol and does not use drugs. Family History:   Family History   Problem Relation Age of Onset    Other Mother         CAD   [facility] Diabetes Mother     Other Father         CAD    Diabetes Father     Thyroid Disease Sister     Cancer Brother         prostate    Diabetes Brother     Cancer Maternal Grandmother         uterine       Vitals:  /71   Pulse 71   Temp 98.2 °F (36.8 °C) (Oral)   Resp 16   Ht 5' 2\" (1.575 m)   Wt 245 lb 1.6 oz (111.2 kg)   SpO2 95%   BMI 44.83 kg/m²   Temp (24hrs), Av.4 °F (36.9 °C), Min:98.1 °F (36.7 °C), Max:98.8 °F (37.1 °C)    No results for input(s): POCGLU in the last 72 hours. I/O (24Hr): Intake/Output Summary (Last 24 hours) at 2021 1039  Last data filed at 2021 0641  Gross per 24 hour   Intake 400 ml   Output 975 ml   Net -575 ml       Labs:  Hematology:  Recent Labs     21  0529 21  0521 21  0640   WBC 8.0 8.3 9.7   RBC 4.44 4.18 4.93   HGB 13.8 13.0 15.2*   HCT 42.3 39.3 46.3   MCV 95.3 94.0 93.9   MCH 31.1 31.1 30.8   MCHC 32.6 33.1 32.8   RDW 13.4 13.6 13.3    151 171   MPV 11.0 11.5 11.0     Chemistry:  Recent Labs     21  0529 21  0521 21  0640    136 135   K 4.2 3.7 4.3    99 97*   CO2 25 25 23   GLUCOSE 140* 156* 207*   BUN 19 17 16   CREATININE 0.66 0.58 0.61   ANIONGAP 13 12 15   LABGLOM >60 >60 >60   GFRAA >60 >60 >60   CALCIUM 8.4* 8.3* 8.7     No results for input(s): PROT, LABALBU, LABA1C, D8YLWDE, A9KALRY, FT4, TSH, AST, ALT, LDH, GGT, ALKPHOS, LABGGT, BILITOT, BILIDIR, AMMONIA, AMYLASE, LIPASE, LACTATE, CHOL, HDL, LDLCHOLESTEROL, CHOLHDLRATIO, TRIG, VLDL, IAP87YO, PHENYTOIN, PHENYF, URICACID, POCGLU in the last 72 hours.   ABG:No results found for: POCPH, PHART, PH, POCPCO2, SIA7TVZ, PCO2, POCPO2, PO2ART, PO2, POCHCO3, KJD7UPA, HCO3, NBEA, PBEA, BEART, BE, THGBART, THB, HQX8LCZ, VPXA6ZSY, U3SYLEIA, O2SAT, FIO2  Lab Results   Component Value Date/Time    SPECIAL NOT REPORTED 2020 09:46

## 2021-11-08 NOTE — PROGRESS NOTES
Physical Therapy  Facility/Department: [facility] PROGRESSIVE CARE  Daily Treatment Note  NAME: Henderson Gilford  : 1955  MRN: [id number]    Date of Service: 2021    Discharge Recommendations:  Patient would benefit from continued therapy after discharge        Assessment   Body structures, Functions, Activity limitations: Decreased functional mobility ; Decreased ADL status; Decreased strength; Decreased endurance; Decreased balance  Assessment: Pt with deficits noted in bed mobility, transfers, ambulation, balance, and endurance(saO2 at 85-93% on 3LO2 with activity) this session. Pt requires continued PT to maximize independence with functional mobility, balance, safety awareness & activity tolerance to improve aerobic capacity & overall tolerance of I ADL's. Due to recent hospitalization and medical condition, pt would benefit from additional therapy at time of discharge to ensure safety. Please refer to the AM-PAC score for current functional status.   Prognosis: Good  Decision Making: Medium Complexity  Exam: ROM, MMT, functional mobility, activity tolerance, Balance, & [employer] AM-PAC 6 Clicks Basic Mobility  Clinical Presentation: evolving  PT Education: PT Role; Plan of Care; Transfer Training; General Safety; Gait Training; Functional Mobility Training; Energy Conservation  Patient Education: Ed pt on functional mobility, safety awareness, importance of being up & OOB to regain strength, & prevention of sedentary complications, circulation ex's,  & optimal breathing techniques including  deep breathing with incentive spirometer including technique, frequency and purpose,  & ex's to move what moves to maintain strength & joint congruency  REQUIRES PT FOLLOW UP: Yes  Activity Tolerance  Activity Tolerance: Patient limited by endurance  Activity Tolerance: saaO2 85-93% with activity on 3L O2     Patient Diagnosis(es): The encounter diagnosis was COVID-19.     has a past medical history of Anxiety, Arthritis, Bell's palsy, Chronic neck pain, Chronic pain, Fibromyalgia, HA (headache), Headache, HTN (hypertension), and Hypertension. has a past surgical history that includes  section; shoulder surgery; Urethra dilation; Arm Surgery; [place] tooth extraction; joint replacement (Left); Cholecystectomy, laparoscopic (N/A, 2020); and Cholecystectomy (2020). Restrictions  Restrictions/Precautions  Restrictions/Precautions: General Precautions, Fall Risk  Required Braces or Orthoses?: No  Position Activity Restriction  Other position/activity restrictions: Up as tolerated, telemetry, Droplet+ for Covid, 3L O2 per nc, RUE IV  Subjective   General  Chart Reviewed:  Yes  Additional Pertinent Hx: [place] palsy, fibromyalgia, BMI 43  Response To Previous Treatment: Not applicable  Family / Caregiver Present: No  Subjective  Subjective: Pt agreeable to PT  General Comment  Comments: RN okays PT  Pain Screening  Patient Currently in Pain: Denies  Pain Assessment  Response to Pain Intervention: Patient Satisfied  Vital Signs  BP Location: Right upper arm  Patient Currently in Pain: Denies  Oxygen Therapy  O2 Device: Nasal cannula       Orientation  Orientation  Overall Orientation Status: Within Normal Limits  Cognition   Cognition  Overall Cognitive Status: WFL  Objective   Bed mobility  Rolling to Left: Supervision  Supine to Sit: Stand by assistance  Scooting: Stand by assistance  Comment: MIN cues for hand placement on bed rail as needed, pacing & pursed lip breathing tech, and use of BUE's to scoot fully out to EOB as well as awareness/assist with lines to increase safety  Transfers  Sit to Stand: Contact guard assistance  Stand to sit: Contact guard assistance  Bed to Chair: Contact guard assistance  Stand Pivot Transfers: Contact guard assistance  Comment: Ed + tactile assist on correct use of upper body for safe sit/stand + to back all way back to surface with walker close before she reaches from arms of walker to chair, & to exhale as she sits to chair  Ambulation  Ambulation?: Yes  Ambulation 1  Surface: level tile  Device: Rolling Walker  Assistance: Minimal assistance  Quality of Gait: step to pattern, device used 2* hypoxic with activity for better activity tolerance & energy conservation, safety cues to keep walker close at all times & to amb inside base of walker, pacing & pursed lip breathing, & upright posture  Gait Deviations: Slow Sharona; Increased FRANNIE; Decreased step length; Decreased step height; Shuffles; Decreased head and trunk rotation  Distance: 20ftx2  Comments: ModA needed to manage lines  Neuromuscular Education  NDT Treatment: Gait ; Lower extremity; Sitting; Standing  Balance  Sitting - Static: Good  Sitting - Dynamic: Good  Standing - Static: Good (R/W)  Standing - Dynamic: Fair; + (R/W)  Single Leg Stance R Le (with R/W)  Single Leg Stance L Le (with R/W)  Exercises    Comments: Pt performed seated LE ex's x 10 reps to promote mobility exercises and promote joint congruency, completed sit to stands x 4 to promote mobility and functional pre gait activities & completed static standing weight shifts & picking feet off floor with UB support at R/W to improve core strength & stability          G-Code     OutComes Score                                                     AM-PAC Score  AM-PAC Inpatient Mobility Raw Score : 19 (21)  AM-PAC Inpatient T-Scale Score : 45.44 (21)  Mobility Inpatient CMS 0-100% Score: 41.77 (21)  Mobility Inpatient CMS G-Code Modifier : CK (21)          Goals  Short term goals  Time Frame for Short term goals: 12 visits  Short term goal 1: Patient will be indep with bed mobility and transfers. Short term goal 2: Patient will amb 100 feet indep with appropriate AD. Short term goal 3: Patient will negotiate 4 stairs with rails and supervision. Short term goal 4:  Inc standing balance to good with device to facilitate pt independence for performance of ADL's & functional mobility, & reduce fall risk  Short term goal 5: Pt able to tolerate 30 min of activity to include ex, breathing techniques & endurance training with pt demonstrating ability to perform energy conservation strategies during functional activity to self-regulate pacing & breathing techniques to avoid SOB & maintain saO2 in safe range  Short term goal 6: Ed pt on home ex's, optimal breathing techniques, safety & energy principles, endurance training, Covid 19 pt education & issue written pt education  Patient Goals   Patient goals : Improve breathing and walking    Plan    Plan  Times per week: 1-2x/d, 5-6 d/wk  Current Treatment Recommendations: Strengthening, Balance Training, Functional Mobility Training, Transfer Training, Endurance Training, Gait Training, Stair training, Patient/Caregiver Education & Training, Safety Education & Training, Home Exercise Program  Safety Devices  Type of devices:  All fall risk precautions in place, Gait belt, Call light within reach, Left in chair     Therapy Time   Individual Concurrent Group Co-treatment   Time In 1343         Time Out 1413         Minutes [address] [place] [address], PT

## 2021-11-08 NOTE — PROGRESS NOTES
Home oxygen has been ordered for this patient. She requires home oxygen due to chronic hypoxia from COVID-19 pneumonia. This was discussed with the patient face-to-face.      Parvez Yeh MD   Hospitalist

## 2021-11-08 NOTE — PLAN OF CARE
Problem: Falls - Risk of:  Goal: Will remain free from falls  Description: Will remain free from falls  11/7/2021 2244 by Luanne Angulo RN  Outcome: Ongoing  Note: Patient is a fall risk during this admission. Fall risk assessment was performed. Patient is absent of falls. Bed is in the lowest position. Wheels on the bed are locked. Call light and bed side table are within reach. Clutter is removed. Patient was educated to call out when needing assistance or wanting to get out of bed. Patient offered toileting assistance during rounding. Hourly rounds have been performed. Problem: Falls - Risk of:  Goal: Absence of physical injury  Description: Absence of physical injury  Outcome: Ongoing     Problem: Pain:  Goal: Pain level will decrease  Description: Pain level will decrease  Outcome: Ongoing  Note: Pt medicated with pain medication prn. Assessed all pain characteristics including level, type, location, frequency, and onset. Non-pharmacologic interventions offered to pt as well. Pt states pain is tolerable at this time. Will continue to monitor      Problem: Pain:  Goal: Control of chronic pain  Description: Control of chronic pain  Outcome: Ongoing     Problem: Gas Exchange - Impaired  Goal: Absence of hypoxia  Outcome: Ongoing  Note: Assist the patient with activities of daily living. Auscultate breath sounds, as ordered. Monitor heart rate and rhythm, as ordered. Monitor respiratory rate, depth, and effort, as ordered. Problem: Gas Exchange - Impaired  Goal: Promote optimal lung function  11/7/2021 2244 by Luanne Angulo RN  Outcome: Ongoing     Problem: Breathing Pattern - Ineffective  Goal: Ability to achieve and maintain a regular respiratory rate  11/7/2021 2244 by Luanne Angulo RN  Outcome: Ongoing  Note: Assess the patient's airway for patency, and auscultate the lungs. Monitor respiratory rate, depth, and degree of effort.   Encourage coughing and deep-breathing exercises. 11/7/2021 1106 by Alfonso Vera RN  Outcome: Ongoing     Problem: Body Temperature -  Risk of, Imbalanced  Goal: Ability to maintain a body temperature within defined limits  Outcome: Ongoing     Problem: Body Temperature -  Risk of, Imbalanced  Goal: Will regain or maintain usual level of consciousness  Outcome: Ongoing     Problem:  Body Temperature -  Risk of, Imbalanced  Goal: Complications related to the disease process, condition or treatment will be avoided or minimized  Outcome: Ongoing     Problem: Isolation Precautions - Risk of Spread of Infection  Goal: Prevent transmission of infection  Outcome: Ongoing     Problem: Loneliness or Risk for Loneliness  Goal: Demonstrate positive use of time alone when socialization is not possible  Outcome: Ongoing     Problem: Fatigue  Goal: Verbalize increase energy and improved vitality  Outcome: Ongoing

## 2021-11-08 NOTE — PROGRESS NOTES
Occupational Therapy  Facility/Department: [facility] PROGRESSIVE CARE  Daily Treatment Note  NAME: Jaren Feldman  : 1955  MRN: [id number]    Date of Service: 2021    Discharge Recommendations:  Patient would benefit from continued therapy after discharge       Assessment   Performance deficits / Impairments: Decreased functional mobility ; Decreased ADL status; Decreased strength; Decreased endurance; Decreased high-level IADLs; Decreased balance; Decreased posture  Assessment: Pt. completed functional UE exercise program while sitting upright in bedside chair. Pt. instructed on exercise with use of graded theraband and was attentive and displayed proper tech. Pt. did have O2 on 2L and destated to 84% during exercise requiring rest break. Pt. quickly recovered to healthy levels 95-96% saturation. Pt. educated on pacing self and proper breathing tech during functional tasks. Skilled OT  is warranted to promote functional endurance and strength to return pt to prior living arrangement with assist as needed. Prognosis: Good  OT Education: OT Role; Transfer Training; Energy Conservation; ADL Adaptive Strategies; Plan of Care  Patient Education: Pt. educated on proper breathing tech with activity and exercise program to promote functional strength. REQUIRES OT FOLLOW UP: Yes  Activity Tolerance  Activity Tolerance: Patient limited by fatigue  Activity Tolerance: fair +  Safety Devices  Safety Devices in place: Yes  Type of devices: All fall risk precautions in place; Call light within reach; Chair alarm in place; Left in chair; Nurse notified; Patient at risk for falls         Patient Diagnosis(es): The encounter diagnosis was COVID-19.      has a past medical history of Anxiety, Arthritis, Bell's palsy, Chronic neck pain, Chronic pain, Fibromyalgia, HA (headache), Headache, HTN (hypertension), and Hypertension. has a past surgical history that includes  section; shoulder surgery; Urethra dilation;  Arm Surgery; [place] tooth extraction; joint replacement (Left); Cholecystectomy, laparoscopic (N/A, 4/6/2020); and Cholecystectomy (04/2020). Restrictions  Restrictions/Precautions  Restrictions/Precautions: General Precautions, Fall Risk  Required Braces or Orthoses?: No  Position Activity Restriction  Other position/activity restrictions: 2L O2 per nc, COVID +, RUE IV  Subjective   General  Chart Reviewed: Yes  Patient assessed for rehabilitation services?: Yes  Additional Pertinent Hx: Pt. sitting upright in bedside chair upon arrival to room. Pt. agreeable to treatment  Response to previous treatment: Patient with no complaints from previous session  Family / Caregiver Present: No  Subjective  Subjective: Pt resting in bed in ER agreeable to OT eval      Orientation  Orientation  Overall Orientation Status: Within Functional Limits  Objective             Balance  Sitting Balance: Independent (Pt. sitting upright in bedside chair displaying good upright posture)   Cognition  Overall Cognitive Status: WFL     Perception  Overall Perceptual Status: WFL              Type of ROM/Therapeutic Exercise  Type of ROM/Therapeutic Exercise: AROM  Comment: Pt. completed UE exercise program to promote functional endurance for ADLS. Pt. program while sitting up in bedside chair. Exercises  Shoulder Flexion: x10  Shoulder Extension: x10  Horizontal ABduction: x10  Horizontal ADduction: x10  Elbow Flexion: x10 with use of graded theraband  Elbow Extension: x10 with use of graded theraband       Plan   Plan  Times per week: 4-5x/wk 1x/day as rufina  Times per day: Daily  Current Treatment Recommendations: Strengthening, Endurance Training, Patient/Caregiver Education & Training, Equipment Evaluation, Education, & procurement, Self-Care / ADL, Home Management Training, Functional Mobility Training, Safety Education & Training  Plan Comment: Cont.  OT with stated POC      AM-PAC Score        AM-PAC Inpatient Daily Activity Raw Score: 16 (11/08/21 1523)  AM-PAC Inpatient ADL T-Scale Score : 35.96 (11/08/21 1523)  ADL Inpatient CMS 0-100% Score: 53.32 (11/08/21 1523)  ADL Inpatient CMS G-Code Modifier : CK (11/08/21 1523)    Goals  Short term goals  Time Frame for Short term goals: By discharge, pt to demo  Short term goal 1: ADL transfers and functional mobility to SBA with use of AD as needed. Short term goal 2: UB ADLS to Set up and LB ADLs SBA with use of AD/AE as needed. Short term goal 3: toileting to SBA with use of AD/grab bars as needed. Short term goal 4: increased B UE strength by 1/2 grade to assist with functional tasks/I with B UE HEP with use of handouts as needed. Short term goal 5: bed mobility to Mod I with use of bedrails as needed. Long term goals  Long term goal 1: Pt to be I with fall prevention educaimargie, Covid specific education, EC/WS tech, recommendations for AE with use of handouts as needed. Patient Goals   Patient goals : To feel better! Therapy Time   Individual Concurrent Group Co-treatment   Time In 0238         Time Out 0316         Minutes 45              RN reports patient is medically stable for therapy treatment this date. Chart reviewed prior to treatment and patient is agreeable for therapy. All lines intact and patient positioned comfortably at end of treatment. All patient needs addressed prior to ending therapy session.       GURVINDER Galvan

## 2021-11-08 NOTE — PROGRESS NOTES
Home Oxygen Evaluation    Home Oxygen Evaluation completed. Patient is on 3   liters per minute via nasal cannula. Resting SpO2 = 97%  Resting SpO2 on room air = 86%    SpO2 on room air with exercise = na%  SpO2 on oxygen as above with exercise = 95%    Nocturnal Oximetry with patient on room air is recommended is SpO2 is between 89% and 95% (requires additional order). Test performed at [facility]    joel amaral RCP  12:53 PM

## 2021-11-08 NOTE — CARE COORDINATION
Discharge planning    Chart reviewed. Patient admitted with COVID and currently on 3 liters NC . Therapy did work with patient and did recommend home health. Patient lives with spouse. . DME includes cane, walker and shower seat in the htome . Patient normally independent prior to admission. Will follow todays therapy notes to see how she is progressing. If still recommending home with therapy will follow up with patient to see if agreeable. 1300  Patient qualified for 02 at 3 liters NC at rest. Gave face sheet and RT testing to [facility] from [facility]. Will need to fax over RX and face to face to [facility]hma at  and call her if discharges to set up delivery of tanks. Jonnie Cruz [phone]   Phone [phone]  Fax [phone] and face to face in [facility]. Faxed to [facility].  RN aware they just need to call to have tank delivered if discharged home today

## 2021-11-08 NOTE — PLAN OF CARE
Problem: Falls - Risk of:  Goal: Will remain free from falls  Description: Will remain free from falls  11/8/2021 1057 by Gates Closs, RN  Outcome: Ongoing  Note: Proper pt identification  Hourly rounding performed  Anticipatory needs met  Non-skid socks worn  Proper transferring technique  2/4 side rails up  Personal necessities within reach  Bed low and locked  Call light in reach  Proper lighting  Room free of clutter  Continue to monitor     Problem: Nutrition Deficits  Goal: Optimize nutritional status  11/8/2021 1057 by Gates Closs, RN  Outcome: Ongoing  Note: Pt tolerating oral foods  Pt eating 50-75% of meals

## 2021-11-09 ENCOUNTER — APPOINTMENT (OUTPATIENT)
Dept: GENERAL RADIOLOGY | Age: 66
DRG: 177 | End: 2021-11-09
Payer: MEDICARE

## 2021-11-09 ENCOUNTER — APPOINTMENT (OUTPATIENT)
Dept: CT IMAGING | Age: 66
DRG: 177 | End: 2021-11-09
Payer: MEDICARE

## 2021-11-09 PROBLEM — J12.82 PNEUMONIA DUE TO COVID-19 VIRUS: Status: ACTIVE | Noted: 2021-11-04

## 2021-11-09 PROBLEM — E66.01 MORBID OBESITY WITH BMI OF 40.0-44.9, ADULT (HCC): Status: ACTIVE | Noted: 2019-08-13

## 2021-11-09 LAB
ABSOLUTE EOS #: <0.03 K/UL (ref 0–0.44)
ABSOLUTE IMMATURE GRANULOCYTE: 0.09 K/UL (ref 0–0.3)
ABSOLUTE LYMPH #: 1.09 K/UL (ref 1.1–3.7)
ABSOLUTE MONO #: 0.46 K/UL (ref 0.1–1.2)
ALBUMIN SERPL-MCNC: 3.3 G/DL (ref 3.5–5.2)
ALBUMIN/GLOBULIN RATIO: ABNORMAL (ref 1–2.5)
ALP BLD-CCNC: 67 U/L (ref 35–104)
ALT SERPL-CCNC: 24 U/L (ref 5–33)
ANION GAP SERPL CALCULATED.3IONS-SCNC: 12 MMOL/L (ref 9–17)
AST SERPL-CCNC: 26 U/L
BASOPHILS # BLD: 0 % (ref 0–2)
BASOPHILS ABSOLUTE: <0.03 K/UL (ref 0–0.2)
BILIRUB SERPL-MCNC: 0.43 MG/DL (ref 0.3–1.2)
BILIRUBIN DIRECT: 0.14 MG/DL
BILIRUBIN, INDIRECT: 0.29 MG/DL (ref 0–1)
BUN BLDV-MCNC: 18 MG/DL (ref 8–23)
BUN/CREAT BLD: 30 (ref 9–20)
C-REACTIVE PROTEIN: 74 MG/L (ref 0–5)
CALCIUM SERPL-MCNC: 8.5 MG/DL (ref 8.6–10.4)
CHLORIDE BLD-SCNC: 95 MMOL/L (ref 98–107)
CO2: 26 MMOL/L (ref 20–31)
CREAT SERPL-MCNC: 0.6 MG/DL (ref 0.5–0.9)
DIFFERENTIAL TYPE: ABNORMAL
EOSINOPHILS RELATIVE PERCENT: 0 % (ref 1–4)
GFR AFRICAN AMERICAN: >60 ML/MIN
GFR NON-AFRICAN AMERICAN: >60 ML/MIN
GFR SERPL CREATININE-BSD FRML MDRD: ABNORMAL ML/MIN/{1.73_M2}
GFR SERPL CREATININE-BSD FRML MDRD: ABNORMAL ML/MIN/{1.73_M2}
GLOBULIN: ABNORMAL G/DL (ref 1.5–3.8)
GLUCOSE BLD-MCNC: 148 MG/DL (ref 70–99)
GLUCOSE BLD-MCNC: 156 MG/DL (ref 65–105)
GLUCOSE BLD-MCNC: 174 MG/DL (ref 65–105)
GLUCOSE BLD-MCNC: 186 MG/DL (ref 65–105)
GLUCOSE BLD-MCNC: 215 MG/DL (ref 65–105)
HCT VFR BLD CALC: 43 % (ref 36.3–47.1)
HEMOGLOBIN: 13.8 G/DL (ref 11.9–15.1)
IMMATURE GRANULOCYTES: 1 %
LYMPHOCYTES # BLD: 16 % (ref 24–43)
MCH RBC QN AUTO: 30.5 PG (ref 25.2–33.5)
MCHC RBC AUTO-ENTMCNC: 32.1 G/DL (ref 28.4–34.8)
MCV RBC AUTO: 95.1 FL (ref 82.6–102.9)
MONOCYTES # BLD: 7 % (ref 3–12)
NRBC AUTOMATED: ABNORMAL PER 100 WBC
PDW BLD-RTO: 13.6 % (ref 11.8–14.4)
PLATELET # BLD: 158 K/UL (ref 138–453)
PLATELET ESTIMATE: ABNORMAL
PMV BLD AUTO: 10.9 FL (ref 8.1–13.5)
POTASSIUM SERPL-SCNC: 3.7 MMOL/L (ref 3.7–5.3)
RBC # BLD: 4.52 M/UL (ref 3.95–5.11)
RBC # BLD: ABNORMAL 10*6/UL
SEG NEUTROPHILS: 76 % (ref 36–65)
SEGMENTED NEUTROPHILS ABSOLUTE COUNT: 5.33 K/UL (ref 1.5–8.1)
SODIUM BLD-SCNC: 133 MMOL/L (ref 135–144)
TOTAL PROTEIN: 6.1 G/DL (ref 6.4–8.3)
TROPONIN INTERP: NORMAL
TROPONIN T: NORMAL NG/ML
TROPONIN, HIGH SENSITIVITY: <6 NG/L (ref 0–14)
WBC # BLD: 7 K/UL (ref 3.5–11.3)
WBC # BLD: ABNORMAL 10*3/UL

## 2021-11-09 PROCEDURE — 6370000000 HC RX 637 (ALT 250 FOR IP): Performed by: STUDENT IN AN ORGANIZED HEALTH CARE EDUCATION/TRAINING PROGRAM

## 2021-11-09 PROCEDURE — 80076 HEPATIC FUNCTION PANEL: CPT

## 2021-11-09 PROCEDURE — 6360000002 HC RX W HCPCS: Performed by: INTERNAL MEDICINE

## 2021-11-09 PROCEDURE — 94761 N-INVAS EAR/PLS OXIMETRY MLT: CPT

## 2021-11-09 PROCEDURE — 6370000000 HC RX 637 (ALT 250 FOR IP): Performed by: NURSE PRACTITIONER

## 2021-11-09 PROCEDURE — 2580000003 HC RX 258: Performed by: NURSE PRACTITIONER

## 2021-11-09 PROCEDURE — 93970 EXTREMITY STUDY: CPT

## 2021-11-09 PROCEDURE — 6360000004 HC RX CONTRAST MEDICATION: Performed by: NURSE PRACTITIONER

## 2021-11-09 PROCEDURE — 84484 ASSAY OF TROPONIN QUANT: CPT

## 2021-11-09 PROCEDURE — 99232 SBSQ HOSP IP/OBS MODERATE 35: CPT | Performed by: INTERNAL MEDICINE

## 2021-11-09 PROCEDURE — 36415 COLL VENOUS BLD VENIPUNCTURE: CPT

## 2021-11-09 PROCEDURE — 2060000000 HC ICU INTERMEDIATE R&B

## 2021-11-09 PROCEDURE — 85025 COMPLETE CBC W/AUTO DIFF WBC: CPT

## 2021-11-09 PROCEDURE — 71045 X-RAY EXAM CHEST 1 VIEW: CPT

## 2021-11-09 PROCEDURE — 6360000002 HC RX W HCPCS: Performed by: NURSE PRACTITIONER

## 2021-11-09 PROCEDURE — 82947 ASSAY GLUCOSE BLOOD QUANT: CPT

## 2021-11-09 PROCEDURE — 80048 BASIC METABOLIC PNL TOTAL CA: CPT

## 2021-11-09 PROCEDURE — 71260 CT THORAX DX C+: CPT

## 2021-11-09 PROCEDURE — 2700000000 HC OXYGEN THERAPY PER DAY

## 2021-11-09 PROCEDURE — 6360000002 HC RX W HCPCS: Performed by: STUDENT IN AN ORGANIZED HEALTH CARE EDUCATION/TRAINING PROGRAM

## 2021-11-09 RX ORDER — 0.9 % SODIUM CHLORIDE 0.9 %
80 INTRAVENOUS SOLUTION INTRAVENOUS ONCE
Status: COMPLETED | OUTPATIENT
Start: 2021-11-09 | End: 2021-11-09

## 2021-11-09 RX ORDER — FUROSEMIDE 10 MG/ML
20 INJECTION INTRAMUSCULAR; INTRAVENOUS ONCE
Status: COMPLETED | OUTPATIENT
Start: 2021-11-09 | End: 2021-11-09

## 2021-11-09 RX ORDER — SODIUM CHLORIDE 0.9 % (FLUSH) 0.9 %
10 SYRINGE (ML) INJECTION PRN
Status: DISCONTINUED | OUTPATIENT
Start: 2021-11-09 | End: 2021-11-27 | Stop reason: HOSPADM

## 2021-11-09 RX ADMIN — METOPROLOL SUCCINATE 100 MG: 50 TABLET, EXTENDED RELEASE ORAL at 08:54

## 2021-11-09 RX ADMIN — INSULIN LISPRO 2 UNITS: 100 INJECTION, SOLUTION INTRAVENOUS; SUBCUTANEOUS at 12:45

## 2021-11-09 RX ADMIN — ACETAMINOPHEN 650 MG: 325 TABLET ORAL at 08:59

## 2021-11-09 RX ADMIN — OXYCODONE AND ACETAMINOPHEN 1 TABLET: 5; 325 TABLET ORAL at 19:54

## 2021-11-09 RX ADMIN — SODIUM CHLORIDE, PRESERVATIVE FREE 10 ML: 5 INJECTION INTRAVENOUS at 20:48

## 2021-11-09 RX ADMIN — MAGNESIUM HYDROXIDE 30 ML: 400 SUSPENSION ORAL at 08:59

## 2021-11-09 RX ADMIN — SODIUM CHLORIDE, PRESERVATIVE FREE 5 ML: 5 INJECTION INTRAVENOUS at 08:45

## 2021-11-09 RX ADMIN — GUAIFENESIN AND DEXTROMETHORPHAN 5 ML: 100; 10 SYRUP ORAL at 08:59

## 2021-11-09 RX ADMIN — ANTI-FUNGAL POWDER MICONAZOLE NITRATE TALC FREE: 1.42 POWDER TOPICAL at 13:20

## 2021-11-09 RX ADMIN — ENOXAPARIN SODIUM 30 MG: 100 INJECTION SUBCUTANEOUS at 20:17

## 2021-11-09 RX ADMIN — SODIUM CHLORIDE, PRESERVATIVE FREE 10 ML: 5 INJECTION INTRAVENOUS at 20:21

## 2021-11-09 RX ADMIN — LISINOPRIL AND HYDROCHLOROTHIAZIDE 1 TABLET: 12.5; 2 TABLET ORAL at 08:54

## 2021-11-09 RX ADMIN — IOPAMIDOL 75 ML: 755 INJECTION, SOLUTION INTRAVENOUS at 20:48

## 2021-11-09 RX ADMIN — DOCUSATE SODIUM 100 MG: 100 CAPSULE, LIQUID FILLED ORAL at 08:54

## 2021-11-09 RX ADMIN — MIRTAZAPINE 7.5 MG: 15 TABLET, FILM COATED ORAL at 20:17

## 2021-11-09 RX ADMIN — DEXAMETHASONE 6 MG: 4 TABLET ORAL at 03:15

## 2021-11-09 RX ADMIN — FUROSEMIDE 20 MG: 10 INJECTION, SOLUTION INTRAMUSCULAR; INTRAVENOUS at 12:45

## 2021-11-09 RX ADMIN — SODIUM CHLORIDE 80 ML: 9 INJECTION, SOLUTION INTRAVENOUS at 20:48

## 2021-11-09 RX ADMIN — INSULIN LISPRO 1 UNITS: 100 INJECTION, SOLUTION INTRAVENOUS; SUBCUTANEOUS at 08:54

## 2021-11-09 RX ADMIN — ENOXAPARIN SODIUM 30 MG: 100 INJECTION SUBCUTANEOUS at 08:54

## 2021-11-09 RX ADMIN — Medication 2000 UNITS: at 08:54

## 2021-11-09 RX ADMIN — ANTI-FUNGAL POWDER MICONAZOLE NITRATE TALC FREE: 1.42 POWDER TOPICAL at 20:21

## 2021-11-09 RX ADMIN — INSULIN LISPRO 1 UNITS: 100 INJECTION, SOLUTION INTRAVENOUS; SUBCUTANEOUS at 16:37

## 2021-11-09 RX ADMIN — INSULIN LISPRO 1 UNITS: 100 INJECTION, SOLUTION INTRAVENOUS; SUBCUTANEOUS at 20:18

## 2021-11-09 ASSESSMENT — PAIN SCALES - GENERAL
PAINLEVEL_OUTOF10: 0
PAINLEVEL_OUTOF10: 0
PAINLEVEL_OUTOF10: 6
PAINLEVEL_OUTOF10: 0
PAINLEVEL_OUTOF10: 0
PAINLEVEL_OUTOF10: 1
PAINLEVEL_OUTOF10: 0

## 2021-11-09 NOTE — PROGRESS NOTES
[facility]  Office: [address], DO, Juana Goncalves, DO, Axel Justin, DO, Archana Aleman Blood, DO, Tanja Clarke MD, Shari Ross MD, Linn Jain MD, Angelita Rodriguez MD, Argelia Shaw MD, Susanna Cantu MD, Gavin Su MD, Devan Montaño, DO, Damian Torres, DO, Daniel Davis MD,  Lm Mancia, DO, Lauren Samuels MD, Maik Blue MD, Fortino Segundo MD, Lieutenant Vasiliy MD, Carrie Ortiz MD, Francine Dash MD, James Norwood MD, Maycol Garrett, [facility], [place], [facility], CNP, Herb Spring, CNP, Lc Rowan, CNS, Sathish Lucio, CNP, James Zelaya, CNP, John Pritchard, CNP, Iza Tam, CNP, Israel Granger, CNP, Conner Ritter PA-C, Bridgette Fontana, [facility], Sandrine Elder, CNP, Galileo Petersen, CNP, Pura Canela, CNP, Vy Ross, CNP, Fabien Murray, CNP, Rajiv Shah, CNP, [facility]ils[facility] Moore[facility]    Progress Note      Name:   Nancy Mckinley  MRN:     [id number]     [place]:      [de-identified]   Room:   [address] [place] [address] Day:  5  Admit Date:  11/3/2021 10:05 PM    PCP:   Quentin Kearney DO  Code Status:  Full Code    Subjective:     C/C:   Chief Complaint   Patient presents with    Abdominal Pain     lower    Nausea    Back Pain    Headache     Interval History Status: not changed. Patient indicates he is feeling more nauseated and short of breath this morning. Denies any abdominal pain. No chest pain. T-max 100.2. Remains on 3 L nasal cannula oxygen.+ Constipation  Blood sugars reviewed. Brief History:   Per my partner: '66 y. o. female with a past medical history of hypertension and obesity who presented to the emergency department on 11/3/2021 complaining of headache, nausea and abdominal pain. She is not vaccinated against COVID-19. In the ED, the patient was febrile (Tmax 102.2) and ill-appearing. Rapid COVID-19 test was positive.  Chest x-ray was remarkable for interstitial prominence and bibasilar opacities consistent with pneumonia. The patient is admitted to internal medicine for further management of COVID-19 pneumonia.'    Review of Systems:     Constitutional:  negative for chills, fevers, sweats  Respiratory:  + cough, dyspnea on exertion, shortness of breath, wheezing  Cardiovascular:  negative for chest pain, chest pressure/discomfort, lower extremity edema, palpitations  Gastrointestinal:  negative for abdominal pain, constipation, diarrhea, +nausea, no vomiting  Neurological:  negative for dizziness, headache    Medications: Allergies: Allergies   Allergen Reactions    Other Hives    Ceclor [Cefaclor]     Food      All melons      Sulfa Antibiotics      Projectile vomiting        Current Meds:   Scheduled Meds:    insulin lispro  0-6 Units SubCUTAneous TID WC    insulin lispro  0-3 Units SubCUTAneous Nightly    miconazole   Topical BID    dexamethasone  6 mg Oral Q24H    mirtazapine  7.5 mg Oral Nightly    metoprolol succinate  100 mg Oral Daily    lisinopril-hydroCHLOROthiazide  1 tablet Oral Daily    sodium chloride flush  5-40 mL IntraVENous 2 times per day    enoxaparin  30 mg SubCUTAneous BID    Vitamin D  2,000 Units Oral Daily    docusate sodium  100 mg Oral Daily    influenza virus vaccine  0.5 mL IntraMUSCular Prior to discharge     Continuous Infusions:    dextrose      sodium chloride       PRN Meds: glucose, dextrose, glucagon (rDNA), dextrose, metoprolol, oxyCODONE-acetaminophen, sodium chloride flush, sodium chloride, ondansetron **OR** ondansetron, magnesium hydroxide, acetaminophen **OR** acetaminophen, guaiFENesin-dextromethorphan    Data:     Past Medical History:   has a past medical history of Anxiety, Arthritis, Bell's palsy, Chronic neck pain, Chronic pain, Fibromyalgia, HA (headache), Headache, HTN (hypertension), and Hypertension. Social History:   reports that she has never smoked.  She has never used smokeless tobacco. She reports that she does not drink CHEST PORTABLE    Result Date: 11/9/2021  Multifocal pulmonary opacities suggest significantly worsened pulmonary edema and or multifocal pneumonia. Recommend continued follow-up to resolution. XR CHEST PORTABLE    Result Date: 11/3/2021  Interstitial prominence and bibasilar opacities, which could be seen with pneumonia or edema. Physical Examination:        General appearance:  alert, cooperative and mild distress  Mental Status:  oriented to person, place and time and flat affect  Lungs: poor air entry, crackles at left base. Heart:  regular rate and rhythm, no murmur  Abdomen:  soft, nontender, nondistended, normal bowel sounds, no masses, hepatomegaly, splenomegaly  Extremities:+ b/l pedal edema and tenderness in the calves  Skin:  no gross lesions, rashes, induration    Assessment:        Hospital Problems           Last Modified POA    * (Principal) Pneumonia due to COVID-19 virus 11/9/2021 Yes    Essential hypertension 11/8/2021 Yes    Prediabetes 11/8/2021 Yes    Morbid obesity with BMI of 40.0-44.9, adult ([facility] [address] 11/9/2021 Yes    Hyperglycemia 11/8/2021 Yes          Plan:      -Continue nasal cannula O2. Will get a chest x-ray. Will likely benefit from IV diuresis. Continue steroids. - +pedal edema with tenderness, check venous dopplers to r/o DVT. Although she does have fibromyalgia.   -Started on daily docusate. Continue MiraLAX as needed. If does not help will give enema.  -Steroid-induced hyperglycemia:continue Low intensity insulin sliding scale.  -Continue home Prinzide and Lopressor 100.       Lilian Tate MD  11/9/2021

## 2021-11-09 NOTE — CARE COORDINATION
Discharge Planning:    Spoke with patient via phone to see if she would be agreeable to [facility] [place], [facility].. Patient is agreeable and agreeable to any agency accepting of her insurance. Referral sent to [facility] with Steph MATOS, awaiting response. The Plan for Transition of Care is related to the following treatment goals: SN/PT/OT    The Patient and/or patient representative was provided with a choice of provider and agrees   with the discharge plan. [x] Yes [] No    Freedom of choice list was provided with basic dialogue that supports the patient's individualized plan of care/goals, treatment preferences and shares the quality data associated with the providers.  [x] Yes [] No

## 2021-11-09 NOTE — PLAN OF CARE
Problem: Falls - Risk of:  Goal: Will remain free from falls  Description: Will remain free from falls  Outcome: Ongoing     Problem: Breathing Pattern - Ineffective  Goal: Ability to achieve and maintain a regular respiratory rate  Outcome: Ongoing     Problem:  Body Temperature -  Risk of, Imbalanced  Goal: Ability to maintain a body temperature within defined limits  Outcome: Ongoing

## 2021-11-09 NOTE — DISCHARGE INSTR - COC
Continuity of Care Form    Patient Name: Juan Haywood   :  1955  MRN:  [id number]    [facility] date:  11/3/2021  Discharge date:  21    Code Status Order: Full Code   Advance Directives:      Admitting Physician:  Dimas Rodriguez MD  PCP: Cece Hogue DO    Discharging Nurse: Jasper Mealy, [address] Unit/Room#: 5163/3449-98  Discharging Unit Phone Number: [phone]    Emergency Contact:   Extended Emergency Contact Information  Primary Emergency Contact: Nelwyn Manual  Address[place] [address] Phone: [phone]  Mobile Phone: [phone]  Relation: Spouse    Past Surgical History:  Past Surgical History:   Procedure Laterality Date    ARM SURGERY      rt arm fatty tumor     SECTION      CHOLECYSTECTOMY  2020    CHOLECYSTECTOMY, LAPAROSCOPIC N/A 2020    CHOLECYSTECTOMY LAPAROSCOPIC WITH GRAM performed by James Adhikari MD at [facility] Left     knee    SHOULDER SURGERY      rt rotator cuff repair    URETHRAL STRICTURE DILATATION      WISDOM TOOTH EXTRACTION         Immunization History:   Immunization History   Administered Date(s) Administered    Influenza, High Dose (Fluzone 65 yrs and older) 2018    Pneumococcal Conjugate 13-valent (Lella Hutching) 2018       Active Problems:  Patient Active Problem List   Diagnosis Code    Essential hypertension I10    Fibromyalgia M79.7    Edema extremities R60.0    Obesity, Class III, BMI 40-49.9 (morbid obesity) ([facility] [address] E66.01    Prediabetes R73.03    Arthritis M19.90    Extrinsic asthma without complication Q56.352    Chronic migraine [id number]    Bilateral chronic knee pain M25.561, M25.562, G89.29    Obesity (BMI 30-39. 9) E66.9    Gastric ulcer K25.9    Hyperglycemia R73.9    COVID-19 U07.1       Isolation/Infection:   Isolation            Droplet Plus  Droplet Plus          Patient Infection Status       Infection Onset Added Last Indicated Last restirctions  Other Medical Equipment (for information only, NOT a DME order):  walker  Other Treatments:   SN for Assessment  SN for Medication Teaching & Compliance      Patient's personal belongings (please select all that are sent with patient):  Glasses    RN SIGNATURE:  Electronically signed by Kevin Glynn RN on 11/27/21 at 5:29 PM EST    CASE MANAGEMENT/SOCIAL WORK SECTION    Inpatient Status Date: 11/4/21    Readmission Risk Assessment Score:  Readmission Risk              Risk of Unplanned Readmission:  13           Discharging to Facility/ Agency   Name: [facility]  Address:  Phone: [phone]  Fax:  [phone]    / signature: Electronically signed by Jordyn Fried RN   11/24/2021 at 12:27 PM    PHYSICIAN SECTION    Prognosis: Guarded    Condition at Discharge: Stable    Rehab Potential (if transferring to Rehab): Guarded    Recommended Labs or Other Treatments After Discharge: Continue to wean off oxygen as tolerated    Physician Certification: I certify the above information and transfer of Reinier Samuels  is necessary for the continuing treatment of the diagnosis listed and that she requires Home Care for greater 30 days.      Update Admission H&P: No change in H&P    PHYSICIAN SIGNATURE:  Electronically signed by Edel Oconnor MD on 11/27/21 at 3:53 PM EST

## 2021-11-09 NOTE — PLAN OF CARE
Problem: Falls - Risk of:  Goal: Will remain free from falls  Description: Will remain free from falls  11/8/2021 2221 by Tim Torres RN  Outcome: Ongoing     Problem: Falls - Risk of:  Goal: Absence of physical injury  Description: Absence of physical injury  Outcome: Ongoing     Problem: IP BALANCE  Goal: LTG - Patient will maintain balance to allow for safe/functional mobility  Outcome: Ongoing     Problem: Pain:  Goal: Pain level will decrease  Description: Pain level will decrease  Outcome: Ongoing     Problem: Pain:  Goal: Control of acute pain  Description: Control of acute pain  Outcome: Ongoing     Problem: Pain:  Goal: Control of chronic pain  Description: Control of chronic pain  Outcome: Ongoing     Problem: Airway Clearance - Ineffective  Goal: Achieve or maintain patent airway  Outcome: Ongoing     Problem: Pain:  Goal: Control of chronic pain  Description: Control of chronic pain  Outcome: Ongoing     Problem: Airway Clearance - Ineffective  Goal: Achieve or maintain patent airway  Outcome: Ongoing     Problem: Gas Exchange - Impaired  Goal: Absence of hypoxia  Outcome: Ongoing     Problem: Gas Exchange - Impaired  Goal: Promote optimal lung function  Outcome: Ongoing     Problem: Breathing Pattern - Ineffective  Goal: Ability to achieve and maintain a regular respiratory rate  Outcome: Ongoing     Problem: Body Temperature -  Risk of, Imbalanced  Goal: Ability to maintain a body temperature within defined limits  Outcome: Ongoing     Problem: Body Temperature -  Risk of, Imbalanced  Goal: Will regain or maintain usual level of consciousness  Outcome: Ongoing     Problem:  Body Temperature -  Risk of, Imbalanced  Goal: Complications related to the disease process, condition or treatment will be avoided or minimized  Outcome: Ongoing     Problem: Isolation Precautions - Risk of Spread of Infection  Goal: Prevent transmission of infection  Outcome: Ongoing     Problem: Nutrition Deficits  Goal: Optimize nutritional status  11/8/2021 2221 by Tasha Estevez RN  Outcome: Ongoing     Problem: Loneliness or Risk for Loneliness  Goal: Demonstrate positive use of time alone when socialization is not possible  Outcome: Ongoing     Problem: Fatigue  Goal: Verbalize increase energy and improved vitality  Outcome: Ongoing     Problem: Patient Education: Go to Patient Education Activity  Goal: Patient/Family Education  Outcome: Ongoing

## 2021-11-10 PROBLEM — J96.01 ACUTE RESPIRATORY FAILURE WITH HYPOXIA (HCC): Status: ACTIVE | Noted: 2021-11-10

## 2021-11-10 PROBLEM — R33.8 ACUTE URINARY RETENTION: Status: ACTIVE | Noted: 2021-11-10

## 2021-11-10 LAB
ABSOLUTE EOS #: <0.03 K/UL (ref 0–0.44)
ABSOLUTE IMMATURE GRANULOCYTE: 0.08 K/UL (ref 0–0.3)
ABSOLUTE LYMPH #: 0.8 K/UL (ref 1.1–3.7)
ABSOLUTE MONO #: 0.48 K/UL (ref 0.1–1.2)
ANION GAP SERPL CALCULATED.3IONS-SCNC: 14 MMOL/L (ref 9–17)
BASOPHILS # BLD: 0 % (ref 0–2)
BASOPHILS ABSOLUTE: <0.03 K/UL (ref 0–0.2)
BILIRUBIN URINE: NEGATIVE
BUN BLDV-MCNC: 23 MG/DL (ref 8–23)
BUN/CREAT BLD: ABNORMAL (ref 9–20)
CALCIUM SERPL-MCNC: 8.6 MG/DL (ref 8.6–10.4)
CHLORIDE BLD-SCNC: 95 MMOL/L (ref 98–107)
CO2: 26 MMOL/L (ref 20–31)
COLOR: YELLOW
COMMENT UA: NORMAL
CREAT SERPL-MCNC: <0.4 MG/DL (ref 0.5–0.9)
D-DIMER QUANTITATIVE: 0.69 MG/L FEU (ref 0–0.59)
DIFFERENTIAL TYPE: ABNORMAL
EOSINOPHILS RELATIVE PERCENT: 0 % (ref 1–4)
GFR AFRICAN AMERICAN: ABNORMAL ML/MIN
GFR NON-AFRICAN AMERICAN: ABNORMAL ML/MIN
GFR SERPL CREATININE-BSD FRML MDRD: ABNORMAL ML/MIN/{1.73_M2}
GFR SERPL CREATININE-BSD FRML MDRD: ABNORMAL ML/MIN/{1.73_M2}
GLUCOSE BLD-MCNC: 133 MG/DL (ref 65–105)
GLUCOSE BLD-MCNC: 174 MG/DL (ref 65–105)
GLUCOSE BLD-MCNC: 186 MG/DL (ref 65–105)
GLUCOSE BLD-MCNC: 189 MG/DL (ref 70–99)
GLUCOSE BLD-MCNC: 214 MG/DL (ref 65–105)
GLUCOSE BLD-MCNC: 281 MG/DL (ref 65–105)
GLUCOSE URINE: NEGATIVE
HCT VFR BLD CALC: 42 % (ref 36.3–47.1)
HEMOGLOBIN: 14 G/DL (ref 11.9–15.1)
IMMATURE GRANULOCYTES: 1 %
KETONES, URINE: NEGATIVE
LEUKOCYTE ESTERASE, URINE: NEGATIVE
LYMPHOCYTES # BLD: 9 % (ref 24–43)
MCH RBC QN AUTO: 31.2 PG (ref 25.2–33.5)
MCHC RBC AUTO-ENTMCNC: 33.3 G/DL (ref 28.4–34.8)
MCV RBC AUTO: 93.5 FL (ref 82.6–102.9)
MONOCYTES # BLD: 6 % (ref 3–12)
NITRITE, URINE: NEGATIVE
NRBC AUTOMATED: 0 PER 100 WBC
PDW BLD-RTO: 13.4 % (ref 11.8–14.4)
PH UA: 5.5 (ref 5–8)
PLATELET # BLD: 205 K/UL (ref 138–453)
PLATELET ESTIMATE: ABNORMAL
PMV BLD AUTO: 10.7 FL (ref 8.1–13.5)
POTASSIUM SERPL-SCNC: 4.3 MMOL/L (ref 3.7–5.3)
PROCALCITONIN: 0.13 NG/ML
PROTEIN UA: NEGATIVE
RBC # BLD: 4.49 M/UL (ref 3.95–5.11)
RBC # BLD: ABNORMAL 10*6/UL
SEG NEUTROPHILS: 84 % (ref 36–65)
SEGMENTED NEUTROPHILS ABSOLUTE COUNT: 7.41 K/UL (ref 1.5–8.1)
SODIUM BLD-SCNC: 135 MMOL/L (ref 135–144)
SPECIFIC GRAVITY UA: 1.02 (ref 1–1.03)
TURBIDITY: CLEAR
URINE HGB: NEGATIVE
UROBILINOGEN, URINE: NORMAL
WBC # BLD: 8.8 K/UL (ref 3.5–11.3)
WBC # BLD: ABNORMAL 10*3/UL

## 2021-11-10 PROCEDURE — 6370000000 HC RX 637 (ALT 250 FOR IP): Performed by: INTERNAL MEDICINE

## 2021-11-10 PROCEDURE — 81003 URINALYSIS AUTO W/O SCOPE: CPT

## 2021-11-10 PROCEDURE — 2580000003 HC RX 258: Performed by: NURSE PRACTITIONER

## 2021-11-10 PROCEDURE — 94660 CPAP INITIATION&MGMT: CPT

## 2021-11-10 PROCEDURE — 6360000002 HC RX W HCPCS: Performed by: NURSE PRACTITIONER

## 2021-11-10 PROCEDURE — 2700000000 HC OXYGEN THERAPY PER DAY

## 2021-11-10 PROCEDURE — 82947 ASSAY GLUCOSE BLOOD QUANT: CPT

## 2021-11-10 PROCEDURE — 94640 AIRWAY INHALATION TREATMENT: CPT

## 2021-11-10 PROCEDURE — 80048 BASIC METABOLIC PNL TOTAL CA: CPT

## 2021-11-10 PROCEDURE — 6370000000 HC RX 637 (ALT 250 FOR IP): Performed by: STUDENT IN AN ORGANIZED HEALTH CARE EDUCATION/TRAINING PROGRAM

## 2021-11-10 PROCEDURE — 2060000000 HC ICU INTERMEDIATE R&B

## 2021-11-10 PROCEDURE — 85379 FIBRIN DEGRADATION QUANT: CPT

## 2021-11-10 PROCEDURE — 99233 SBSQ HOSP IP/OBS HIGH 50: CPT | Performed by: INTERNAL MEDICINE

## 2021-11-10 PROCEDURE — XW033H5 INTRODUCTION OF TOCILIZUMAB INTO PERIPHERAL VEIN, PERCUTANEOUS APPROACH, NEW TECHNOLOGY GROUP 5: ICD-10-PCS | Performed by: INTERNAL MEDICINE

## 2021-11-10 PROCEDURE — 51702 INSERT TEMP BLADDER CATH: CPT

## 2021-11-10 PROCEDURE — 6360000002 HC RX W HCPCS: Performed by: STUDENT IN AN ORGANIZED HEALTH CARE EDUCATION/TRAINING PROGRAM

## 2021-11-10 PROCEDURE — 6370000000 HC RX 637 (ALT 250 FOR IP): Performed by: NURSE PRACTITIONER

## 2021-11-10 PROCEDURE — 85025 COMPLETE CBC W/AUTO DIFF WBC: CPT

## 2021-11-10 PROCEDURE — 36415 COLL VENOUS BLD VENIPUNCTURE: CPT

## 2021-11-10 PROCEDURE — 94761 N-INVAS EAR/PLS OXIMETRY MLT: CPT

## 2021-11-10 PROCEDURE — 6360000002 HC RX W HCPCS: Performed by: INTERNAL MEDICINE

## 2021-11-10 PROCEDURE — 2000000000 HC ICU R&B

## 2021-11-10 PROCEDURE — 84145 PROCALCITONIN (PCT): CPT

## 2021-11-10 PROCEDURE — 51798 US URINE CAPACITY MEASURE: CPT

## 2021-11-10 RX ORDER — ALBUTEROL SULFATE 2.5 MG/3ML
2.5 SOLUTION RESPIRATORY (INHALATION) EVERY 6 HOURS PRN
Status: DISCONTINUED | OUTPATIENT
Start: 2021-11-10 | End: 2021-11-27 | Stop reason: HOSPADM

## 2021-11-10 RX ORDER — BUDESONIDE AND FORMOTEROL FUMARATE DIHYDRATE 160; 4.5 UG/1; UG/1
2 AEROSOL RESPIRATORY (INHALATION) 2 TIMES DAILY
Status: DISCONTINUED | OUTPATIENT
Start: 2021-11-10 | End: 2021-11-16

## 2021-11-10 RX ORDER — IPRATROPIUM BROMIDE AND ALBUTEROL SULFATE 2.5; .5 MG/3ML; MG/3ML
1 SOLUTION RESPIRATORY (INHALATION)
Status: DISCONTINUED | OUTPATIENT
Start: 2021-11-10 | End: 2021-11-27 | Stop reason: HOSPADM

## 2021-11-10 RX ORDER — ALPRAZOLAM 0.25 MG/1
0.25 TABLET ORAL 2 TIMES DAILY PRN
Status: DISCONTINUED | OUTPATIENT
Start: 2021-11-10 | End: 2021-11-27 | Stop reason: HOSPADM

## 2021-11-10 RX ORDER — FUROSEMIDE 10 MG/ML
40 INJECTION INTRAMUSCULAR; INTRAVENOUS ONCE
Status: COMPLETED | OUTPATIENT
Start: 2021-11-10 | End: 2021-11-10

## 2021-11-10 RX ORDER — DEXAMETHASONE SODIUM PHOSPHATE 10 MG/ML
10 INJECTION, SOLUTION INTRAMUSCULAR; INTRAVENOUS EVERY 12 HOURS
Status: DISCONTINUED | OUTPATIENT
Start: 2021-11-10 | End: 2021-11-21

## 2021-11-10 RX ORDER — LEVOFLOXACIN 5 MG/ML
500 INJECTION, SOLUTION INTRAVENOUS EVERY 24 HOURS
Status: DISCONTINUED | OUTPATIENT
Start: 2021-11-10 | End: 2021-11-11

## 2021-11-10 RX ADMIN — INSULIN LISPRO 2 UNITS: 100 INJECTION, SOLUTION INTRAVENOUS; SUBCUTANEOUS at 13:10

## 2021-11-10 RX ADMIN — SODIUM CHLORIDE, PRESERVATIVE FREE 10 ML: 5 INJECTION INTRAVENOUS at 22:13

## 2021-11-10 RX ADMIN — Medication 2000 UNITS: at 09:29

## 2021-11-10 RX ADMIN — ANTI-FUNGAL POWDER MICONAZOLE NITRATE TALC FREE: 1.42 POWDER TOPICAL at 09:29

## 2021-11-10 RX ADMIN — FUROSEMIDE 40 MG: 10 INJECTION, SOLUTION INTRAMUSCULAR; INTRAVENOUS at 09:46

## 2021-11-10 RX ADMIN — ALPRAZOLAM 0.25 MG: 0.25 TABLET ORAL at 22:24

## 2021-11-10 RX ADMIN — INSULIN LISPRO 2 UNITS: 100 INJECTION, SOLUTION INTRAVENOUS; SUBCUTANEOUS at 22:14

## 2021-11-10 RX ADMIN — OXYCODONE AND ACETAMINOPHEN 1 TABLET: 5; 325 TABLET ORAL at 09:39

## 2021-11-10 RX ADMIN — INSULIN LISPRO 1 UNITS: 100 INJECTION, SOLUTION INTRAVENOUS; SUBCUTANEOUS at 09:29

## 2021-11-10 RX ADMIN — SODIUM CHLORIDE, PRESERVATIVE FREE 10 ML: 5 INJECTION INTRAVENOUS at 09:29

## 2021-11-10 RX ADMIN — TOCILIZUMAB 800 MG: 20 INJECTION, SOLUTION, CONCENTRATE INTRAVENOUS at 17:46

## 2021-11-10 RX ADMIN — DEXAMETHASONE 6 MG: 4 TABLET ORAL at 02:39

## 2021-11-10 RX ADMIN — METOPROLOL SUCCINATE 100 MG: 50 TABLET, EXTENDED RELEASE ORAL at 09:28

## 2021-11-10 RX ADMIN — LISINOPRIL AND HYDROCHLOROTHIAZIDE 1 TABLET: 12.5; 2 TABLET ORAL at 09:29

## 2021-11-10 RX ADMIN — ANTI-FUNGAL POWDER MICONAZOLE NITRATE TALC FREE: 1.42 POWDER TOPICAL at 22:16

## 2021-11-10 RX ADMIN — LEVOFLOXACIN 500 MG: 5 INJECTION, SOLUTION INTRAVENOUS at 13:02

## 2021-11-10 RX ADMIN — ENOXAPARIN SODIUM 30 MG: 100 INJECTION SUBCUTANEOUS at 22:15

## 2021-11-10 RX ADMIN — ALPRAZOLAM 0.25 MG: 0.25 TABLET ORAL at 10:22

## 2021-11-10 RX ADMIN — GUAIFENESIN AND DEXTROMETHORPHAN 5 ML: 100; 10 SYRUP ORAL at 09:39

## 2021-11-10 RX ADMIN — DEXAMETHASONE SODIUM PHOSPHATE 10 MG: 10 INJECTION, SOLUTION INTRAMUSCULAR; INTRAVENOUS at 15:07

## 2021-11-10 RX ADMIN — DOCUSATE SODIUM 100 MG: 100 CAPSULE, LIQUID FILLED ORAL at 09:29

## 2021-11-10 RX ADMIN — IPRATROPIUM BROMIDE AND ALBUTEROL SULFATE 1 AMPULE: .5; 2.5 SOLUTION RESPIRATORY (INHALATION) at 21:07

## 2021-11-10 RX ADMIN — ENOXAPARIN SODIUM 30 MG: 100 INJECTION SUBCUTANEOUS at 09:29

## 2021-11-10 RX ADMIN — MIRTAZAPINE 7.5 MG: 15 TABLET, FILM COATED ORAL at 22:13

## 2021-11-10 RX ADMIN — ACETAMINOPHEN 650 MG: 325 TABLET ORAL at 13:02

## 2021-11-10 RX ADMIN — IPRATROPIUM BROMIDE AND ALBUTEROL SULFATE 1 AMPULE: .5; 2.5 SOLUTION RESPIRATORY (INHALATION) at 16:40

## 2021-11-10 ASSESSMENT — PAIN SCALES - GENERAL
PAINLEVEL_OUTOF10: 0
PAINLEVEL_OUTOF10: 0
PAINLEVEL_OUTOF10: 8
PAINLEVEL_OUTOF10: 8
PAINLEVEL_OUTOF10: 6
PAINLEVEL_OUTOF10: 7
PAINLEVEL_OUTOF10: 8
PAINLEVEL_OUTOF10: 6

## 2021-11-10 ASSESSMENT — PAIN DESCRIPTION - PAIN TYPE
TYPE: ACUTE PAIN
TYPE: ACUTE PAIN

## 2021-11-10 ASSESSMENT — PAIN DESCRIPTION - LOCATION
LOCATION: HEAD
LOCATION: CHEST

## 2021-11-10 ASSESSMENT — PAIN DESCRIPTION - DESCRIPTORS: DESCRIPTORS: ACHING

## 2021-11-10 NOTE — PROGRESS NOTES
[address]  Occupational Therapy Not Seen    DATE: 11/10/2021    NAME: Jarrett Mcclellan  MRN: [id number]   : 1955    Patient not seen this date for Occupational Therapy due to:      [x] Cancel by RN or physician due to: RN reporting pt unable to tolerate treatment at this time due to being on continuous BiPap    [] Hemodialysis    [] Critical Lab Value Level     [] Blood transfusion in progress    [] Acute or unstable cardiovascular status   _MAP < 55 or more than >115  _HR < 40 or > 130    [] Acute or unstable pulmonary status   -FiO2 > 60%   _RR < 5 or >40    _O2 sats < 85%    [] Strict Bedrest    [] Off Unit for surgery or procedure    [] Off Unit for testing       [] Pending imaging to R/O fracture    [] Refusal by Patient      [] Other      [] OT being discontinued at this time. Patient independent. No further needs. [] OT being discontinued at this time as the patient has been transferred to hospice care. No further needs.       Georjean Landau, OTA

## 2021-11-10 NOTE — PLAN OF CARE
Problem: Falls - Risk of:  Goal: Will remain free from falls  Description: Will remain free from falls  11/9/2021 2239 by Clint Whalen RN  Outcome: Ongoing     Problem: Falls - Risk of:  Goal: Absence of physical injury  Description: Absence of physical injury  Outcome: Ongoing     Problem: IP BALANCE  Goal: LTG - Patient will maintain balance to allow for safe/functional mobility  Outcome: Ongoing     Problem: Pain:  Goal: Pain level will decrease  Description: Pain level will decrease  Outcome: Ongoing     Problem: Pain:  Goal: Control of acute pain  Description: Control of acute pain  Outcome: Ongoing     Problem: Pain:  Goal: Control of chronic pain  Description: Control of chronic pain  Outcome: Ongoing     Problem: Airway Clearance - Ineffective  Goal: Achieve or maintain patent airway  Outcome: Ongoing     Problem: Pain:  Goal: Pain level will decrease  Description: Pain level will decrease  Outcome: Ongoing     Problem: Pain:  Goal: Control of acute pain  Description: Control of acute pain  Outcome: Ongoing     Problem: Pain:  Goal: Control of chronic pain  Description: Control of chronic pain  Outcome: Ongoing     Problem: Airway Clearance - Ineffective  Goal: Achieve or maintain patent airway  Outcome: Ongoing     Problem: Gas Exchange - Impaired  Goal: Absence of hypoxia  Outcome: Ongoing     Problem: Gas Exchange - Impaired  Goal: Promote optimal lung function  Outcome: Ongoing     Problem: Breathing Pattern - Ineffective  Goal: Ability to achieve and maintain a regular respiratory rate  11/9/2021 2239 by Clint Whalen RN  Outcome: Ongoing     Problem: Body Temperature -  Risk of, Imbalanced  Goal: Ability to maintain a body temperature within defined limits  11/9/2021 2239 by Clint Whalen RN  Outcome: Ongoing     Problem: Body Temperature -  Risk of, Imbalanced  Goal: Will regain or maintain usual level of consciousness  Outcome: Ongoing     Problem:  Body Temperature -  Risk of, Imbalanced  Goal: Complications related to the disease process, condition or treatment will be avoided or minimized  Outcome: Ongoing     Problem: Isolation Precautions - Risk of Spread of Infection  Goal: Prevent transmission of infection  Outcome: Ongoing     Problem: Nutrition Deficits  Goal: Optimize nutritional status  Outcome: Ongoing     Problem: Loneliness or Risk for Loneliness  Goal: Demonstrate positive use of time alone when socialization is not possible  Outcome: Ongoing

## 2021-11-10 NOTE — PROGRESS NOTES
[facility]  Office: [address], DO, Poly Dashmond, DO, Samy Chavira, DO, Poly Guzman Di, DO, Frieda Bass MD, Dimas Patel MD, Lisa Hodge MD, Teodoro Doty MD, Rosie Lester MD, Leeann Alvarez MD, Aziza Owens MD, Porfirio Wallace, DO, Nancy Hernandez, DO, Brittany Ziegler MD,  Serene Castellanos, DO, Rosy Schirmer, MD, Rustam Mulligan MD, Julienne Oswald MD, Liseth Jarvis MD, Jamar Cyr MD, Davey Becerril MD, Velta Bernheim, MD, Anette Adam [facility], [facility], CNP, Kentrell Gallegos, CNP, Wellington Rai, CNS, Kenny Mcdaniels, CNP, Giuseppe Méndez, CNP, Rebecca Ramsey, CNP, Monisha Garcia, CNP, Ekaterina Flor, CNP, [place] Delgado PA-C, Darline Navarrete, [facility], Tiffanie Landry, CNP, Anika Petty, CNP, Cherelle Ricci, CNP, Orlando Sultana, CNP, Rowan Key, CNP, Yaneth Hobbs, [facility], [facility]    Progress Note      Name:   Aldo Louie  MRN:     [id number]     [place]:      [de-identified]   Room:   [address] [place] [address] Day:  6  Admit Date:  11/3/2021 10:05 PM    PCP:   Mirtha Kim DO  Code Status:  Full Code    Subjective:     C/C:   Chief Complaint   Patient presents with    Abdominal Pain     lower    Nausea    Back Pain    Headache     Interval History Status: not changed. Patient states feels more tired this morning. She otherwise denies any specific complaints. Maintaining O2 sats on 4 L nasal cannula oxygen, increased from 3 L last couple days. Urine output 2300ml, CBC, BMP unremarkable. Per RN change patient had an episode of left-sided chest pain radiating into the back and side overnight. Had EKG troponins done which were unremarkable. A CTA chest  to rule out PE was done: Showed no PE but extensive pulmonary abnormalities and mild reactive adenopathy. Lower extremity Dopplers were negative for DVT. She had couple bowel movements per patient. Brief History:   Per my partner: '66 y. o. female with a past medical history of hypertension and obesity who presented to the emergency department on 11/3/2021 complaining of headache, nausea and abdominal pain. She is not vaccinated against COVID-19. In the ED, the patient was febrile (Tmax 102.2) and ill-appearing. Rapid COVID-19 test was positive. Chest x-ray was remarkable for interstitial prominence and bibasilar opacities consistent with pneumonia. The patient is admitted to internal medicine for further management of COVID-19 pneumonia.'    Review of Systems:     Constitutional:  negative for chills, fevers, sweats  Respiratory:  + cough, dyspnea on exertion, shortness of breath, wheezing  Cardiovascular:  negative for chest pain, chest pressure/discomfort, lower extremity edema, palpitations  Gastrointestinal:  negative for abdominal pain, constipation, diarrhea, +nausea, no vomiting  Neurological:  negative for dizziness, headache    Medications: Allergies:     Allergies   Allergen Reactions    Other Hives    Ceclor [Cefaclor]     Food      All melons      Sulfa Antibiotics      Projectile vomiting        Current Meds:   Scheduled Meds:    levofloxacin  500 mg IntraVENous Q24H    ipratropium-albuterol  1 ampule Inhalation Q4H WA    dexamethasone  10 mg IntraVENous Q12H    budesonide-formoterol  2 puff Inhalation BID    insulin lispro  0-6 Units SubCUTAneous TID WC    insulin lispro  0-3 Units SubCUTAneous Nightly    miconazole   Topical BID    mirtazapine  7.5 mg Oral Nightly    metoprolol succinate  100 mg Oral Daily    lisinopril-hydroCHLOROthiazide  1 tablet Oral Daily    sodium chloride flush  5-40 mL IntraVENous 2 times per day    enoxaparin  30 mg SubCUTAneous BID    Vitamin D  2,000 Units Oral Daily    docusate sodium  100 mg Oral Daily    influenza virus vaccine  0.5 mL IntraMUSCular Prior to discharge     Continuous Infusions:    dextrose      sodium chloride       PRN Meds: ALPRAZolam, albuterol, sodium chloride flush, glucose, dextrose, glucagon (rDNA), dextrose, metoprolol, oxyCODONE-acetaminophen, sodium chloride flush, sodium chloride, ondansetron **OR** ondansetron, magnesium hydroxide, acetaminophen **OR** acetaminophen, guaiFENesin-dextromethorphan    Data:     Past Medical History:   has a past medical history of Anxiety, Arthritis, Bell's palsy, Chronic neck pain, Chronic pain, Fibromyalgia, HA (headache), Headache, HTN (hypertension), and Hypertension. Social History:   reports that she has never smoked. She has never used smokeless tobacco. She reports that she does not drink alcohol and does not use drugs. Family History:   Family History   Problem Relation Age of Onset    Other Mother         CAD   Elise Eros Diabetes Mother     Other Father         CAD    Diabetes Father     Thyroid Disease Sister     Cancer Brother         prostate    Diabetes Brother     Cancer Maternal Grandmother         uterine       Vitals:  /87   Pulse 71   Temp 97.5 °F (36.4 °C) (Oral)   Resp 20   Ht 5' 2\" (1.575 m)   Wt 239 lb 3.2 oz (108.5 kg)   SpO2 94%   BMI 43.75 kg/m²   Temp (24hrs), Av.2 °F (36.8 °C), Min:97.5 °F (36.4 °C), Max:99.1 °F (37.3 °C)    Recent Labs     21  1633 21  1954 11/10/21  0756 11/10/21  1133   POCGLU 174* 156* 174* 214*       I/O (24Hr):     Intake/Output Summary (Last 24 hours) at 11/10/2021 1620  Last data filed at 11/10/2021 1350  Gross per 24 hour   Intake 260 ml   Output 1000 ml   Net -740 ml       Labs:  Hematology:  Recent Labs     21  0640 21  0517 11/10/21  0636 11/10/21  1530   WBC 9.7 7.0 8.8  --    RBC 4.93 4.52 4.49  --    HGB 15.2* 13.8 14.0  --    HCT 46.3 43.0 42.0  --    MCV 93.9 95.1 93.5  --    MCH 30.8 30.5 31.2  --    MCHC 32.8 32.1 33.3  --    RDW 13.3 13.6 13.4  --     158 205  --    MPV 11.0 10.9 10.7  --    CRP 74.0*  --   --   --    DDIMER  --   --   --  0.69*     Chemistry:  Recent Labs     21  0640 21  0517 218 11/10/21  0636    133*  --  135   K 4.3 3.7  --  4.3   CL 97* 95*  --  95*   CO2 23 26  --  26   GLUCOSE 207* 148*  --  189*   BUN 16 18  --  23   CREATININE 0.61 0.60  --  <0.40*   ANIONGAP 15 12  --  14   LABGLOM >60 >60  --  CANNOT BE CALCULATED   GFRAA >60 >60  --  CANNOT BE CALCULATED   CALCIUM 8.7 8.5*  --  8.6   TROPHS  --   --  <6  --      Recent Labs     11/08/21 2022 11/09/21  0517 11/09/21  0759 11/09/21  1124 11/09/21  1633 11/09/21  1954 11/10/21  0756 11/10/21  1133   PROT  --  6.1*  --   --   --   --   --   --    LABALBU  --  3.3*  --   --   --   --   --   --    AST  --  26  --   --   --   --   --   --    ALT  --  24  --   --   --   --   --   --    ALKPHOS  --  67  --   --   --   --   --   --    BILITOT  --  0.43  --   --   --   --   --   --    BILIDIR  --  0.14  --   --   --   --   --   --    POCGLU   < >  --  186* 215* 174* 156* 174* 214*    < > = values in this interval not displayed. Radiology:  XR CHEST PORTABLE    Result Date: 11/9/2021  Multifocal pulmonary opacities suggest significantly worsened pulmonary edema and or multifocal pneumonia. Recommend continued follow-up to resolution. XR CHEST PORTABLE    Result Date: 11/3/2021  Interstitial prominence and bibasilar opacities, which could be seen with pneumonia or edema. Physical Examination:        General appearance:  alert, cooperative and mild distress  Mental Status:  oriented to person, place and time and flat affect  Lungs: poor air entry, crackles at left base.    Heart:  regular rate and rhythm, no murmur  Abdomen:  soft, nontender, nondistended, normal bowel sounds, no masses, hepatomegaly, splenomegaly  Extremities:+ b/l pedal edema and tenderness in the calves  Skin:  no gross lesions, rashes, induration    Assessment:        Hospital Problems           Last Modified POA    * (Principal) Pneumonia due to COVID-19 virus 11/9/2021 Yes    Essential hypertension 11/8/2021 Yes    Prediabetes 11/8/2021 Yes    Morbid obesity with BMI of 40.0-44.9, adult ([facility] [place] [address] 11/9/2021 Yes    Hyperglycemia 11/8/2021 Yes    Acute respiratory failure with hypoxia ([facility] [place] [address] 11/10/2021 Yes    Acute urinary retention 11/10/2021 Yes          Plan:      -Continue nasal cannula O2 to keep saturations more than 95%. Will give another dose of Lasix 40 mg IV. Continue steroids.   -Started on daily docusate. Continue MiraLAX as needed. -Steroid-induced hyperglycemia:continue Low intensity insulin sliding scale.  -Continue home Prinzide and Lopressor 100. Addend: Through the day, patient's resp status got worse and she needed high flow which he did not tolerate and was subsequently switched to BiPAP. Bronchodilator therapy was started and pulmonary was consulted. She diuresed well according to RN. But later in the day she was noted to have urinary retention requiring Hylton placement. D/w code status with patient: She is okay with intubation and CPR on resuscitation. To remain full code. Discussed with patient's  , Hernandez Melo on phone: Cammy Fregoso him clinical update and answer questions to best of ability.     Lilian Tate MD  11/10/2021

## 2021-11-10 NOTE — RT PROTOCOL NOTE
RT Inhaler-Nebulizer Bronchodilator Protocol Note    There is a bronchodilator order in the chart from a provider indicating to follow the RT Bronchodilator Protocol and there is an Initiate RT Inhaler-Nebulizer Bronchodilator Protocol order as well (see protocol at bottom of note). CXR Findings:  XR CHEST PORTABLE    Result Date: 11/9/2021  Multifocal pulmonary opacities suggest significantly worsened pulmonary edema and or multifocal pneumonia. Recommend continued follow-up to resolution. The findings from the last RT Protocol Assessment were as follows:   History Pulmonary Disease: None or smoker <15 pack years  Respiratory Pattern: Regular pattern and RR 12-20 bpm  Breath Sounds: Slightly diminished and/or crackles  Cough: Strong, spontaneous, non-productive  Indication for Bronchodilator Therapy:    Bronchodilator Assessment Score: 2    Aerosolized bronchodilator medication orders have been revised according to the RT Inhaler-Nebulizer Bronchodilator Protocol below. Respiratory Therapist to perform RT Therapy Protocol Assessment initially then follow the protocol. Repeat RT Therapy Protocol Assessment PRN for score 0-3 or on second treatment, BID, and PRN for scores above 3. No Indications - adjust the frequency to every 6 hours PRN wheezing or bronchospasm, if no treatments needed after 48 hours then discontinue using Per Protocol order mode. If indication present, adjust the RT bronchodilator orders based on the Bronchodilator Assessment Score as indicated below. Use Inhaler orders unless patient has one or more of the following: on home nebulizer, not able to hold breath for 10 seconds, is not alert and oriented, cannot activate and use MDI correctly, or respiratory rate 25 breaths per minute or more, then use the equivalent nebulizer order(s) with same Frequency and PRN reasons based on the score.   If a patient is on this medication at home then do not decrease Frequency below that

## 2021-11-10 NOTE — PROGRESS NOTES
Physical Therapy  DATE: 11/10/2021    NAME: Elizabeth Iverson  MRN: [id number]   : 1955    Patient not seen this date for Physical Therapy due to:      [x] Cancel by RN or physician due to:    [] Hemodialysis    [] Critical Lab Value Level     [] Blood transfusion in progress    [] Acute or unstable cardiovascular status   _MAP < 55 or more than >115  _HR < 40 or > 130    [x] Acute or unstable pulmonary status (RN reported patient on continuous Bipap currently and not appropriate for PT treatment)   -FiO2 > 60%   _RR < 5 or >40    _O2 sats < 85%    [] Strict Bedrest    [] Off Unit for surgery or procedure    [] Off Unit for testing       [] Pending imaging to R/O fracture    [] Refusal by Patient      [] Other      [] PT being discontinued at this time. Patient independent. No further needs. [] PT being discontinued at this time as the patient has been transferred to hospice care. No further needs.       Katherine Haji, PTA

## 2021-11-10 NOTE — PROGRESS NOTES
Writer at bedside finishing assessment & AM medications. Attending physician just completed rounding on patient. At the time of assessment, patient was on 4L NC with oxygen saturation of 90-93%. Upon writer finishing assessment & medication pass, patients oxygen saturation dropped to 80-83% with no exertion from patient. Writer increased NC from 4L to 6L. Attending physician notified. 1x 40mg IV lasix ordered & administered. Patient boosted to the top of the bed & RT called to bedside. Patient not c/o any additional SOB than normal.     RT to bedside and placed patient on HHF. Patient oxygen saturation improved to 90-95% & patient states she feels much better. See chart/ MAR for more details.

## 2021-11-10 NOTE — PROGRESS NOTES
Patient c/o HHF causing too much pressure in eyes & ears & requesting to be placed on BiPAP. RT notified & at bedside.

## 2021-11-10 NOTE — CONSULTS
female who presented to the emergency room 8 days ago with complaints of vaginal ingestion, rhinorrhea and intermittent episodes of nausea and vomiting. Also had generalized body aches. She denied any fever or chills at home however was found to be febrile in the ER. Nahed Rodriguez She was found to be hypoxic in the emergency room requiring supplemental oxygen as well. Her Covid PCR swab was positive. Over the past 2 days her condition has deteriorated, currently requiring high flow oxygen however was unable to tolerate it secondary to nasal discomfort so she is currently on BiPAP 100% FiO2. She was not vaccinated against COVID-19. She denies any history of smoking. She does have asthma. Uses albuterol HFA as needed at home. .  She had not been checked for sleep apnea.     PMHx   Past Medical History      Diagnosis Date    Anxiety     Arthritis     Bell's palsy     Chronic neck pain     posterior neck since     Chronic pain     low back and bilateral lower extremities    Fibromyalgia     HA (headache)     Headache     off/on since     HTN (hypertension)     Hypertension       Past Surgical History        Procedure Laterality Date    ARM SURGERY      rt arm fatty tumor     SECTION      CHOLECYSTECTOMY  2020    CHOLECYSTECTOMY, LAPAROSCOPIC N/A 2020    CHOLECYSTECTOMY LAPAROSCOPIC WITH GRAM performed by Tucker Narvaez MD at [address] Left     knee    SHOULDER SURGERY      rt rotator cuff repair    URETHRAL STRICTURE DILATATION      WISDOM TOOTH EXTRACTION         Meds    Current Medications    levofloxacin  500 mg IntraVENous Q24H    insulin lispro  0-6 Units SubCUTAneous TID WC    insulin lispro  0-3 Units SubCUTAneous Nightly    miconazole   Topical BID    dexamethasone  6 mg Oral Q24H    mirtazapine  7.5 mg Oral Nightly    metoprolol succinate  100 mg Oral Daily    lisinopril-hydroCHLOROthiazide  1 tablet Oral Daily    sodium chloride flush  5-40 mL IntraVENous 2 times per day    enoxaparin  30 mg SubCUTAneous BID    Vitamin D  2,000 Units Oral Daily    docusate sodium  100 mg Oral Daily    influenza virus vaccine  0.5 mL IntraMUSCular Prior to discharge     ALPRAZolam, albuterol, sodium chloride flush, glucose, dextrose, glucagon (rDNA), dextrose, metoprolol, oxyCODONE-acetaminophen, sodium chloride flush, sodium chloride, ondansetron **OR** ondansetron, magnesium hydroxide, acetaminophen **OR** acetaminophen, guaiFENesin-dextromethorphan  IV Drips/Infusions   dextrose      sodium chloride       Home Medications  Medications Prior to Admission: ondansetron (ZOFRAN ODT) 4 MG disintegrating tablet, Take 1 tablet by mouth every 8 hours as needed for Nausea  [] HYDROcodone-acetaminophen (NORCO) 5-325 MG per tablet, Take 1-2 tablets by mouth every 8 hours as needed for Pain for up to 3 days.   albuterol sulfate HFA (VENTOLIN HFA) 108 (90 Base) MCG/ACT inhaler, Inhale 2 puffs into the lungs 4 times daily as needed for Wheezing  acetaminophen (TYLENOL) 500 MG tablet, Take 500 mg by mouth every 6 hours as needed for Pain  lisinopril-hydroCHLOROthiazide (PRINZIDE;ZESTORETIC) 20-12.5 MG per tablet, TAKE 1 TABLET DAILY  metoprolol succinate (TOPROL XL) 100 MG extended release tablet, TAKE 1 TABLET DAILY  cetirizine (ZYRTEC) 10 MG tablet, Take 10 mg by mouth as needed for Allergies   Multiple Vitamins-Minerals (MULTIVITAMIN ADULT PO), Take 1 tablet by mouth daily     Allergies    Other, Ceclor [cefaclor], Food, and Sulfa antibiotics  Social History     Social History     Tobacco Use    Smoking status: Never Smoker    Smokeless tobacco: Never Used   Substance Use Topics    Alcohol use: No     Family History          Problem Relation Age of Onset    Other Mother         CAD    Diabetes Mother     Other Father         CAD    Diabetes Father     Thyroid Disease Sister     Cancer Brother         prostate    Diabetes Brother     Cancer Maternal Grandmother         uterine     ROS - 11 systems   Unable to obtain detailed ROS secondary to patient being on BiPAP currently    Vitals     height is 5' 2\" (1.575 m) and weight is 239 lb 3.2 oz (108.5 kg). Her oral temperature is 97.5 °F (36.4 °C). Her blood pressure is 142/86 (abnormal) and her pulse is 61. Her respiration is 26 and oxygen saturation is 90%. Body mass index is 43.75 kg/m². I/O        Intake/Output Summary (Last 24 hours) at 11/10/2021 1347  Last data filed at 11/10/2021 0003  Gross per 24 hour   Intake 10 ml   Output 2300 ml   Net -2290 ml     I/O last 3 completed shifts: In: 18 [P.O.:500; I.V.:10]  Out: 2300 [Urine:2300]   Patient Vitals for the past 96 hrs (Last 3 readings):   Weight   11/09/21 0537 239 lb 3.2 oz (108.5 kg)   11/07/21 0601 245 lb 1.6 oz (111.2 kg)     Exam   General Appearance  Awake, alert, in bed, on BiPAP  HEENT - Head is normocephalic, atraumatic. Pupil reactive to light  Neck - Supple,  trachea midline and straight  Lungs -moderate air exchange, positive crackles  Cardiovascular - Heart sounds are normal.  Regular rhythm normal rate without murmur, gallop or rub. Abdomen - Soft, nontender, nondistended, no masses or organomegaly  Neurologic - CN II-XII are grossly intact.  There are no focal motor or sensory deficits  Skin - No bruising or bleeding  Extremities - No cyanosis, clubbing or edema    Labs  - Old records and notes have been reviewed in [facility]   CBC     Lab Results   Component Value Date    WBC 8.8 11/10/2021    RBC 4.49 11/10/2021    HGB 14.0 11/10/2021    HCT 42.0 11/10/2021     11/10/2021    MCV 93.5 11/10/2021    MCH 31.2 11/10/2021    MCHC 33.3 11/10/2021    RDW 13.4 11/10/2021    LYMPHOPCT 9 11/10/2021    MONOPCT 6 11/10/2021    BASOPCT 0 11/10/2021    MONOSABS 0.48 11/10/2021    LYMPHSABS 0.80 11/10/2021    EOSABS <0.03 11/10/2021    BASOSABS <0.03 11/10/2021    DIFFTYPE NOT REPORTED 11/10/2021     BMP   Lab Results Component Value Date     11/10/2021    K 4.3 11/10/2021    CL 95 11/10/2021    CO2 26 11/10/2021    BUN 23 11/10/2021    CREATININE <0.40 11/10/2021    GLUCOSE 189 11/10/2021    CALCIUM 8.6 11/10/2021    MG 1.8 04/04/2020     LFTS  Lab Results   Component Value Date    ALKPHOS 67 11/09/2021    ALT 24 11/09/2021    AST 26 11/09/2021    PROT 6.1 11/09/2021    BILITOT 0.43 11/09/2021    BILIDIR 0.14 11/09/2021    IBILI 0.29 11/09/2021    LABALBU 3.3 11/09/2021     PTT  Lab Results   Component Value Date    APTT 30.4 11/05/2021     INR   Lab Results   Component Value Date    INR 1.0 11/05/2021    INR 1.1 11/04/2021    INR 1.0 04/04/2020    PROTIME 13.5 11/05/2021    PROTIME 14.0 11/04/2021    PROTIME 10.7 04/04/2020       Radiology    CXR       CT Scans    (See actual reports for details)    \"Thank you for asking us to see this patient\"    Case discussed with nurse and patient. Questions and concerns addressed.     Electronically signed by     KILLIAN Mabry-CNP  Pulmonary Critical Care and Sleep Medicine,  Patient seen under the supervision of Colt Lui MD, [facility]

## 2021-11-10 NOTE — PLAN OF CARE
Problem: Falls - Risk of:  Goal: Will remain free from falls  Description: Will remain free from falls. Fall risk assessment completed. Patient instructed to use call light. Bed locked and in lowest position, side rails up 2/4, call light and bedside table within reach, clutter removed, and non-skid footwear on when pt out of bed. Hourly rounds will continue. Bed alarm in use. Outcome: Ongoing     Problem: Pain:  Goal: Control of acute pain  Description: Control of acute pain. Pain assessment completed. Patient demonstrates understanding of pain rating scale and interventions. At this time patient states pain is well controlled. Will continue to monitor and reassess with each rounding and PRN.     Outcome: Ongoing     Problem: Breathing Pattern - Ineffective  Goal: Ability to achieve and maintain a regular respiratory rate  Outcome: Ongoing

## 2021-11-11 ENCOUNTER — APPOINTMENT (OUTPATIENT)
Dept: GENERAL RADIOLOGY | Age: 66
DRG: 177 | End: 2021-11-11
Payer: MEDICARE

## 2021-11-11 LAB
ABSOLUTE EOS #: <0.03 K/UL (ref 0–0.44)
ABSOLUTE IMMATURE GRANULOCYTE: 0.1 K/UL (ref 0–0.3)
ABSOLUTE LYMPH #: 0.79 K/UL (ref 1.1–3.7)
ABSOLUTE MONO #: 0.5 K/UL (ref 0.1–1.2)
ANION GAP SERPL CALCULATED.3IONS-SCNC: 15 MMOL/L (ref 9–17)
BASOPHILS # BLD: 0 % (ref 0–2)
BASOPHILS ABSOLUTE: <0.03 K/UL (ref 0–0.2)
BUN BLDV-MCNC: 32 MG/DL (ref 8–23)
BUN/CREAT BLD: 52 (ref 9–20)
CALCIUM SERPL-MCNC: 8.6 MG/DL (ref 8.6–10.4)
CHLORIDE BLD-SCNC: 96 MMOL/L (ref 98–107)
CO2: 26 MMOL/L (ref 20–31)
CREAT SERPL-MCNC: 0.61 MG/DL (ref 0.5–0.9)
DIFFERENTIAL TYPE: ABNORMAL
EOSINOPHILS RELATIVE PERCENT: 0 % (ref 1–4)
GFR AFRICAN AMERICAN: >60 ML/MIN
GFR NON-AFRICAN AMERICAN: >60 ML/MIN
GFR SERPL CREATININE-BSD FRML MDRD: ABNORMAL ML/MIN/{1.73_M2}
GFR SERPL CREATININE-BSD FRML MDRD: ABNORMAL ML/MIN/{1.73_M2}
GLUCOSE BLD-MCNC: 188 MG/DL (ref 65–105)
GLUCOSE BLD-MCNC: 208 MG/DL (ref 70–99)
GLUCOSE BLD-MCNC: 214 MG/DL (ref 65–105)
GLUCOSE BLD-MCNC: 323 MG/DL (ref 65–105)
GLUCOSE BLD-MCNC: 363 MG/DL (ref 65–105)
HCT VFR BLD CALC: 43 % (ref 36.3–47.1)
HEMOGLOBIN: 14.1 G/DL (ref 11.9–15.1)
IMMATURE GRANULOCYTES: 2 %
LYMPHOCYTES # BLD: 15 % (ref 24–43)
MCH RBC QN AUTO: 30.7 PG (ref 25.2–33.5)
MCHC RBC AUTO-ENTMCNC: 32.8 G/DL (ref 28.4–34.8)
MCV RBC AUTO: 93.7 FL (ref 82.6–102.9)
MONOCYTES # BLD: 10 % (ref 3–12)
NRBC AUTOMATED: 0 PER 100 WBC
PDW BLD-RTO: 13.2 % (ref 11.8–14.4)
PLATELET # BLD: 230 K/UL (ref 138–453)
PLATELET ESTIMATE: ABNORMAL
PMV BLD AUTO: 10.4 FL (ref 8.1–13.5)
POTASSIUM SERPL-SCNC: 4.2 MMOL/L (ref 3.7–5.3)
RBC # BLD: 4.59 M/UL (ref 3.95–5.11)
RBC # BLD: ABNORMAL 10*6/UL
SEG NEUTROPHILS: 73 % (ref 36–65)
SEGMENTED NEUTROPHILS ABSOLUTE COUNT: 3.87 K/UL (ref 1.5–8.1)
SODIUM BLD-SCNC: 137 MMOL/L (ref 135–144)
WBC # BLD: 5.3 K/UL (ref 3.5–11.3)
WBC # BLD: ABNORMAL 10*3/UL

## 2021-11-11 PROCEDURE — 94761 N-INVAS EAR/PLS OXIMETRY MLT: CPT

## 2021-11-11 PROCEDURE — 6360000002 HC RX W HCPCS: Performed by: INTERNAL MEDICINE

## 2021-11-11 PROCEDURE — 2700000000 HC OXYGEN THERAPY PER DAY

## 2021-11-11 PROCEDURE — 6360000002 HC RX W HCPCS: Performed by: NURSE PRACTITIONER

## 2021-11-11 PROCEDURE — 2000000000 HC ICU R&B

## 2021-11-11 PROCEDURE — 99233 SBSQ HOSP IP/OBS HIGH 50: CPT | Performed by: INTERNAL MEDICINE

## 2021-11-11 PROCEDURE — 2580000003 HC RX 258: Performed by: NURSE PRACTITIONER

## 2021-11-11 PROCEDURE — 85025 COMPLETE CBC W/AUTO DIFF WBC: CPT

## 2021-11-11 PROCEDURE — 2060000000 HC ICU INTERMEDIATE R&B

## 2021-11-11 PROCEDURE — 94640 AIRWAY INHALATION TREATMENT: CPT

## 2021-11-11 PROCEDURE — 97530 THERAPEUTIC ACTIVITIES: CPT

## 2021-11-11 PROCEDURE — 71045 X-RAY EXAM CHEST 1 VIEW: CPT

## 2021-11-11 PROCEDURE — 82947 ASSAY GLUCOSE BLOOD QUANT: CPT

## 2021-11-11 PROCEDURE — 6370000000 HC RX 637 (ALT 250 FOR IP): Performed by: INTERNAL MEDICINE

## 2021-11-11 PROCEDURE — 36415 COLL VENOUS BLD VENIPUNCTURE: CPT

## 2021-11-11 PROCEDURE — 6370000000 HC RX 637 (ALT 250 FOR IP): Performed by: NURSE PRACTITIONER

## 2021-11-11 PROCEDURE — 97535 SELF CARE MNGMENT TRAINING: CPT

## 2021-11-11 PROCEDURE — 94660 CPAP INITIATION&MGMT: CPT

## 2021-11-11 PROCEDURE — 87040 BLOOD CULTURE FOR BACTERIA: CPT

## 2021-11-11 PROCEDURE — 80048 BASIC METABOLIC PNL TOTAL CA: CPT

## 2021-11-11 PROCEDURE — 6370000000 HC RX 637 (ALT 250 FOR IP): Performed by: STUDENT IN AN ORGANIZED HEALTH CARE EDUCATION/TRAINING PROGRAM

## 2021-11-11 RX ORDER — LEVOFLOXACIN 500 MG/1
750 TABLET, FILM COATED ORAL DAILY
Status: COMPLETED | OUTPATIENT
Start: 2021-11-12 | End: 2021-11-19

## 2021-11-11 RX ADMIN — OXYCODONE AND ACETAMINOPHEN 1 TABLET: 5; 325 TABLET ORAL at 10:16

## 2021-11-11 RX ADMIN — INSULIN LISPRO 2 UNITS: 100 INJECTION, SOLUTION INTRAVENOUS; SUBCUTANEOUS at 20:33

## 2021-11-11 RX ADMIN — LISINOPRIL AND HYDROCHLOROTHIAZIDE 1 TABLET: 12.5; 2 TABLET ORAL at 08:57

## 2021-11-11 RX ADMIN — DOCUSATE SODIUM 100 MG: 100 CAPSULE, LIQUID FILLED ORAL at 08:58

## 2021-11-11 RX ADMIN — ENOXAPARIN SODIUM 30 MG: 100 INJECTION SUBCUTANEOUS at 08:57

## 2021-11-11 RX ADMIN — SODIUM CHLORIDE, PRESERVATIVE FREE 10 ML: 5 INJECTION INTRAVENOUS at 08:58

## 2021-11-11 RX ADMIN — DEXAMETHASONE SODIUM PHOSPHATE 10 MG: 10 INJECTION, SOLUTION INTRAMUSCULAR; INTRAVENOUS at 16:05

## 2021-11-11 RX ADMIN — MIRTAZAPINE 7.5 MG: 15 TABLET, FILM COATED ORAL at 20:33

## 2021-11-11 RX ADMIN — IPRATROPIUM BROMIDE AND ALBUTEROL SULFATE 1 AMPULE: .5; 2.5 SOLUTION RESPIRATORY (INHALATION) at 15:00

## 2021-11-11 RX ADMIN — INSULIN LISPRO 8 UNITS: 100 INJECTION, SOLUTION INTRAVENOUS; SUBCUTANEOUS at 12:12

## 2021-11-11 RX ADMIN — IPRATROPIUM BROMIDE AND ALBUTEROL SULFATE 1 AMPULE: .5; 2.5 SOLUTION RESPIRATORY (INHALATION) at 20:20

## 2021-11-11 RX ADMIN — ENOXAPARIN SODIUM 30 MG: 100 INJECTION SUBCUTANEOUS at 20:33

## 2021-11-11 RX ADMIN — BUDESONIDE AND FORMOTEROL FUMARATE DIHYDRATE 2 PUFF: 160; 4.5 AEROSOL RESPIRATORY (INHALATION) at 20:21

## 2021-11-11 RX ADMIN — INSULIN LISPRO 1 UNITS: 100 INJECTION, SOLUTION INTRAVENOUS; SUBCUTANEOUS at 08:58

## 2021-11-11 RX ADMIN — DEXAMETHASONE SODIUM PHOSPHATE 10 MG: 10 INJECTION, SOLUTION INTRAMUSCULAR; INTRAVENOUS at 03:00

## 2021-11-11 RX ADMIN — Medication 2000 UNITS: at 08:58

## 2021-11-11 RX ADMIN — IPRATROPIUM BROMIDE AND ALBUTEROL SULFATE 1 AMPULE: .5; 2.5 SOLUTION RESPIRATORY (INHALATION) at 07:57

## 2021-11-11 RX ADMIN — LEVOFLOXACIN 500 MG: 5 INJECTION, SOLUTION INTRAVENOUS at 10:16

## 2021-11-11 RX ADMIN — INSULIN LISPRO 10 UNITS: 100 INJECTION, SOLUTION INTRAVENOUS; SUBCUTANEOUS at 18:01

## 2021-11-11 RX ADMIN — ANTI-FUNGAL POWDER MICONAZOLE NITRATE TALC FREE: 1.42 POWDER TOPICAL at 08:58

## 2021-11-11 RX ADMIN — IPRATROPIUM BROMIDE AND ALBUTEROL SULFATE 1 AMPULE: .5; 2.5 SOLUTION RESPIRATORY (INHALATION) at 11:54

## 2021-11-11 RX ADMIN — ANTI-FUNGAL POWDER MICONAZOLE NITRATE TALC FREE: 1.42 POWDER TOPICAL at 20:33

## 2021-11-11 RX ADMIN — SODIUM CHLORIDE, PRESERVATIVE FREE 10 ML: 5 INJECTION INTRAVENOUS at 20:33

## 2021-11-11 RX ADMIN — GUAIFENESIN AND DEXTROMETHORPHAN 5 ML: 100; 10 SYRUP ORAL at 09:23

## 2021-11-11 RX ADMIN — METOPROLOL SUCCINATE 100 MG: 50 TABLET, EXTENDED RELEASE ORAL at 08:58

## 2021-11-11 ASSESSMENT — PAIN SCALES - GENERAL
PAINLEVEL_OUTOF10: 8
PAINLEVEL_OUTOF10: 0
PAINLEVEL_OUTOF10: 8
PAINLEVEL_OUTOF10: 3
PAINLEVEL_OUTOF10: 0
PAINLEVEL_OUTOF10: 6

## 2021-11-11 ASSESSMENT — PAIN DESCRIPTION - LOCATION: LOCATION: LEG

## 2021-11-11 ASSESSMENT — PAIN DESCRIPTION - DESCRIPTORS: DESCRIPTORS: ACHING

## 2021-11-11 ASSESSMENT — PAIN DESCRIPTION - PAIN TYPE: TYPE: CHRONIC PAIN

## 2021-11-11 NOTE — PROGRESS NOTES
Patient arrived to Room 1102 in ICU. Oriented to room. All questions answered. Patient satisfied at this bishop.  Nurse assessment of patient in progress at this time

## 2021-11-11 NOTE — PROGRESS NOTES
[facility]  Office: [address], DO, Johanna Humphreys, DO, Charlotte Garett, DO, Marlin Terrell Blood, DO, Ana Cristina Veras MD, Jhon Foster MD, Ousmane Ronquillo MD, Paola Darling MD, Liza Cavanaugh MD, Charlotte Mata MD, Antoni Andrade MD, Taisa Rocha, DO, Kenny Ochoa DO, Nargis Huynh MD,  Myke Batista, DO, Kirby Buckley MD, Fabien Cuevas MD, Elvie Morrison MD, Katrina Vizcarra MD, Jose Blackman MD, Dahlia Yuan MD, Virginia Javed MD, Jimi Lindsay, [facility], [facility], CNP, Abbi Zheng, CNP, Fifi Rm, CNS, Twila Rubio, CNP, Antoine Rosa, CNP, Kavitha Branham, CNP, Sveta Marroquin, CNP, Eusebio Bustillo, CNP, SANDRA Witt-C, Cleone Denver, [facility], Jasmin Patricia, CNP, Verenice Arguelles, CNP, Royce Recio, CNP, Enmanuel Subramanian, CNP, Wendy Leyva, CNP, Juancarlos Miguel, CNP, Michael DavisPAM [facility]    Progress Note      Name:   Coy Knight  MRN:     [id number]     [place]:      [de-identified]   Room:   [address] [place] [address] Day:  7  Admit Date:  11/3/2021 10:05 PM    PCP:   Halie Michael DO  Code Status:  Full Code    Subjective:     C/C:   Chief Complaint   Patient presents with    Abdominal Pain     lower    Nausea    Back Pain    Headache     Interval History Status: not changed. Patient had worsening respiratory status and needed 100% BiPAP support and was moved to ICU last evening. This morning patient remains on BiPAP at 80% FiO2. Denies any complaints this morning. Hemodynamically stable  Slept well without any acute issues overnight. CBC BMP reviewed. Blood sugars reviewed. Brief History:   Per my partner: '66 y. o. female with a past medical history of hypertension and obesity who presented to the emergency department on 11/3/2021 complaining of headache, nausea and abdominal pain. She is not vaccinated against COVID-19. In the ED, the patient was febrile (Tmax 102.2) and ill-appearing.  Rapid COVID-19 test was positive. Chest x-ray was remarkable for interstitial prominence and bibasilar opacities consistent with pneumonia. The patient is admitted to internal medicine for further management of COVID-19 pneumonia.'  11/9 overnight: episode of left-sided chest pain radiating into the back and side overnight. Had EKG troponins done which were unremarkable. A CTA chest  to rule out PE was done: Showed no PE but extensive pulmonary abnormalities and mild reactive adenopathy. Lower extremity Dopplers were negative for DVT. Review of Systems:     Constitutional:  negative for chills, fevers, sweats  Respiratory:  + cough, dyspnea on exertion, shortness of breath, wheezing  Cardiovascular:  negative for chest pain, chest pressure/discomfort, lower extremity edema, palpitations  Gastrointestinal:  negative for abdominal pain, constipation, diarrhea, +nausea, no vomiting  Neurological:  negative for dizziness, headache    Medications: Allergies:     Allergies   Allergen Reactions    Other Hives    Ceclor [Cefaclor]     Food      All melons      Sulfa Antibiotics      Projectile vomiting        Current Meds:   Scheduled Meds:    insulin lispro  0-12 Units SubCUTAneous TID WC    insulin lispro  0-6 Units SubCUTAneous Nightly    [START ON 11/12/2021] levoFLOXacin  750 mg Oral Daily    ipratropium-albuterol  1 ampule Inhalation Q4H WA    dexamethasone  10 mg IntraVENous Q12H    budesonide-formoterol  2 puff Inhalation BID    miconazole   Topical BID    mirtazapine  7.5 mg Oral Nightly    metoprolol succinate  100 mg Oral Daily    lisinopril-hydroCHLOROthiazide  1 tablet Oral Daily    sodium chloride flush  5-40 mL IntraVENous 2 times per day    enoxaparin  30 mg SubCUTAneous BID    Vitamin D  2,000 Units Oral Daily    docusate sodium  100 mg Oral Daily    influenza virus vaccine  0.5 mL IntraMUSCular Prior to discharge     Continuous Infusions:    dextrose      sodium chloride       PRN Meds: ALPRAZolam, albuterol, sodium chloride flush, glucose, dextrose, glucagon (rDNA), dextrose, metoprolol, oxyCODONE-acetaminophen, sodium chloride flush, sodium chloride, ondansetron **OR** ondansetron, magnesium hydroxide, acetaminophen **OR** acetaminophen, guaiFENesin-dextromethorphan    Data:     Past Medical History:   has a past medical history of Anxiety, Arthritis, Bell's palsy, Chronic neck pain, Chronic pain, Fibromyalgia, HA (headache), Headache, HTN (hypertension), and Hypertension. Social History:   reports that she has never smoked. She has never used smokeless tobacco. She reports that she does not drink alcohol and does not use drugs. Family History:   Family History   Problem Relation Age of Onset    Other Mother         CAD   [employer] Diabetes Mother     Other Father         CAD    Diabetes Father     Thyroid Disease Sister     Cancer Brother         prostate    Diabetes Brother     Cancer Maternal Grandmother         uterine       Vitals:  /88   Pulse 66   Temp 98.4 °F (36.9 °C) (Oral)   Resp 17   Ht 5' 2\" (1.575 m)   Wt 239 lb 3.2 oz (108.5 kg)   SpO2 92%   BMI 43.75 kg/m²   Temp (24hrs), Av.4 °F (36.9 °C), Min:98.4 °F (36.9 °C), Max:98.4 °F (36.9 °C)    Recent Labs     11/10/21  1939 11/10/21  2136 21  0723 21  1114   POCGLU 186* 281* 188* 323*       I/O (24Hr):     Intake/Output Summary (Last 24 hours) at 2021 1454  Last data filed at 2021 0528  Gross per 24 hour   Intake 150 ml   Output 1650 ml   Net -1500 ml       Labs:  Hematology:  Recent Labs     21  0517 11/10/21  0636 11/10/21  1530 21  0454   WBC 7.0 8.8  --  5.3   RBC 4.52 4.49  --  4.59   HGB 13.8 14.0  --  14.1   HCT 43.0 42.0  --  43.0   MCV 95.1 93.5  --  93.7   MCH 30.5 31.2  --  30.7   MCHC 32.1 33.3  --  32.8   RDW 13.6 13.4  --  13.2    205  --  230   MPV 10.9 10.7  --  10.4   DDIMER  --   --  0.69*  --      Chemistry:  Recent Labs     21  0517 21 11/10/21  0636 11/11/21  0454   *  --  135 137   K 3.7  --  4.3 4.2   CL 95*  --  95* 96*   CO2 26  --  26 26   GLUCOSE 148*  --  189* 208*   BUN 18  --  23 32*   CREATININE 0.60  --  <0.40* 0.61   ANIONGAP 12  --  14 15   LABGLOM >60  --  CANNOT BE CALCULATED >60   GFRAA >60  --  CANNOT BE CALCULATED >60   CALCIUM 8.5*  --  8.6 8.6   TROPHS  --  <6  --   --      Recent Labs     11/09/21  0517 11/09/21  0759 11/10/21  1133 11/10/21  1706 11/10/21  1939 11/10/21  2136 11/11/21  0723 11/11/21  1114   PROT 6.1*  --   --   --   --   --   --   --    LABALBU 3.3*  --   --   --   --   --   --   --    AST 26  --   --   --   --   --   --   --    ALT 24  --   --   --   --   --   --   --    ALKPHOS 67  --   --   --   --   --   --   --    BILITOT 0.43  --   --   --   --   --   --   --    BILIDIR 0.14  --   --   --   --   --   --   --    POCGLU  --    < > 214* 133* 186* 281* 188* 323*    < > = values in this interval not displayed. Radiology:  XR CHEST PORTABLE    Result Date: 11/9/2021  Multifocal pulmonary opacities suggest significantly worsened pulmonary edema and or multifocal pneumonia. Recommend continued follow-up to resolution. XR CHEST PORTABLE    Result Date: 11/3/2021  Interstitial prominence and bibasilar opacities, which could be seen with pneumonia or edema. Physical Examination:        General appearance:  alert, cooperative having conversation through BiPAP mask. Mental Status:  oriented to person, place and time and flat affect  Lungs: Mildly improved for entry compared to yesterday , crackles at left base.    Heart:  regular rate and rhythm, no murmur  Abdomen:  soft, nontender, nondistended, normal bowel sounds, no masses, hepatomegaly, splenomegaly  Extremities:+ b/l pedal edema and tenderness in the calves  Skin:  no gross lesions, rashes, induration    Assessment:        Hospital Problems           Last Modified POA    * (Principal) Pneumonia due to COVID-19 virus 11/9/2021 Yes Essential hypertension 11/8/2021 Yes    Prediabetes 11/8/2021 Yes    Morbid obesity with BMI of 40.0-44.9, adult ([facility] [address] 11/9/2021 Yes    Hyperglycemia 11/8/2021 Yes    Acute respiratory failure with hypoxia ([facility] [address] 11/10/2021 Yes    Acute urinary retention 11/10/2021 Yes          Plan:      -Continue BiPAP/high flow as tolerated. Continue steroids. Received Actemra 11/10/2021. Continue bronchodilator therapy  Continue current antibiotics Levaquin.  -Continue bowel regimen, Hylton catheter.  -Steroid-induced hyperglycemia: Patient to moderate intensity insulin sliding scale.  -Continue home Prinzide and Lopressor 100. Full code.     Segundo Lainez MD  11/11/2021

## 2021-11-11 NOTE — PLAN OF CARE
Nutrition Problem #1: Inadequate protein-energy intake  Intervention: Food and/or Nutrient Delivery: Continue Current Diet, Start Oral Nutrition Supplement  Nutritional Goals: PO intakes are greater than 75% at meals

## 2021-11-11 NOTE — PLAN OF CARE
Problem: Falls - Risk of:  Goal: Will remain free from falls  Description: Will remain free from falls  11/11/2021 0015 by Ravinder Mccarthy RN  Outcome: Ongoing  11/10/2021 1445 by Nikita Greene RN  Outcome: Ongoing  Goal: Absence of physical injury  Description: Absence of physical injury  Outcome: Ongoing     Problem: Airway Clearance - Ineffective  Goal: Achieve or maintain patent airway  Outcome: Ongoing     Problem: Gas Exchange - Impaired  Goal: Absence of hypoxia  Outcome: Ongoing  Goal: Promote optimal lung function  Outcome: Ongoing     Problem: Breathing Pattern - Ineffective  Goal: Ability to achieve and maintain a regular respiratory rate  11/11/2021 0015 by Ravinder Mccarthy RN  Outcome: Ongoing  11/10/2021 1445 by Nikita Greene RN  Outcome: Ongoing

## 2021-11-11 NOTE — PLAN OF CARE
Problem: Falls - Risk of:  Goal: Will remain free from falls  Description: Will remain free from falls  Outcome: Ongoing     Problem: Pain:  Goal: Control of chronic pain  Description: Control of chronic pain  Outcome: Ongoing     Problem:  Body Temperature -  Risk of, Imbalanced  Goal: Ability to maintain a body temperature within defined limits  Outcome: Ongoing     Problem: Fatigue  Goal: Verbalize increase energy and improved vitality  Outcome: Ongoing     Problem: Skin Integrity:  Goal: Will show no infection signs and symptoms  Description: Will show no infection signs and symptoms  Outcome: Ongoing

## 2021-11-11 NOTE — PROGRESS NOTES
Occupational Therapy  Facility/Department: [facility] ICU  Daily Treatment Note  NAME: Lanny Castle  : 1955  MRN: [id number]    Date of Service: 2021    Discharge Recommendations:  Patient would benefit from continued therapy after discharge       Assessment   Performance deficits / Impairments: Decreased functional mobility ; Decreased ADL status; Decreased strength; Decreased endurance; Decreased high-level IADLs; Decreased balance; Decreased posture  Assessment: Pt. completed bedside ADLS requiring SBA/min assist to complete task. Pt. was able to reposition self in bed with SBA with use of bedrails. Pt. did require min rest breaks throughtout task to rest from fatigue. Pt. educated on proper breathing tech and pacing/planning tasks when returning home. Skilled OT warranted to promote functional stength and endurance for I/safety to return to prior living arrangements with assist as needed. Prognosis: Good  OT Education: OT Role; Transfer Training; Energy Conservation; ADL Adaptive Strategies; Plan of Care  Patient Education: Pt. educated on proper use of siderails to assist with positioning and importance of mobility to increase functional endurance. REQUIRES OT FOLLOW UP: Yes  Activity Tolerance  Activity Tolerance: Patient limited by fatigue  Activity Tolerance: fair  Safety Devices  Safety Devices in place: Yes  Type of devices: All fall risk precautions in place; Bed alarm in place; Call light within reach; Nurse notified; Left in bed         Patient Diagnosis(es): The encounter diagnosis was COVID-19.      has a past medical history of Anxiety, Arthritis, Bell's palsy, Chronic neck pain, Chronic pain, Fibromyalgia, HA (headache), Headache, HTN (hypertension), and Hypertension. has a past surgical history that includes  section; shoulder surgery; Urethra dilation; Arm Surgery; [place] tooth extraction; joint replacement (Left);  Cholecystectomy, laparoscopic (N/A, 2020); and Cholecystectomy (04/2020). Restrictions  Restrictions/Precautions  Restrictions/Precautions: General Precautions, Fall Risk  Required Braces or Orthoses?: No  Position Activity Restriction  Other position/activity restrictions: Up as tolerated, telemetry, Droplet+ for Covid, 3L O2 per nc, RUE IV  Subjective   General  Chart Reviewed: Yes  Patient assessed for rehabilitation services?: Yes  Additional Pertinent Hx: Pt. sitting upright in bedside chair upon arrival to room. Pt. agreeable to treatment  Response to previous treatment: Patient with no complaints from previous session  Family / Caregiver Present: No  Subjective  Subjective: Pt. stated she did not feel good but stated she thinks she will feel better if she gets washed up      Orientation  Orientation  Overall Orientation Status: Within Functional Limits  Objective    ADL  Grooming: Setup; Stand by assistance  UE Bathing: Setup; Contact guard assistance  LE Bathing: Setup;  Moderate assistance  UE Dressing: Setup; Minimal assistance  LE Dressing: Setup; Minimal assistance        Balance  Sitting Balance: Independent  Standing Balance: Unable to assess(comment)  Standing Balance  Activity: Pt. washed up at bedside without standing  Toilet Transfers  Toilet Transfers Comments: Pt. with no needs at this time  Bed mobility  Rolling to Left: Stand by assistance  Rolling to Right: Stand by assistance  Supine to Sit: Stand by assistance  Sit to Supine: Stand by assistance  Scooting: Stand by assistance  Comment: SBA for repositioning after completing ADLS      Cognition  Overall Cognitive Status: WFL     Perception  Overall Perceptual Status: [facility]         Plan   Plan  Times per week: 4-5x/wk 1x/day as rufina  Times per day: Daily  Current Treatment Recommendations: Strengthening, Endurance Training, Patient/Caregiver Education & Training, Equipment Evaluation, Education, & procurement, Self-Care / ADL, Home Management Training, Functional Mobility Training, Safety Education & Training  Plan Comment: Cont. OT with stated POC       AM-PAC Score        AM-PAC Inpatient Daily Activity Raw Score: 15 (11/11/21 1309)  AM-PAC Inpatient ADL T-Scale Score : 34.69 (11/11/21 1309)  ADL Inpatient CMS 0-100% Score: 56.46 (11/11/21 1309)  ADL Inpatient CMS G-Code Modifier : CK (11/11/21 1309)    Goals  Short term goals  Time Frame for Short term goals: By discharge, pt to demo  Short term goal 1: ADL transfers and functional mobility to SBA with use of AD as needed. Short term goal 2: UB ADLS to Set up and LB ADLs SBA with use of AD/AE as needed. Short term goal 3: toileting to SBA with use of AD/grab bars as needed. Short term goal 4: increased B UE strength by 1/2 grade to assist with functional tasks/I with B UE HEP with use of handouts as needed. Short term goal 5: bed mobility to Mod I with use of bedrails as needed. Long term goals  Long term goal 1: Pt to be I with fall prevention educaiton, Covid specific education, EC/WS tech, recommendations for AE with use of handouts as needed. Patient Goals   Patient goals : To feel better! Therapy Time   Individual Concurrent Group Co-treatment   Time In 1444         Time Out 1010         Minutes 24              RN reports patient is medically stable for therapy treatment this date. Chart reviewed prior to treatment and patient is agreeable for therapy. All lines intact and patient positioned comfortably at end of treatment. All patient needs addressed prior to ending therapy session.       GURVINDER Contreras

## 2021-11-11 NOTE — FLOWSHEET NOTE
Pt in [place] isolation on continuous bipap. Pt unable to speak on the phone at this time. Writer says a prayer at the door. Chaplains will remain available to offer spiritual and emotional support as needed.        11/11/21 1633   Encounter Summary   Services provided to: Patient not available   Referral/Consult From: Surya   Continue Visiting   (11/11/21 COVID bipap)   Complexity of Encounter Low   Routine   Type Follow up     Electronically signed by Burton Rivero on 11/11/2021 at 4:34 PM.  Jessie  [phone]

## 2021-11-11 NOTE — PROGRESS NOTES
Supplement Intake  Physical Signs/Symptoms Outcomes:  Biochemical Data, Fluid Status or Edema, Skin, Weight     Discharge Planning:    Continue current diet             [facility]  MFN, RDN, LDN  Lead Clinical Dietitian  RD Office Phone [phone]

## 2021-11-11 NOTE — PROGRESS NOTES
Physical Therapy  Facility/Department: [facility] ICU  Daily Treatment Note  NAME: Puja Gloria  : 1955  MRN: [id number]    Date of Service: 2021    Discharge Recommendations:  Patient would benefit from continued therapy after discharge     Pt currently functioning below baseline. Would suggest additional therapy at time of discharge to maximize long term outcomes and prevent re-admission. Please refer to AM-PAC score for current level of function. Assessment   Body structures, Functions, Activity limitations: Decreased functional mobility ; Decreased ADL status; Decreased strength; Decreased endurance; Decreased balance  Assessment: Patient with decreased aerobic conditioning, requires increased time respiratory recovery with minimal activities and postitional changes. Patient would benefit from continued skilled PT treatment to maximize return to PLOF. Prognosis: Good  Exam: ROM, MMT, functional mobility, activity tolerance, Balance, & [employer] AM-PAC 6 Clicks Basic Mobility  Clinical Presentation: evolving  PT Education: PT Role; Plan of Care; Transfer Training; General Safety; Gait Training; Functional Mobility Training; Energy Conservation  Patient Education: Ed pt on functional mobility, safety awareness, importance of being up & OOB to regain strength, & prevention of sedentary complications, circulation ex's,  & optimal breathing techniques including  deep breathing with incentive spirometer including technique, frequency and purpose,  & ex's to move what moves to maintain strength & joint congruency  REQUIRES PT FOLLOW UP: Yes  Activity Tolerance  Activity Tolerance: Patient limited by endurance  Activity Tolerance: saaO2 83-94% with activity on HI FLOW.  patient requires increased time to maximize tolerance to positional changes and vc's for pursed lip breathing techniques to maximize respiratory function this maria e. Patient Diagnosis(es): The encounter diagnosis was COVID-19. promote to stance posture this date due to increased fatigue and decreased tolerance to positional changes. Exercises  Comments: Pt performed seated LE ex's x 10 reps to promote mobility exercises and promote joint congruency, patient worked across body working on therapuetic activities to improve core strength & stability   NOTE:  Patient become tearful and requires encouragement as patient discusses missing spouse and home life. Patient educated on disease specific education including the importance of continued communication with outside world to decrease risk of hospital psychosis. Patient with good understanding, all patients questions answered and addressed. Patient was feeling less fearful upon completion of PT treatment and expressed gratitude for therapist.  AM-PAC Score  AM-PAC Inpatient Mobility Raw Score : 15 (11/11/21 1515)  AM-PAC Inpatient T-Scale Score : 39.45 (11/11/21 1515)  Mobility Inpatient CMS 0-100% Score: 57.7 (11/11/21 1515)  Mobility Inpatient CMS G-Code Modifier : CK (11/11/21 1515)          Goals  Short term goals  Time Frame for Short term goals: 12 visits  Short term goal 1: Patient will be indep with bed mobility and transfers. Short term goal 2: Patient will amb 100 feet indep with appropriate AD. Short term goal 3: Patient will negotiate 4 stairs with rails and supervision. Short term goal 4:  Inc standing balance to good with device to facilitate pt independence for performance of ADL's & functional mobility, & reduce fall risk  Short term goal 5: Pt able to tolerate 30 min of activity to include ex, breathing techniques & endurance training with pt demonstrating ability to perform energy conservation strategies during functional activity to self-regulate pacing & breathing techniques to avoid SOB & maintain saO2 in safe range  Short term goal 6: Ed pt on home ex's, optimal breathing techniques, safety & energy principles, endurance training, Covid 19 pt education & issue

## 2021-11-11 NOTE — PROGRESS NOTES
Pulmonary Critical Care Progress Note  Rhoda Alfaro MD     Patient seen for the follow up of acute hypoxic respiratory failure, asthma extubation, Pneumonia due to COVID-19 virus     Subjective:  Patient was moved to ICU for worsening respiratory failure. She is on BiPAP at this time with FiO2 80%. She denies any chest pain. She has productive cough with yellow sputum. She denies any change in her shortness of breath. Examination:  Vitals: /72   Pulse 57   Temp 98.4 °F (36.9 °C) (Temporal)   Resp 26   Ht 5' 2\" (1.575 m)   Wt 239 lb 3.2 oz (108.5 kg)   SpO2 92%   BMI 43.75 kg/m²   General appearance:  alert and cooperative with exam  Neck: No JVD  Lungs: Bilateral crackles, no wheezing, moderate air exchange  Heart: regular rate and rhythm, S1, S2 normal, no gallop  Abdomen: Soft, non tender, + BS  Extremities: no cyanosis or clubbing.  No significant edema    LABs:  CBC:   Recent Labs     11/10/21  0636 11/11/21  0454   WBC 8.8 5.3   HGB 14.0 14.1   HCT 42.0 43.0    230     BMP:   Recent Labs     11/10/21  0636 11/11/21  0454    137   K 4.3 4.2   CO2 26 26   BUN 23 32*   CREATININE <0.40* 0.61   LABGLOM CANNOT BE CALCULATED >60   GLUCOSE 189* 208*     LIVER PROFILE:  Recent Labs     11/09/21  0517   AST 26   ALT 24   LABALBU 3.3*     Radiology:  X-ray chest: 11/10/2021  Bilateral pulmonary infiltrate      Impression:  · Acute hypoxic respiratory failure  · COVID-19 pneumonia with possible bacterial coinfection  · Mild adenopathy, likely reactive  · History of asthma  · Suspected obstructive sleep apnea/Obesity  · Arthritis, hypertension, fibromyalgia    Recommendations:  · Continue airborne isolation  · Prone as tolerated  · Incentive spirometry every hour while awake  · Continue BiPAP support  · Oxygen via high flow nasal cannula as tolerated, keep SPO2 90% or greater   · Continue Decadron to IV 10 mg every 12 hours  · S/p Actemra  · Continue albuterol and Ipratropium Q 4 hours and prn  · Continue Symbicort  · Continue Levaquin for possible bacterial coinfection, increase dose to 750 mg daily  · May need additional diuresis, will reevaluate in a.m.   · X-ray chest in am  · Labs: CBC and BMP in am  · DVT prophylaxis with low molecular weight heparin  · Discussed with RN and pharmacist  · Will follow with you    Kieran Espinoza MD, [facility]  Pulmonary Critical Care and Sleep Medicine,  [facility]  Cell: [phone]  Office: [phone]

## 2021-11-11 NOTE — CONSULTS
Tocilizumab   Tocilizumab is a recombinant humanized anti-interleukin (IL)-6 receptor monoclonal antibody approved by the Food and Drug Administration (FDA) for the treatment of certain rheumatologic disorders and cytokine release syndrome induced by chimeric antigen receptor T cell (CAR-T cell) therapy. It is hypothesized that modulating the levels of proinflammatory IL-6 or its effects may reduce the duration and/or severity of COVID-19 illness. Tocilizumab is the only IL-6 inhibitor authorized by the FDA for the treatment of COVID-19 in hospitalized adults and pediatric patients (3years of age and older) who are receiving systemic corticosteroids and require supplemental oxygen, non-invasive or invasive mechanical ventilation, or extracorporeal membrane oxygenation (ECMO). [facility] Tocilizumab Criteria for Use   Criteria **All criteria need to be met to receive Tocilizumab for COVID-19 patients**   Age  >3years old      Clinical Status  **Not intended for use as salvage therapy**     ? Require invasive mechanical ventilation,   noninvasive ventilation or high flow oxygen and given within 24 hours of vital (respiratory/cardiovascular) organ support initiation due to COVID-19   ? OR   ? Rapidly increasing oxygen needs         Concomitant Therapy Receiving systemic corticosteroids    Laboratory Results ? Positive COVID-19 test since hospital admission or within 14 days prior to admission   ? CRP >7.5 mg/dL (>75mg/L)         Precautions   (clarify risk:benefit with prescriber prior to entering order)     ? Invasive active mycobacterial or fungal infection   ? Platelets < 157,452 or active bleeding   ? Not receiving systemic steroids   ? Significantly immunosuppressed patient (either medication related or comorbidity^   ?  Elevated AST/ALT > 10x ULN         Ordering Provider Type ID, intensivists, pulmonology (where available)     *Respiratory support includes but is not limited to: Non-invasive/invasive ventilation  *Cardiovascular support includes but is not limited to: Vasopressor support    ^Including but not limited to:     o Been receiving active cancer treatment for tumors or cancers of the blood      o Received an organ transplant and are taking medicine to suppress the immune system      o Received a stem cell transplant within the last 2 years or are taking medicine to suppress the immune system      o Moderate or severe primary immunodeficiency (such as DiGeorge syndrome, Wiskott-Townsend syndrome)      o Advanced or untreated HIV infection      o Active treatment prior to admission with high-dose corticosteroids or other drugs that may suppress your immune response

## 2021-11-12 ENCOUNTER — APPOINTMENT (OUTPATIENT)
Dept: GENERAL RADIOLOGY | Age: 66
DRG: 177 | End: 2021-11-12
Payer: MEDICARE

## 2021-11-12 LAB
ABSOLUTE EOS #: <0.03 K/UL (ref 0–0.44)
ABSOLUTE IMMATURE GRANULOCYTE: 0.19 K/UL (ref 0–0.3)
ABSOLUTE LYMPH #: 0.9 K/UL (ref 1.1–3.7)
ABSOLUTE MONO #: 0.62 K/UL (ref 0.1–1.2)
ANION GAP SERPL CALCULATED.3IONS-SCNC: 13 MMOL/L (ref 9–17)
BASOPHILS # BLD: 0 % (ref 0–2)
BASOPHILS ABSOLUTE: <0.03 K/UL (ref 0–0.2)
BUN BLDV-MCNC: 34 MG/DL (ref 8–23)
BUN/CREAT BLD: 56 (ref 9–20)
CALCIUM SERPL-MCNC: 9.1 MG/DL (ref 8.6–10.4)
CHLORIDE BLD-SCNC: 97 MMOL/L (ref 98–107)
CO2: 27 MMOL/L (ref 20–31)
CREAT SERPL-MCNC: 0.61 MG/DL (ref 0.5–0.9)
DIFFERENTIAL TYPE: ABNORMAL
EOSINOPHILS RELATIVE PERCENT: 0 % (ref 1–4)
GFR AFRICAN AMERICAN: >60 ML/MIN
GFR NON-AFRICAN AMERICAN: >60 ML/MIN
GFR SERPL CREATININE-BSD FRML MDRD: ABNORMAL ML/MIN/{1.73_M2}
GFR SERPL CREATININE-BSD FRML MDRD: ABNORMAL ML/MIN/{1.73_M2}
GLUCOSE BLD-MCNC: 170 MG/DL (ref 70–99)
GLUCOSE BLD-MCNC: 189 MG/DL (ref 65–105)
GLUCOSE BLD-MCNC: 192 MG/DL (ref 65–105)
GLUCOSE BLD-MCNC: 212 MG/DL (ref 65–105)
GLUCOSE BLD-MCNC: 217 MG/DL (ref 65–105)
HCT VFR BLD CALC: 43.3 % (ref 36.3–47.1)
HEMOGLOBIN: 14.3 G/DL (ref 11.9–15.1)
IMMATURE GRANULOCYTES: 2 %
LYMPHOCYTES # BLD: 10 % (ref 24–43)
MCH RBC QN AUTO: 31 PG (ref 25.2–33.5)
MCHC RBC AUTO-ENTMCNC: 33 G/DL (ref 28.4–34.8)
MCV RBC AUTO: 93.7 FL (ref 82.6–102.9)
MONOCYTES # BLD: 7 % (ref 3–12)
NRBC AUTOMATED: 0 PER 100 WBC
PDW BLD-RTO: 13.1 % (ref 11.8–14.4)
PLATELET # BLD: 270 K/UL (ref 138–453)
PLATELET ESTIMATE: ABNORMAL
PMV BLD AUTO: 10.2 FL (ref 8.1–13.5)
POTASSIUM SERPL-SCNC: 4.3 MMOL/L (ref 3.7–5.3)
RBC # BLD: 4.62 M/UL (ref 3.95–5.11)
RBC # BLD: ABNORMAL 10*6/UL
SEG NEUTROPHILS: 81 % (ref 36–65)
SEGMENTED NEUTROPHILS ABSOLUTE COUNT: 7.44 K/UL (ref 1.5–8.1)
SODIUM BLD-SCNC: 137 MMOL/L (ref 135–144)
WBC # BLD: 9.2 K/UL (ref 3.5–11.3)
WBC # BLD: ABNORMAL 10*3/UL

## 2021-11-12 PROCEDURE — 97530 THERAPEUTIC ACTIVITIES: CPT

## 2021-11-12 PROCEDURE — 97116 GAIT TRAINING THERAPY: CPT

## 2021-11-12 PROCEDURE — 82947 ASSAY GLUCOSE BLOOD QUANT: CPT

## 2021-11-12 PROCEDURE — 6370000000 HC RX 637 (ALT 250 FOR IP): Performed by: STUDENT IN AN ORGANIZED HEALTH CARE EDUCATION/TRAINING PROGRAM

## 2021-11-12 PROCEDURE — 2580000003 HC RX 258: Performed by: NURSE PRACTITIONER

## 2021-11-12 PROCEDURE — 6360000002 HC RX W HCPCS: Performed by: INTERNAL MEDICINE

## 2021-11-12 PROCEDURE — 6370000000 HC RX 637 (ALT 250 FOR IP): Performed by: INTERNAL MEDICINE

## 2021-11-12 PROCEDURE — 6360000002 HC RX W HCPCS: Performed by: NURSE PRACTITIONER

## 2021-11-12 PROCEDURE — 97535 SELF CARE MNGMENT TRAINING: CPT

## 2021-11-12 PROCEDURE — 6370000000 HC RX 637 (ALT 250 FOR IP): Performed by: NURSE PRACTITIONER

## 2021-11-12 PROCEDURE — 80048 BASIC METABOLIC PNL TOTAL CA: CPT

## 2021-11-12 PROCEDURE — 36415 COLL VENOUS BLD VENIPUNCTURE: CPT

## 2021-11-12 PROCEDURE — 94761 N-INVAS EAR/PLS OXIMETRY MLT: CPT

## 2021-11-12 PROCEDURE — 71045 X-RAY EXAM CHEST 1 VIEW: CPT

## 2021-11-12 PROCEDURE — 85025 COMPLETE CBC W/AUTO DIFF WBC: CPT

## 2021-11-12 PROCEDURE — 2700000000 HC OXYGEN THERAPY PER DAY

## 2021-11-12 PROCEDURE — 99232 SBSQ HOSP IP/OBS MODERATE 35: CPT | Performed by: INTERNAL MEDICINE

## 2021-11-12 PROCEDURE — 94640 AIRWAY INHALATION TREATMENT: CPT

## 2021-11-12 PROCEDURE — 2000000000 HC ICU R&B

## 2021-11-12 PROCEDURE — 94660 CPAP INITIATION&MGMT: CPT

## 2021-11-12 RX ORDER — DOCUSATE SODIUM 100 MG/1
100 CAPSULE, LIQUID FILLED ORAL 2 TIMES DAILY
Status: DISCONTINUED | OUTPATIENT
Start: 2021-11-12 | End: 2021-11-27 | Stop reason: HOSPADM

## 2021-11-12 RX ORDER — FUROSEMIDE 10 MG/ML
20 INJECTION INTRAMUSCULAR; INTRAVENOUS ONCE
Status: COMPLETED | OUTPATIENT
Start: 2021-11-12 | End: 2021-11-12

## 2021-11-12 RX ADMIN — IPRATROPIUM BROMIDE AND ALBUTEROL SULFATE 1 AMPULE: .5; 2.5 SOLUTION RESPIRATORY (INHALATION) at 20:14

## 2021-11-12 RX ADMIN — ENOXAPARIN SODIUM 30 MG: 100 INJECTION SUBCUTANEOUS at 08:30

## 2021-11-12 RX ADMIN — LISINOPRIL AND HYDROCHLOROTHIAZIDE 1 TABLET: 12.5; 2 TABLET ORAL at 08:30

## 2021-11-12 RX ADMIN — BUDESONIDE AND FORMOTEROL FUMARATE DIHYDRATE 2 PUFF: 160; 4.5 AEROSOL RESPIRATORY (INHALATION) at 06:27

## 2021-11-12 RX ADMIN — INSULIN LISPRO 2 UNITS: 100 INJECTION, SOLUTION INTRAVENOUS; SUBCUTANEOUS at 17:23

## 2021-11-12 RX ADMIN — INSULIN LISPRO 4 UNITS: 100 INJECTION, SOLUTION INTRAVENOUS; SUBCUTANEOUS at 12:28

## 2021-11-12 RX ADMIN — IPRATROPIUM BROMIDE AND ALBUTEROL SULFATE 1 AMPULE: .5; 2.5 SOLUTION RESPIRATORY (INHALATION) at 10:20

## 2021-11-12 RX ADMIN — ANTI-FUNGAL POWDER MICONAZOLE NITRATE TALC FREE: 1.42 POWDER TOPICAL at 08:37

## 2021-11-12 RX ADMIN — LEVOFLOXACIN 750 MG: 500 TABLET, FILM COATED ORAL at 08:30

## 2021-11-12 RX ADMIN — OXYCODONE AND ACETAMINOPHEN 1 TABLET: 5; 325 TABLET ORAL at 22:44

## 2021-11-12 RX ADMIN — OXYCODONE AND ACETAMINOPHEN 1 TABLET: 5; 325 TABLET ORAL at 17:56

## 2021-11-12 RX ADMIN — METOPROLOL SUCCINATE 100 MG: 50 TABLET, EXTENDED RELEASE ORAL at 08:30

## 2021-11-12 RX ADMIN — DOCUSATE SODIUM 100 MG: 100 CAPSULE, LIQUID FILLED ORAL at 19:54

## 2021-11-12 RX ADMIN — SODIUM CHLORIDE, PRESERVATIVE FREE 10 ML: 5 INJECTION INTRAVENOUS at 19:55

## 2021-11-12 RX ADMIN — Medication 2000 UNITS: at 08:30

## 2021-11-12 RX ADMIN — INSULIN LISPRO 2 UNITS: 100 INJECTION, SOLUTION INTRAVENOUS; SUBCUTANEOUS at 19:55

## 2021-11-12 RX ADMIN — MIRTAZAPINE 7.5 MG: 15 TABLET, FILM COATED ORAL at 19:54

## 2021-11-12 RX ADMIN — SODIUM CHLORIDE, PRESERVATIVE FREE 10 ML: 5 INJECTION INTRAVENOUS at 08:31

## 2021-11-12 RX ADMIN — INSULIN LISPRO 2 UNITS: 100 INJECTION, SOLUTION INTRAVENOUS; SUBCUTANEOUS at 08:31

## 2021-11-12 RX ADMIN — IPRATROPIUM BROMIDE AND ALBUTEROL SULFATE 1 AMPULE: .5; 2.5 SOLUTION RESPIRATORY (INHALATION) at 06:27

## 2021-11-12 RX ADMIN — ANTI-FUNGAL POWDER MICONAZOLE NITRATE TALC FREE: 1.42 POWDER TOPICAL at 19:56

## 2021-11-12 RX ADMIN — DOCUSATE SODIUM 100 MG: 100 CAPSULE, LIQUID FILLED ORAL at 08:30

## 2021-11-12 RX ADMIN — OXYCODONE AND ACETAMINOPHEN 1 TABLET: 5; 325 TABLET ORAL at 08:30

## 2021-11-12 RX ADMIN — BUDESONIDE AND FORMOTEROL FUMARATE DIHYDRATE 2 PUFF: 160; 4.5 AEROSOL RESPIRATORY (INHALATION) at 20:14

## 2021-11-12 RX ADMIN — FUROSEMIDE 20 MG: 10 INJECTION, SOLUTION INTRAMUSCULAR; INTRAVENOUS at 10:40

## 2021-11-12 RX ADMIN — DEXAMETHASONE SODIUM PHOSPHATE 10 MG: 10 INJECTION, SOLUTION INTRAMUSCULAR; INTRAVENOUS at 03:24

## 2021-11-12 RX ADMIN — IPRATROPIUM BROMIDE AND ALBUTEROL SULFATE 1 AMPULE: .5; 2.5 SOLUTION RESPIRATORY (INHALATION) at 14:23

## 2021-11-12 RX ADMIN — ENOXAPARIN SODIUM 30 MG: 100 INJECTION SUBCUTANEOUS at 19:55

## 2021-11-12 RX ADMIN — SODIUM CHLORIDE, PRESERVATIVE FREE 10 ML: 5 INJECTION INTRAVENOUS at 10:41

## 2021-11-12 RX ADMIN — DEXAMETHASONE SODIUM PHOSPHATE 10 MG: 10 INJECTION, SOLUTION INTRAMUSCULAR; INTRAVENOUS at 16:00

## 2021-11-12 ASSESSMENT — PAIN SCALES - GENERAL
PAINLEVEL_OUTOF10: 4
PAINLEVEL_OUTOF10: 5
PAINLEVEL_OUTOF10: 4
PAINLEVEL_OUTOF10: 0
PAINLEVEL_OUTOF10: 7
PAINLEVEL_OUTOF10: 0

## 2021-11-12 ASSESSMENT — PAIN DESCRIPTION - PAIN TYPE
TYPE: CHRONIC PAIN
TYPE: CHRONIC PAIN;ACUTE PAIN

## 2021-11-12 ASSESSMENT — PAIN DESCRIPTION - LOCATION
LOCATION: LEG
LOCATION: BACK

## 2021-11-12 ASSESSMENT — PAIN DESCRIPTION - DESCRIPTORS
DESCRIPTORS: ACHING
DESCRIPTORS: ACHING;DISCOMFORT

## 2021-11-12 NOTE — PLAN OF CARE
Problem: Falls - Risk of:  Goal: Will remain free from falls  Description: Will remain free from falls  11/12/2021 0111 by Natalie Buckley RN  Outcome: Ongoing     Problem: Pain:  Goal: Control of chronic pain  Description: Control of chronic pain  11/12/2021 0111 by Natalie Buckley RN  Outcome: Ongoing     Problem: Gas Exchange - Impaired  Goal: Absence of hypoxia  11/12/2021 0111 by Natalie Buckley RN  Outcome: Ongoing     Problem: Breathing Pattern - Ineffective  Goal: Ability to achieve and maintain a regular respiratory rate  11/12/2021 0111 by Natalie Buckley RN  Outcome: Ongoing     Problem: Body Temperature -  Risk of, Imbalanced  Goal: Ability to maintain a body temperature within defined limits  11/12/2021 0111 by Natalie Buckley RN  Outcome: Ongoing     Problem:  Body Temperature -  Risk of, Imbalanced  Goal: Will regain or maintain usual level of consciousness  11/12/2021 0111 by Natalie Buckley RN  Outcome: Ongoing     Problem: Skin Integrity:  Goal: Absence of new skin breakdown  Description: Absence of new skin breakdown  11/12/2021 0111 by Natalie Buckley RN  Outcome: Ongoing     Problem: Nutrition  Goal: Optimal nutrition therapy  11/12/2021 0111 by Natalie Buckley RN  Outcome: Ongoing

## 2021-11-12 NOTE — PROGRESS NOTES
Pulmonary Critical Care Progress Note  Lori Vinson MD     Patient seen for the follow up of acute hypoxic respiratory failure, asthma extubation, Pneumonia due to COVID-19 virus     Subjective:  She had an uneventful night. She wore BiPAP last night. Currently she is on high flow nasal cannula tolerating well. She denies any chest pain. She has productive cough with yellow sputum. She denies any change in her shortness of breath. Examination:  Vitals: /80   Pulse 78   Temp 98.1 °F (36.7 °C) (Axillary)   Resp 29   Ht 5' 2\" (1.575 m)   Wt 239 lb 3.2 oz (108.5 kg)   SpO2 94%   BMI 43.75 kg/m²   General appearance:  alert and cooperative with exam  Neck: No JVD  Lungs: Bilateral crackles, no wheezing, moderate air exchange  Heart: regular rate and rhythm, S1, S2 normal, no gallop  Abdomen: Soft, non tender, + BS  Extremities: no cyanosis or clubbing.  No significant edema    LABs:  CBC:   Recent Labs     11/11/21  0454 11/12/21  0501   WBC 5.3 9.2   HGB 14.1 14.3   HCT 43.0 43.3    270     BMP:   Recent Labs     11/11/21  0454 11/12/21  0501    137   K 4.2 4.3   CO2 26 27   BUN 32* 34*   CREATININE 0.61 0.61   LABGLOM >60 >60   GLUCOSE 208* 170*     Radiology:  X-ray chest: 11/12/2021  Bilateral pulmonary infiltrate      Impression:  · Acute hypoxic respiratory failure  · COVID-19 pneumonia with possible bacterial coinfection  · Mild adenopathy, likely reactive  · History of asthma  · Suspected obstructive sleep apnea/Obesity  · Arthritis, hypertension, fibromyalgia    Recommendations:  · Continue airborne isolation  · Prone as tolerated  · Incentive spirometry every hour while awake  · Continue BiPAP support  · Oxygen via high flow nasal cannula as tolerated, keep SPO2 90% or greater   · Continue Decadron to IV 10 mg every 12 hours  · S/p Actemra  · Continue albuterol and Ipratropium Q 4 hours and prn  · Continue Symbicort  · Continue Levaquin 750 mg daily  · Lasix 20 mg x 1 dose this morning per primary team  · X-ray chest in am  · Labs: CBC and BMP in am  · DVT prophylaxis with low molecular weight heparin  · Discussed with RN   · Will follow with you    Colt Lui MD, [facility]  Pulmonary Critical Care and Sleep Medicine,  [facility]  Cell: [phone]  Office: [phone]

## 2021-11-12 NOTE — FLOWSHEET NOTE
Patient is in droplet isolation and unavailable. Patient is sitting in a chair and appears to be calm and coping adequately. Writer prays for patient from the hallway. Patient is not able to respond. Spiritual Care will follow as needed.        11/12/21 1054   Encounter Summary   Services provided to: Patient not available   Referral/Consult From: Surya   Continue Visiting   (11/12/21 COVID-19)   Complexity of Encounter Low   Routine   Type Follow up   Assessment Calm   Intervention Prayer   Outcome Did not respond

## 2021-11-12 NOTE — PROGRESS NOTES
Occupational Therapy  Facility/Department: [facility] ICU  Daily Treatment Note  NAME: Li Moran  : 1955  MRN: [id number]    Date of Service: 2021    Discharge Recommendations:  Patient would benefit from continued therapy after discharge       Assessment   Performance deficits / Impairments: Decreased functional mobility ; Decreased ADL status; Decreased strength; Decreased endurance; Decreased high-level IADLs; Decreased balance; Decreased posture  Assessment: Pt. completed functional transfer from EOB to bedside chair with use of RW with min assist X2 to insure safety and manage lines. Pt's O2 monitored with levels decreasing with mobility requiring frequent rest breaks to recover O2 stats to 88-91%. Pt. completed light grooming tasks while sitting upright in bedside chair with setup and supervision. Pt. still limited by decreased functional endurance and limited resp. status. Skilled OT warranted to promote functional strength and endurance to promote I/safety to return pt. to prior living arrangements with assist as needed. Prognosis: Good  OT Education: OT Role; Transfer Training; Energy Conservation; ADL Adaptive Strategies; Plan of Care  Patient Education: Pt. educated on proper use of RW and importance of mobility to promote functional strength. REQUIRES OT FOLLOW UP: Yes  Activity Tolerance  Activity Tolerance: fair+  Safety Devices  Safety Devices in place: Yes  Type of devices: All fall risk precautions in place; Call light within reach; Chair alarm in place; Nurse notified; Left in chair; Patient at risk for falls         Patient Diagnosis(es): The encounter diagnosis was COVID-19.      has a past medical history of Anxiety, Arthritis, Bell's palsy, Chronic neck pain, Chronic pain, Fibromyalgia, HA (headache), Headache, HTN (hypertension), and Hypertension.    has a past surgical history that includes  section; shoulder surgery; Urethra dilation; Arm Surgery; [place] tooth extraction; joint replacement (Left); Cholecystectomy, laparoscopic (N/A, 4/6/2020); and Cholecystectomy (04/2020). Restrictions  Restrictions/Precautions  Restrictions/Precautions: General Precautions, Fall Risk  Required Braces or Orthoses?: No  Position Activity Restriction  Other position/activity restrictions: Up as tolerated, telemetry, Droplet+ for Covid, 3L O2 per nc, RUE IV  Subjective   General  Chart Reviewed: Yes  Patient assessed for rehabilitation services?: Yes  Additional Pertinent Hx: Pt. agreeable for treatment  Response to previous treatment: Patient with no complaints from previous session  Family / Caregiver Present: No  Subjective  Subjective: Pt stated \"I feel better today. Milagros Remedies Milagros Remedies I was hoping to get up in the chair today\"  General Comment  Comments: Pt. displayed increased alertness and motivation today      Orientation  Orientation  Overall Orientation Status: Within Functional Limits  Objective    ADL  Grooming: Setup; Supervision (Pt. completed brushing teeth and combing hair with set up with supervision)  Additional Comments: Pt. completed grooming tasks once sitting upright in bedside chair. Balance  Sitting Balance: Supervision  Standing Balance: Minimal assistance  Standing Balance  Time: 1 min  Activity: ADL transfer from EOB to bedside chair  Functional Mobility  Functional - Mobility Device: Rolling Walker  Assist Level: Minimal assistance  Toilet Transfers  Toilet Transfers Comments: Pt. with no needs at this time  Bed mobility  Bridging: Stand by assistance  Rolling to Left: Stand by assistance  Rolling to Right: Stand by assistance  Supine to Sit: Stand by assistance  Sit to Supine: Stand by assistance  Scooting: Stand by assistance  Comment: Pt. completed bed mobility with SBA with time and verbal cues to use grab bars to assist with positioning.  Mod rest breaks required throughout task to manage her pain levels and rest from fatigue  Transfers  Stand Step Transfers: Minimal assistance; 2 Person assistance (x2 to insure safety and manage lines)  Sit to stand: Contact guard assistance; Minimal assistance (from EOB)  Stand to sit: Minimal assistance; 2 Person assistance  Transfer Comments: Pt. required min verbal cues for safety awareness with use of RW, proper breathing tech, and awareness/assist with line management      Cognition  Overall Cognitive Status: WFL     Perception  Overall Perceptual Status: [facility]      Plan   Plan  Times per week: 4-5x/wk 1x/day as rufina  Times per day: Daily  Current Treatment Recommendations: Strengthening, Endurance Training, Patient/Caregiver Education & Training, Equipment Evaluation, Education, & procurement, Self-Care / ADL, Home Management Training, Functional Mobility Training, Safety Education & Training  Plan Comment: Cont. OT with stated POC       AM-PAC Score        AM-PAC Inpatient Daily Activity Raw Score: 15 (11/12/21 1050)  AM-PAC Inpatient ADL T-Scale Score : 34.69 (11/12/21 1050)  ADL Inpatient CMS 0-100% Score: 56.46 (11/12/21 1050)  ADL Inpatient CMS G-Code Modifier : CK (11/12/21 1050)    Goals  Short term goals  Time Frame for Short term goals: By discharge, pt to demo  Short term goal 1: ADL transfers and functional mobility to SBA with use of AD as needed. Short term goal 2: UB ADLS to Set up and LB ADLs SBA with use of AD/AE as needed. Short term goal 3: toileting to SBA with use of AD/grab bars as needed. Short term goal 4: increased B UE strength by 1/2 grade to assist with functional tasks/I with B UE HEP with use of handouts as needed. Short term goal 5: bed mobility to Mod I with use of bedrails as needed. Long term goals  Long term goal 1: Pt to be I with fall prevention educaiton, Covid specific education, EC/WS tech, recommendations for AE with use of handouts as needed. Patient Goals   Patient goals : To feel better!        Therapy Time   Co-treat Concurrent Group Co-treatment   Time In 0931         Time Out 1010         Minutes 39 Co-treatment with PT warranted secondary to decreased safety and independence requiring 2 skilled therapy professionals to address individual discipline's goals. OT addressing \"preparation for ADL transfer\",\"sitting balance for increased ADL performance\",\"sitting/activity tolerance\",\"functional reaching\",\"environmental safety/scanning\",\"fall prevention\",\"functional mobility for ADL transfers\",\"ability to sequence and follow directions\",\"functional UE strength\".     Nathan Said, GURVINDER

## 2021-11-12 NOTE — CARE COORDINATION
Social work:  Chart reviewed, therapy recommending SNF but patient is on high flow now, will follow and refer to SNF when appropriate.

## 2021-11-12 NOTE — PROGRESS NOTES
[facility]  Office: [address], DO, Niels Das, DO, Melody Crabtree, DO, Bushra Linda Blood, DO, Leonela Valentine MD, Billie Woods MD, Hermelindo Emmanuel MD, Claudean Millard, MD, Lori Allred MD, Kay Noguera MD, Rolly Wheeler MD, Marco Barksdale, DO, Cat Wilder, DO, Isma Freedman MD,  Floyd Troy DO, Uri Stein MD, Bridgette Davidson MD, Serene Alarcon MD, Donnie Menjivar MD, Tomas Powers MD, Leticia Grigsby MD, Irina Paz MD, Soledad Watkins, [facility], [facility], CNP, Jojo Carrillo, CNP, Beatriz Campbell, CNS, Marilee Chin, CNP, Kyle Arteaga, CNP, Kaushik Guo, CNP, Jelena Jasmine, CNP, Gloria Pleitez, CNP, Ade Graham PA-C, Amalia Carrillo, [facility], Thomas Merino, CNP, Mayra Mejia, CNP, Ortega Staples, CNP, Zachery Bishop, CNP, Cecille Zavala, CNP, Azul Leon, [facility], Tim Saint, [place]    Progress Note      Name:   Anna Velásquez  MRN:     [id number]     [place]:      [de-identified]   Room:   [address] [place] [address] Day:  8  Admit Date:  11/3/2021 10:05 PM    PCP:   Arsh Sauceda DO  Code Status:  Full Code    Subjective:     C/C:   Chief Complaint   Patient presents with    Abdominal Pain     lower    Nausea    Back Pain    Headache     Interval History Status: not changed. Patient luzmaria on BiPAP/high flow 80% FiO2. Tolerating it well. Afebrile, hemodynamically stable. CBC BMP unremarkable. Blood sugars appear improved. Chest x-ray 11/12 shows moderate bilateral pneumonia unchanged. Complaining of constipation  Brief History:   Per my partner: '66 y. o. female with a past medical history of hypertension and obesity who presented to the emergency department on 11/3/2021 complaining of headache, nausea and abdominal pain. She is not vaccinated against COVID-19. In the ED, the patient was febrile (Tmax 102.2) and ill-appearing. Rapid COVID-19 test was positive.  Chest x-ray was remarkable for interstitial prominence and bibasilar opacities consistent with pneumonia. The patient is admitted to internal medicine for further management of COVID-19 pneumonia.'  11/9 overnight: episode of left-sided chest pain radiating into the back and side overnight. Had EKG troponins done which were unremarkable. A CTA chest  to rule out PE was done: Showed no PE but extensive pulmonary abnormalities and mild reactive adenopathy. Lower extremity Dopplers were negative for DVT. had worsening respiratory status and needed 100% BiPAP support and was moved to ICU 11/10  Review of Systems:     Constitutional:  negative for chills, fevers, sweats  Respiratory:  + cough, dyspnea on exertion, shortness of breath, wheezing  Cardiovascular:  negative for chest pain, chest pressure/discomfort, lower extremity edema, palpitations  Gastrointestinal:  negative for abdominal pain, constipation, diarrhea, +nausea, no vomiting  Neurological:  negative for dizziness, headache    Medications: Allergies:     Allergies   Allergen Reactions    Other Hives    Ceclor [Cefaclor]     Food      All melons      Sulfa Antibiotics      Projectile vomiting        Current Meds:   Scheduled Meds:    insulin lispro  0-12 Units SubCUTAneous TID WC    insulin lispro  0-6 Units SubCUTAneous Nightly    levoFLOXacin  750 mg Oral Daily    ipratropium-albuterol  1 ampule Inhalation Q4H WA    dexamethasone  10 mg IntraVENous Q12H    budesonide-formoterol  2 puff Inhalation BID    miconazole   Topical BID    mirtazapine  7.5 mg Oral Nightly    metoprolol succinate  100 mg Oral Daily    lisinopril-hydroCHLOROthiazide  1 tablet Oral Daily    sodium chloride flush  5-40 mL IntraVENous 2 times per day    enoxaparin  30 mg SubCUTAneous BID    Vitamin D  2,000 Units Oral Daily    docusate sodium  100 mg Oral Daily    influenza virus vaccine  0.5 mL IntraMUSCular Prior to discharge     Continuous Infusions:    dextrose      sodium chloride       PRN Meds: ALPRAZolam, albuterol, sodium chloride flush, glucose, dextrose, glucagon (rDNA), dextrose, metoprolol, oxyCODONE-acetaminophen, sodium chloride flush, sodium chloride, ondansetron **OR** ondansetron, magnesium hydroxide, acetaminophen **OR** acetaminophen, guaiFENesin-dextromethorphan    Data:     Past Medical History:   has a past medical history of Anxiety, Arthritis, Bell's palsy, Chronic neck pain, Chronic pain, Fibromyalgia, HA (headache), Headache, HTN (hypertension), and Hypertension. Social History:   reports that she has never smoked. She has never used smokeless tobacco. She reports that she does not drink alcohol and does not use drugs. Family History:   Family History   Problem Relation Age of Onset    Other Mother         CAD   Sandoval Diabetes Mother     Other Father         CAD    Diabetes Father     Thyroid Disease Sister     Cancer Brother         prostate    Diabetes Brother     Cancer Maternal Grandmother         uterine       Vitals:  /80   Pulse 78   Temp 98.1 °F (36.7 °C) (Axillary)   Resp 23   Ht 5' 2\" (1.575 m)   Wt 239 lb 3.2 oz (108.5 kg)   SpO2 93%   BMI 43.75 kg/m²   Temp (24hrs), Av.9 °F (36.6 °C), Min:97.5 °F (36.4 °C), Max:98.3 °F (36.8 °C)    Recent Labs     21  1627 21  1932 21  0759 21  1156   POCGLU 363* 214* 189* 212*       I/O (24Hr):     Intake/Output Summary (Last 24 hours) at 2021 1455  Last data filed at 2021 0500  Gross per 24 hour   Intake --   Output 825 ml   Net -825 ml       Labs:  Hematology:  Recent Labs     11/10/21  0636 11/10/21  1530 21  0454 21  0501   WBC 8.8  --  5.3 9.2   RBC 4.49  --  4.59 4.62   HGB 14.0  --  14.1 14.3   HCT 42.0  --  43.0 43.3   MCV 93.5  --  93.7 93.7   MCH 31.2  --  30.7 31.0   MCHC 33.3  --  32.8 33.0   RDW 13.4  --  13.2 13.1     --  230 270   MPV 10.7  --  10.4 10.2   DDIMER  --  0.69*  --   --      Chemistry:  Recent Labs     11/09/21  2033 11/10/21  0619 11/11/21  0454 11/12/21  0501   NA  --  135 137 137   K  --  4.3 4.2 4.3   CL  --  95* 96* 97*   CO2  --  26 26 27   GLUCOSE  --  189* 208* 170*   BUN  --  23 32* 34*   CREATININE  --  <0.40* 0.61 0.61   ANIONGAP  --  14 15 13   LABGLOM  --  CANNOT BE CALCULATED >60 >60   GFRAA  --  CANNOT BE CALCULATED >60 >60   CALCIUM  --  8.6 8.6 9.1   TROPHS <6  --   --   --      Recent Labs     11/11/21  0723 11/11/21  1114 11/11/21  1627 11/11/21  1932 11/12/21  0759 11/12/21  1156   POCGLU 188* 323* 363* 214* 189* 212*     Radiology:  XR CHEST PORTABLE    Result Date: 11/9/2021  Multifocal pulmonary opacities suggest significantly worsened pulmonary edema and or multifocal pneumonia. Recommend continued follow-up to resolution. XR CHEST PORTABLE    Result Date: 11/3/2021  Interstitial prominence and bibasilar opacities, which could be seen with pneumonia or edema. Physical Examination:        General appearance:  alert, cooperative having conversation through BiPAP mask. Mental Status:  oriented to person, place and time and flat affect  Lungs: Continues to have rhonchi, basal crackles  Heart:  regular rate and rhythm, no murmur  Abdomen:  soft, nontender, nondistended, normal bowel sounds, no masses, hepatomegaly, splenomegaly  Extremities:+ b/l pedal edema and tenderness in the calves  Skin:  no gross lesions, rashes, induration    Assessment:        Hospital Problems           Last Modified POA    * (Principal) Pneumonia due to COVID-19 virus 11/9/2021 Yes    Essential hypertension 11/8/2021 Yes    Prediabetes 11/8/2021 Yes    Morbid obesity with BMI of 40.0-44.9, adult ([facility] [address] 11/9/2021 Yes    Hyperglycemia 11/8/2021 Yes    Acute respiratory failure with hypoxia ([facility] [address] 11/10/2021 Yes    Acute urinary retention 11/10/2021 Yes        Plan:      -Continue BiPAP/high flow as tolerated. Continue steroids. Received Actemra 11/10/2021. Continue bronchodilator therapy  Continue current antibiotics Levaquin.   Will give Lasix 20 mg IV x1, monitor I's and O's.  -Continue bowel regimen: Increase docusate 200 mg twice daily Milk of mag and enema ordered as needed. ,  Continue Hylton catheter.  -Steroid-induced hyperglycemia: Patient to moderate intensity insulin sliding scale.  -Continue home Prinzide and Lopressor 100. Full code.     Yara Garrido MD  11/12/2021

## 2021-11-13 LAB
ABSOLUTE EOS #: <0.03 K/UL (ref 0–0.44)
ABSOLUTE IMMATURE GRANULOCYTE: 0.17 K/UL (ref 0–0.3)
ABSOLUTE LYMPH #: 0.9 K/UL (ref 1.1–3.7)
ABSOLUTE MONO #: 0.65 K/UL (ref 0.1–1.2)
ANION GAP SERPL CALCULATED.3IONS-SCNC: 14 MMOL/L (ref 9–17)
BASOPHILS # BLD: 0 % (ref 0–2)
BASOPHILS ABSOLUTE: <0.03 K/UL (ref 0–0.2)
BUN BLDV-MCNC: 41 MG/DL (ref 8–23)
BUN/CREAT BLD: 64 (ref 9–20)
CALCIUM SERPL-MCNC: 9 MG/DL (ref 8.6–10.4)
CHLORIDE BLD-SCNC: 97 MMOL/L (ref 98–107)
CO2: 24 MMOL/L (ref 20–31)
CREAT SERPL-MCNC: 0.64 MG/DL (ref 0.5–0.9)
DIFFERENTIAL TYPE: ABNORMAL
EOSINOPHILS RELATIVE PERCENT: 0 % (ref 1–4)
GFR AFRICAN AMERICAN: >60 ML/MIN
GFR NON-AFRICAN AMERICAN: >60 ML/MIN
GFR SERPL CREATININE-BSD FRML MDRD: ABNORMAL ML/MIN/{1.73_M2}
GFR SERPL CREATININE-BSD FRML MDRD: ABNORMAL ML/MIN/{1.73_M2}
GLUCOSE BLD-MCNC: 135 MG/DL (ref 65–105)
GLUCOSE BLD-MCNC: 175 MG/DL (ref 65–105)
GLUCOSE BLD-MCNC: 190 MG/DL (ref 70–99)
GLUCOSE BLD-MCNC: 194 MG/DL (ref 65–105)
GLUCOSE BLD-MCNC: 204 MG/DL (ref 65–105)
GLUCOSE BLD-MCNC: 204 MG/DL (ref 65–105)
HCT VFR BLD CALC: 43.1 % (ref 36.3–47.1)
HEMOGLOBIN: 14.2 G/DL (ref 11.9–15.1)
IMMATURE GRANULOCYTES: 2 %
LYMPHOCYTES # BLD: 10 % (ref 24–43)
MCH RBC QN AUTO: 31.2 PG (ref 25.2–33.5)
MCHC RBC AUTO-ENTMCNC: 32.9 G/DL (ref 28.4–34.8)
MCV RBC AUTO: 94.7 FL (ref 82.6–102.9)
MONOCYTES # BLD: 7 % (ref 3–12)
NRBC AUTOMATED: 0 PER 100 WBC
PDW BLD-RTO: 13.2 % (ref 11.8–14.4)
PLATELET # BLD: 302 K/UL (ref 138–453)
PLATELET ESTIMATE: ABNORMAL
PMV BLD AUTO: 10.2 FL (ref 8.1–13.5)
POTASSIUM SERPL-SCNC: 4.2 MMOL/L (ref 3.7–5.3)
RBC # BLD: 4.55 M/UL (ref 3.95–5.11)
RBC # BLD: ABNORMAL 10*6/UL
SEG NEUTROPHILS: 81 % (ref 36–65)
SEGMENTED NEUTROPHILS ABSOLUTE COUNT: 7.51 K/UL (ref 1.5–8.1)
SODIUM BLD-SCNC: 135 MMOL/L (ref 135–144)
WBC # BLD: 9.3 K/UL (ref 3.5–11.3)
WBC # BLD: ABNORMAL 10*3/UL

## 2021-11-13 PROCEDURE — 82947 ASSAY GLUCOSE BLOOD QUANT: CPT

## 2021-11-13 PROCEDURE — 6370000000 HC RX 637 (ALT 250 FOR IP): Performed by: NURSE PRACTITIONER

## 2021-11-13 PROCEDURE — 6370000000 HC RX 637 (ALT 250 FOR IP): Performed by: STUDENT IN AN ORGANIZED HEALTH CARE EDUCATION/TRAINING PROGRAM

## 2021-11-13 PROCEDURE — 2000000000 HC ICU R&B

## 2021-11-13 PROCEDURE — 6370000000 HC RX 637 (ALT 250 FOR IP): Performed by: INTERNAL MEDICINE

## 2021-11-13 PROCEDURE — 6360000002 HC RX W HCPCS: Performed by: NURSE PRACTITIONER

## 2021-11-13 PROCEDURE — 94640 AIRWAY INHALATION TREATMENT: CPT

## 2021-11-13 PROCEDURE — 2580000003 HC RX 258: Performed by: NURSE PRACTITIONER

## 2021-11-13 PROCEDURE — 85025 COMPLETE CBC W/AUTO DIFF WBC: CPT

## 2021-11-13 PROCEDURE — 99232 SBSQ HOSP IP/OBS MODERATE 35: CPT | Performed by: INTERNAL MEDICINE

## 2021-11-13 PROCEDURE — 94761 N-INVAS EAR/PLS OXIMETRY MLT: CPT

## 2021-11-13 PROCEDURE — 36415 COLL VENOUS BLD VENIPUNCTURE: CPT

## 2021-11-13 PROCEDURE — 94660 CPAP INITIATION&MGMT: CPT

## 2021-11-13 PROCEDURE — 2700000000 HC OXYGEN THERAPY PER DAY

## 2021-11-13 PROCEDURE — 80048 BASIC METABOLIC PNL TOTAL CA: CPT

## 2021-11-13 RX ORDER — POLYETHYLENE GLYCOL 3350 17 G/17G
17 POWDER, FOR SOLUTION ORAL DAILY
Status: DISCONTINUED | OUTPATIENT
Start: 2021-11-13 | End: 2021-11-27 | Stop reason: HOSPADM

## 2021-11-13 RX ADMIN — SODIUM CHLORIDE, PRESERVATIVE FREE 10 ML: 5 INJECTION INTRAVENOUS at 20:11

## 2021-11-13 RX ADMIN — IPRATROPIUM BROMIDE AND ALBUTEROL SULFATE 1 AMPULE: .5; 2.5 SOLUTION RESPIRATORY (INHALATION) at 08:22

## 2021-11-13 RX ADMIN — DEXAMETHASONE SODIUM PHOSPHATE 10 MG: 10 INJECTION, SOLUTION INTRAMUSCULAR; INTRAVENOUS at 03:25

## 2021-11-13 RX ADMIN — IPRATROPIUM BROMIDE AND ALBUTEROL SULFATE 1 AMPULE: .5; 2.5 SOLUTION RESPIRATORY (INHALATION) at 20:27

## 2021-11-13 RX ADMIN — Medication 2000 UNITS: at 09:04

## 2021-11-13 RX ADMIN — POLYETHYLENE GLYCOL 3350 17 G: 17 POWDER, FOR SOLUTION ORAL at 20:11

## 2021-11-13 RX ADMIN — LEVOFLOXACIN 750 MG: 500 TABLET, FILM COATED ORAL at 09:01

## 2021-11-13 RX ADMIN — IPRATROPIUM BROMIDE AND ALBUTEROL SULFATE 1 AMPULE: .5; 2.5 SOLUTION RESPIRATORY (INHALATION) at 11:27

## 2021-11-13 RX ADMIN — BUDESONIDE AND FORMOTEROL FUMARATE DIHYDRATE 2 PUFF: 160; 4.5 AEROSOL RESPIRATORY (INHALATION) at 20:27

## 2021-11-13 RX ADMIN — SODIUM CHLORIDE, PRESERVATIVE FREE 10 ML: 5 INJECTION INTRAVENOUS at 09:00

## 2021-11-13 RX ADMIN — OXYCODONE AND ACETAMINOPHEN 1 TABLET: 5; 325 TABLET ORAL at 13:15

## 2021-11-13 RX ADMIN — INSULIN LISPRO 4 UNITS: 100 INJECTION, SOLUTION INTRAVENOUS; SUBCUTANEOUS at 20:55

## 2021-11-13 RX ADMIN — INSULIN LISPRO 2 UNITS: 100 INJECTION, SOLUTION INTRAVENOUS; SUBCUTANEOUS at 08:48

## 2021-11-13 RX ADMIN — DEXAMETHASONE SODIUM PHOSPHATE 10 MG: 10 INJECTION, SOLUTION INTRAMUSCULAR; INTRAVENOUS at 13:15

## 2021-11-13 RX ADMIN — ANTI-FUNGAL POWDER MICONAZOLE NITRATE TALC FREE: 1.42 POWDER TOPICAL at 09:03

## 2021-11-13 RX ADMIN — INSULIN LISPRO 4 UNITS: 100 INJECTION, SOLUTION INTRAVENOUS; SUBCUTANEOUS at 12:04

## 2021-11-13 RX ADMIN — IPRATROPIUM BROMIDE AND ALBUTEROL SULFATE 1 AMPULE: .5; 2.5 SOLUTION RESPIRATORY (INHALATION) at 15:53

## 2021-11-13 RX ADMIN — ENOXAPARIN SODIUM 30 MG: 100 INJECTION SUBCUTANEOUS at 20:10

## 2021-11-13 RX ADMIN — MIRTAZAPINE 7.5 MG: 15 TABLET, FILM COATED ORAL at 20:11

## 2021-11-13 RX ADMIN — ANTI-FUNGAL POWDER MICONAZOLE NITRATE TALC FREE: 1.42 POWDER TOPICAL at 20:11

## 2021-11-13 RX ADMIN — LISINOPRIL AND HYDROCHLOROTHIAZIDE 1 TABLET: 12.5; 2 TABLET ORAL at 09:01

## 2021-11-13 RX ADMIN — ENOXAPARIN SODIUM 30 MG: 100 INJECTION SUBCUTANEOUS at 09:02

## 2021-11-13 RX ADMIN — METOPROLOL SUCCINATE 100 MG: 50 TABLET, EXTENDED RELEASE ORAL at 09:02

## 2021-11-13 RX ADMIN — DOCUSATE SODIUM 100 MG: 100 CAPSULE, LIQUID FILLED ORAL at 09:01

## 2021-11-13 RX ADMIN — DOCUSATE SODIUM 100 MG: 100 CAPSULE, LIQUID FILLED ORAL at 20:11

## 2021-11-13 RX ADMIN — MAGNESIUM HYDROXIDE 30 ML: 400 SUSPENSION ORAL at 12:18

## 2021-11-13 ASSESSMENT — PAIN SCALES - GENERAL
PAINLEVEL_OUTOF10: 0
PAINLEVEL_OUTOF10: 7
PAINLEVEL_OUTOF10: 0

## 2021-11-13 ASSESSMENT — PAIN DESCRIPTION - LOCATION: LOCATION: OTHER (COMMENT)

## 2021-11-13 NOTE — PLAN OF CARE
Problem: Falls - Risk of:  Goal: Will remain free from falls  Description: Will remain free from falls  Outcome: Ongoing  Goal: Absence of physical injury  Description: Absence of physical injury  Outcome: Ongoing     Problem: IP BALANCE  Goal: LTG - Patient will maintain balance to allow for safe/functional mobility  Outcome: Ongoing     Problem: Pain:  Description: Pain management should include both nonpharmacologic and pharmacologic interventions. Goal: Pain level will decrease  Description: Pain level will decrease  Outcome: Ongoing  Goal: Control of acute pain  Description: Control of acute pain  Outcome: Ongoing  Goal: Control of chronic pain  Description: Control of chronic pain  Outcome: Ongoing     Problem: Airway Clearance - Ineffective  Goal: Achieve or maintain patent airway  Outcome: Ongoing     Problem: Gas Exchange - Impaired  Goal: Absence of hypoxia  Outcome: Ongoing  Goal: Promote optimal lung function  Outcome: Ongoing     Problem: Breathing Pattern - Ineffective  Goal: Ability to achieve and maintain a regular respiratory rate  Outcome: Ongoing     Problem:  Body Temperature -  Risk of, Imbalanced  Goal: Ability to maintain a body temperature within defined limits  Outcome: Ongoing  Goal: Will regain or maintain usual level of consciousness  Outcome: Ongoing  Goal: Complications related to the disease process, condition or treatment will be avoided or minimized  Outcome: Ongoing     Problem: Isolation Precautions - Risk of Spread of Infection  Goal: Prevent transmission of infection  Outcome: Ongoing     Problem: Nutrition Deficits  Goal: Optimize nutritional status  Outcome: Ongoing     Problem: Loneliness or Risk for Loneliness  Goal: Demonstrate positive use of time alone when socialization is not possible  Outcome: Ongoing     Problem: Fatigue  Goal: Verbalize increase energy and improved vitality  Outcome: Ongoing     Problem: Patient Education: Go to Patient Education Activity  Goal: Patient/Family Education  Outcome: Ongoing     Problem: Skin Integrity:  Goal: Will show no infection signs and symptoms  Description: Will show no infection signs and symptoms  Outcome: Ongoing  Goal: Absence of new skin breakdown  Description: Absence of new skin breakdown  Outcome: Ongoing     Problem: Nutrition  Goal: Optimal nutrition therapy  Outcome: Ongoing

## 2021-11-13 NOTE — PROGRESS NOTES
Pulmonary Critical Care Progress Note  Kieran Espinoza MD     Patient seen for the follow up of acute hypoxic respiratory failure, asthma extubation, Pneumonia due to COVID-19 virus     Subjective:  No significant overnight events noted. She wore BiPAP last night. She is up in chair right now. She is still on high flow, 60 L / 80% FiO2. Shortness of breath is not much change. She has productive cough with yellow sputum. Examination:  Vitals: /75   Pulse 67   Temp 98.2 °F (36.8 °C) (Oral)   Resp 27   Ht 5' 2\" (1.575 m)   Wt 239 lb 3.2 oz (108.5 kg)   SpO2 90%   BMI 43.75 kg/m²   General appearance:  alert and cooperative with exam up in chair  Neck: No JVD  Lungs: Bilateral crackles, no wheezing, moderate air exchange  Heart: regular rate and rhythm, S1, S2 normal, no gallop  Abdomen: Soft, non tender, + BS  Extremities: no cyanosis or clubbing.  No significant edema    LABs:  CBC:   Recent Labs     11/12/21  0501 11/13/21  0500   WBC 9.2 9.3   HGB 14.3 14.2   HCT 43.3 43.1    302     BMP:   Recent Labs     11/12/21  0501 11/13/21  0500    135   K 4.3 4.2   CO2 27 24   BUN 34* 41*   CREATININE 0.61 0.64   LABGLOM >60 >60   GLUCOSE 170* 190*     Radiology:  X-ray chest: 11/12/2021  Bilateral pulmonary infiltrate      Impression:  · Acute hypoxic respiratory failure  · COVID-19 pneumonia with possible bacterial coinfection  · Mild adenopathy, likely reactive  · History of asthma  · Suspected obstructive sleep apnea/Obesity  · Arthritis, hypertension, fibromyalgia    Recommendations:  · Continue airborne isolation  · Prone as tolerated  · Incentive spirometry every hour while awake  · Continue BiPAP support  · Oxygen via high flow nasal cannula as tolerated, keep SPO2 90% or greater   · Continue Decadron to IV 10 mg every 12 hours  · S/p Actemra  · Continue albuterol and Ipratropium Q 4 hours and prn  · Continue Symbicort  · Continue Levaquin 750 mg daily  · X-ray chest in am  · Labs: CBC and BMP in am  · DVT prophylaxis with low molecular weight heparin  · Discussed with RN   · Will follow with you    Titus Rajan MD, [facility]  Pulmonary Critical Care and Sleep Medicine,  [facility]  Cell: [phone]  Office: [phone]

## 2021-11-13 NOTE — PROGRESS NOTES
[facility]  Office: [address], DO, Germaine Maldonado, DO, Brooklyn Starr, DO, Husam Mendoza Sevilla, DO, Talat Ferguson MD, Tony Ellis MD, Shiv Breen MD, Manolo Duenas MD, Jesús Christy MD, Hussain Stringer MD, Brandie Cota MD, Mylene King, DO, Mell Hutchins, DO, Namita Zuñiga MD,  Saud Amin, DO, Alejandro Valle MD, Fátiam Maldonado MD, Britany Briseno MD, Jordy Doll MD, Jero Lovett MD, Ana Malave MD, Vanda Carlton MD, Myah Guidry, [facility], [facility], CNP, Monique Jacobs, CNP, Neela Norris, CNS, Corinne Dennis, CNP, Guy Chadwick, CNP, Saadia Alonso, CNP, Epifanio Higuera, [facility], Epifanio Curtis, [facility], Kerry Moyer PA-C, Yolanda Guy, [facility], Dave Mclaughlin, CNP, Ravi Slaughter, CNP, Dana Schmid, CNP, Nancy Fonseca, CNP, Urmila Torres, CNP, Dorina Sethi, CNP, Brook Wisdom, [facility]    Progress Note      Name:   Puja Gloria  MRN:     [id number]     [place]:      [de-identified]   Room:   [address] [place] [address] Day:  9  Admit Date:  11/3/2021 10:05 PM    PCP:   Kalen Barrientos DO  Code Status:  Full Code    Subjective:     C/C:   Chief Complaint   Patient presents with    Abdominal Pain     lower    Nausea    Back Pain    Headache     Interval History Status: not changed. Patient states feels more weak today. Breathing remains pretty much unchanged. Remains constipated. Is afebrile, hemodynamically stable. CBC BMP reviewed. Unremarkable. Blood sugars reviewed. Chest x-ray yesterday showed moderate bilateral pneumonia unchanged from prior. Brief History:   Per my partner: '66 y. o. female with a past medical history of hypertension and obesity who presented to the emergency department on 11/3/2021 complaining of headache, nausea and abdominal pain. She is not vaccinated against COVID-19. In the ED, the patient was febrile (Tmax 102.2) and ill-appearing. Rapid COVID-19 test was positive.  Chest x-ray was Continuous Infusions:    dextrose      sodium chloride       PRN Meds: ALPRAZolam, albuterol, sodium chloride flush, glucose, dextrose, glucagon (rDNA), dextrose, metoprolol, oxyCODONE-acetaminophen, sodium chloride flush, sodium chloride, ondansetron **OR** ondansetron, magnesium hydroxide, acetaminophen **OR** acetaminophen, guaiFENesin-dextromethorphan    Data:     Past Medical History:   has a past medical history of Anxiety, Arthritis, Bell's palsy, Chronic neck pain, Chronic pain, Fibromyalgia, HA (headache), Headache, HTN (hypertension), and Hypertension. Social History:   reports that she has never smoked. She has never used smokeless tobacco. She reports that she does not drink alcohol and does not use drugs. Family History:   Family History   Problem Relation Age of Onset    Other Mother         CAD   China Bennyns Diabetes Mother     Other Father         CAD    Diabetes Father     Thyroid Disease Sister     Cancer Brother         prostate    Diabetes Brother     Cancer Maternal Grandmother         uterine       Vitals:  /75   Pulse 67   Temp 98.2 °F (36.8 °C) (Oral)   Resp 18   Ht 5' 2\" (1.575 m)   Wt 239 lb 3.2 oz (108.5 kg)   SpO2 96%   BMI 43.75 kg/m²   Temp (24hrs), Av.1 °F (36.7 °C), Min:97.9 °F (36.6 °C), Max:98.2 °F (36.8 °C)    Recent Labs     21  1939 21  0724 21  1110 21  1627   POCGLU 217* 175* 204* 135*       I/O (24Hr):     Intake/Output Summary (Last 24 hours) at 2021 1802  Last data filed at 2021 1750  Gross per 24 hour   Intake 340 ml   Output 1025 ml   Net -685 ml       Labs:  Hematology:  Recent Labs     21  0454 21  0501 21  0500   WBC 5.3 9.2 9.3   RBC 4.59 4.62 4.55   HGB 14.1 14.3 14.2   HCT 43.0 43.3 43.1   MCV 93.7 93.7 94.7   MCH 30.7 31.0 31.2   MCHC 32.8 33.0 32.9   RDW 13.2 13.1 13.2    270 302   MPV 10.4 10.2 10.2     Chemistry:  Recent Labs     21  0454 21  0501 21  0500  137 135   K 4.2 4.3 4.2   CL 96* 97* 97*   CO2 26 27 24   GLUCOSE 208* 170* 190*   BUN 32* 34* 41*   CREATININE 0.61 0.61 0.64   ANIONGAP 15 13 14   LABGLOM >60 >60 >60   GFRAA >60 >60 >60   CALCIUM 8.6 9.1 9.0     Recent Labs     11/12/21  1156 11/12/21  1627 11/12/21  1939 11/13/21  0724 11/13/21  1110 11/13/21  1627   POCGLU 212* 192* 217* 175* 204* 135*     Radiology:  XR CHEST PORTABLE    Result Date: 11/9/2021  Multifocal pulmonary opacities suggest significantly worsened pulmonary edema and or multifocal pneumonia. Recommend continued follow-up to resolution. XR CHEST PORTABLE    Result Date: 11/3/2021  Interstitial prominence and bibasilar opacities, which could be seen with pneumonia or edema. Physical Examination:        General appearance:  alert, cooperative with flat affect   mental Status:  oriented to person, place and time   Lungs: Continues to have rhonchi, basal crackles  Heart:  regular rate and rhythm, no murmur  Abdomen:  soft, nontender, nondistended, normal bowel sounds, no masses, hepatomegaly, splenomegaly  Extremities: Trace b/l ankle edema and tenderness in the calves  Skin:  no gross lesions, rashes, induration    Assessment:        Hospital Problems           Last Modified POA    * (Principal) Pneumonia due to COVID-19 virus 11/9/2021 Yes    Essential hypertension 11/8/2021 Yes    Prediabetes 11/8/2021 Yes    Morbid obesity with BMI of 40.0-44.9, adult ([facility] [address] 11/9/2021 Yes    Hyperglycemia 11/8/2021 Yes    Acute respiratory failure with hypoxia ([facility] [address] 11/10/2021 Yes    Acute urinary retention 11/10/2021 Yes        Plan:      -Continue BiPAP/high flow as tolerated. Continue steroids. Received Actemra 11/10/2021. Continue bronchodilator therapy  Continue current antibiotics Levaquin.  -Continue bowel regimen: Continue docusate 200 mg twice daily. Schedule Milk of mag and enema ordered as needed, discussed with RN.   Continue Hylton catheter.  -Steroid-induced hyperglycemia: Continue moderate intensity insulin sliding scale.  -Continue home Prinzide and Lopressor 100.  -Encourage patient to use incentive spirometry and up to chair today. Full code.     Yara Garrido MD  11/13/2021

## 2021-11-14 ENCOUNTER — APPOINTMENT (OUTPATIENT)
Dept: GENERAL RADIOLOGY | Age: 66
DRG: 177 | End: 2021-11-14
Payer: MEDICARE

## 2021-11-14 LAB
ABSOLUTE EOS #: 0.07 K/UL (ref 0–0.44)
ABSOLUTE IMMATURE GRANULOCYTE: 0.24 K/UL (ref 0–0.3)
ABSOLUTE LYMPH #: 1.34 K/UL (ref 1.1–3.7)
ABSOLUTE MONO #: 0.59 K/UL (ref 0.1–1.2)
ANION GAP SERPL CALCULATED.3IONS-SCNC: 11 MMOL/L (ref 9–17)
BASOPHILS # BLD: 0 % (ref 0–2)
BASOPHILS ABSOLUTE: 0.03 K/UL (ref 0–0.2)
BUN BLDV-MCNC: 37 MG/DL (ref 8–23)
BUN/CREAT BLD: 54 (ref 9–20)
CALCIUM SERPL-MCNC: 9.1 MG/DL (ref 8.6–10.4)
CHLORIDE BLD-SCNC: 95 MMOL/L (ref 98–107)
CO2: 29 MMOL/L (ref 20–31)
CREAT SERPL-MCNC: 0.69 MG/DL (ref 0.5–0.9)
DIFFERENTIAL TYPE: ABNORMAL
EOSINOPHILS RELATIVE PERCENT: 1 % (ref 1–4)
GFR AFRICAN AMERICAN: >60 ML/MIN
GFR NON-AFRICAN AMERICAN: >60 ML/MIN
GFR SERPL CREATININE-BSD FRML MDRD: ABNORMAL ML/MIN/{1.73_M2}
GFR SERPL CREATININE-BSD FRML MDRD: ABNORMAL ML/MIN/{1.73_M2}
GLUCOSE BLD-MCNC: 139 MG/DL (ref 70–99)
GLUCOSE BLD-MCNC: 140 MG/DL (ref 65–105)
GLUCOSE BLD-MCNC: 167 MG/DL (ref 65–105)
GLUCOSE BLD-MCNC: 167 MG/DL (ref 65–105)
GLUCOSE BLD-MCNC: 98 MG/DL (ref 65–105)
HCT VFR BLD CALC: 45.6 % (ref 36.3–47.1)
HEMOGLOBIN: 14.9 G/DL (ref 11.9–15.1)
IMMATURE GRANULOCYTES: 2 %
LYMPHOCYTES # BLD: 12 % (ref 24–43)
MCH RBC QN AUTO: 30.8 PG (ref 25.2–33.5)
MCHC RBC AUTO-ENTMCNC: 32.7 G/DL (ref 28.4–34.8)
MCV RBC AUTO: 94.2 FL (ref 82.6–102.9)
MONOCYTES # BLD: 5 % (ref 3–12)
NRBC AUTOMATED: 0 PER 100 WBC
PDW BLD-RTO: 13 % (ref 11.8–14.4)
PLATELET # BLD: 319 K/UL (ref 138–453)
PLATELET ESTIMATE: ABNORMAL
PMV BLD AUTO: 10 FL (ref 8.1–13.5)
POTASSIUM SERPL-SCNC: 4.6 MMOL/L (ref 3.7–5.3)
RBC # BLD: 4.84 M/UL (ref 3.95–5.11)
RBC # BLD: ABNORMAL 10*6/UL
SEG NEUTROPHILS: 80 % (ref 36–65)
SEGMENTED NEUTROPHILS ABSOLUTE COUNT: 9 K/UL (ref 1.5–8.1)
SODIUM BLD-SCNC: 135 MMOL/L (ref 135–144)
WBC # BLD: 11.3 K/UL (ref 3.5–11.3)
WBC # BLD: ABNORMAL 10*3/UL

## 2021-11-14 PROCEDURE — 6370000000 HC RX 637 (ALT 250 FOR IP): Performed by: INTERNAL MEDICINE

## 2021-11-14 PROCEDURE — 85025 COMPLETE CBC W/AUTO DIFF WBC: CPT

## 2021-11-14 PROCEDURE — 36415 COLL VENOUS BLD VENIPUNCTURE: CPT

## 2021-11-14 PROCEDURE — 94640 AIRWAY INHALATION TREATMENT: CPT

## 2021-11-14 PROCEDURE — 6370000000 HC RX 637 (ALT 250 FOR IP): Performed by: STUDENT IN AN ORGANIZED HEALTH CARE EDUCATION/TRAINING PROGRAM

## 2021-11-14 PROCEDURE — 99232 SBSQ HOSP IP/OBS MODERATE 35: CPT | Performed by: INTERNAL MEDICINE

## 2021-11-14 PROCEDURE — 6370000000 HC RX 637 (ALT 250 FOR IP): Performed by: NURSE PRACTITIONER

## 2021-11-14 PROCEDURE — 2580000003 HC RX 258: Performed by: NURSE PRACTITIONER

## 2021-11-14 PROCEDURE — 94761 N-INVAS EAR/PLS OXIMETRY MLT: CPT

## 2021-11-14 PROCEDURE — 82947 ASSAY GLUCOSE BLOOD QUANT: CPT

## 2021-11-14 PROCEDURE — 6360000002 HC RX W HCPCS: Performed by: NURSE PRACTITIONER

## 2021-11-14 PROCEDURE — 2000000000 HC ICU R&B

## 2021-11-14 PROCEDURE — 2700000000 HC OXYGEN THERAPY PER DAY

## 2021-11-14 PROCEDURE — 94660 CPAP INITIATION&MGMT: CPT

## 2021-11-14 PROCEDURE — 80048 BASIC METABOLIC PNL TOTAL CA: CPT

## 2021-11-14 PROCEDURE — 71045 X-RAY EXAM CHEST 1 VIEW: CPT

## 2021-11-14 RX ADMIN — ANTI-FUNGAL POWDER MICONAZOLE NITRATE TALC FREE: 1.42 POWDER TOPICAL at 20:27

## 2021-11-14 RX ADMIN — INSULIN LISPRO 2 UNITS: 100 INJECTION, SOLUTION INTRAVENOUS; SUBCUTANEOUS at 13:10

## 2021-11-14 RX ADMIN — ANTI-FUNGAL POWDER MICONAZOLE NITRATE TALC FREE: 1.42 POWDER TOPICAL at 13:12

## 2021-11-14 RX ADMIN — LEVOFLOXACIN 750 MG: 500 TABLET, FILM COATED ORAL at 09:07

## 2021-11-14 RX ADMIN — ENOXAPARIN SODIUM 30 MG: 100 INJECTION SUBCUTANEOUS at 09:15

## 2021-11-14 RX ADMIN — IPRATROPIUM BROMIDE AND ALBUTEROL SULFATE 1 AMPULE: .5; 2.5 SOLUTION RESPIRATORY (INHALATION) at 10:00

## 2021-11-14 RX ADMIN — BUDESONIDE AND FORMOTEROL FUMARATE DIHYDRATE 2 PUFF: 160; 4.5 AEROSOL RESPIRATORY (INHALATION) at 10:02

## 2021-11-14 RX ADMIN — OXYCODONE AND ACETAMINOPHEN 1 TABLET: 5; 325 TABLET ORAL at 10:59

## 2021-11-14 RX ADMIN — SODIUM CHLORIDE, PRESERVATIVE FREE 10 ML: 5 INJECTION INTRAVENOUS at 20:27

## 2021-11-14 RX ADMIN — SODIUM CHLORIDE, PRESERVATIVE FREE 10 ML: 5 INJECTION INTRAVENOUS at 09:15

## 2021-11-14 RX ADMIN — MIRTAZAPINE 7.5 MG: 15 TABLET, FILM COATED ORAL at 20:27

## 2021-11-14 RX ADMIN — INSULIN LISPRO 1 UNITS: 100 INJECTION, SOLUTION INTRAVENOUS; SUBCUTANEOUS at 20:21

## 2021-11-14 RX ADMIN — DOCUSATE SODIUM 100 MG: 100 CAPSULE, LIQUID FILLED ORAL at 20:27

## 2021-11-14 RX ADMIN — LISINOPRIL AND HYDROCHLOROTHIAZIDE 1 TABLET: 12.5; 2 TABLET ORAL at 09:09

## 2021-11-14 RX ADMIN — Medication 2000 UNITS: at 09:10

## 2021-11-14 RX ADMIN — IPRATROPIUM BROMIDE AND ALBUTEROL SULFATE 1 AMPULE: .5; 2.5 SOLUTION RESPIRATORY (INHALATION) at 15:11

## 2021-11-14 RX ADMIN — INSULIN LISPRO 2 UNITS: 100 INJECTION, SOLUTION INTRAVENOUS; SUBCUTANEOUS at 09:14

## 2021-11-14 RX ADMIN — METOPROLOL SUCCINATE 100 MG: 50 TABLET, EXTENDED RELEASE ORAL at 09:09

## 2021-11-14 RX ADMIN — DOCUSATE SODIUM 100 MG: 100 CAPSULE, LIQUID FILLED ORAL at 09:09

## 2021-11-14 RX ADMIN — ENOXAPARIN SODIUM 30 MG: 100 INJECTION SUBCUTANEOUS at 20:27

## 2021-11-14 RX ADMIN — DEXAMETHASONE SODIUM PHOSPHATE 10 MG: 10 INJECTION, SOLUTION INTRAMUSCULAR; INTRAVENOUS at 04:06

## 2021-11-14 RX ADMIN — DEXAMETHASONE SODIUM PHOSPHATE 10 MG: 10 INJECTION, SOLUTION INTRAMUSCULAR; INTRAVENOUS at 16:10

## 2021-11-14 RX ADMIN — IPRATROPIUM BROMIDE AND ALBUTEROL SULFATE 1 AMPULE: .5; 2.5 SOLUTION RESPIRATORY (INHALATION) at 20:08

## 2021-11-14 ASSESSMENT — PAIN DESCRIPTION - DESCRIPTORS: DESCRIPTORS: SHARP

## 2021-11-14 ASSESSMENT — PAIN SCALES - GENERAL
PAINLEVEL_OUTOF10: 0
PAINLEVEL_OUTOF10: 0
PAINLEVEL_OUTOF10: 8
PAINLEVEL_OUTOF10: 0

## 2021-11-14 ASSESSMENT — PAIN DESCRIPTION - ORIENTATION: ORIENTATION: RIGHT;LEFT

## 2021-11-14 ASSESSMENT — PAIN DESCRIPTION - LOCATION: LOCATION: HIP

## 2021-11-14 ASSESSMENT — PAIN DESCRIPTION - FREQUENCY: FREQUENCY: CONTINUOUS

## 2021-11-14 ASSESSMENT — PAIN DESCRIPTION - PAIN TYPE: TYPE: CHRONIC PAIN

## 2021-11-14 ASSESSMENT — PAIN DESCRIPTION - ONSET: ONSET: SUDDEN

## 2021-11-14 ASSESSMENT — PAIN - FUNCTIONAL ASSESSMENT: PAIN_FUNCTIONAL_ASSESSMENT: PREVENTS OR INTERFERES WITH MANY ACTIVE NOT PASSIVE ACTIVITIES

## 2021-11-14 ASSESSMENT — PAIN DESCRIPTION - PROGRESSION: CLINICAL_PROGRESSION: GRADUALLY WORSENING

## 2021-11-14 NOTE — PROGRESS NOTES
[facility]  Office: [address], DO, Cosmo Juliana, DO, Esther Maple, DO, Adriajohn paul Sevilla, DO, Katelynn Calderón MD, Ancelmo Crane MD, Omaira Santiago MD, Chas Thorne MD, Camryn Milligan MD, Kiki Aguilar MD, Vishnu Colmenares MD, Mara Ibarra, DO, Rosario Malhotra, DO, Suhail Ratliff MD,  Aura Ricci, DO, Cassia Mccarthy MD, Ar Cuba MD, Jeanette Pantoja MD, Josefina Mcfadden MD, Alexx Garner MD, Angelica Terrell MD, Claritza Menchaca MD, Tricia Tong, [facility], [facility], [facility], Miguelito Brandt, [facility], Yusef Desir, CNS, Mikala Garcia, CNP, Sandy Uriostegui, CNP, Ruth Doty, CNP, Ney Wesley, CNP, Deidre Sue, CNP, Viv Guerrero PA-C, Jose Benoit, [facility], Destiny Diaz, CNP, Gina Fisher, CNP, Ca Steele, CNP, Lana Braswell, CNP, Geovani Sheets, CNP, Jackie Owens, [facility], [facility]    Progress Note      Name:   Minh Hannon  MRN:     [id number]     [place]:      [de-identified]   Room:   [address] [place] [address] Day:  10  Admit Date:  11/3/2021 10:05 PM    PCP:   Ivette Brown DO  Code Status:  Full Code    Subjective:     C/C:   Chief Complaint   Patient presents with    Abdominal Pain     lower    Nausea    Back Pain    Headache     Interval History Status: not changed. Patient indicates shortness of breath is about the same. Remains on BiPAP 70%, high flow80%/60L/min. +constipation. +flatus. Symptomatic, hemodynamically stable. BC BMP reviewed unremarkable. Blood sugars reviewed. Brief History:   Per my partner: '66 y. o. female with a past medical history of hypertension and obesity who presented to the emergency department on 11/3/2021 complaining of headache, nausea and abdominal pain. She is not vaccinated against COVID-19. In the ED, the patient was febrile (Tmax 102.2) and ill-appearing. Rapid COVID-19 test was positive.  Chest x-ray was remarkable for interstitial prominence and bibasilar opacities consistent with pneumonia. The patient is admitted to internal medicine for further management of COVID-19 pneumonia.'  11/9 overnight: episode of left-sided chest pain radiating into the back and side overnight. Had EKG troponins done which were unremarkable. A CTA chest  to rule out PE was done: Showed no PE but extensive pulmonary abnormalities and mild reactive adenopathy. Lower extremity Dopplers were negative for DVT. had worsening respiratory status and needed 100% BiPAP support and was moved to ICU 11/10  Review of Systems:     Constitutional:  negative for chills, fevers, sweats  Respiratory:  + cough, dyspnea on exertion, shortness of breath, wheezing  Cardiovascular:  negative for chest pain, chest pressure/discomfort, lower extremity edema, palpitations  Gastrointestinal:  negative for abdominal pain, constipation, diarrhea, +nausea, no vomiting  Neurological:  negative for dizziness, headache    Medications: Allergies:     Allergies   Allergen Reactions    Other Hives    Ceclor [Cefaclor]     Food      All melons      Sulfa Antibiotics      Projectile vomiting        Current Meds:   Scheduled Meds:    polyethylene glycol  17 g Oral Daily    docusate sodium  100 mg Oral BID    insulin lispro  0-12 Units SubCUTAneous TID WC    insulin lispro  0-6 Units SubCUTAneous Nightly    levoFLOXacin  750 mg Oral Daily    ipratropium-albuterol  1 ampule Inhalation Q4H WA    dexamethasone  10 mg IntraVENous Q12H    budesonide-formoterol  2 puff Inhalation BID    miconazole   Topical BID    mirtazapine  7.5 mg Oral Nightly    metoprolol succinate  100 mg Oral Daily    lisinopril-hydroCHLOROthiazide  1 tablet Oral Daily    sodium chloride flush  5-40 mL IntraVENous 2 times per day    enoxaparin  30 mg SubCUTAneous BID    Vitamin D  2,000 Units Oral Daily    influenza virus vaccine  0.5 mL IntraMUSCular Prior to discharge     Continuous Infusions:    dextrose      sodium chloride       PRN Meds: ALPRAZolam, albuterol, sodium chloride flush, glucose, dextrose, glucagon (rDNA), dextrose, metoprolol, oxyCODONE-acetaminophen, sodium chloride flush, sodium chloride, ondansetron **OR** ondansetron, magnesium hydroxide, acetaminophen **OR** acetaminophen, guaiFENesin-dextromethorphan    Data:     Past Medical History:   has a past medical history of Anxiety, Arthritis, Bell's palsy, Chronic neck pain, Chronic pain, Fibromyalgia, HA (headache), Headache, HTN (hypertension), and Hypertension. Social History:   reports that she has never smoked. She has never used smokeless tobacco. She reports that she does not drink alcohol and does not use drugs. Family History:   Family History   Problem Relation Age of Onset    Other Mother         CAD   Noemi Blank Diabetes Mother     Other Father         CAD    Diabetes Father     Thyroid Disease Sister     Cancer Brother         prostate    Diabetes Brother     Cancer Maternal Grandmother         uterine       Vitals:  /68   Pulse 70   Temp 98.4 °F (36.9 °C) (Axillary)   Resp 24   Ht 5' 2\" (1.575 m)   Wt 231 lb 11.3 oz (105.1 kg)   SpO2 92%   BMI 42.38 kg/m²   Temp (24hrs), Av.7 °F (36.5 °C), Min:96.7 °F (35.9 °C), Max:98.4 °F (36.9 °C)    Recent Labs     21  1909 21  0730 21  1110   POCGLU 194* 204* 167* 140*       I/O (24Hr):     Intake/Output Summary (Last 24 hours) at 2021 1609  Last data filed at 2021 1255  Gross per 24 hour   Intake 100 ml   Output 950 ml   Net -850 ml       Labs:  Hematology:  Recent Labs     21  0501 21  0500 21  0340   WBC 9.2 9.3 11.3   RBC 4.62 4.55 4.84   HGB 14.3 14.2 14.9   HCT 43.3 43.1 45.6   MCV 93.7 94.7 94.2   MCH 31.0 31.2 30.8   MCHC 33.0 32.9 32.7   RDW 13.1 13.2 13.0    302 319   MPV 10.2 10.2 10.0     Chemistry:  Recent Labs     21  0501 21  0500 21  0340    135 135   K 4.3 4.2 4.6   CL 97* 97* 95*   CO2 27 24 29 GLUCOSE 170* 190* 139*   BUN 34* 41* 37*   CREATININE 0.61 0.64 0.69   ANIONGAP 13 14 11   LABGLOM >60 >60 >60   GFRAA >60 >60 >60   CALCIUM 9.1 9.0 9.1     Recent Labs     11/13/21  1110 11/13/21  1627 11/13/21  1909 11/13/21 2010 11/14/21  0730 11/14/21  1110   POCGLU 204* 135* 194* 204* 167* 140*     Radiology:  XR CHEST PORTABLE    Result Date: 11/9/2021  Multifocal pulmonary opacities suggest significantly worsened pulmonary edema and or multifocal pneumonia. Recommend continued follow-up to resolution. XR CHEST PORTABLE    Result Date: 11/3/2021  Interstitial prominence and bibasilar opacities, which could be seen with pneumonia or edema. Physical Examination:        General appearance:  alert, cooperative with flat affect   mental Status:  oriented to person, place and time   Lungs: Continues to have rhonchi, basal crackles  Heart:  regular rate and rhythm, no murmur  Abdomen:  soft, nontender, nondistended, normal bowel sounds, no masses, hepatomegaly, splenomegaly  Extremities: Trace b/l ankle edema and tenderness in the calves  Skin:  no gross lesions, rashes, induration    Assessment:        Hospital Problems           Last Modified POA    * (Principal) Pneumonia due to COVID-19 virus 11/9/2021 Yes    Essential hypertension 11/8/2021 Yes    Prediabetes 11/8/2021 Yes    Morbid obesity with BMI of 40.0-44.9, adult ([facility] [address] 11/9/2021 Yes    Hyperglycemia 11/8/2021 Yes    Acute respiratory failure with hypoxia ([facility] [address] 11/10/2021 Yes    Acute urinary retention 11/10/2021 Yes        Plan:      -Continue BiPAP/high flow as tolerated. Continue steroids. Received Actemra 11/10/2021. Continue bronchodilator therapy  Continue current antibiotics Levaquin day3.  -Continue bowel regimen: Continue docusate 200 mg twice daily. Schedule Milk of mag and enema ordered as needed, discussed with RN.   Continue Hylton catheter.  -Steroid-induced hyperglycemia: Continue moderate intensity insulin sliding scale.  -Continue home Prinzide and Lopressor 100.  -Encourage patient to use incentive spirometry and up to chair today. Full code.     Bhavna Chanel MD  11/14/2021

## 2021-11-14 NOTE — PLAN OF CARE
Problem: Falls - Risk of:  Goal: Will remain free from falls  Description: Will remain free from falls  Outcome: Ongoing     Problem: Pain:  Goal: Pain level will decrease  Description: Pain level will decrease  Outcome: Ongoing     Problem: Gas Exchange - Impaired  Goal: Absence of hypoxia  Outcome: Ongoing     Problem: Skin Integrity:  Goal: Will show no infection signs and symptoms  Description: Will show no infection signs and symptoms  Outcome: Ongoing   Uses call light appropriately, fall precautions in place will continue to monitor. Patient will have progressive improvement with pain scale with interventions. Pt will maintain SpO2 GT 92%. Continue to monitor skin integrity and IV sites.

## 2021-11-14 NOTE — PROGRESS NOTES
Pulmonary Critical Care Progress Note  Ashish Vera MD     Patient seen for the follow up of acute hypoxic respiratory failure, asthma extubation, Pneumonia due to COVID-19 virus     Subjective:  No significant overnight events noted. She wore BiPAP last night. She is resting in bed. She is still on high flow, 60 L / 70% FiO2. Shortness of breath is not much change. She has productive cough with yellow sputum. Examination:  Vitals: /84   Pulse 84   Temp 97.7 °F (36.5 °C) (Axillary)   Resp 25   Ht 5' 2\" (1.575 m)   Wt 231 lb 11.3 oz (105.1 kg)   SpO2 92%   BMI 42.38 kg/m²   General appearance:  alert and cooperative with exam, resting in bed  Neck: No JVD  Lungs: Bilateral crackles, no wheezing, moderate air exchange  Heart: regular rate and rhythm, S1, S2 normal, no gallop  Abdomen: Soft, non tender, + BS  Extremities: no cyanosis or clubbing.  No significant edema    LABs:  CBC:   Recent Labs     11/13/21  0500 11/14/21  0340   WBC 9.3 11.3   HGB 14.2 14.9   HCT 43.1 45.6    319     BMP:   Recent Labs     11/13/21  0500 11/14/21  0340    135   K 4.2 4.6   CO2 24 29   BUN 41* 37*   CREATININE 0.64 0.69   LABGLOM >60 >60   GLUCOSE 190* 139*     Radiology:  X-ray chest: 11/14/2021  Bilateral pulmonary infiltrate, slightly worsened on the right since previous exam      Impression:  · Acute hypoxic respiratory failure  · COVID-19 pneumonia with possible bacterial coinfection  · Mild adenopathy, likely reactive  · History of asthma  · Suspected obstructive sleep apnea/Obesity  · Arthritis, hypertension, fibromyalgia    Recommendations:  · Continue airborne isolation  · Prone as tolerated  · Incentive spirometry every hour while awake  · Continue BiPAP support  · Oxygen via high flow nasal cannula as tolerated, keep SPO2 90% or greater   · Continue Decadron to IV 10 mg every 12 hours  · S/p Actemra  · Continue albuterol and Ipratropium Q 4 hours and prn  · Continue Symbicort  · Continue Levaquin 750 mg daily  · X-ray chest in am  · Labs: CBC and BMP in am  · DVT prophylaxis with low molecular weight heparin  · Discussed with RN   · Will follow with you    Alia Osorio MD, [facility]  Pulmonary Critical Care and Sleep Medicine,  [facility]  Cell: [phone]  Office: [phone]

## 2021-11-14 NOTE — PROGRESS NOTES
Pt request to sit up on edge of bed. Pt assisted with activity then became very anxious stating she couldn't breathe. Imagery and emotional support provided. Enc pt to look out the window and watch the snow fall. SpO2 70s but pleth not great and bipap 70%. Within a few minutes breathing relaxed, pt less anxious and SpO2 high 80s to 90%. Pt sat on edge of bed a little longer till back pain started then pt assisted with legs to lay back in bed. Pt states it is more comfortable to lay on left side than right side.  Pt declined personal care again and call light provided and pt enc to rest.

## 2021-11-15 LAB
ABSOLUTE EOS #: 0.21 K/UL (ref 0–0.44)
ABSOLUTE IMMATURE GRANULOCYTE: 0.17 K/UL (ref 0–0.3)
ABSOLUTE LYMPH #: 1.01 K/UL (ref 1.1–3.7)
ABSOLUTE MONO #: 0.52 K/UL (ref 0.1–1.2)
ANION GAP SERPL CALCULATED.3IONS-SCNC: 14 MMOL/L (ref 9–17)
BASOPHILS # BLD: 0 % (ref 0–2)
BASOPHILS ABSOLUTE: 0.03 K/UL (ref 0–0.2)
BUN BLDV-MCNC: 35 MG/DL (ref 8–23)
BUN/CREAT BLD: 58 (ref 9–20)
CALCIUM SERPL-MCNC: 9.1 MG/DL (ref 8.6–10.4)
CHLORIDE BLD-SCNC: 98 MMOL/L (ref 98–107)
CO2: 23 MMOL/L (ref 20–31)
CREAT SERPL-MCNC: 0.6 MG/DL (ref 0.5–0.9)
DIFFERENTIAL TYPE: ABNORMAL
EOSINOPHILS RELATIVE PERCENT: 2 % (ref 1–4)
GFR AFRICAN AMERICAN: >60 ML/MIN
GFR NON-AFRICAN AMERICAN: >60 ML/MIN
GFR SERPL CREATININE-BSD FRML MDRD: ABNORMAL ML/MIN/{1.73_M2}
GFR SERPL CREATININE-BSD FRML MDRD: ABNORMAL ML/MIN/{1.73_M2}
GLUCOSE BLD-MCNC: 111 MG/DL (ref 65–105)
GLUCOSE BLD-MCNC: 127 MG/DL (ref 70–99)
GLUCOSE BLD-MCNC: 178 MG/DL (ref 65–105)
GLUCOSE BLD-MCNC: 181 MG/DL (ref 65–105)
GLUCOSE BLD-MCNC: 185 MG/DL (ref 65–105)
HCT VFR BLD CALC: 46 % (ref 36.3–47.1)
HEMOGLOBIN: 15.3 G/DL (ref 11.9–15.1)
IMMATURE GRANULOCYTES: 2 %
LYMPHOCYTES # BLD: 9 % (ref 24–43)
MCH RBC QN AUTO: 30.5 PG (ref 25.2–33.5)
MCHC RBC AUTO-ENTMCNC: 33.3 G/DL (ref 28.4–34.8)
MCV RBC AUTO: 91.8 FL (ref 82.6–102.9)
MONOCYTES # BLD: 5 % (ref 3–12)
NRBC AUTOMATED: 0 PER 100 WBC
PDW BLD-RTO: 12.8 % (ref 11.8–14.4)
PLATELET # BLD: 343 K/UL (ref 138–453)
PLATELET ESTIMATE: ABNORMAL
PMV BLD AUTO: 9.3 FL (ref 8.1–13.5)
POTASSIUM SERPL-SCNC: 4.7 MMOL/L (ref 3.7–5.3)
RBC # BLD: 5.01 M/UL (ref 3.95–5.11)
RBC # BLD: ABNORMAL 10*6/UL
SEG NEUTROPHILS: 82 % (ref 36–65)
SEGMENTED NEUTROPHILS ABSOLUTE COUNT: 8.87 K/UL (ref 1.5–8.1)
SODIUM BLD-SCNC: 135 MMOL/L (ref 135–144)
WBC # BLD: 10.8 K/UL (ref 3.5–11.3)
WBC # BLD: ABNORMAL 10*3/UL

## 2021-11-15 PROCEDURE — 94761 N-INVAS EAR/PLS OXIMETRY MLT: CPT

## 2021-11-15 PROCEDURE — 6360000002 HC RX W HCPCS: Performed by: NURSE PRACTITIONER

## 2021-11-15 PROCEDURE — 6370000000 HC RX 637 (ALT 250 FOR IP): Performed by: NURSE PRACTITIONER

## 2021-11-15 PROCEDURE — 94640 AIRWAY INHALATION TREATMENT: CPT

## 2021-11-15 PROCEDURE — 82947 ASSAY GLUCOSE BLOOD QUANT: CPT

## 2021-11-15 PROCEDURE — 99232 SBSQ HOSP IP/OBS MODERATE 35: CPT | Performed by: INTERNAL MEDICINE

## 2021-11-15 PROCEDURE — 6370000000 HC RX 637 (ALT 250 FOR IP): Performed by: INTERNAL MEDICINE

## 2021-11-15 PROCEDURE — 6360000002 HC RX W HCPCS: Performed by: INTERNAL MEDICINE

## 2021-11-15 PROCEDURE — 2700000000 HC OXYGEN THERAPY PER DAY

## 2021-11-15 PROCEDURE — 36415 COLL VENOUS BLD VENIPUNCTURE: CPT

## 2021-11-15 PROCEDURE — 85025 COMPLETE CBC W/AUTO DIFF WBC: CPT

## 2021-11-15 PROCEDURE — 2580000003 HC RX 258: Performed by: NURSE PRACTITIONER

## 2021-11-15 PROCEDURE — 6370000000 HC RX 637 (ALT 250 FOR IP): Performed by: STUDENT IN AN ORGANIZED HEALTH CARE EDUCATION/TRAINING PROGRAM

## 2021-11-15 PROCEDURE — 94660 CPAP INITIATION&MGMT: CPT

## 2021-11-15 PROCEDURE — 2000000000 HC ICU R&B

## 2021-11-15 PROCEDURE — 80048 BASIC METABOLIC PNL TOTAL CA: CPT

## 2021-11-15 RX ORDER — FUROSEMIDE 10 MG/ML
20 INJECTION INTRAMUSCULAR; INTRAVENOUS ONCE
Status: COMPLETED | OUTPATIENT
Start: 2021-11-15 | End: 2021-11-15

## 2021-11-15 RX ADMIN — ANTI-FUNGAL POWDER MICONAZOLE NITRATE TALC FREE: 1.42 POWDER TOPICAL at 21:30

## 2021-11-15 RX ADMIN — POLYETHYLENE GLYCOL 3350 17 G: 17 POWDER, FOR SOLUTION ORAL at 08:40

## 2021-11-15 RX ADMIN — INSULIN LISPRO 2 UNITS: 100 INJECTION, SOLUTION INTRAVENOUS; SUBCUTANEOUS at 11:49

## 2021-11-15 RX ADMIN — BUDESONIDE AND FORMOTEROL FUMARATE DIHYDRATE 2 PUFF: 160; 4.5 AEROSOL RESPIRATORY (INHALATION) at 08:00

## 2021-11-15 RX ADMIN — DOCUSATE SODIUM 100 MG: 100 CAPSULE, LIQUID FILLED ORAL at 08:40

## 2021-11-15 RX ADMIN — METOPROLOL SUCCINATE 100 MG: 50 TABLET, EXTENDED RELEASE ORAL at 08:39

## 2021-11-15 RX ADMIN — ENOXAPARIN SODIUM 30 MG: 100 INJECTION SUBCUTANEOUS at 20:23

## 2021-11-15 RX ADMIN — ENOXAPARIN SODIUM 30 MG: 100 INJECTION SUBCUTANEOUS at 08:40

## 2021-11-15 RX ADMIN — Medication 2000 UNITS: at 08:40

## 2021-11-15 RX ADMIN — INSULIN LISPRO 2 UNITS: 100 INJECTION, SOLUTION INTRAVENOUS; SUBCUTANEOUS at 09:00

## 2021-11-15 RX ADMIN — DEXAMETHASONE SODIUM PHOSPHATE 10 MG: 10 INJECTION, SOLUTION INTRAMUSCULAR; INTRAVENOUS at 15:43

## 2021-11-15 RX ADMIN — MIRTAZAPINE 7.5 MG: 15 TABLET, FILM COATED ORAL at 20:23

## 2021-11-15 RX ADMIN — SODIUM CHLORIDE, PRESERVATIVE FREE 10 ML: 5 INJECTION INTRAVENOUS at 09:30

## 2021-11-15 RX ADMIN — SODIUM CHLORIDE, PRESERVATIVE FREE 10 ML: 5 INJECTION INTRAVENOUS at 20:23

## 2021-11-15 RX ADMIN — LISINOPRIL AND HYDROCHLOROTHIAZIDE 1 TABLET: 12.5; 2 TABLET ORAL at 08:39

## 2021-11-15 RX ADMIN — OXYCODONE AND ACETAMINOPHEN 1 TABLET: 5; 325 TABLET ORAL at 15:43

## 2021-11-15 RX ADMIN — INSULIN LISPRO 2 UNITS: 100 INJECTION, SOLUTION INTRAVENOUS; SUBCUTANEOUS at 20:30

## 2021-11-15 RX ADMIN — IPRATROPIUM BROMIDE AND ALBUTEROL SULFATE 1 AMPULE: .5; 2.5 SOLUTION RESPIRATORY (INHALATION) at 07:46

## 2021-11-15 RX ADMIN — IPRATROPIUM BROMIDE AND ALBUTEROL SULFATE 1 AMPULE: .5; 2.5 SOLUTION RESPIRATORY (INHALATION) at 10:52

## 2021-11-15 RX ADMIN — ALPRAZOLAM 0.25 MG: 0.25 TABLET ORAL at 09:37

## 2021-11-15 RX ADMIN — DEXAMETHASONE SODIUM PHOSPHATE 10 MG: 10 INJECTION, SOLUTION INTRAMUSCULAR; INTRAVENOUS at 04:17

## 2021-11-15 RX ADMIN — DOCUSATE SODIUM 100 MG: 100 CAPSULE, LIQUID FILLED ORAL at 20:23

## 2021-11-15 RX ADMIN — LEVOFLOXACIN 750 MG: 500 TABLET, FILM COATED ORAL at 08:40

## 2021-11-15 RX ADMIN — IPRATROPIUM BROMIDE AND ALBUTEROL SULFATE 1 AMPULE: .5; 2.5 SOLUTION RESPIRATORY (INHALATION) at 19:33

## 2021-11-15 RX ADMIN — FUROSEMIDE 20 MG: 10 INJECTION, SOLUTION INTRAMUSCULAR; INTRAVENOUS at 11:49

## 2021-11-15 RX ADMIN — OXYCODONE AND ACETAMINOPHEN 1 TABLET: 5; 325 TABLET ORAL at 09:38

## 2021-11-15 RX ADMIN — SODIUM CHLORIDE, PRESERVATIVE FREE 10 ML: 5 INJECTION INTRAVENOUS at 15:43

## 2021-11-15 RX ADMIN — SODIUM CHLORIDE, PRESERVATIVE FREE 10 ML: 5 INJECTION INTRAVENOUS at 11:49

## 2021-11-15 RX ADMIN — IPRATROPIUM BROMIDE AND ALBUTEROL SULFATE 1 AMPULE: .5; 2.5 SOLUTION RESPIRATORY (INHALATION) at 14:45

## 2021-11-15 RX ADMIN — ANTI-FUNGAL POWDER MICONAZOLE NITRATE TALC FREE: 1.42 POWDER TOPICAL at 09:25

## 2021-11-15 ASSESSMENT — PAIN DESCRIPTION - FREQUENCY
FREQUENCY: CONTINUOUS
FREQUENCY: CONTINUOUS

## 2021-11-15 ASSESSMENT — PAIN SCALES - GENERAL
PAINLEVEL_OUTOF10: 6
PAINLEVEL_OUTOF10: 5
PAINLEVEL_OUTOF10: 0

## 2021-11-15 ASSESSMENT — PAIN - FUNCTIONAL ASSESSMENT
PAIN_FUNCTIONAL_ASSESSMENT: PREVENTS OR INTERFERES SOME ACTIVE ACTIVITIES AND ADLS
PAIN_FUNCTIONAL_ASSESSMENT: PREVENTS OR INTERFERES SOME ACTIVE ACTIVITIES AND ADLS

## 2021-11-15 ASSESSMENT — PAIN DESCRIPTION - DESCRIPTORS
DESCRIPTORS: SHARP
DESCRIPTORS: SHARP

## 2021-11-15 ASSESSMENT — PAIN DESCRIPTION - PAIN TYPE
TYPE: CHRONIC PAIN
TYPE: CHRONIC PAIN

## 2021-11-15 ASSESSMENT — PAIN DESCRIPTION - PROGRESSION
CLINICAL_PROGRESSION: GRADUALLY WORSENING
CLINICAL_PROGRESSION: GRADUALLY WORSENING

## 2021-11-15 ASSESSMENT — PAIN DESCRIPTION - ONSET
ONSET: SUDDEN
ONSET: SUDDEN

## 2021-11-15 ASSESSMENT — PAIN DESCRIPTION - ORIENTATION
ORIENTATION: LOWER
ORIENTATION: RIGHT;LEFT

## 2021-11-15 ASSESSMENT — PAIN DESCRIPTION - LOCATION
LOCATION: BACK
LOCATION: HIP

## 2021-11-15 ASSESSMENT — PAIN DESCRIPTION - DIRECTION: RADIATING_TOWARDS: NO

## 2021-11-15 NOTE — PROGRESS NOTES
Pt states she is hungry and wants to eat. Pt informed she is 90-91% on bipap 85% and unable to take her off to eat at this time. Pt enc to rest on her sides to help increase her SpO2. Pt states she wants to sit up in bed. Pt informed it is in her best interest to rest on her sides as much as possible.

## 2021-11-15 NOTE — PLAN OF CARE
Problem: Falls - Risk of:  Goal: Will remain free from falls  Description: Will remain free from falls  11/15/2021 1328 by Sophia Dai RN  Outcome: Ongoing  11/15/2021 0429 by Ashley Martines RN  Outcome: Met This Shift     Problem: Pain:  Goal: Pain level will decrease  Description: Pain level will decrease  11/15/2021 1328 by Sophia Dai RN  Outcome: Ongoing  11/15/2021 0429 by Ashley Martines RN  Outcome: Met This Shift     Problem: Gas Exchange - Impaired  Goal: Absence of hypoxia  11/15/2021 1328 by Sophia Dai RN  Outcome: Ongoing  11/15/2021 0429 by Ashley Martines RN  Outcome: Met This Shift     Problem: Skin Integrity:  Goal: Will show no infection signs and symptoms  Description: Will show no infection signs and symptoms  Outcome: Ongoing   Uses call light appropriately, fall precautions in place will continue to monitor. Patient will have progressive improvement with pain scale with interventions. Pt will maintain SpO2 GT 92%. . Continue to monitor skin integrity and IV sites.

## 2021-11-15 NOTE — PROGRESS NOTES
[facility]  Office: [address], DO, Etta Marysol, DO, Selenaant Reyes, DO, Juanita Carmonas Blood, DO, Gwen Suarez MD, Ciro Butler MD, Dora Tripathi MD, Juanis Knox MD, Zully Smith MD, Don Munoz MD, Bucky Lovett MD, Nalini Beckett, DO, Lorie Clark, DO, Reagan Dave MD,  Michell Sherman DO, Dietrich Halsted, MD, Teresa Rodriguez MD, Denny Clifford MD, Jessica Glasgow MD, Denise Paz MD, Ehsan Romero MD, Tory Cuenca MD, Ness Fabian [facility], [facility], CNP, Kyle Chaves, CNP, Elaina Boateng, CNS, Iza Vergara, CNP, Jon Crane, CNP, Oli Ayala, CNP, Henri Bray, CNP, Cristiane Schrader, CNP, Tanja Abreu, PA-C, Christian Veliz, [facility], Enrique Hensley, CNP, Anselmo Panchal, CNP, Kassi Gomez CNP, Ellen Hernandez, [facility], Zulema Freeman, CNP, Esmer Esparza CNP, Ana Rosa Albert, [facility]    Progress Note      Name:   Milady Aquino  MRN:     [id number]     [place]:      [de-identified]   Room:   [address] [place] [address] Day:  11  Admit Date:  11/3/2021 10:05 PM    PCP:   Norbert Shore DO  Code Status:  Full Code    Subjective:     C/C:   Chief Complaint   Patient presents with    Abdominal Pain     lower    Nausea    Back Pain    Headache     Interval History Status: not changed. Patient appears more tired this morning ,she herself denies any complaints though. On 93% 60 L/min high flow this morning. Is afebrile, hemodynamically stable  Brief History:   Per my partner: '66 y. o. female with a past medical history of hypertension and obesity who presented to the emergency department on 11/3/2021 complaining of headache, nausea and abdominal pain. She is not vaccinated against COVID-19. In the ED, the patient was febrile (Tmax 102.2) and ill-appearing. Rapid COVID-19 test was positive. Chest x-ray was remarkable for interstitial prominence and bibasilar opacities consistent with pneumonia.  The patient is admitted to internal medicine for further management of COVID-19 pneumonia.'  11/9 overnight: episode of left-sided chest pain radiating into the back and side overnight. Had EKG troponins done which were unremarkable. A CTA chest  to rule out PE was done: Showed no PE but extensive pulmonary abnormalities and mild reactive adenopathy. Lower extremity Dopplers were negative for DVT. had worsening respiratory status and needed 100% BiPAP support and was moved to ICU 11/10  Review of Systems:     Constitutional:  negative for chills, fevers, sweats  Respiratory:  + cough, dyspnea on exertion, shortness of breath, wheezing  Cardiovascular:  negative for chest pain, chest pressure/discomfort, lower extremity edema, palpitations  Gastrointestinal:  negative for abdominal pain, constipation, diarrhea, +nausea, no vomiting  Neurological:  negative for dizziness, headache    Medications: Allergies:     Allergies   Allergen Reactions    Other Hives    Ceclor [Cefaclor]     Food      All melons      Sulfa Antibiotics      Projectile vomiting        Current Meds:   Scheduled Meds:    polyethylene glycol  17 g Oral Daily    docusate sodium  100 mg Oral BID    insulin lispro  0-12 Units SubCUTAneous TID WC    insulin lispro  0-6 Units SubCUTAneous Nightly    levoFLOXacin  750 mg Oral Daily    ipratropium-albuterol  1 ampule Inhalation Q4H WA    dexamethasone  10 mg IntraVENous Q12H    budesonide-formoterol  2 puff Inhalation BID    miconazole   Topical BID    mirtazapine  7.5 mg Oral Nightly    metoprolol succinate  100 mg Oral Daily    lisinopril-hydroCHLOROthiazide  1 tablet Oral Daily    sodium chloride flush  5-40 mL IntraVENous 2 times per day    enoxaparin  30 mg SubCUTAneous BID    Vitamin D  2,000 Units Oral Daily    influenza virus vaccine  0.5 mL IntraMUSCular Prior to discharge     Continuous Infusions:    dextrose      sodium chloride       PRN Meds: ALPRAZolam, albuterol, sodium chloride flush, glucose, dextrose, glucagon (rDNA), dextrose, metoprolol, oxyCODONE-acetaminophen, sodium chloride flush, sodium chloride, ondansetron **OR** ondansetron, magnesium hydroxide, acetaminophen **OR** acetaminophen, guaiFENesin-dextromethorphan    Data:     Past Medical History:   has a past medical history of Anxiety, Arthritis, Bell's palsy, Chronic neck pain, Chronic pain, Fibromyalgia, HA (headache), Headache, HTN (hypertension), and Hypertension. Social History:   reports that she has never smoked. She has never used smokeless tobacco. She reports that she does not drink alcohol and does not use drugs. Family History:   Family History   Problem Relation Age of Onset    Other Mother         CAD   [facility] Diabetes Mother     Other Father         CAD    Diabetes Father     Thyroid Disease Sister     Cancer Brother         prostate    Diabetes Brother     Cancer Maternal Grandmother         uterine       Vitals:  /71   Pulse 91   Temp 97.6 °F (36.4 °C) (Oral)   Resp 27   Ht 5' 2\" (1.575 m)   Wt 227 lb 15.3 oz (103.4 kg)   SpO2 (!) 87%   BMI 41.69 kg/m²   Temp (24hrs), Av.4 °F (36.9 °C), Min:97.6 °F (36.4 °C), Max:98.8 °F (37.1 °C)    Recent Labs     21  2017 11/15/21  0759 11/15/21  1135 11/15/21  1631   POCGLU 167* 178* 185* 111*       I/O (24Hr):     Intake/Output Summary (Last 24 hours) at 11/15/2021 1700  Last data filed at 11/15/2021 1645  Gross per 24 hour   Intake 400 ml   Output 1625 ml   Net -1225 ml       Labs:  Hematology:  Recent Labs     21  0500 21  0340 11/15/21  0501   WBC 9.3 11.3 10.8   RBC 4.55 4.84 5.01   HGB 14.2 14.9 15.3*   HCT 43.1 45.6 46.0   MCV 94.7 94.2 91.8   MCH 31.2 30.8 30.5   MCHC 32.9 32.7 33.3   RDW 13.2 13.0 12.8    319 343   MPV 10.2 10.0 9.3     Chemistry:  Recent Labs     21  0500 21  0340 11/15/21  0501    135 135   K 4.2 4.6 4.7   CL 97* 95* 98   CO2 24 29 23   GLUCOSE 190* 139* 127*   BUN 41* 37* 35* CREATININE 0.64 0.69 0.60   ANIONGAP 14 11 14   LABGLOM >60 >60 >60   GFRAA >60 >60 >60   CALCIUM 9.0 9.1 9.1     Recent Labs     11/14/21  1110 11/14/21  1611 11/14/21  2017 11/15/21  0759 11/15/21  1135 11/15/21  1631   POCGLU 140* 98 167* 178* 185* 111*     Radiology:  XR CHEST PORTABLE    Result Date: 11/9/2021  Multifocal pulmonary opacities suggest significantly worsened pulmonary edema and or multifocal pneumonia. Recommend continued follow-up to resolution. XR CHEST PORTABLE    Result Date: 11/3/2021  Interstitial prominence and bibasilar opacities, which could be seen with pneumonia or edema. Physical Examination:        General appearance:  alert, cooperative with flat affect   mental Status:  oriented to person, place and time   Lungs: + Right lower lobe crepitus, poor bilateral air entry. Heart:  regular rate and rhythm, no murmur  Abdomen:  soft, nontender, nondistended, normal bowel sounds, no masses, hepatomegaly, splenomegaly  Extremities: Trace b/l ankle edema and tenderness in the calves  Skin:  no gross lesions, rashes, induration    Assessment:        Hospital Problems           Last Modified POA    * (Principal) Pneumonia due to COVID-19 virus 11/9/2021 Yes    Essential hypertension 11/8/2021 Yes    Prediabetes 11/8/2021 Yes    Morbid obesity with BMI of 40.0-44.9, adult ([facility] [address] 11/9/2021 Yes    Hyperglycemia 11/8/2021 Yes    Acute respiratory failure with hypoxia ([facility] [address] 11/10/2021 Yes    Acute urinary retention 11/10/2021 Yes        Plan:      -Continue BiPAP/high flow as tolerated. Continue steroids. Received Actemra 11/10/2021. Continue bronchodilator therapy  Continue current antibiotics Levaquin day4. Will give a dose of Lasix 40 mg IV x1 today.  -Continue bowel regimen: Continue docusate 200 mg twice daily. Schedule Milk of mag and enema ordered as needed, discussed with RN.   Continue Hylton catheter.  -Steroid-induced hyperglycemia: Continue moderate intensity insulin sliding scale.  -Continue home Prinzide and Lopressor 100. Full code.     Yobany Crouch MD  11/15/2021

## 2021-11-15 NOTE — PROGRESS NOTES
Occupational Therapy  DATE: 11/15/2021    NAME: Li Moran  MRN: [id number]   : 1955    Patient not seen this date for Occupational Therapy due to:      [x] Cancel by RN or physician due to: Tx canceled d/t unstable respiratory status    [] Hemodialysis    [] Critical Lab Value Level     [] Blood transfusion in progress    [] Acute or unstable cardiovascular status   _MAP < 55 or more than >115  _HR < 40 or > 130    [] Acute or unstable pulmonary status   -FiO2 > 60%   _RR < 5 or >40    _O2 sats < 85%    [] Strict Bedrest    [] Off Unit for surgery or procedure    [] Off Unit for testing       [] Pending imaging to R/O fracture    [] Refusal by Patient      [] Other      [] OT being discontinued at this time. Patient independent. No further needs. [] OT being discontinued at this time as the patient has been transferred to hospice care. No further needs. OT will consult with nursing prior to next treatment.     Jose Sampson GURVINDER

## 2021-11-15 NOTE — PROGRESS NOTES
Nutrition Assessment     Type and Reason for Visit: Reassess    Nutrition Recommendations/Plan:   1. Continue current Adult Diet; Regular; 5 Carb Choices (75gms/meal)  2. Modified Glucerna to 3x/d  3. Monitor po intakes, ONS acceptance, labs  4. If oral intake remains inadequate, consider starting nutrition support    Nutrition Assessment:  Patient remains in droplet plus precautions for COVID-19 virus. She is BiPAP and high flow dependent. Had some dinner last night per RN (1-25%) 0% breakfast this AM- drinking Glucerna. Changed ONS order to 3x/d to help supplement oral intake. Will continue current diet and encourage oral intake. May need to consider nutrition support if po intakes continue to remain poor. Monitor po intakes, ONS acceptance, and labs. Malnutrition Assessment:  Malnutrition Status: At risk for malnutrition (Comment)    Estimated Daily Nutrient Needs:  Energy (kcal): 1217-4318 kcal (15-18 kcal/kg); Weight Used for Energy Requirements:  Current     Protein (g): 65-75 gm of protein (1.3-1.5 gm/kg); Weight Used for Protein Requirements:  Ideal          Nutrition Related Findings: BiPAP + High Flow. Constipation- laxatives. Labs reviewed. RUQ/RLQ: active bowel sounds. LUQ/LLQ- hypoactive bowel sounds. Edema: BLE +1 non-pitting. Current Nutrition Therapies:    ADULT DIET;  Regular; 5 carb choices (75 gm/meal)  ADULT ORAL NUTRITION SUPPLEMENT; Breakfast, Dinner, Lunch; Diabetic Oral Supplement    Anthropometric Measures:  · Height: 5' 2\" (157.5 cm)  · Current Body Wt: 227 lb 15.3 oz (103.4 kg) (12# loss x 4 days?)   · BMI: 41.7    Nutrition Diagnosis:   · Inadequate protein-energy intake related to inadequate protein-energy intake as evidenced by intake 0-25%, intake 26-50%    Nutrition Interventions:   Food and/or Nutrient Delivery:  Continue Current Diet, Modify Oral Nutrition Supplement  Nutrition Education/Counseling:  Education not indicated   Coordination of Nutrition Care:  Continue to monitor while inpatient    Goals:  PO intakes are greater than 75% at meals       Nutrition Monitoring and Evaluation:   Behavioral-Environmental Outcomes:  None Identified   Food/Nutrient Intake Outcomes:  Food and Nutrient Intake, Supplement Intake  Physical Signs/Symptoms Outcomes:  Biochemical Data, Fluid Status or Edema, Skin, Weight     Discharge Planning:    Continue current diet     Chris Zamora, [address] [place] [address],   Office Number: [phone]

## 2021-11-15 NOTE — PROGRESS NOTES
Physical Therapy  DATE: 11/15/2021    NAME: Parvez Finley  MRN: [id number]   : 1955    Patient not seen this date for Physical Therapy due to:      [x] Cancel by RN or physician due to: respiratory function (SpO2% unstable this morning)    [] Hemodialysis    [] Critical Lab Value Level     [] Blood transfusion in progress    [] Acute or unstable cardiovascular status   _MAP < 55 or more than >115  _HR < 40 or > 130    [] Acute or unstable pulmonary status   -FiO2 > 60%   _RR < 5 or >40    _O2 sats < 85%    [] Strict Bedrest    [] Off Unit for surgery or procedure    [] Off Unit for testing       [] Pending imaging to R/O fracture    [] Refusal by Patient      [] Other      [] PT being discontinued at this time. Patient independent. No further needs. [] PT being discontinued at this time as the patient has been transferred to hospice care. No further needs.       Betty Howard, PTA

## 2021-11-15 NOTE — PROGRESS NOTES
Pulmonary Critical Care Progress Note  Debra Werner MD     Patient seen for the follow up of acute hypoxic respiratory failure, asthma extubation, Pneumonia due to COVID-19 virus     Subjective:  No significant overnight events noted. She wore BiPAP last night. She is resting in bed. She is still on high flow, 60 L / 70% FiO2. Shortness of breath is not much change. She has productive cough with yellow sputum. Examination:  Vitals: BP (!) 117/94   Pulse 88   Temp 98.2 °F (36.8 °C) (Axillary)   Resp 30   Ht 5' 2\" (1.575 m)   Wt 227 lb 15.3 oz (103.4 kg)   SpO2 90%   BMI 41.69 kg/m²   General appearance:  alert and cooperative with exam, resting in bed  Neck: No JVD  Lungs: Bilateral crackles, no wheezing, moderate air exchange  Heart: regular rate and rhythm, S1, S2 normal, no gallop  Abdomen: Soft, non tender, + BS  Extremities: no cyanosis or clubbing.  No significant edema    LABs:  CBC:   Recent Labs     11/14/21  0340 11/15/21  0501   WBC 11.3 10.8   HGB 14.9 15.3*   HCT 45.6 46.0    343     BMP:   Recent Labs     11/14/21  0340 11/15/21  0501    135   K 4.6 4.7   CO2 29 23   BUN 37* 35*   CREATININE 0.69 0.60   LABGLOM >60 >60   GLUCOSE 139* 127*     Radiology:  X-ray chest: 11/14/2021  Bilateral pulmonary infiltrate, slightly worsened on the right since previous exam      Impression:  · Acute hypoxic respiratory failure  · COVID-19 pneumonia with possible bacterial coinfection  · Mild adenopathy, likely reactive  · History of asthma  · Suspected obstructive sleep apnea/Obesity  · Arthritis, hypertension, fibromyalgia    Recommendations:  · Continue airborne isolation  · Prone as tolerated  · Incentive spirometry every hour while awake  · Continue BiPAP support  · Oxygen via high flow nasal cannula as tolerated, keep SPO2 90% or greater   · Continue Decadron to IV 10 mg every 12 hours  · S/p Actemra  · Continue albuterol and Ipratropium Q 4 hours and prn  · Continue Symbicort  · Continue Levaquin 750 mg daily  · X-ray chest in am  · Labs: CBC and BMP in am  · DVT prophylaxis with low molecular weight heparin  · Discussed with RN   · Will follow with you    Wes Adler MD, [facility]  Pulmonary Critical Care and Sleep Medicine,  [facility]  Cell: [phone]  Office: [phone]

## 2021-11-15 NOTE — CARE COORDINATION
DC Planning    Spoke with pt this morning-discussed  LTACH-Regenlopez agrees to transition of care but would like writer to discuss with spouse. Spoke with pt spouse Rebecca Ruiz over the phone updated on pt status LTACH Regenlopez explained process, what LTACH does, transtion of care plans. Updated on her 02 requirements. He does have a cough and is being tested for covid. He is checking his pulse ox at home and relays it is 95%. Advised to call 911 if in resp distress or seek medical attention if condition worsens. He relays he does have oxygen at home for pt that was delivered, writer  explained the amount she needs is much higher than what she can have at home and needs constant medical  supervision. He verbalizes understanding. He agrees to [employer] and would like return phone call on plan-explained it may be a couple of days before insurance company responds. Spoke with Miranda Hodgkins from [facility] will start precert today.

## 2021-11-15 NOTE — PLAN OF CARE
Nutrition Problem #1: Inadequate protein-energy intake  Intervention: Food and/or Nutrient Delivery: Continue Current Diet, Modify Oral Nutrition Supplement  Nutritional Goals: PO intakes are greater than 75% at meals

## 2021-11-15 NOTE — PLAN OF CARE
Problem: Falls - Risk of:  Goal: Will remain free from falls  Description: Will remain free from falls  Outcome: Met This Shift  Goal: Absence of physical injury  Description: Absence of physical injury  Outcome: Met This Shift     Problem: IP BALANCE  Goal: LTG - Patient will maintain balance to allow for safe/functional mobility  Outcome: Met This Shift     Problem: Pain:  Goal: Pain level will decrease  Description: Pain level will decrease  Outcome: Met This Shift  Goal: Control of acute pain  Description: Control of acute pain  Outcome: Met This Shift  Goal: Control of chronic pain  Description: Control of chronic pain  Outcome: Met This Shift     Problem: Airway Clearance - Ineffective  Goal: Achieve or maintain patent airway  Outcome: Met This Shift     Problem: Gas Exchange - Impaired  Goal: Absence of hypoxia  Outcome: Met This Shift  Goal: Promote optimal lung function  Outcome: Met This Shift

## 2021-11-16 ENCOUNTER — APPOINTMENT (OUTPATIENT)
Dept: INTERVENTIONAL RADIOLOGY/VASCULAR | Age: 66
DRG: 177 | End: 2021-11-16
Payer: MEDICARE

## 2021-11-16 ENCOUNTER — APPOINTMENT (OUTPATIENT)
Dept: GENERAL RADIOLOGY | Age: 66
DRG: 177 | End: 2021-11-16
Payer: MEDICARE

## 2021-11-16 LAB
ABSOLUTE EOS #: 0.26 K/UL (ref 0–0.44)
ABSOLUTE IMMATURE GRANULOCYTE: 0.25 K/UL (ref 0–0.3)
ABSOLUTE LYMPH #: 0.92 K/UL (ref 1.1–3.7)
ABSOLUTE MONO #: 0.62 K/UL (ref 0.1–1.2)
ANION GAP SERPL CALCULATED.3IONS-SCNC: 13 MMOL/L (ref 9–17)
BASOPHILS # BLD: 0 % (ref 0–2)
BASOPHILS ABSOLUTE: 0.04 K/UL (ref 0–0.2)
BUN BLDV-MCNC: 46 MG/DL (ref 8–23)
BUN/CREAT BLD: 52 (ref 9–20)
CALCIUM SERPL-MCNC: 9.1 MG/DL (ref 8.6–10.4)
CHLORIDE BLD-SCNC: 98 MMOL/L (ref 98–107)
CO2: 25 MMOL/L (ref 20–31)
CREAT SERPL-MCNC: 0.89 MG/DL (ref 0.5–0.9)
DIFFERENTIAL TYPE: ABNORMAL
EOSINOPHILS RELATIVE PERCENT: 2 % (ref 1–4)
GFR AFRICAN AMERICAN: >60 ML/MIN
GFR NON-AFRICAN AMERICAN: >60 ML/MIN
GFR SERPL CREATININE-BSD FRML MDRD: ABNORMAL ML/MIN/{1.73_M2}
GFR SERPL CREATININE-BSD FRML MDRD: ABNORMAL ML/MIN/{1.73_M2}
GLUCOSE BLD-MCNC: 155 MG/DL (ref 65–105)
GLUCOSE BLD-MCNC: 158 MG/DL (ref 70–99)
GLUCOSE BLD-MCNC: 190 MG/DL (ref 65–105)
GLUCOSE BLD-MCNC: 221 MG/DL (ref 65–105)
GLUCOSE BLD-MCNC: 237 MG/DL (ref 65–105)
HCT VFR BLD CALC: 45.5 % (ref 36.3–47.1)
HEMOGLOBIN: 15.1 G/DL (ref 11.9–15.1)
IMMATURE GRANULOCYTES: 2 %
LYMPHOCYTES # BLD: 8 % (ref 24–43)
MCH RBC QN AUTO: 30.8 PG (ref 25.2–33.5)
MCHC RBC AUTO-ENTMCNC: 33.2 G/DL (ref 28.4–34.8)
MCV RBC AUTO: 92.7 FL (ref 82.6–102.9)
MONOCYTES # BLD: 5 % (ref 3–12)
NRBC AUTOMATED: 0 PER 100 WBC
PDW BLD-RTO: 12.8 % (ref 11.8–14.4)
PLATELET # BLD: 336 K/UL (ref 138–453)
PLATELET ESTIMATE: ABNORMAL
PMV BLD AUTO: 9.8 FL (ref 8.1–13.5)
POTASSIUM SERPL-SCNC: 4.5 MMOL/L (ref 3.7–5.3)
RBC # BLD: 4.91 M/UL (ref 3.95–5.11)
RBC # BLD: ABNORMAL 10*6/UL
SEG NEUTROPHILS: 83 % (ref 36–65)
SEGMENTED NEUTROPHILS ABSOLUTE COUNT: 10.08 K/UL (ref 1.5–8.1)
SODIUM BLD-SCNC: 136 MMOL/L (ref 135–144)
WBC # BLD: 12.2 K/UL (ref 3.5–11.3)
WBC # BLD: ABNORMAL 10*3/UL

## 2021-11-16 PROCEDURE — 6370000000 HC RX 637 (ALT 250 FOR IP): Performed by: STUDENT IN AN ORGANIZED HEALTH CARE EDUCATION/TRAINING PROGRAM

## 2021-11-16 PROCEDURE — 6360000002 HC RX W HCPCS: Performed by: INTERNAL MEDICINE

## 2021-11-16 PROCEDURE — 85025 COMPLETE CBC W/AUTO DIFF WBC: CPT

## 2021-11-16 PROCEDURE — 02HV33Z INSERTION OF INFUSION DEVICE INTO SUPERIOR VENA CAVA, PERCUTANEOUS APPROACH: ICD-10-PCS | Performed by: INTERNAL MEDICINE

## 2021-11-16 PROCEDURE — 6370000000 HC RX 637 (ALT 250 FOR IP): Performed by: INTERNAL MEDICINE

## 2021-11-16 PROCEDURE — 99232 SBSQ HOSP IP/OBS MODERATE 35: CPT | Performed by: FAMILY MEDICINE

## 2021-11-16 PROCEDURE — 94761 N-INVAS EAR/PLS OXIMETRY MLT: CPT

## 2021-11-16 PROCEDURE — 94660 CPAP INITIATION&MGMT: CPT

## 2021-11-16 PROCEDURE — 80048 BASIC METABOLIC PNL TOTAL CA: CPT

## 2021-11-16 PROCEDURE — 6370000000 HC RX 637 (ALT 250 FOR IP): Performed by: NURSE PRACTITIONER

## 2021-11-16 PROCEDURE — 2580000003 HC RX 258: Performed by: NURSE PRACTITIONER

## 2021-11-16 PROCEDURE — C1751 CATH, INF, PER/CENT/MIDLINE: HCPCS

## 2021-11-16 PROCEDURE — 71045 X-RAY EXAM CHEST 1 VIEW: CPT

## 2021-11-16 PROCEDURE — 82947 ASSAY GLUCOSE BLOOD QUANT: CPT

## 2021-11-16 PROCEDURE — 6360000002 HC RX W HCPCS: Performed by: NURSE PRACTITIONER

## 2021-11-16 PROCEDURE — 2000000000 HC ICU R&B

## 2021-11-16 PROCEDURE — 6370000000 HC RX 637 (ALT 250 FOR IP): Performed by: FAMILY MEDICINE

## 2021-11-16 PROCEDURE — 2700000000 HC OXYGEN THERAPY PER DAY

## 2021-11-16 PROCEDURE — 36573 INSJ PICC RS&I 5 YR+: CPT | Performed by: RADIOLOGY

## 2021-11-16 PROCEDURE — 97530 THERAPEUTIC ACTIVITIES: CPT

## 2021-11-16 PROCEDURE — 36415 COLL VENOUS BLD VENIPUNCTURE: CPT

## 2021-11-16 PROCEDURE — 94640 AIRWAY INHALATION TREATMENT: CPT

## 2021-11-16 PROCEDURE — 97535 SELF CARE MNGMENT TRAINING: CPT

## 2021-11-16 RX ORDER — PREGABALIN 50 MG/1
50 CAPSULE ORAL 2 TIMES DAILY
Status: DISCONTINUED | OUTPATIENT
Start: 2021-11-16 | End: 2021-11-27 | Stop reason: HOSPADM

## 2021-11-16 RX ORDER — ARFORMOTEROL TARTRATE 15 UG/2ML
15 SOLUTION RESPIRATORY (INHALATION) 2 TIMES DAILY
Status: DISCONTINUED | OUTPATIENT
Start: 2021-11-16 | End: 2021-11-23

## 2021-11-16 RX ORDER — LACTULOSE 10 G/15ML
20 SOLUTION ORAL ONCE
Status: COMPLETED | OUTPATIENT
Start: 2021-11-16 | End: 2021-11-16

## 2021-11-16 RX ORDER — BUDESONIDE 0.5 MG/2ML
0.5 INHALANT ORAL 2 TIMES DAILY
Status: COMPLETED | OUTPATIENT
Start: 2021-11-16 | End: 2021-11-20

## 2021-11-16 RX ORDER — METOPROLOL SUCCINATE 50 MG/1
50 TABLET, EXTENDED RELEASE ORAL DAILY
Status: DISCONTINUED | OUTPATIENT
Start: 2021-11-16 | End: 2021-11-17

## 2021-11-16 RX ORDER — BISACODYL 10 MG
10 SUPPOSITORY, RECTAL RECTAL DAILY PRN
Status: DISCONTINUED | OUTPATIENT
Start: 2021-11-16 | End: 2021-11-27 | Stop reason: HOSPADM

## 2021-11-16 RX ORDER — LISINOPRIL 10 MG/1
10 TABLET ORAL DAILY
Status: DISCONTINUED | OUTPATIENT
Start: 2021-11-16 | End: 2021-11-17

## 2021-11-16 RX ADMIN — BUDESONIDE 500 MCG: 0.5 SUSPENSION RESPIRATORY (INHALATION) at 19:53

## 2021-11-16 RX ADMIN — MIRTAZAPINE 7.5 MG: 15 TABLET, FILM COATED ORAL at 19:28

## 2021-11-16 RX ADMIN — ANTI-FUNGAL POWDER MICONAZOLE NITRATE TALC FREE: 1.42 POWDER TOPICAL at 21:01

## 2021-11-16 RX ADMIN — ARFORMOTEROL TARTRATE 15 MCG: 15 SOLUTION RESPIRATORY (INHALATION) at 11:18

## 2021-11-16 RX ADMIN — INSULIN LISPRO 2 UNITS: 100 INJECTION, SOLUTION INTRAVENOUS; SUBCUTANEOUS at 09:47

## 2021-11-16 RX ADMIN — OXYCODONE AND ACETAMINOPHEN 1 TABLET: 5; 325 TABLET ORAL at 13:56

## 2021-11-16 RX ADMIN — Medication 2000 UNITS: at 09:51

## 2021-11-16 RX ADMIN — IPRATROPIUM BROMIDE AND ALBUTEROL SULFATE 1 AMPULE: .5; 2.5 SOLUTION RESPIRATORY (INHALATION) at 15:05

## 2021-11-16 RX ADMIN — POLYETHYLENE GLYCOL 3350 17 G: 17 POWDER, FOR SOLUTION ORAL at 09:50

## 2021-11-16 RX ADMIN — DOCUSATE SODIUM 100 MG: 100 CAPSULE, LIQUID FILLED ORAL at 09:51

## 2021-11-16 RX ADMIN — ENOXAPARIN SODIUM 30 MG: 100 INJECTION SUBCUTANEOUS at 09:50

## 2021-11-16 RX ADMIN — DEXAMETHASONE SODIUM PHOSPHATE 10 MG: 10 INJECTION, SOLUTION INTRAMUSCULAR; INTRAVENOUS at 04:15

## 2021-11-16 RX ADMIN — INSULIN LISPRO 2 UNITS: 100 INJECTION, SOLUTION INTRAVENOUS; SUBCUTANEOUS at 21:00

## 2021-11-16 RX ADMIN — SODIUM CHLORIDE, PRESERVATIVE FREE 10 ML: 5 INJECTION INTRAVENOUS at 13:58

## 2021-11-16 RX ADMIN — PREGABALIN 50 MG: 50 CAPSULE ORAL at 20:53

## 2021-11-16 RX ADMIN — ANTI-FUNGAL POWDER MICONAZOLE NITRATE TALC FREE: 1.42 POWDER TOPICAL at 12:37

## 2021-11-16 RX ADMIN — ARFORMOTEROL TARTRATE 15 MCG: 15 SOLUTION RESPIRATORY (INHALATION) at 19:53

## 2021-11-16 RX ADMIN — IPRATROPIUM BROMIDE AND ALBUTEROL SULFATE 1 AMPULE: .5; 2.5 SOLUTION RESPIRATORY (INHALATION) at 19:53

## 2021-11-16 RX ADMIN — DOCUSATE SODIUM 100 MG: 100 CAPSULE, LIQUID FILLED ORAL at 20:53

## 2021-11-16 RX ADMIN — IPRATROPIUM BROMIDE AND ALBUTEROL SULFATE 1 AMPULE: .5; 2.5 SOLUTION RESPIRATORY (INHALATION) at 07:05

## 2021-11-16 RX ADMIN — SODIUM CHLORIDE, PRESERVATIVE FREE 10 ML: 5 INJECTION INTRAVENOUS at 19:29

## 2021-11-16 RX ADMIN — METOPROLOL SUCCINATE 50 MG: 50 TABLET, EXTENDED RELEASE ORAL at 09:51

## 2021-11-16 RX ADMIN — DEXAMETHASONE SODIUM PHOSPHATE 10 MG: 10 INJECTION, SOLUTION INTRAMUSCULAR; INTRAVENOUS at 14:00

## 2021-11-16 RX ADMIN — INSULIN LISPRO 4 UNITS: 100 INJECTION, SOLUTION INTRAVENOUS; SUBCUTANEOUS at 12:39

## 2021-11-16 RX ADMIN — BISACODYL 10 MG: 10 SUPPOSITORY RECTAL at 16:51

## 2021-11-16 RX ADMIN — IPRATROPIUM BROMIDE AND ALBUTEROL SULFATE 1 AMPULE: .5; 2.5 SOLUTION RESPIRATORY (INHALATION) at 11:18

## 2021-11-16 RX ADMIN — ONDANSETRON 4 MG: 2 INJECTION INTRAMUSCULAR; INTRAVENOUS at 19:28

## 2021-11-16 RX ADMIN — BUDESONIDE 500 MCG: 0.5 SUSPENSION RESPIRATORY (INHALATION) at 11:18

## 2021-11-16 RX ADMIN — SODIUM CHLORIDE, PRESERVATIVE FREE 10 ML: 5 INJECTION INTRAVENOUS at 10:01

## 2021-11-16 RX ADMIN — LEVOFLOXACIN 750 MG: 500 TABLET, FILM COATED ORAL at 09:51

## 2021-11-16 RX ADMIN — ENOXAPARIN SODIUM 30 MG: 100 INJECTION SUBCUTANEOUS at 20:53

## 2021-11-16 RX ADMIN — INSULIN LISPRO 2 UNITS: 100 INJECTION, SOLUTION INTRAVENOUS; SUBCUTANEOUS at 17:19

## 2021-11-16 RX ADMIN — LACTULOSE 20 G: 20 SOLUTION ORAL at 17:16

## 2021-11-16 RX ADMIN — LISINOPRIL 10 MG: 10 TABLET ORAL at 09:51

## 2021-11-16 ASSESSMENT — ENCOUNTER SYMPTOMS
CHEST TIGHTNESS: 0
RHINORRHEA: 0
NAUSEA: 0
COUGH: 1
VOMITING: 0
BLOOD IN STOOL: 0
WHEEZING: 0
CONSTIPATION: 0
SHORTNESS OF BREATH: 1
ABDOMINAL PAIN: 0
DIARRHEA: 0

## 2021-11-16 ASSESSMENT — PAIN DESCRIPTION - ORIENTATION
ORIENTATION: LOWER
ORIENTATION: MID;LOWER

## 2021-11-16 ASSESSMENT — PAIN DESCRIPTION - FREQUENCY: FREQUENCY: CONTINUOUS

## 2021-11-16 ASSESSMENT — PAIN SCALES - GENERAL
PAINLEVEL_OUTOF10: 3
PAINLEVEL_OUTOF10: 0
PAINLEVEL_OUTOF10: 5

## 2021-11-16 ASSESSMENT — PAIN DESCRIPTION - PAIN TYPE
TYPE: CHRONIC PAIN
TYPE: CHRONIC PAIN

## 2021-11-16 ASSESSMENT — PAIN DESCRIPTION - LOCATION
LOCATION: BACK
LOCATION: THROAT;BACK

## 2021-11-16 ASSESSMENT — PAIN - FUNCTIONAL ASSESSMENT: PAIN_FUNCTIONAL_ASSESSMENT: PREVENTS OR INTERFERES SOME ACTIVE ACTIVITIES AND ADLS

## 2021-11-16 ASSESSMENT — PAIN DESCRIPTION - ONSET: ONSET: ON-GOING

## 2021-11-16 ASSESSMENT — PAIN DESCRIPTION - PROGRESSION: CLINICAL_PROGRESSION: GRADUALLY WORSENING

## 2021-11-16 ASSESSMENT — PAIN DESCRIPTION - DESCRIPTORS
DESCRIPTORS: SHARP
DESCRIPTORS: SHARP

## 2021-11-16 NOTE — PLAN OF CARE
Problem: Falls - Risk of:  Goal: Will remain free from falls  Description: Will remain free from falls  11/15/2021 2131 by Gerson Piedra RN  Outcome: Ongoing  Note: Pt's be in lowest position with wheels locked, bedside table and call light are within reach. Pt wearing gripper socks, and fall sign posted outside of room. Problem: Pain:  Goal: Control of chronic pain  Description: Control of chronic pain  11/15/2021 2131 by Gerson Piedra RN  Outcome: Ongoing     Problem: Airway Clearance - Ineffective  Goal: Achieve or maintain patent airway  11/15/2021 2131 by Gerson Piedra RN  Outcome: Ongoing     Problem: Gas Exchange - Impaired  Goal: Absence of hypoxia  11/15/2021 2131 by Gerson Piedra RN  Outcome: Ongoing  Note: Pt sating mid to low 90's on BiPAP at 85%     Problem: Breathing Pattern - Ineffective  Goal: Ability to achieve and maintain a regular respiratory rate  11/15/2021 2131 by Gerson Piedra RN  Outcome: Ongoing     Problem:  Body Temperature -  Risk of, Imbalanced  Goal: Ability to maintain a body temperature within defined limits  11/15/2021 2131 by Gerson Piedra RN  Outcome: Ongoing     Problem: Isolation Precautions - Risk of Spread of Infection  Goal: Prevent transmission of infection  11/15/2021 2131 by Gerson Piedra RN  Outcome: Ongoing

## 2021-11-16 NOTE — PROGRESS NOTES
Occupational Therapy  Facility/Department: [facility] ICU  Daily Treatment Note  NAME: Li Moran  : 1955  MRN: [id number]    Date of Service: 2021    Discharge Recommendations:  Patient would benefit from continued therapy after discharge       Assessment   Performance deficits / Impairments: Decreased functional mobility ; Decreased ADL status; Decreased strength; Decreased endurance; Decreased high-level IADLs; Decreased balance; Decreased posture  Assessment: Pt. completed simple grooming task with set up and supervision while sitting upright in bed. Pt. completed bed mobility to sit EOB and displayed decreased levels of O2 to 83-84% saturation. Pt. educated on proper breathing tech and tolerated sitting EOB 15 min before needed to return to supine. Once in supine, O2 levels returned to 93-94% saturation. Skilled OT warranted to promote functional I/safety to return pt to prior living arrangement with assist as needed. Prognosis: Good  OT Education: OT Role; Transfer Training; Energy Conservation; ADL Adaptive Strategies; Plan of Care  Patient Education: Pt. educated on proper breathing tech to maintain healthy O2 levels, proper use of bedrails to assist with positioning/mobility. REQUIRES OT FOLLOW UP: Yes  Activity Tolerance  Activity Tolerance: Patient limited by fatigue; Treatment limited secondary to medical complications (free text) (fluctuating O2 levels.)  Activity Tolerance: fair  Safety Devices  Safety Devices in place: Yes  Type of devices: All fall risk precautions in place; Bed alarm in place; Call light within reach; Nurse notified; Left in bed; Patient at risk for falls         Patient Diagnosis(es): The encounter diagnosis was COVID-19.      has a past medical history of Anxiety, Arthritis, Bell's palsy, Chronic neck pain, Chronic pain, Fibromyalgia, HA (headache), Headache, HTN (hypertension), and Hypertension.    has a past surgical history that includes  section; shoulder surgery; Urethra dilation; Arm Surgery; [place] tooth extraction; joint replacement (Left); Cholecystectomy, laparoscopic (N/A, 4/6/2020); and Cholecystectomy (04/2020). Restrictions  Restrictions/Precautions  Restrictions/Precautions: General Precautions, Fall Risk  Required Braces or Orthoses?: No  Position Activity Restriction  Other position/activity restrictions: Up as tolerated, telemetry, Droplet+ for Covid, HF NC O2, RUE IV  Subjective   General  Chart Reviewed: Yes  Patient assessed for rehabilitation services?: Yes  Additional Pertinent Hx: Pt. agreeable for treatment  Response to previous treatment: Patient with no complaints from previous session  Family / Caregiver Present: No  Subjective  Subjective: Pt. stated \"I do feel better today. Gumaro Quincy Gumaro Quincy Gumaro Quincy I would like to get in the chair today\". General Comment  Comments: Pt. appeared motivated to get up in chair today. Orientation  Orientation  Overall Orientation Status: Within Functional Limits  Objective    ADL  Feeding: Setup  Grooming: Setup; Supervision  Additional Comments: Pt. completed grooming tasks while sitting upright in bed. Balance  Sitting Balance: Stand by assistance  Standing Balance: Unable to assess(comment)  Bed mobility  Supine to Sit: Moderate assistance (mod assist to move from supine to sit EOB with max verbal cues on proper breathing tech to maintain healthy O2 levels)  Sit to Supine: Moderate assistance; 2 Person assistance (Mod assist x2 to repostion self from EOB back to proper position supine in bed.)  Scooting: Moderate assistance  Comment: Pt. completed sitting EOB to tolerate 15 min with fluctuating O2 stats from 84% saturation to 93% saturation. Pt. did c/o lightheadedness but displayed increased fatigue needing to return to supine position/upright sitting in bed to recover.   Transfers  Transfer Comments: Transfers not attempted d/t fluctuating O2 levels while sitting EOB        Cognition  Overall Cognitive Status: [facility] Perception  Overall Per[facility]      Plan   Plan  Times per week: 4-5x/wk 1x/day as rufina  Times per day: Daily  Current Treatment Recommendations: Strengthening, Endurance Training, Patient/Caregiver Education & Training, Equipment Evaluation, Education, & procurement, Self-Care / ADL, Home Management Training, Functional Mobility Training, Safety Education & Training  Plan Comment: Cont. OT with stated POC           AM-PAC Score        AM-PAC Inpatient Daily Activity Raw Score: 14 (11/16/21 1125)  AM-PAC Inpatient ADL T-Scale Score : 33.39 (11/16/21 1125)  ADL Inpatient CMS 0-100% Score: 59.67 (11/16/21 1125)  ADL Inpatient CMS G-Code Modifier : CK (11/16/21 1125)    Goals  Short term goals  Time Frame for Short term goals: By discharge, pt to demo  Short term goal 1: ADL transfers and functional mobility to SBA with use of AD as needed. Short term goal 2: UB ADLS to Set up and LB ADLs SBA with use of AD/AE as needed. Short term goal 3: toileting to SBA with use of AD/grab bars as needed. Short term goal 4: increased B UE strength by 1/2 grade to assist with functional tasks/I with B UE HEP with use of handouts as needed. Short term goal 5: bed mobility to Mod I with use of bedrails as needed. Long term goals  Long term goal 1: Pt to be I with fall prevention educaiton, Covid specific education, EC/WS tech, recommendations for AE with use of handouts as needed. Patient Goals   Patient goals : To feel better! Therapy Time   Co-treat Concurrent Group Co-treatment   Time In 1021         Time Out 1100         Minutes 44              Co-treatment with PT warranted secondary to decreased safety and independence requiring 2 skilled therapy professionals to address individual discipline's goals.  OT addressing \"preparation for ADL transfer\",\"sitting balance for increased ADL performance\",\"sitting/activity tolerance\",\"functional reaching\",\"environmental safety/scanning\",\"fall prevention\",\"functional mobility for ADL transfers\",\"ability to sequence and follow directions\",\"functional UE strength.     Denisa Brody, GURVINDER

## 2021-11-16 NOTE — FLOWSHEET NOTE
Patient in COVID-19 isolation; sleeping; no family present. Writer prays for patient from [facility]. Per RN, patient is somewhat improved today. Spiritual Care will follow as needed.      11/16/21 1321   Encounter Summary   Services provided to: Patient not available   Referral/Consult From: Surya   Continue Visiting   (11/16/21 COVID/sleeping)   Complexity of Encounter Low   Length of Encounter 15 minutes   Routine   Type Follow up   Assessment Sleeping   Intervention Prayer

## 2021-11-16 NOTE — PROGRESS NOTES
[facility]  Office: [address], DO, Niels Das, DO, Melody Crabtree, DO, Bushra Linda Blood, DO, Leonela Valentine MD, Billie Woods MD, Hermelindo Emmanuel MD, Claudean Millard, MD, Lori Allred MD, Kay Noguera MD, Rolly Wheeler MD, Marco Barksdale, DO, Cat Wilder, DO, Isma Freedman MD,  Floyd Troy DO, Uri Stein MD, Bridgette Davidson MD, Serene Alarcon MD, Donnie Menjivar MD, Tomas Powers MD, Leticia Grigsby MD, Irina Paz MD, Soledad Watkins, [facility], [facility], CNP, Jojo Carrillo, CNP, Beatriz Campbell, CNS, Marilee Chin, CNP, Kyle Arteaga, CNP, Kaushik Guo, CNP, Jelena Jasmine, CNP, Gloria Pleitez, CNP, Ade Graham PAJEET, Amalia Carrillo, [facility], Thomas Merino, CNP, Mayra Mejia, CNP, Ortega Staples, CNP, Zachery Bishop, CNP, Cecille Zavala, CNP, Azul Leon, CNP, Tim Saint, [facility]      Daily Progress Note     Admit Date: 11/3/2021  Bed/Room No.  1102/1102-01  Admitting Physician : Hermelindo Emmanuel MD  Code Status :Full Code  Hospital Day:  LOS: 12 days   Chief Complaint:     Chief Complaint   Patient presents with    Abdominal Pain     lower    Nausea    Back Pain    Headache     Principal Problem:    Pneumonia due to COVID-19 virus  Active Problems:    Essential hypertension    Prediabetes    Morbid obesity with BMI of 40.0-44.9, adult ([facility] [address]    Hyperglycemia    Acute respiratory failure with hypoxia ([facility] [address]    Acute urinary retention  Resolved Problems:    * No resolved hospital problems. *    Subjective : Interval History/Significant events :  11/16/21    Patient complains of generalized fatigue, cough without expectoration. Patient remains on high flow 60 LPM, 90% FiO2 with intermittent BiPAP use 18/8. She is having poor oral intake. Patient has constipation. Denies nausea, vomiting. Vitals - Unstable afebrile  Labs -WBC 12.2, hyperglycemia, normal chloride, kidney function. Nursing notes , Consults notes reviewed.  Overnight events and updates discussed with Nursing staff . Background History:         Nancy Mckinley is 74 y.o. female  Who was admitted to the hospital on 11/3/2021 for treatment of Pneumonia due to COVID-19 virus. Patient presented to emergency room with headache, abdominal pain, nausea, fever T-max 102. 2. Chris Henderson Patient tested positive for COVID-19. Chest x-ray showed bibasilar opacities consistent with COVID-19 pneumonia. She has underlying history of obesity, fibromyalgia, chronic pain, hypertension. Patient was not vaccinated against COVID-19. CT chest was negative for PE. Patient had acute respiratory failure was requiring O2 support. Breathing status worsened and required BiPAP with 100% FiO2 and was moved to ICU on 11/10/2021. PMH:  Past Medical History:   Diagnosis Date    Anxiety     Arthritis     Bell's palsy     Chronic neck pain     posterior neck since fall April 2014    Chronic pain     low back and bilateral lower extremities    Fibromyalgia     HA (headache)     Headache     off/on since fall April 2014    HTN (hypertension)     Hypertension       Allergies:    Allergies   Allergen Reactions    Other Hives    Ceclor [Cefaclor]     Food      All melons      Sulfa Antibiotics      Projectile vomiting       Medications :  metoprolol succinate, 50 mg, Oral, Daily  lisinopril, 10 mg, Oral, Daily  polyethylene glycol, 17 g, Oral, Daily  docusate sodium, 100 mg, Oral, BID  insulin lispro, 0-12 Units, SubCUTAneous, TID WC  insulin lispro, 0-6 Units, SubCUTAneous, Nightly  levoFLOXacin, 750 mg, Oral, Daily  ipratropium-albuterol, 1 ampule, Inhalation, Q4H WA  dexamethasone, 10 mg, IntraVENous, Q12H  budesonide-formoterol, 2 puff, Inhalation, BID  miconazole, , Topical, BID  mirtazapine, 7.5 mg, Oral, Nightly  sodium chloride flush, 5-40 mL, IntraVENous, 2 times per day  enoxaparin, 30 mg, SubCUTAneous, BID  Vitamin D, 2,000 Units, Oral, Daily  influenza virus vaccine, 0.5 mL, IntraMUSCular, 89/54 -- -- 74 27 -- --   11/16/21 0005 -- -- -- -- 20 93 % --   11/16/21 0000 99/63 98.1 °F (36.7 °C) Oral 98 30 93 % --   11/15/21 2300 (!) 87/56 -- -- 95 24 90 % --   11/15/21 2200 118/75 -- -- 84 22 92 % --   11/15/21 2100 111/75 -- -- 83 18 -- --   11/15/21 2000 111/71 98.3 °F (36.8 °C) Axillary 75 22 92 % --   11/15/21 1930 -- -- -- -- 20 -- --   11/15/21 1915 -- -- -- -- 19 92 % --   11/15/21 1900 105/78 -- -- 71 19 92 % --   11/15/21 1800 98/67 -- -- 74 19 90 % --   11/15/21 1700 102/74 -- -- 79 18 91 % --   11/15/21 1610 -- -- -- 91 27 (!) 87 % --   11/15/21 1606 -- -- -- 98 24 (!) 81 % --   11/15/21 1600 120/71 -- -- 100 22 (!) 87 % --   11/15/21 1543 -- 97.6 °F (36.4 °C) Oral 89 22 92 % --   11/15/21 1500 -- -- -- 77 27 93 % --   11/15/21 1448 -- -- -- -- 26 -- --   11/15/21 1400 107/73 -- -- 77 30 90 % --   11/15/21 1300 99/72 -- -- 73 18 90 % --   11/15/21 1200 116/78 98.6 °F (37 °C) Axillary 76 26 94 % --   11/15/21 1129 -- -- -- -- 24 -- --   11/15/21 1100 116/81 -- -- 73 22 90 % --   11/15/21 1052 -- -- -- -- 30 90 % --   11/15/21 1000 (!) 117/94 -- -- 88 27 91 % --   11/15/21 0915 -- -- -- 82 17 91 % 227 lb 15.3 oz (103.4 kg)   11/15/21 0905 -- -- -- 88 27 90 % --   11/15/21 0900 125/82 -- -- 97 20 (!) 85 % --   11/15/21 0855 -- -- -- 91 24 (!) 89 % --   11/15/21 0848 -- -- -- 91 25 93 % --   11/15/21 0830 -- -- -- 78 28 93 % --   11/15/21 0807 -- -- -- 73 25 90 % --     Intake / output   11/15 0701 - 11/16 0700  In: 300 [P.O.:300]  Out: 1325 [Urine:1325]  Physical Exam:  Physical Exam  Vitals and nursing note reviewed. Constitutional:       General: She is not in acute distress. Appearance: She is not diaphoretic. Interventions: Nasal cannula in place. Comments: On High Flow    HENT:      Head: Normocephalic and atraumatic. Nose:      Right Sinus: No maxillary sinus tenderness or frontal sinus tenderness.       Left Sinus: No maxillary sinus tenderness or frontal sinus tenderness. Mouth/Throat:      Pharynx: No oropharyngeal exudate. Eyes:      General: No scleral icterus. Conjunctiva/sclera: Conjunctivae normal.      Pupils: Pupils are equal, round, and reactive to light. Neck:      Thyroid: No thyromegaly. Vascular: No JVD. Cardiovascular:      Rate and Rhythm: Normal rate and regular rhythm. Pulses:           Dorsalis pedis pulses are 2+ on the right side and 2+ on the left side. Heart sounds: Normal heart sounds. No murmur heard. Pulmonary:      Effort: Pulmonary effort is normal.      Breath sounds: Normal breath sounds. No wheezing or rales. Abdominal:      Palpations: Abdomen is soft. There is no mass. Tenderness: There is no abdominal tenderness. Musculoskeletal:      Cervical back: Full passive range of motion without pain and neck supple. Lymphadenopathy:      Head:      Right side of head: No submandibular adenopathy. Left side of head: No submandibular adenopathy. Cervical: No cervical adenopathy. Skin:     General: Skin is warm. Neurological:      Mental Status: She is alert and oriented to person, place, and time. Motor: No tremor. Psychiatric:         Behavior: Behavior is cooperative.            Laboratory findings:    Recent Labs     11/14/21  0340 11/15/21  0501 11/16/21  0458   WBC 11.3 10.8 12.2*   HGB 14.9 15.3* 15.1   HCT 45.6 46.0 45.5    343 336     Recent Labs     11/14/21  0340 11/15/21  0501 11/16/21  0458    135 136   K 4.6 4.7 4.5   CL 95* 98 98   CO2 29 23 25   GLUCOSE 139* 127* 158*   BUN 37* 35* 46*   CREATININE 0.69 0.60 0.89   CALCIUM 9.1 9.1 9.1            Specific Gravity, UA   Date Value Ref Range Status   11/10/2021 1.020 1.005 - 1.030 Final     Protein, UA   Date Value Ref Range Status   11/10/2021 NEGATIVE NEGATIVE Final     RBC, UA   Date Value Ref Range Status   11/02/2021 2 TO 5 0 - 2 /HPF Final     Bacteria, UA   Date Value Ref Range Status   11/02/2021 NOT REPORTED None Final     Nitrite, Urine   Date Value Ref Range Status   11/10/2021 NEGATIVE NEGATIVE Final     WBC, UA   Date Value Ref Range Status   11/02/2021 2 TO 5 0 - 5 /HPF Final     Leukocyte Esterase, Urine   Date Value Ref Range Status   11/10/2021 NEGATIVE NEGATIVE Final       Imaging / Clinical Data :-   XR CHEST PORTABLE    Result Date: 11/14/2021  Moderate bilateral parenchymal opacities. Minimally increased parenchymal density on the right compared to prior studies. XR CHEST PORTABLE    Result Date: 11/12/2021  Moderate bilateral pneumonia, similar to the previous exam.     XR CHEST PORTABLE    Result Date: 11/11/2021  Findings remain compatible with moderate COVID-19 pneumonitis. No significant interval changes. XR CHEST PORTABLE    Result Date: 11/9/2021  Multifocal pulmonary opacities suggest significantly worsened pulmonary edema and or multifocal pneumonia. Recommend continued follow-up to resolution. CT CHEST PULMONARY EMBOLISM W CONTRAST    Result Date: 11/9/2021  No evidence of pulmonary embolism. Extensive pulmonary abnormalities most compatible with COVID-19 pneumonia; other processes such as influenza pneumonia and organizing pneumonia, as can be seen with drug toxicity and connective tissue disease, can cause a similar imaging pattern. Mild adenopathy, probably reactive. Clinical Course : unchanged  Assessment and Plan  :        1. Acute respiratory failure with hypoxia secondary to COVID-19 pneumonia-on BiPAP/high flow. Continue dexamethasone high-dose, Actemra given on 11/10/2021. Continue bronchodilator therapy. Patient on day 5/8 Levaquin. Lasix as needed. 2. Morbid obesity BMI 41  3. Essential hypertension - patient has low blood pressure. Discontinue Prinzide and switch to lisinopril 10 mg daily,  decrease Lopressor dose. 4. Prediabetes worsened with steroid-induced hyperglycemia - A1C 5.8. Humalog as needed. -     Encourage incentive spirometry.   PT OT Continue to monitor vitals , Intake / output ,  Cell count , HGB , Kidney function, oxygenation  as indicated . Plan and updates discussed with patient ,  answers  explained to satisfaction.    Plan discussed with Staff Carlos Eduardo Vega RN     (Please note that portions of this note were completed with a voice recognition program. Efforts were made to edit the dictations but occasionally words are mis-transcribed.)      Yenni Grimes MD  11/16/2021

## 2021-11-16 NOTE — PROGRESS NOTES
Pulmonary Critical Care Progress Note  Alia Osorio MD     Patient seen for the follow up of acute hypoxic respiratory failure, asthma extubation, Pneumonia due to COVID-19 virus     Subjective:  No significant overnight events noted. She wore BiPAP last night with FiO2 of 95%  She is resting in bed. She is still on high flow, 60 L / 95% FiO2. Shortness of breath is not much change. She has productive cough with yellow sputum. She feels better today. She is feeling hungry today. No bowel movements for many days. Examination:  Vitals: /80   Pulse 83   Temp 96.6 °F (35.9 °C) (Axillary)   Resp 21   Ht 5' 2\" (1.575 m)   Wt 228 lb 2.8 oz (103.5 kg)   SpO2 92%   BMI 41.73 kg/m²   General appearance:  alert and cooperative with exam, resting in bed  Neck: No JVD  Lungs: Bilateral crackles, no wheezing, moderate air exchange  Heart: regular rate and rhythm, S1, S2 normal, no gallop  Abdomen: Soft, non tender, + BS  Extremities: no cyanosis or clubbing.  No significant edema    LABs:  CBC:   Recent Labs     11/15/21  0501 11/16/21  0458   WBC 10.8 12.2*   HGB 15.3* 15.1   HCT 46.0 45.5    336     BMP:   Recent Labs     11/15/21  0501 11/16/21  0458    136   K 4.7 4.5   CO2 23 25   BUN 35* 46*   CREATININE 0.60 0.89   LABGLOM >60 >60   GLUCOSE 127* 158*     Radiology:  X-ray chest: 11/16/2021  Bilateral pulmonary infiltrate, slightly worsened on the right since previous exam      Impression:  · Acute hypoxic respiratory failure  · COVID-19 pneumonia with possible bacterial coinfection  · Mild adenopathy, likely reactive  · History of asthma  · Suspected obstructive sleep apnea/Obesity  · Arthritis, hypertension, fibromyalgia    Recommendations:  · Continue airborne isolation  · Prone as tolerated  · Incentive spirometry every hour while awake  · Continue BiPAP support  · Oxygen via high flow nasal cannula as tolerated, keep SPO2 90% or greater   · Continue Decadron to IV 10 mg every 12 hours  · S/p Actemra  · Continue albuterol and Ipratropium Q 4 hours and prn  · Discontinue Symbicort  · Start Brovana aerosol treatment  · Start budesonide aerosol treatment  · Continue Levaquin 750 mg daily  · Labs: CBC and BMP in am  · DVT prophylaxis with low molecular weight heparin  · Discussed with RN   · Will follow with you    Maricruz Holder MD, [facility]  Pulmonary Critical Care and Sleep Medicine,  [facility]  Cell: [phone]  Office: [phone]

## 2021-11-16 NOTE — PROGRESS NOTES
Physical Therapy  Facility/Department: [facility], [place] ICU  Daily Treatment Note  NAME: Kim Martin  : 1955  MRN: [id number]    Date of Service: 2021   RYAN Rand reports patient is medically stable for therapy treatment this date. Chart reviewed prior to treatment and patient is agreeable for therapy. All lines intact and patient positioned comfortably at end of treatment. All patient needs addressed prior to ending therapy session. Discharge Recommendations:  Pt currently functioning below baseline. Would suggest additional therapy at time of discharge to maximize long term outcomes and prevent re-admission. Please refer to AM-PAC score for current level of function. Patient would benefit from continued therapy after discharge      Assessment   Body structures, Functions, Activity limitations: Decreased functional mobility ; Decreased ADL status; Decreased strength; Decreased endurance; Decreased balance  Assessment: Pt tolerated session fair this date. Pt w/ significant difficulty throughout bed mobility this date, most limited by decreased endurance and fluctuating SpO2 w/ minimal activity. Pt currently requires skilled 2 person assist for safe functional mobility. Pt would benefit from continued skilled physical therapy to address these deficits in order to return to PLOF. Prognosis: Good  Decision Making: Medium Complexity  PT Education: PT Role; Plan of Care; General Safety; Energy Conservation  Patient Education: Pt educated on: importance of continued mobility throughout admission, general safety awareness, pursed lip breathing, and PT POC. Pt w/ fair return demo. Pt would benefit from continued reinforcement of education.   REQUIRES PT FOLLOW UP: Yes  Activity Tolerance  Activity Tolerance: Patient limited by endurance     Patient Diagnosis(es): The encounter diagnosis was COVID-19.     has a past medical history of Anxiety, Arthritis, Bell's palsy, Chronic neck pain, Chronic pain, Fibromyalgia, HA (headache), Headache, HTN (hypertension), and Hypertension. has a past surgical history that includes  section; shoulder surgery; Urethra dilation; Arm Surgery; [place] tooth extraction; joint replacement (Left); Cholecystectomy, laparoscopic (N/A, 2020); and Cholecystectomy (2020). Restrictions  Restrictions/Precautions  Restrictions/Precautions: General Precautions, Fall Risk  Required Braces or Orthoses?: No  Position Activity Restriction  Other position/activity restrictions: Up as tolerated, telemetry, Droplet+ for Covid, HF NC O2, RUE IV  Subjective   General  Response To Previous Treatment: Patient with no complaints from previous session. Family / Caregiver Present: No  Subjective  Subjective: Pt reporting feeling better this date, has been able to eat a little. General Comment  Comments: RN and pt agreeable to therapy. Pt supine in bed upon arrival on 60L high flow w/ SpO2 93%. Pt pleasant and cooperative throughout. Orientation  Orientation  Overall Orientation Status: Within Normal Limits  Cognition   Cognition  Overall Cognitive Status: WFL  Objective   Bed mobility  Supine to Sit: Moderate assistance; 2 Person assistance  Sit to Supine: Moderate assistance; 2 Person assistance  Scooting: Moderate assistance; 2 Person assistance  Comment: Pt w/ significant difficulty throughout bed mobility this date, requiring assist for progression of BLE and trunk throughout. Pt sat EOB for ~15min this date w/ fair static sitting balance. Pt's SpO2 dropped w/ bed mobility to 84% and took ~1-2 minutes to recover w/ max verbal cueing for pursed lip breathing. Following return to supine, pt's SpO2 returned to 93-94% w/ ~1-2 min of pursed lip breathing. Transfers  Comment: Not assessed this date. Pt w/ fluctuating SpO2 throughout bed mobility requiring increased recovery time. Pt also fatiguing w/ sitting EOB.   Ambulation  Ambulation?: No     Balance  Posture: Fair  Sitting - Static: Good  Sitting - Dynamic: Good; -  Comments: Standing balance not assessed this date. Exercises  Heelslides: x 10 BLE  Ankle Pumps: x 10 BLE  Comments: Pt fatiguing quickly w/ ther ex this date and w/ fluctuating SpO2 levels between 84-94% this date. AM-PAC Score  AM-PAC Inpatient Mobility Raw Score : 8 (11/16/21 1137)  AM-PAC Inpatient T-Scale Score : 28.52 (11/16/21 1137)  Mobility Inpatient CMS 0-100% Score: 86.62 (11/16/21 1137)  Mobility Inpatient CMS G-Code Modifier : CM (11/16/21 1137)       Functional Outcome Measure-   Single Leg Stance Test:  0 sec. (<5 sec.= fall risk)    Goals  Short term goals  Time Frame for Short term goals: 12 visits  Short term goal 1: Patient will be indep with bed mobility and transfers. Short term goal 2: Patient will amb 100 feet indep with appropriate AD. Short term goal 3: Patient will negotiate 4 stairs with rails and supervision. Short term goal 4:  Inc standing balance to good with device to facilitate pt independence for performance of ADL's & functional mobility, & reduce fall risk  Short term goal 5: Pt able to tolerate 30 min of activity to include ex, breathing techniques & endurance training with pt demonstrating ability to perform energy conservation strategies during functional activity to self-regulate pacing & breathing techniques to avoid SOB & maintain saO2 in safe range  Short term goal 6: Ed pt on home ex's, optimal breathing techniques, safety & energy principles, endurance training, Covid 19 pt education & issue written pt education  Patient Goals   Patient goals : Improve breathing and walking    Plan    Plan  Times per week: 1-2x/d, 5-6 d/wk  Current Treatment Recommendations: Strengthening, Balance Training, Functional Mobility Training, Transfer Training, Endurance Training, Gait Training, Stair training, Patient/Caregiver Education & Training, Safety Education & Training, Home Exercise Program  Safety Devices  Type of devices: Bed alarm in place, Call light within reach, Nurse notified, Left in bed  Restraints  Initially in place: No     Therapy Time   Individual Concurrent Group Co-treatment   Time In 1021         Time Out 1100         Minutes 44                Co-treatment with OT warranted secondary to decreased safety and independence requiring 2 skilled therapy professionals to address individual discipline's goals.      Lisa Pedraza, PT

## 2021-11-17 LAB
ABSOLUTE EOS #: 0.18 K/UL (ref 0–0.44)
ABSOLUTE IMMATURE GRANULOCYTE: 0.31 K/UL (ref 0–0.3)
ABSOLUTE LYMPH #: 1 K/UL (ref 1.1–3.7)
ABSOLUTE MONO #: 0.79 K/UL (ref 0.1–1.2)
ANION GAP SERPL CALCULATED.3IONS-SCNC: 12 MMOL/L (ref 9–17)
BASOPHILS # BLD: 0 % (ref 0–2)
BASOPHILS ABSOLUTE: 0.06 K/UL (ref 0–0.2)
BUN BLDV-MCNC: 39 MG/DL (ref 8–23)
BUN/CREAT BLD: 72 (ref 9–20)
CALCIUM SERPL-MCNC: 8.9 MG/DL (ref 8.6–10.4)
CHLORIDE BLD-SCNC: 101 MMOL/L (ref 98–107)
CO2: 20 MMOL/L (ref 20–31)
CREAT SERPL-MCNC: 0.54 MG/DL (ref 0.5–0.9)
CULTURE: NORMAL
CULTURE: NORMAL
DIFFERENTIAL TYPE: ABNORMAL
EOSINOPHILS RELATIVE PERCENT: 1 % (ref 1–4)
GFR AFRICAN AMERICAN: >60 ML/MIN
GFR NON-AFRICAN AMERICAN: >60 ML/MIN
GFR SERPL CREATININE-BSD FRML MDRD: ABNORMAL ML/MIN/{1.73_M2}
GFR SERPL CREATININE-BSD FRML MDRD: ABNORMAL ML/MIN/{1.73_M2}
GLUCOSE BLD-MCNC: 154 MG/DL (ref 70–99)
GLUCOSE BLD-MCNC: 156 MG/DL (ref 65–105)
GLUCOSE BLD-MCNC: 177 MG/DL (ref 65–105)
GLUCOSE BLD-MCNC: 178 MG/DL (ref 65–105)
GLUCOSE BLD-MCNC: 180 MG/DL (ref 65–105)
GLUCOSE BLD-MCNC: 251 MG/DL (ref 65–105)
HCT VFR BLD CALC: 48.1 % (ref 36.3–47.1)
HEMOGLOBIN: 15.8 G/DL (ref 11.9–15.1)
IMMATURE GRANULOCYTES: 2 %
LACTIC ACID, SEPSIS WHOLE BLOOD: ABNORMAL MMOL/L (ref 0.5–1.9)
LACTIC ACID, SEPSIS: 2.2 MMOL/L (ref 0.5–1.9)
LYMPHOCYTES # BLD: 7 % (ref 24–43)
Lab: NORMAL
Lab: NORMAL
MCH RBC QN AUTO: 31 PG (ref 25.2–33.5)
MCHC RBC AUTO-ENTMCNC: 32.8 G/DL (ref 28.4–34.8)
MCV RBC AUTO: 94.5 FL (ref 82.6–102.9)
MONOCYTES # BLD: 6 % (ref 3–12)
NRBC AUTOMATED: 0 PER 100 WBC
PDW BLD-RTO: 12.7 % (ref 11.8–14.4)
PLATELET # BLD: 327 K/UL (ref 138–453)
PLATELET ESTIMATE: ABNORMAL
PMV BLD AUTO: 10 FL (ref 8.1–13.5)
POTASSIUM SERPL-SCNC: 4.3 MMOL/L (ref 3.7–5.3)
POTASSIUM SERPL-SCNC: 5.4 MMOL/L (ref 3.7–5.3)
RBC # BLD: 5.09 M/UL (ref 3.95–5.11)
RBC # BLD: ABNORMAL 10*6/UL
SEG NEUTROPHILS: 84 % (ref 36–65)
SEGMENTED NEUTROPHILS ABSOLUTE COUNT: 11.23 K/UL (ref 1.5–8.1)
SODIUM BLD-SCNC: 133 MMOL/L (ref 135–144)
SPECIMEN DESCRIPTION: NORMAL
SPECIMEN DESCRIPTION: NORMAL
WBC # BLD: 13.6 K/UL (ref 3.5–11.3)
WBC # BLD: ABNORMAL 10*3/UL

## 2021-11-17 PROCEDURE — 82947 ASSAY GLUCOSE BLOOD QUANT: CPT

## 2021-11-17 PROCEDURE — 6370000000 HC RX 637 (ALT 250 FOR IP): Performed by: NURSE PRACTITIONER

## 2021-11-17 PROCEDURE — 94660 CPAP INITIATION&MGMT: CPT

## 2021-11-17 PROCEDURE — 6370000000 HC RX 637 (ALT 250 FOR IP): Performed by: INTERNAL MEDICINE

## 2021-11-17 PROCEDURE — 99232 SBSQ HOSP IP/OBS MODERATE 35: CPT | Performed by: FAMILY MEDICINE

## 2021-11-17 PROCEDURE — 97112 NEUROMUSCULAR REEDUCATION: CPT

## 2021-11-17 PROCEDURE — 97530 THERAPEUTIC ACTIVITIES: CPT

## 2021-11-17 PROCEDURE — 94761 N-INVAS EAR/PLS OXIMETRY MLT: CPT

## 2021-11-17 PROCEDURE — 2700000000 HC OXYGEN THERAPY PER DAY

## 2021-11-17 PROCEDURE — 6360000002 HC RX W HCPCS: Performed by: NURSE PRACTITIONER

## 2021-11-17 PROCEDURE — 2580000003 HC RX 258: Performed by: NURSE PRACTITIONER

## 2021-11-17 PROCEDURE — 2500000003 HC RX 250 WO HCPCS: Performed by: FAMILY MEDICINE

## 2021-11-17 PROCEDURE — 6370000000 HC RX 637 (ALT 250 FOR IP): Performed by: STUDENT IN AN ORGANIZED HEALTH CARE EDUCATION/TRAINING PROGRAM

## 2021-11-17 PROCEDURE — 80048 BASIC METABOLIC PNL TOTAL CA: CPT

## 2021-11-17 PROCEDURE — 6370000000 HC RX 637 (ALT 250 FOR IP): Performed by: FAMILY MEDICINE

## 2021-11-17 PROCEDURE — 94640 AIRWAY INHALATION TREATMENT: CPT

## 2021-11-17 PROCEDURE — 2000000000 HC ICU R&B

## 2021-11-17 PROCEDURE — 6360000002 HC RX W HCPCS: Performed by: INTERNAL MEDICINE

## 2021-11-17 PROCEDURE — 36415 COLL VENOUS BLD VENIPUNCTURE: CPT

## 2021-11-17 PROCEDURE — 83605 ASSAY OF LACTIC ACID: CPT

## 2021-11-17 PROCEDURE — 85025 COMPLETE CBC W/AUTO DIFF WBC: CPT

## 2021-11-17 PROCEDURE — 84132 ASSAY OF SERUM POTASSIUM: CPT

## 2021-11-17 RX ORDER — DEXTROSE MONOHYDRATE 25 G/50ML
25 INJECTION, SOLUTION INTRAVENOUS ONCE
Status: COMPLETED | OUTPATIENT
Start: 2021-11-17 | End: 2021-11-17

## 2021-11-17 RX ORDER — SODIUM POLYSTYRENE SULFONATE 15 G/60ML
15 SUSPENSION ORAL; RECTAL ONCE
Status: COMPLETED | OUTPATIENT
Start: 2021-11-17 | End: 2021-11-17

## 2021-11-17 RX ORDER — METOPROLOL SUCCINATE 25 MG/1
25 TABLET, EXTENDED RELEASE ORAL DAILY
Status: DISCONTINUED | OUTPATIENT
Start: 2021-11-18 | End: 2021-11-25

## 2021-11-17 RX ADMIN — PREGABALIN 50 MG: 50 CAPSULE ORAL at 20:19

## 2021-11-17 RX ADMIN — IPRATROPIUM BROMIDE AND ALBUTEROL SULFATE 1 AMPULE: .5; 2.5 SOLUTION RESPIRATORY (INHALATION) at 19:09

## 2021-11-17 RX ADMIN — SODIUM CHLORIDE, PRESERVATIVE FREE 10 ML: 5 INJECTION INTRAVENOUS at 08:27

## 2021-11-17 RX ADMIN — INSULIN LISPRO 2 UNITS: 100 INJECTION, SOLUTION INTRAVENOUS; SUBCUTANEOUS at 17:43

## 2021-11-17 RX ADMIN — MIRTAZAPINE 7.5 MG: 15 TABLET, FILM COATED ORAL at 20:19

## 2021-11-17 RX ADMIN — DEXTROSE MONOHYDRATE 25 G: 25 INJECTION, SOLUTION INTRAVENOUS at 16:07

## 2021-11-17 RX ADMIN — ANTI-FUNGAL POWDER MICONAZOLE NITRATE TALC FREE: 1.42 POWDER TOPICAL at 08:27

## 2021-11-17 RX ADMIN — ANTI-FUNGAL POWDER MICONAZOLE NITRATE TALC FREE: 1.42 POWDER TOPICAL at 20:21

## 2021-11-17 RX ADMIN — INSULIN LISPRO 3 UNITS: 100 INJECTION, SOLUTION INTRAVENOUS; SUBCUTANEOUS at 20:31

## 2021-11-17 RX ADMIN — BUDESONIDE 500 MCG: 0.5 SUSPENSION RESPIRATORY (INHALATION) at 19:09

## 2021-11-17 RX ADMIN — BUDESONIDE 500 MCG: 0.5 SUSPENSION RESPIRATORY (INHALATION) at 07:57

## 2021-11-17 RX ADMIN — ENOXAPARIN SODIUM 30 MG: 100 INJECTION SUBCUTANEOUS at 20:31

## 2021-11-17 RX ADMIN — ENOXAPARIN SODIUM 30 MG: 100 INJECTION SUBCUTANEOUS at 08:22

## 2021-11-17 RX ADMIN — PREGABALIN 50 MG: 50 CAPSULE ORAL at 08:23

## 2021-11-17 RX ADMIN — INSULIN HUMAN 10 UNITS: 100 INJECTION, SOLUTION PARENTERAL at 16:06

## 2021-11-17 RX ADMIN — SODIUM POLYSTYRENE SULFONATE 15 G: 15 SUSPENSION ORAL; RECTAL at 08:22

## 2021-11-17 RX ADMIN — INSULIN LISPRO 2 UNITS: 100 INJECTION, SOLUTION INTRAVENOUS; SUBCUTANEOUS at 13:12

## 2021-11-17 RX ADMIN — DEXAMETHASONE SODIUM PHOSPHATE 10 MG: 10 INJECTION, SOLUTION INTRAMUSCULAR; INTRAVENOUS at 03:04

## 2021-11-17 RX ADMIN — ARFORMOTEROL TARTRATE 15 MCG: 15 SOLUTION RESPIRATORY (INHALATION) at 19:09

## 2021-11-17 RX ADMIN — Medication 2000 UNITS: at 08:23

## 2021-11-17 RX ADMIN — DEXAMETHASONE SODIUM PHOSPHATE 10 MG: 10 INJECTION, SOLUTION INTRAMUSCULAR; INTRAVENOUS at 16:07

## 2021-11-17 RX ADMIN — IPRATROPIUM BROMIDE AND ALBUTEROL SULFATE 1 AMPULE: .5; 2.5 SOLUTION RESPIRATORY (INHALATION) at 11:21

## 2021-11-17 RX ADMIN — OXYCODONE AND ACETAMINOPHEN 1 TABLET: 5; 325 TABLET ORAL at 10:37

## 2021-11-17 RX ADMIN — LEVOFLOXACIN 750 MG: 500 TABLET, FILM COATED ORAL at 08:23

## 2021-11-17 RX ADMIN — IPRATROPIUM BROMIDE AND ALBUTEROL SULFATE 1 AMPULE: .5; 2.5 SOLUTION RESPIRATORY (INHALATION) at 15:33

## 2021-11-17 RX ADMIN — ARFORMOTEROL TARTRATE 15 MCG: 15 SOLUTION RESPIRATORY (INHALATION) at 07:57

## 2021-11-17 RX ADMIN — IPRATROPIUM BROMIDE AND ALBUTEROL SULFATE 1 AMPULE: .5; 2.5 SOLUTION RESPIRATORY (INHALATION) at 07:57

## 2021-11-17 RX ADMIN — SODIUM CHLORIDE, PRESERVATIVE FREE 10 ML: 5 INJECTION INTRAVENOUS at 20:21

## 2021-11-17 RX ADMIN — INSULIN LISPRO 2 UNITS: 100 INJECTION, SOLUTION INTRAVENOUS; SUBCUTANEOUS at 08:22

## 2021-11-17 RX ADMIN — DOCUSATE SODIUM 100 MG: 100 CAPSULE, LIQUID FILLED ORAL at 20:19

## 2021-11-17 RX ADMIN — DOCUSATE SODIUM 100 MG: 100 CAPSULE, LIQUID FILLED ORAL at 08:23

## 2021-11-17 ASSESSMENT — ENCOUNTER SYMPTOMS
VOMITING: 0
SHORTNESS OF BREATH: 1
RHINORRHEA: 0
CHEST TIGHTNESS: 0
COUGH: 1
CONSTIPATION: 0
DIARRHEA: 0
NAUSEA: 0
BLOOD IN STOOL: 0
ABDOMINAL PAIN: 0
WHEEZING: 0

## 2021-11-17 ASSESSMENT — PAIN SCALES - GENERAL
PAINLEVEL_OUTOF10: 6
PAINLEVEL_OUTOF10: 0

## 2021-11-17 ASSESSMENT — PAIN DESCRIPTION - ORIENTATION: ORIENTATION: LOWER

## 2021-11-17 ASSESSMENT — PAIN DESCRIPTION - LOCATION: LOCATION: BACK

## 2021-11-17 ASSESSMENT — PAIN DESCRIPTION - PAIN TYPE: TYPE: CHRONIC PAIN

## 2021-11-17 NOTE — PROGRESS NOTES
Pulmonary Critical Care Progress Note  Jerald León MD     Patient seen for the follow up of acute hypoxic respiratory failure, asthma extubation, Pneumonia due to COVID-19 virus     Subjective:  No significant overnight events noted. She wore BiPAP last night with FiO2 of 95%  She is resting in bed. She remains on high flow, 60 L / 75% FiO2. No significant change in her shortness of breath. She has productive cough with yellow sputum. Examination:  Vitals: /78   Pulse 93   Temp 97.9 °F (36.6 °C) (Oral)   Resp 21   Ht 5' 2\" (1.575 m)   Wt 230 lb 2.6 oz (104.4 kg)   SpO2 94%   BMI 42.10 kg/m²   General appearance:  alert and cooperative with exam, resting in bed  Neck: No JVD  Lungs: Bilateral crackles, no wheezing, moderate air exchange  Heart: regular rate and rhythm, S1, S2 normal, no gallop  Abdomen: Soft, non tender, + BS  Extremities: no cyanosis or clubbing.  No significant edema    LABs:  CBC:   Recent Labs     11/16/21  0458 11/17/21  0501   WBC 12.2* 13.6*   HGB 15.1 15.8*   HCT 45.5 48.1*    327     BMP:   Recent Labs     11/16/21  0458 11/17/21  0501    133*   K 4.5 5.4*   CO2 25 20   BUN 46* 39*   CREATININE 0.89 0.54   LABGLOM >60 >60   GLUCOSE 158* 154*     Radiology:  X-ray chest: 11/16/2021  Bilateral pulmonary infiltrate, slightly worsened on the right since previous exam      Impression:  · Acute hypoxic respiratory failure  · COVID-19 pneumonia with possible bacterial coinfection  · Mild adenopathy, likely reactive  · History of asthma  · Suspected obstructive sleep apnea/Obesity  · Arthritis, hypertension, fibromyalgia    Recommendations:  · Continue airborne isolation  · Prone as tolerated  · Incentive spirometry every hour while awake  · Continue BiPAP support  · Oxygen via high flow nasal cannula as tolerated, keep SPO2 90% or greater   · Continue Decadron to IV 10 mg every 12 hours  · S/p Actemra  · Continue albuterol and Ipratropium Q 4 hours and prn  · Brovana aerosol treatment  · Budesonide aerosol treatment  · Continue Levaquin 750 mg daily  · Labs: CBC and BMP in am  · DVT prophylaxis with low molecular weight heparin  · LTAC evaluation  · Discussed with RN   · Will follow with you    Charles Felix MD, [facility]  Pulmonary Critical Care and Sleep Medicine,  [facility]  Cell: [phone]  Office: [phone]

## 2021-11-17 NOTE — PROGRESS NOTES
Physical Therapy  Facility/Department: [facility] ICU  Daily Treatment Note  NAME: Lanny Castle  : 1955  MRN: [id number]    Date of Service: 2021    Discharge Recommendations:  Patient would benefit from continued therapy after discharge     Pt currently functioning below baseline. Would suggest additional therapy at time of discharge to maximize long term outcomes and prevent re-admission. Please refer to AM-PAC score for current level of function. Assessment   Body structures, Functions, Activity limitations: Decreased functional mobility ; Decreased ADL status; Decreased strength; Decreased endurance; Decreased balance  Assessment: Pt tolerated session fair this date. Pt w/ significant difficulty throughout bed mobility this date, most limited by decreased endurance and fluctuating SpO2 w/ minimal activity. Pt currently requires skilled 2 person assist for safe functional mobility. Pt would benefit from continued skilled physical therapy to address these deficits in order to return to PLOF. Prognosis: Good  PT Education: PT Role; Plan of Care; General Safety; Energy Conservation  REQUIRES PT FOLLOW UP: Yes  Activity Tolerance  Activity Tolerance: Patient limited by endurance  Activity Tolerance: SpO2 80-84% with activity on BiPap. patient requires increased time to maximize tolerance to positional changes and vc's for pursed lip breathing techniques to maximize respiratory function this date. Patient Diagnosis(es): The encounter diagnosis was COVID-19.     has a past medical history of Anxiety, Arthritis, Bell's palsy, Chronic neck pain, Chronic pain, Fibromyalgia, HA (headache), Headache, HTN (hypertension), and Hypertension. has a past surgical history that includes  section; shoulder surgery; Urethra dilation; Arm Surgery; [place] tooth extraction; joint replacement (Left); Cholecystectomy, laparoscopic (N/A, 2020);  Cholecystectomy (2020); and IR INSERT PICC VAD W SQ PORT >5 YEARS (11/16/2021). Restrictions  Restrictions/Precautions  Restrictions/Precautions: General Precautions, Fall Risk  Required Braces or Orthoses?: No  Position Activity Restriction  Other position/activity restrictions: Up as tolerated, telemetry, Droplet+ for covid, bipap, RUE IV  Subjective   General  Chart Reviewed: Yes  Additional Pertinent Hx: [place] palsy, fibromyalgia, BMI 43  Response To Previous Treatment: Patient with no complaints from previous session. Family / Caregiver Present: No  Subjective  Subjective: Pt reporting feeling better this date, has been able to eat a little. General Comment  Comments: RN and pt agreeable to therapy. Pt supine in bed upon arrival on 60L high flow w/ SpO2 93%. Pt pleasant and cooperative throughout. Pain Screening  Patient Currently in Pain: Denies  Vital Signs  Patient Currently in Pain: Denies       Orientation  Orientation  Overall Orientation Status: Within Normal Limits  Cognition   Cognition  Overall Cognitive Status: WFL  Objective   Bed mobility  Bridging: Stand by assistance  Rolling to Left: Stand by assistance  Rolling to Right: Stand by assistance  Supine to Sit: Moderate assistance; 2 Person assistance  Sit to Supine: Moderate assistance; 2 Person assistance  Scooting: Moderate assistance; 2 Person assistance  Comment: Hayleytent placed on BiPap prior to treatment but RT. Hayleytent required Mod x 2 A all bed mobility. Patient sat EOB x 7 mins with VCs for pursed lip breathing and emotional support given. Patient anxiety increased and reporting \"I can't breathing\". SpO2 80-84% seated EOB. When preparing for STS patient SpO2 desated to low 80s again with incresed anxiety and SOB. Patient assisted from sit to supine Mod x 2 A and boost to [facility] for optimal lung capacity.   Transfers  Sit to Stand: Unable to assess  Stand to sit: Unable to assess  Bed to Chair: Unable to assess  Stand Pivot Transfers: Unable to assess  Ambulation  Ambulation?: No  More Ambulation?: No  Neuromuscular Education  NDT Treatment: Lower extremity; Sitting  Balance  Posture: Fair  Sitting - Static: Good  Sitting - Dynamic: Good; -  Standing - Static: Fair; +  Standing - Dynamic: Fair  Comments: Standing balance not assessed this date. AM-PAC Score  AM-PAC Inpatient Mobility Raw Score : 8 (11/17/21 1504)  AM-PAC Inpatient T-Scale Score : 28.52 (11/17/21 1504)  Mobility Inpatient CMS 0-100% Score: 86.62 (11/17/21 1504)  Mobility Inpatient CMS G-Code Modifier : CM (11/17/21 1504)          Goals  Short term goals  Time Frame for Short term goals: 12 visits  Short term goal 1: Patient will be indep with bed mobility and transfers. Short term goal 2: Patient will amb 100 feet indep with appropriate AD. Short term goal 3: Patient will negotiate 4 stairs with rails and supervision. Short term goal 4:  Inc standing balance to good with device to facilitate pt independence for performance of ADL's & functional mobility, & reduce fall risk  Short term goal 5: Pt able to tolerate 30 min of activity to include ex, breathing techniques & endurance training with pt demonstrating ability to perform energy conservation strategies during functional activity to self-regulate pacing & breathing techniques to avoid SOB & maintain saO2 in safe range  Short term goal 6: Ed pt on home ex's, optimal breathing techniques, safety & energy principles, endurance training, Covid 19 pt education & issue written pt education  Patient Goals   Patient goals : Improve breathing and walking    Plan    Plan  Times per week: 1-2x/d, 5-6 d/wk  Current Treatment Recommendations: Strengthening, Balance Training, Functional Mobility Training, Transfer Training, Endurance Training, Gait Training, Stair training, Patient/Caregiver Education & Training, Safety Education & Training, Home Exercise Program  Safety Devices  Type of devices: Bed alarm in place, Call light within reach, Nurse notified  Restraints  Initially in place: No     Therapy Time   Individual Concurrent Group Co-treatment   Time In       200   Time Out       0745   Minutes       23        Co-treatment with OT warranted secondary to decreased safety and independence requiring 2 skilled therapy professionals to address individual discipline's goals. PT addressing pre gait trunk strengthening, weight shifting prior to transfers, transfer training and postural control in sitting.     Reagan Barton, PTA

## 2021-11-17 NOTE — PROGRESS NOTES
Occupational Therapy  Facility/Department: [facility] ICU  Daily Treatment Note  NAME: Coy Knight  : 1955  MRN: [id number]    Date of Service: 2021    Discharge Recommendations:  Patient would benefit from continued therapy after discharge       Assessment   Performance deficits / Impairments: Decreased functional mobility ; Decreased ADL status; Decreased strength; Decreased endurance; Decreased high-level IADLs; Decreased balance; Decreased posture  Assessment: Pt. completed bed mobility with mod assist x2 to sit upright on EOB. Pt. on bipap and displaying increased anxiety today. Pt. O2 stat decreased to 81-84% with supine to sit and required positive encouragement and ed on proper breathing tech. O2 stats increased to 89-90% with rest. Pt. cont. to display anxiety and was returned to supine position d/t increased level of fatigue. Skilled OT warranted to promote I/safety to return pt to prior living arrangement with assist as needed. Prognosis: Good  OT Education: OT Role; Transfer Training; Energy Conservation; ADL Adaptive Strategies; Plan of Care  Patient Education: Pt. educated on proper use of siderails to assist with bed mobility and importance of mobility to promote functional endurance; proper breathing tech and relaxation to promote decreased anxiety. REQUIRES OT FOLLOW UP: Yes  Activity Tolerance  Activity Tolerance: Treatment limited secondary to medical complications (free text); Patient limited by fatigue  Activity Tolerance: poor  Safety Devices  Safety Devices in place: Yes  Type of devices: All fall risk precautions in place; Bed alarm in place; Call light within reach; Nurse notified; Left in bed; Patient at risk for falls         Patient Diagnosis(es): The encounter diagnosis was COVID-19.      has a past medical history of Anxiety, Arthritis, Bell's palsy, Chronic neck pain, Chronic pain, Fibromyalgia, HA (headache), Headache, HTN (hypertension), and Hypertension.    has a past surgical history that includes  section; shoulder surgery; Urethra dilation; Arm Surgery; [place] tooth extraction; joint replacement (Left); Cholecystectomy, laparoscopic (N/A, 2020); Cholecystectomy (2020); and IR INSERT PICC VAD W SQ PORT >5 YEARS (2021). Restrictions  Restrictions/Precautions  Restrictions/Precautions: General Precautions, Fall Risk  Required Braces or Orthoses?: No  Position Activity Restriction  Other position/activity restrictions: Up as tolerated, telemetry, Droplet+ for covid, bipap, RUE IV  Subjective   General  Chart Reviewed: Yes  Patient assessed for rehabilitation services?: Yes  Additional Pertinent Hx: Pt. agreeable for treatment  Response to previous treatment: Patient with no complaints from previous session  Family / Caregiver Present: No  Subjective  Subjective: Pt. stated \"I think I feel better today\"  General Comment  Comments: Pt. on bipap during treatment but willing to participate      Orientation  Orientation  Overall Orientation Status: Within Functional Limits  Objective    ADL  Feeding: Setup  Grooming: Setup; Minimal assistance        Balance  Sitting Balance: Minimal assistance  Standing Balance: Unable to assess(comment) (unable to assess d/t tolerated sitting EOB only)  Bed mobility  Supine to Sit: Moderate assistance; 2 Person assistance  Sit to Supine: Moderate assistance; 2 Person assistance  Scooting: Moderate assistance; 2 Person assistance  Comment: Pt. required mod assist x2 for bed mobility. Pt. had bipap in place and displayed increased anxiety stating \"I can't breath\" throughout mobility. Pt. provided with positive encourangement. O2 stats did decrease to 81-84% with mobility and recovered with positive encouragement and education on breathing tech. O2 returned to 89-90%.      Cognition  Overall Cognitive Status: WFL     Perception  Overall Perceptual Status: [facility]     Plan   Plan  Times per week: 4-5x/wk 1x/day as rufina  Times per day: Daily  Current Treatment Recommendations: Strengthening, Endurance Training, Patient/Caregiver Education & Training, Equipment Evaluation, Education, & procurement, Self-Care / ADL, Home Management Training, Functional Mobility Training, Safety Education & Training  Plan Comment: Cont. OT with stated POC       AM-PAC Score        AM-PAC Inpatient Daily Activity Raw Score: 11 (11/17/21 1322)  AM-PAC Inpatient ADL T-Scale Score : 29.04 (11/17/21 1322)  ADL Inpatient CMS 0-100% Score: 70.42 (11/17/21 1322)  ADL Inpatient CMS G-Code Modifier : CL (11/17/21 1322)    Goals  Short term goals  Time Frame for Short term goals: By discharge, pt to demo  Short term goal 1: ADL transfers and functional mobility to SBA with use of AD as needed. Short term goal 2: UB ADLS to Set up and LB ADLs SBA with use of AD/AE as needed. Short term goal 3: toileting to SBA with use of AD/grab bars as needed. Short term goal 4: increased B UE strength by 1/2 grade to assist with functional tasks/I with B UE HEP with use of handouts as needed. Short term goal 5: bed mobility to Mod I with use of bedrails as needed. Long term goals  Long term goal 1: Pt to be I with fall prevention educamy, Covid specific education, EC/WS tech, recommendations for AE with use of handouts as needed. Patient Goals   Patient goals : To feel better! Therapy Time   cotx Concurrent Group Co-treatment   Time In 4342         Time Out 4123         Minutes 23              Co-treatment with PT warranted secondary to decreased safety and independence requiring 2 skilled therapy professionals to address individual discipline's goals. OT addressing \"preparation for ADL transfer\",\"sitting balance for increased ADL performance\",\"sitting/activity tolerance\",\"functional reaching\",\"environmental safety/scanning\",\"fall prevention\",\"functional mobility for ADL transfers\",\"ability to sequence and follow directions\",\"functional UE strength\".     GURVINDER Barros

## 2021-11-17 NOTE — PLAN OF CARE
Problem: Falls - Risk of:  Goal: Will remain free from falls  Description: Will remain free from falls  11/17/2021 0007 by Wendy Gramajo RN  Outcome: Ongoing     Problem: Pain:  Goal: Control of chronic pain  Description: Control of chronic pain  11/17/2021 0007 by Wendy Gramajo RN  Outcome: Ongoing     Problem: Airway Clearance - Ineffective  Goal: Achieve or maintain patent airway  11/17/2021 0007 by Wendy Gramajo RN  Outcome: Ongoing     Problem: Gas Exchange - Impaired  Goal: Promote optimal lung function  11/17/2021 0007 by Wendy Gramajo RN  Outcome: Ongoing     Problem:  Body Temperature -  Risk of, Imbalanced  Goal: Ability to maintain a body temperature within defined limits  11/17/2021 0007 by Wendy Gramajo RN  Outcome: Ongoing     Problem: Isolation Precautions - Risk of Spread of Infection  Goal: Prevent transmission of infection  11/17/2021 0007 by Wendy Gramajo RN  Outcome: Ongoing

## 2021-11-17 NOTE — CARE COORDINATION
Master called and states that the patient was denied. The peer to peer window was missed, so Sebastian Bains is going to start appeal. Cont' to follow.

## 2021-11-17 NOTE — PROGRESS NOTES
[facility]  Office: [address], , Author Rodrigo, DO, Loida Esquivel, DO, Dana Sevilla, DO, Deedee Smith MD, Tyree Adhikari MD, Alirio Manriquez MD, Carolan Schilder, MD, Annie Nguyen MD, Amara Doherty MD, Damir Srinivasan MD, Murphy Rowan, DO, Missy De Anda, DO, Rachel Benitez MD,  Mayi Metzger DO, Maida Marie MD, Toshia Gan MD, Howard Vogt MD, Lenora Potter MD, Igor Crabtree MD, Han Murillo MD, Yuni Ramirez MD, Jay San, [facility], [facility], CNP, Ren Lujan, CNP, Amado Ordoñez, CNS, Daniel Carrion, CNP, Ko Mcdonald, CNP, Paul Mohamud, CNP, Quin Chavez, CNP, Tosha Lacy, CNP, Derian Joyner PA-C, Rio Zepeda, [facility], Lysbeth Angelucci, CNP, Taylor Zaragoza, CNP, David Chan, CNP, Matti Campbell, CNP, Clinton Childs, CNP, Shanae Longo, CNP, Anna Morales, [facility]      Daily Progress Note     Admit Date: 11/3/2021  Bed/Room No.  1102/1102-01  Admitting Physician : Alirio Manriquez MD  Code Status :Full Code  Hospital Day:  LOS: 13 days   Chief Complaint:     Chief Complaint   Patient presents with    Abdominal Pain     lower    Nausea    Back Pain    Headache     Principal Problem:    Pneumonia due to COVID-19 virus  Active Problems:    Essential hypertension    Prediabetes    Morbid obesity with BMI of 40.0-44.9, adult ([address]    Hyperglycemia    Acute respiratory failure with hypoxia ([facility] [address]    Acute urinary retention  Resolved Problems:    * No resolved hospital problems. *    Subjective : Interval History/Significant events :  11/17/21    Patient appears ill. She was working with physical therapy and was sitting by the edge of the bed when she got hypoxic. Patient is back on BiPAP and is having increased work of breathing. Patient denies any nausea, vomiting, fever, chills. She is currently on high BiPAP support. She had 2 bowel movements yesterday after bowel regimen.   Vitals - Unstable afebrile  Labs -leukocytosis 13.6, hyperkalemia 5.4, sodium 133. .    Nursing notes , Consults notes reviewed. Overnight events and updates discussed with Nursing staff . Background History:         Li Moran is 74 y.o. female  Who was admitted to the hospital on 11/3/2021 for treatment of Pneumonia due to COVID-19 virus. Patient presented to emergency room with headache, abdominal pain, nausea, fever T-max 102. 2. Andreina Wellington Patient tested positive for COVID-19. Chest x-ray showed bibasilar opacities consistent with COVID-19 pneumonia. She has underlying history of obesity, fibromyalgia, chronic pain, hypertension. Patient was not vaccinated against COVID-19. CT chest was negative for PE. Patient had acute respiratory failure was requiring O2 support. Breathing status worsened and required BiPAP with 100% FiO2 and was moved to ICU on 11/10/2021. PMH:  Past Medical History:   Diagnosis Date    Anxiety     Arthritis     Bell's palsy     Chronic neck pain     posterior neck since fall April 2014    Chronic pain     low back and bilateral lower extremities    Fibromyalgia     HA (headache)     Headache     off/on since fall April 2014    HTN (hypertension)     Hypertension       Allergies:    Allergies   Allergen Reactions    Other Hives    Ceclor [Cefaclor]     Food      All melons      Sulfa Antibiotics      Projectile vomiting       Medications :  sodium polystyrene, 15 g, Oral, Once  metoprolol succinate, 50 mg, Oral, Daily  lisinopril, 10 mg, Oral, Daily  Arformoterol Tartrate, 15 mcg, Nebulization, BID  budesonide, 0.5 mg, Nebulization, BID  pregabalin, 50 mg, Oral, BID  polyethylene glycol, 17 g, Oral, Daily  docusate sodium, 100 mg, Oral, BID  insulin lispro, 0-12 Units, SubCUTAneous, TID WC  insulin lispro, 0-6 Units, SubCUTAneous, Nightly  levoFLOXacin, 750 mg, Oral, Daily  ipratropium-albuterol, 1 ampule, Inhalation, Q4H WA  dexamethasone, 10 mg, IntraVENous, Q12H  miconazole, , Topical, BID  mirtazapine, 7.5 mg, Oral, Nightly  sodium chloride flush, 5-40 mL, IntraVENous, 2 times per day  enoxaparin, 30 mg, SubCUTAneous, BID  Vitamin D, 2,000 Units, Oral, Daily  influenza virus vaccine, 0.5 mL, IntraMUSCular, Prior to discharge        Review of Systems   Review of Systems   Constitutional: Positive for activity change, appetite change and fatigue. Negative for fever and unexpected weight change. HENT: Negative for congestion, rhinorrhea and sneezing. Eyes: Negative for visual disturbance. Respiratory: Positive for cough and shortness of breath. Negative for chest tightness and wheezing. Cardiovascular: Negative for chest pain and palpitations. Gastrointestinal: Negative for abdominal pain, blood in stool, constipation, diarrhea, nausea and vomiting. Genitourinary: Negative for dysuria, enuresis, frequency and hematuria. Musculoskeletal: Negative for arthralgias and myalgias. Skin: Negative for rash. Neurological: Negative for dizziness, weakness, light-headedness and headaches. Hematological: Does not bruise/bleed easily. Psychiatric/Behavioral: Negative for dysphoric mood and sleep disturbance.      Objective :      Current Vitals : Temp: 98.2 °F (36.8 °C),  Pulse: 67, Resp: 24, BP: 102/68, SpO2: 94 %  Last 24 Hrs Vitals   Patient Vitals for the past 24 hrs:   BP Temp Temp src Pulse Resp SpO2 Weight   11/17/21 0700 102/68 -- -- 67 24 94 % --   11/17/21 0554 -- -- -- -- -- -- 230 lb 2.6 oz (104.4 kg)   11/17/21 0500 105/67 -- -- 71 23 -- --   11/17/21 0430 -- -- -- -- 14 -- --   11/17/21 0400 (!) 83/58 98.2 °F (36.8 °C) Oral 79 19 90 % --   11/17/21 0300 (!) 87/59 -- -- 76 16 94 % --   11/17/21 0200 95/64 -- -- 72 20 -- --   11/17/21 0100 88/62 -- -- 76 17 -- --   11/17/21 0000 107/69 98.4 °F (36.9 °C) Oral 83 16 92 % --   11/16/21 2351 -- -- -- -- 17 94 % --   11/16/21 2337 -- -- -- -- 22 93 % --   11/16/21 2300 113/74 -- -- 80 21 93 % --   11/16/21 2200 110/69 -- -- 77 23 94 % --   11/16/21 2100 118/75 -- -- 87 25 90 % --   11/16/21 2000 112/69 98.6 °F (37 °C) Oral 85 21 96 % --   11/16/21 1950 -- -- -- -- 23 95 % --   11/16/21 1940 -- -- -- -- -- (!) 85 % --   11/16/21 1900 108/66 -- -- 77 26 96 % --   11/16/21 1812 -- -- -- 100 22 97 % --   11/16/21 1800 131/87 -- -- 91 20 99 % --   11/16/21 1710 -- -- -- 73 28 91 % --   11/16/21 1700 120/67 -- -- 81 22 94 % --   11/16/21 1655 -- 97.6 °F (36.4 °C) Axillary 79 24 92 % --   11/16/21 1603 -- -- -- 81 23 91 % --   11/16/21 1600 109/67 -- -- 89 28 (!) 89 % --   11/16/21 1509 -- -- -- -- 25 -- --   11/16/21 1500 114/65 -- -- 83 25 95 % --   11/16/21 1430 -- -- -- 89 15 97 % --   11/16/21 1420 -- -- -- 84 25 93 % --   11/16/21 1410 -- -- -- 88 23 90 % --   11/16/21 1400 116/67 -- -- 85 27 (!) 86 % --   11/16/21 1355 -- -- -- 88 24 90 % --   11/16/21 1300 107/68 -- -- 98 28 93 % --   11/16/21 1250 -- -- -- 101 24 94 % --   11/16/21 1245 -- 98.1 °F (36.7 °C) Oral 98 25 (!) 84 % --   11/16/21 1200 94/74 -- -- 74 -- 92 % --   11/16/21 1119 -- -- -- -- -- 96 % --   11/16/21 1100 92/64 -- -- 89 26 94 % --   11/16/21 1005 -- -- -- 83 21 92 % --   11/16/21 1000 104/80 -- -- 79 14 (!) 89 % --   11/16/21 0955 -- -- -- 75 20 91 % --   11/16/21 0900 112/70 -- -- 71 22 93 % --   11/16/21 0800 106/73 -- -- 78 22 93 % --     Intake / output   11/16 0701 - 11/17 0700  In: 650 [P.O.:650]  Out: 790 [Urine:790]  Physical Exam:  Physical Exam  Vitals and nursing note reviewed. Constitutional:       General: She is not in acute distress. Appearance: She is not diaphoretic. Interventions: Nasal cannula in place. Comments: On High Flow    HENT:      Head: Normocephalic and atraumatic. Nose:      Right Sinus: No maxillary sinus tenderness or frontal sinus tenderness. Left Sinus: No maxillary sinus tenderness or frontal sinus tenderness. Mouth/Throat:      Pharynx: No oropharyngeal exudate. Eyes:      General: No scleral icterus. Conjunctiva/sclera: Conjunctivae normal.      Pupils: Pupils are equal, round, and reactive to light. Neck:      Thyroid: No thyromegaly. Vascular: No JVD. Cardiovascular:      Rate and Rhythm: Normal rate and regular rhythm. Pulses:           Dorsalis pedis pulses are 2+ on the right side and 2+ on the left side. Heart sounds: Normal heart sounds. No murmur heard. Pulmonary:      Effort: Pulmonary effort is normal.      Breath sounds: Normal breath sounds. No wheezing or rales. Abdominal:      Palpations: Abdomen is soft. There is no mass. Tenderness: There is no abdominal tenderness. Musculoskeletal:      Cervical back: Full passive range of motion without pain and neck supple. Lymphadenopathy:      Head:      Right side of head: No submandibular adenopathy. Left side of head: No submandibular adenopathy. Cervical: No cervical adenopathy. Skin:     General: Skin is warm. Neurological:      Mental Status: She is alert and oriented to person, place, and time. Motor: No tremor. Psychiatric:         Behavior: Behavior is cooperative.            Laboratory findings:    Recent Labs     11/15/21  0501 11/16/21  0458 11/17/21  0501   WBC 10.8 12.2* 13.6*   HGB 15.3* 15.1 15.8*   HCT 46.0 45.5 48.1*    336 327     Recent Labs     11/15/21  0501 11/16/21  0458 11/17/21  0501    136 133*   K 4.7 4.5 5.4*   CL 98 98 101   CO2 23 25 20   GLUCOSE 127* 158* 154*   BUN 35* 46* 39*   CREATININE 0.60 0.89 0.54   CALCIUM 9.1 9.1 8.9            Specific Gravity, UA   Date Value Ref Range Status   11/10/2021 1.020 1.005 - 1.030 Final     Protein, UA   Date Value Ref Range Status   11/10/2021 NEGATIVE NEGATIVE Final     RBC, UA   Date Value Ref Range Status   11/02/2021 2 TO 5 0 - 2 /HPF Final     Bacteria, UA   Date Value Ref Range Status   11/02/2021 NOT REPORTED None Final     Nitrite, Urine   Date Value Ref Range Status   11/10/2021 NEGATIVE NEGATIVE Final     WBC, UA   Date Value Ref Range Status   11/02/2021 2 TO 5 0 - 5 /HPF Final     Leukocyte Esterase, Urine   Date Value Ref Range Status   11/10/2021 NEGATIVE NEGATIVE Final       Imaging / Clinical Data :-   XR CHEST PORTABLE    Result Date: 11/14/2021  Moderate bilateral parenchymal opacities. Minimally increased parenchymal density on the right compared to prior studies. XR CHEST PORTABLE    Result Date: 11/12/2021  Moderate bilateral pneumonia, similar to the previous exam.     XR CHEST PORTABLE    Result Date: 11/11/2021  Findings remain compatible with moderate COVID-19 pneumonitis. No significant interval changes. XR CHEST PORTABLE    Result Date: 11/9/2021  Multifocal pulmonary opacities suggest significantly worsened pulmonary edema and or multifocal pneumonia. Recommend continued follow-up to resolution. CT CHEST PULMONARY EMBOLISM W CONTRAST    Result Date: 11/9/2021  No evidence of pulmonary embolism. Extensive pulmonary abnormalities most compatible with COVID-19 pneumonia; other processes such as influenza pneumonia and organizing pneumonia, as can be seen with drug toxicity and connective tissue disease, can cause a similar imaging pattern. Mild adenopathy, probably reactive. Clinical Course : unchanged  Assessment and Plan  :        1. Acute respiratory failure with hypoxia secondary to COVID-19 pneumonia-on BiPAP/high flow. Continue dexamethasone high-dose, Actemra given on 11/10/2021. Continue bronchodilator therapy. Patient on day 7/8 Levaquin. Lasix as needed. 2. Morbid obesity BMI 41  3. Essential hypertension - patient has low blood pressure and  prinzide held. Decrease Lopressor dose. 4. Prediabetes worsened with steroid-induced hyperglycemia - A1C 5.8. Humalog as needed. -   5. Hyperkalemia - Kayexalate - hold lisinopril . Repeat potassium. Encourage incentive spirometry. PT OT   Monitor in ICU. High risk of worsening.     Continue to monitor vitals , Intake / output ,  Cell count , HGB , Kidney function, oxygenation  as indicated . Plan and updates discussed with patient ,  answers  explained to satisfaction.    Plan discussed with Staff Jose De La Rosa RN     (Please note that portions of this note were completed with a voice recognition program. Efforts were made to edit the dictations but occasionally words are mis-transcribed.)      Marion Begum MD  11/17/2021

## 2021-11-17 NOTE — FLOWSHEET NOTE
Patient in COVID-19 isolation; with Respiratory Therapist; receiving breathing treatment; appears to be coping normally. Writer prays for patient from [facility]. Spiritual Care will follow as needed.      11/17/21 0817   Encounter Summary   Services provided to: Patient not available   Referral/Consult From: Surya   Continue Visiting   (11/17/21 COVID/with RN)   Complexity of Encounter Low   Length of Encounter 15 minutes   Routine   Type Follow up   Assessment Unable to respond   Intervention Prayer

## 2021-11-17 NOTE — CARE COORDINATION
BIPAP at 95% and HF at 60L/80%. PT/OT to eval today. [facility] to start precert and patient was accepted by [facility] care as well. Lives with spouse and has DME's including: Has home O2, walker, and SC. HWas independent PTA. Continue to follow.

## 2021-11-18 LAB
ABSOLUTE EOS #: 0.09 K/UL (ref 0–0.44)
ABSOLUTE IMMATURE GRANULOCYTE: 0.26 K/UL (ref 0–0.3)
ABSOLUTE LYMPH #: 1.06 K/UL (ref 1.1–3.7)
ABSOLUTE MONO #: 0.61 K/UL (ref 0.1–1.2)
ANION GAP SERPL CALCULATED.3IONS-SCNC: 14 MMOL/L (ref 9–17)
BASOPHILS # BLD: 0 % (ref 0–2)
BASOPHILS ABSOLUTE: <0.03 K/UL (ref 0–0.2)
BUN BLDV-MCNC: 42 MG/DL (ref 8–23)
BUN/CREAT BLD: 67 (ref 9–20)
CALCIUM SERPL-MCNC: 8.8 MG/DL (ref 8.6–10.4)
CHLORIDE BLD-SCNC: 98 MMOL/L (ref 98–107)
CO2: 22 MMOL/L (ref 20–31)
CREAT SERPL-MCNC: 0.63 MG/DL (ref 0.5–0.9)
DIFFERENTIAL TYPE: ABNORMAL
EOSINOPHILS RELATIVE PERCENT: 1 % (ref 1–4)
GFR AFRICAN AMERICAN: >60 ML/MIN
GFR NON-AFRICAN AMERICAN: >60 ML/MIN
GFR SERPL CREATININE-BSD FRML MDRD: ABNORMAL ML/MIN/{1.73_M2}
GFR SERPL CREATININE-BSD FRML MDRD: ABNORMAL ML/MIN/{1.73_M2}
GLUCOSE BLD-MCNC: 134 MG/DL (ref 65–105)
GLUCOSE BLD-MCNC: 158 MG/DL (ref 65–105)
GLUCOSE BLD-MCNC: 165 MG/DL (ref 70–99)
GLUCOSE BLD-MCNC: 238 MG/DL (ref 65–105)
HCT VFR BLD CALC: 48.3 % (ref 36.3–47.1)
HEMOGLOBIN: 15.7 G/DL (ref 11.9–15.1)
IMMATURE GRANULOCYTES: 2 %
LYMPHOCYTES # BLD: 8 % (ref 24–43)
MCH RBC QN AUTO: 30.5 PG (ref 25.2–33.5)
MCHC RBC AUTO-ENTMCNC: 32.5 G/DL (ref 28.4–34.8)
MCV RBC AUTO: 93.8 FL (ref 82.6–102.9)
MONOCYTES # BLD: 5 % (ref 3–12)
NRBC AUTOMATED: ABNORMAL PER 100 WBC
PDW BLD-RTO: 12.6 % (ref 11.8–14.4)
PLATELET # BLD: 278 K/UL (ref 138–453)
PLATELET ESTIMATE: ABNORMAL
PMV BLD AUTO: 9.7 FL (ref 8.1–13.5)
POTASSIUM SERPL-SCNC: 5 MMOL/L (ref 3.7–5.3)
RBC # BLD: 5.15 M/UL (ref 3.95–5.11)
RBC # BLD: ABNORMAL 10*6/UL
SEG NEUTROPHILS: 84 % (ref 36–65)
SEGMENTED NEUTROPHILS ABSOLUTE COUNT: 10.88 K/UL (ref 1.5–8.1)
SODIUM BLD-SCNC: 134 MMOL/L (ref 135–144)
WBC # BLD: 12.9 K/UL (ref 3.5–11.3)
WBC # BLD: ABNORMAL 10*3/UL

## 2021-11-18 PROCEDURE — 94761 N-INVAS EAR/PLS OXIMETRY MLT: CPT

## 2021-11-18 PROCEDURE — 97530 THERAPEUTIC ACTIVITIES: CPT

## 2021-11-18 PROCEDURE — 85025 COMPLETE CBC W/AUTO DIFF WBC: CPT

## 2021-11-18 PROCEDURE — 6370000000 HC RX 637 (ALT 250 FOR IP): Performed by: INTERNAL MEDICINE

## 2021-11-18 PROCEDURE — 2700000000 HC OXYGEN THERAPY PER DAY

## 2021-11-18 PROCEDURE — 36415 COLL VENOUS BLD VENIPUNCTURE: CPT

## 2021-11-18 PROCEDURE — 2000000000 HC ICU R&B

## 2021-11-18 PROCEDURE — 94660 CPAP INITIATION&MGMT: CPT

## 2021-11-18 PROCEDURE — 6360000002 HC RX W HCPCS: Performed by: INTERNAL MEDICINE

## 2021-11-18 PROCEDURE — 6360000002 HC RX W HCPCS: Performed by: NURSE PRACTITIONER

## 2021-11-18 PROCEDURE — 6370000000 HC RX 637 (ALT 250 FOR IP): Performed by: NURSE PRACTITIONER

## 2021-11-18 PROCEDURE — 6370000000 HC RX 637 (ALT 250 FOR IP): Performed by: STUDENT IN AN ORGANIZED HEALTH CARE EDUCATION/TRAINING PROGRAM

## 2021-11-18 PROCEDURE — 97110 THERAPEUTIC EXERCISES: CPT

## 2021-11-18 PROCEDURE — 97110 THERAPEUTIC EXERCISES: CPT | Performed by: NURSE PRACTITIONER

## 2021-11-18 PROCEDURE — 2580000003 HC RX 258: Performed by: NURSE PRACTITIONER

## 2021-11-18 PROCEDURE — 82947 ASSAY GLUCOSE BLOOD QUANT: CPT

## 2021-11-18 PROCEDURE — 99232 SBSQ HOSP IP/OBS MODERATE 35: CPT | Performed by: FAMILY MEDICINE

## 2021-11-18 PROCEDURE — 80048 BASIC METABOLIC PNL TOTAL CA: CPT

## 2021-11-18 PROCEDURE — 94640 AIRWAY INHALATION TREATMENT: CPT

## 2021-11-18 PROCEDURE — 6370000000 HC RX 637 (ALT 250 FOR IP): Performed by: FAMILY MEDICINE

## 2021-11-18 RX ADMIN — INSULIN LISPRO 2 UNITS: 100 INJECTION, SOLUTION INTRAVENOUS; SUBCUTANEOUS at 20:15

## 2021-11-18 RX ADMIN — IPRATROPIUM BROMIDE AND ALBUTEROL SULFATE 1 AMPULE: .5; 2.5 SOLUTION RESPIRATORY (INHALATION) at 15:48

## 2021-11-18 RX ADMIN — IPRATROPIUM BROMIDE AND ALBUTEROL SULFATE 1 AMPULE: .5; 2.5 SOLUTION RESPIRATORY (INHALATION) at 20:08

## 2021-11-18 RX ADMIN — MIRTAZAPINE 7.5 MG: 15 TABLET, FILM COATED ORAL at 20:16

## 2021-11-18 RX ADMIN — BUDESONIDE 500 MCG: 0.5 SUSPENSION RESPIRATORY (INHALATION) at 20:08

## 2021-11-18 RX ADMIN — INSULIN LISPRO 2 UNITS: 100 INJECTION, SOLUTION INTRAVENOUS; SUBCUTANEOUS at 07:50

## 2021-11-18 RX ADMIN — PREGABALIN 50 MG: 50 CAPSULE ORAL at 08:57

## 2021-11-18 RX ADMIN — DOCUSATE SODIUM 100 MG: 100 CAPSULE, LIQUID FILLED ORAL at 20:16

## 2021-11-18 RX ADMIN — ARFORMOTEROL TARTRATE 15 MCG: 15 SOLUTION RESPIRATORY (INHALATION) at 07:49

## 2021-11-18 RX ADMIN — PREGABALIN 50 MG: 50 CAPSULE ORAL at 20:16

## 2021-11-18 RX ADMIN — ANTI-FUNGAL POWDER MICONAZOLE NITRATE TALC FREE: 1.42 POWDER TOPICAL at 20:20

## 2021-11-18 RX ADMIN — ANTI-FUNGAL POWDER MICONAZOLE NITRATE TALC FREE: 1.42 POWDER TOPICAL at 08:58

## 2021-11-18 RX ADMIN — Medication 2000 UNITS: at 08:57

## 2021-11-18 RX ADMIN — SODIUM CHLORIDE, PRESERVATIVE FREE 10 ML: 5 INJECTION INTRAVENOUS at 08:56

## 2021-11-18 RX ADMIN — ARFORMOTEROL TARTRATE 15 MCG: 15 SOLUTION RESPIRATORY (INHALATION) at 20:08

## 2021-11-18 RX ADMIN — IPRATROPIUM BROMIDE AND ALBUTEROL SULFATE 1 AMPULE: .5; 2.5 SOLUTION RESPIRATORY (INHALATION) at 07:49

## 2021-11-18 RX ADMIN — IPRATROPIUM BROMIDE AND ALBUTEROL SULFATE 1 AMPULE: .5; 2.5 SOLUTION RESPIRATORY (INHALATION) at 11:04

## 2021-11-18 RX ADMIN — DEXAMETHASONE SODIUM PHOSPHATE 10 MG: 10 INJECTION, SOLUTION INTRAMUSCULAR; INTRAVENOUS at 02:42

## 2021-11-18 RX ADMIN — ENOXAPARIN SODIUM 30 MG: 100 INJECTION SUBCUTANEOUS at 08:57

## 2021-11-18 RX ADMIN — ENOXAPARIN SODIUM 30 MG: 100 INJECTION SUBCUTANEOUS at 20:16

## 2021-11-18 RX ADMIN — BUDESONIDE 500 MCG: 0.5 SUSPENSION RESPIRATORY (INHALATION) at 07:49

## 2021-11-18 RX ADMIN — DOCUSATE SODIUM 100 MG: 100 CAPSULE, LIQUID FILLED ORAL at 08:57

## 2021-11-18 RX ADMIN — DEXAMETHASONE SODIUM PHOSPHATE 10 MG: 10 INJECTION, SOLUTION INTRAMUSCULAR; INTRAVENOUS at 15:16

## 2021-11-18 RX ADMIN — INSULIN LISPRO 2 UNITS: 100 INJECTION, SOLUTION INTRAVENOUS; SUBCUTANEOUS at 12:37

## 2021-11-18 RX ADMIN — METOPROLOL SUCCINATE 25 MG: 25 TABLET, EXTENDED RELEASE ORAL at 08:57

## 2021-11-18 RX ADMIN — LEVOFLOXACIN 750 MG: 500 TABLET, FILM COATED ORAL at 08:57

## 2021-11-18 ASSESSMENT — ENCOUNTER SYMPTOMS
WHEEZING: 0
ABDOMINAL PAIN: 0
VOMITING: 0
BLOOD IN STOOL: 0
RHINORRHEA: 0
CONSTIPATION: 0
NAUSEA: 0
SHORTNESS OF BREATH: 1
CHEST TIGHTNESS: 0
COUGH: 1
DIARRHEA: 0

## 2021-11-18 ASSESSMENT — PAIN DESCRIPTION - PAIN TYPE: TYPE: CHRONIC PAIN

## 2021-11-18 ASSESSMENT — PAIN SCALES - GENERAL: PAINLEVEL_OUTOF10: 0

## 2021-11-18 ASSESSMENT — PAIN DESCRIPTION - ONSET: ONSET: ON-GOING

## 2021-11-18 ASSESSMENT — PAIN DESCRIPTION - FREQUENCY: FREQUENCY: CONTINUOUS

## 2021-11-18 ASSESSMENT — PAIN DESCRIPTION - ORIENTATION: ORIENTATION: INNER

## 2021-11-18 ASSESSMENT — PAIN DESCRIPTION - PROGRESSION: CLINICAL_PROGRESSION: NOT CHANGED

## 2021-11-18 ASSESSMENT — PAIN DESCRIPTION - LOCATION: LOCATION: BACK

## 2021-11-18 NOTE — PROGRESS NOTES
Occupational Therapy  Facility/Department: [facility] ICU  Daily Treatment Note  NAME: Jarrett Mcclellan  : 1955  MRN: [id number]    Date of Service: 2021    Discharge Recommendations:  Patient would benefit from continued therapy after discharge    Due to recent hospitalization and medical condition, pt would benefit from additional therapy at time of discharge to ensure safety. Please refer to the AM-PAC score for current functional status. Assessment   Performance deficits / Impairments: Decreased functional mobility ; Decreased ADL status; Decreased strength; Decreased endurance; Decreased high-level IADLs; Decreased balance; Decreased posture  Assessment: Pt still limited by respiratory status. Pt would benefit from continued skilled OT services to address deficits in areas of functional balance, functional reach, ADL completion, safety awareness, transfers, UE strength and functional mobility, all limiting safe return home to [facility]. Prognosis: Good  OT Education: OT Role; Energy Conservation; Plan of Care; Precautions  Patient Education: Therex tech, breathing exercise  REQUIRES OT FOLLOW UP: Yes  Activity Tolerance  Activity Tolerance: Patient limited by fatigue; Treatment limited secondary to medical complications (free text)  Safety Devices  Safety Devices in place: Yes  Type of devices: All fall risk precautions in place; Bed alarm in place; Call light within reach; Nurse notified; Left in bed; Patient at risk for falls         Patient Diagnosis(es): The encounter diagnosis was COVID-19.      has a past medical history of Anxiety, Arthritis, Bell's palsy, Chronic neck pain, Chronic pain, Fibromyalgia, HA (headache), Headache, HTN (hypertension), and Hypertension. has a past surgical history that includes  section; shoulder surgery; Urethra dilation; Arm Surgery; [place] tooth extraction; joint replacement (Left); Cholecystectomy, laparoscopic (N/A, 2020);  Cholecystectomy (2020); and IR INSERT PICC VAD W SQ PORT >5 YEARS (11/16/2021). Restrictions  Restrictions/Precautions  Restrictions/Precautions: General Precautions, Fall Risk  Required Braces or Orthoses?: No  Position Activity Restriction  Other position/activity restrictions: Up as tolerated, telemetry, Droplet+ for covid, bipap, RUE IV, high flow O2  Subjective   General  Chart Reviewed: Yes  Patient assessed for rehabilitation services?: Yes  Additional Pertinent Hx: Pt. agreeable for treatment  Response to previous treatment: Patient with no complaints from previous session  Family / Caregiver Present: No  Subjective  Subjective: Pt sopmewhat tearful at times. Very sweet and appreciative. General Comment  Comments: RN okayed supine therex      Orientation  Orientation  Overall Orientation Status: Within Functional Limits  Objective       Cognition  Overall Cognitive Status: WFL                    Type of ROM/Therapeutic Exercise  Type of ROM/Therapeutic Exercise: AROM; Resistive Bands  Comment: Pt toelrated well with rest breaks after each set of 10 reps. SpO2 89% with recovery to 94% with VC for breathing tech  Exercises  Horizontal ABduction: x10  Horizontal ADduction: x10  Elbow Flexion: x10  Elbow Extension: x10  Other: Shoulder IR/ER x10                    Plan   Plan  Times per week: 4-5x/wk 1x/day as rufina  Times per day: Daily  Current Treatment Recommendations: Strengthening, Endurance Training, Patient/Caregiver Education & Training, Equipment Evaluation, Education, & procurement, Self-Care / ADL, Home Management Training, Functional Mobility Training, Safety Education & Training  Plan Comment: Cont.  OT with stated POC           AM-PAC Score        AM-PAC Inpatient Daily Activity Raw Score: 12 (11/18/21 1242)  AM-PAC Inpatient ADL T-Scale Score : 30.6 (11/18/21 1242)  ADL Inpatient CMS 0-100% Score: 66.57 (11/18/21 1242)  ADL Inpatient CMS G-Code Modifier : CL (11/18/21 1242)    Goals  Short term goals  Time Frame for Short term goals: By discharge, pt to demo  Short term goal 1: ADL transfers and functional mobility to SBA with use of AD as needed. Short term goal 2: UB ADLS to Set up and LB ADLs SBA with use of AD/AE as needed. Short term goal 3: toileting to SBA with use of AD/grab bars as needed. Short term goal 4: increased B UE strength by 1/2 grade to assist with functional tasks/I with B UE HEP with use of handouts as needed. Short term goal 5: bed mobility to Mod I with use of bedrails as needed. Long term goals  Long term goal 1: Pt to be I with fall prevention educaiton, Covid specific education, EC/WS tech, recommendations for AE with use of handouts as needed. Patient Goals   Patient goals : To feel better! Therapy Time   Individual Concurrent Group Co-treatment   Time In 200         Time Out 200         Minutes 25           RN reports patient is medically stable for therapy treatment this date. Chart reviewed prior to treatment and patient is agreeable for therapy. All lines intact and patient positioned comfortably at end of treatment. All patient needs addressed prior to ending therapy session.            GURVINDER Wallace

## 2021-11-18 NOTE — PLAN OF CARE
Nutrition Problem #1: Inadequate protein-energy intake  Intervention: Food and/or Nutrient Delivery: Modify Oral Nutrition Supplement, Continue Current Diet  Nutritional Goals: PO intakes are greater than 75% at meals

## 2021-11-18 NOTE — PROGRESS NOTES
Physical Therapy  Facility/Department: [facility] ICU  Daily Treatment Note  NAME: Ashia Monroe  : 1955  MRN: [id number]    Date of Service: 2021    Discharge Recommendations:  Patient would benefit from continued therapy after discharge Pt currently functioning below baseline. Would suggest additional therapy at time of discharge to maximize long term outcomes and prevent re-admission. Please refer to AM-PAC score for current level of function. Assessment   Body structures, Functions, Activity limitations: Decreased functional mobility ; Decreased ADL status; Decreased strength; Decreased endurance; Decreased balance  Assessment: Pt motivated and hopeful for improved strength. Pt continues with decreased aerobic capacity and decreased activity tolerance. Attempted up to chair but patient with RR > 40 and O2 sats dropping into low 80's - RT requested return to bed. Pt was \"up\" on side of bed and working on strengthening for > 30 minutes. Supine exercises and activity x ~10 minutes. Will continue to progress. Specific instructions for Next Treatment: reza oscar; pressure relief exercises  PT Education: PT Role; Plan of Care; General Safety; Energy Conservation  Patient Education: reviewed pressure relief exercises and performed. REQUIRES PT FOLLOW UP: Yes  Activity Tolerance  Activity Tolerance: Patient limited by endurance; Treatment limited secondary to medical complications (free text)  Activity Tolerance: O2 sats monitored throughout treatment with siginifcant drop with light activity. Patient Diagnosis(es): The encounter diagnosis was COVID-19.     has a past medical history of Anxiety, Arthritis, Bell's palsy, Chronic neck pain, Chronic pain, Fibromyalgia, HA (headache), Headache, HTN (hypertension), and Hypertension.    has a past surgical history that includes  section; shoulder surgery; Urethra dilation; Arm Surgery; [place] tooth extraction; joint replacement (Left); Cholecystectomy, laparoscopic (N/A, 4/6/2020); Cholecystectomy (04/2020); and IR INSERT PICC VAD W SQ PORT >5 YEARS (11/16/2021). Restrictions  Restrictions/Precautions  Restrictions/Precautions: General Precautions, Fall Risk  Required Braces or Orthoses?: No  Position Activity Restriction  Other position/activity restrictions: Up as tolerated, telemetry, Droplet+ for covid, bipap, RUE IV, high flow O2  Subjective   General  Chart Reviewed: Yes  Additional Pertinent Hx: [place] palsy, fibromyalgia, BMI 43  Response To Previous Treatment: Patient with no complaints from previous session. Family / Caregiver Present: No  Subjective  Subjective: Pt hoping for a bath today. General Comment  Comments: RN ok for treatment  Pain Screening  Patient Currently in Pain: Denies  Vital Signs  Patient Currently in Pain: Denies       Objective   Bed mobility  Supine to Sit: Moderate assistance  Sit to Supine: Moderate assistance  Comment: Used reza stedy this date for sit to stand to allow for jairo LE stability. Attempted with r.walker without ability to sustain weight and unable to offload. Pt only able to achieve standing with bed raised ~ 4inches and reza stedy platform used. Pt with standing tolerance ~ 30 seconds x 2. Pt used reza CerRxdy chair for partial standing x ~ 2 minutes. Pt RR > 40 and O2 sats dropped to <85%. Pt was able to recover quickly once sitting. Spoke with RT and she said to leave patient in the bed and not get up to the chair. Transfers  Stand to sit: Moderate Assistance (head of bed elevated. reza stedy used.)  Bed to Chair: Moderate assistance (head of bed elevated.  reza stedy used.)  Squat Pivot Transfers: Dependent/Total  Ambulation  Ambulation?: No (only pre gait activities tolerated at this time)     Balance  Posture: Fair  Sitting - Static: Good  Sitting - Dynamic: Good; -  Standing - Static: Fair  Comments: sitting balance EOB x ~ 13 minutes SBA +1; pt fearful of falling forward but otherwise appeared stable; standing balance in reza moore - mod assist +1  Exercises  Comments: ankle pumps; heel slides; bridging 20; 10; 10;  deep breathing exercises. AM-PAC Score 8/24     Goals  Short term goals  Time Frame for Short term goals: 12 visits  Short term goal 1: Patient will be indep with bed mobility and transfers. Short term goal 2: Patient will amb 100 feet indep with appropriate AD. Short term goal 3: Patient will negotiate 4 stairs with rails and supervision. Short term goal 4:  Inc standing balance to good with device to facilitate pt independence for performance of ADL's & functional mobility, & reduce fall risk  Short term goal 5: Pt able to tolerate 30 min of activity to include ex, breathing techniques & endurance training with pt demonstrating ability to perform energy conservation strategies during functional activity to self-regulate pacing & breathing techniques to avoid SOB & maintain saO2 in safe range  Short term goal 6: Ed pt on home ex's, optimal breathing techniques, safety & energy principles, endurance training, Covid 19 pt education & issue written pt education  Patient Goals   Patient goals : Improve breathing and walking    Plan    Plan  Times per week: 1-2x/d, 5-6 d/wk  Specific instructions for Next Treatment: reza moore; pressure relief exercises  Current Treatment Recommendations: Strengthening, Balance Training, Functional Mobility Training, Transfer Training, Endurance Training, Gait Training, Stair training, Patient/Caregiver Education & Training, Safety Education & Training, Home Exercise Program  Safety Devices  Type of devices: Bed alarm in place, Call light within reach, Nurse notified  Restraints  Initially in place: No     Therapy Time   Individual   Time In 1130   Time Out 1210   Minutes 40        CHAYO HUA, PT

## 2021-11-18 NOTE — PLAN OF CARE
Problem: Falls - Risk of:  Goal: Will remain free from falls  Description: Will remain free from falls  11/17/2021 2150 by Emilie Weldon RN  Outcome: Ongoing     Problem: Pain:  Goal: Control of chronic pain  Description: Control of chronic pain  Outcome: Ongoing     Problem: Airway Clearance - Ineffective  Goal: Achieve or maintain patent airway  Outcome: Ongoing     Problem: Gas Exchange - Impaired  Goal: Absence of hypoxia  Outcome: Ongoing  Note: Pt wore HFNC majority of the day maintaining spO2 WNL, agrees to wear BiPAP tonight       Problem:  Body Temperature -  Risk of, Imbalanced  Goal: Ability to maintain a body temperature within defined limits  Outcome: Ongoing     Problem: Isolation Precautions - Risk of Spread of Infection  Goal: Prevent transmission of infection  Outcome: Ongoing     Problem: Skin Integrity:  Goal: Will show no infection signs and symptoms  Description: Will show no infection signs and symptoms  11/17/2021 2150 by Emilie Weldon RN  Outcome: Ongoing

## 2021-11-18 NOTE — PROGRESS NOTES
Nutrition Assessment     Type and Reason for Visit: Reassess    Nutrition Recommendations/Plan:   1. Continue ADULT DIET; Regular; Low Sodium (2 gm); Low Potassium (Less than 3000 mg/day); 1500 ml  2. Modify oral nutrition supplement to Glucerna 2x/day  3. Monitor p.o intakes and labs    Nutrition Assessment:  Patient remains in droplet plus precaution for COVID-19 virus. Patient is eating better at meals. Meal intake is 51-75%. Continue current diet. Will decrease Glucerna to 2x/day. Monitor p.o intakes and labs. Malnutrition Assessment:  Malnutrition Status: At risk for malnutrition (Comment)    Estimated Daily Nutrient Needs:  Energy (kcal): 2905-7951 (15-18 kcal/kg); Weight Used for Energy Requirements:  Current     Protein (g): 65-75 gm of protein (1.3-1.5 gm/kg); Weight Used for Protein Requirements:  Ideal          Nutrition Related Findings: Edema: +1 non-pitting. Active bowel sounds. High flow oxygen      Current Nutrition Therapies:    ADULT ORAL NUTRITION SUPPLEMENT; Breakfast, Dinner, Lunch; Diabetic Oral Supplement  ADULT DIET; Regular; Low Sodium (2 gm);  Low Potassium (Less than 3000 mg/day); 1500 ml    Anthropometric Measures:  · Height: 5' 2\" (157.5 cm)  · Current Body Wt: 230 lb (104.3 kg)   · BMI: 42.1    Nutrition Diagnosis:   · Inadequate protein-energy intake related to inadequate protein-energy intake as evidenced by intake 0-25%, intake 26-50%      Nutrition Interventions:   Food and/or Nutrient Delivery:  Modify Oral Nutrition Supplement, Continue Current Diet  Nutrition Education/Counseling:  Education not indicated   Coordination of Nutrition Care:  Continue to monitor while inpatient    Goals:  PO intakes are greater than 75% at meals       Nutrition Monitoring and Evaluation:   Behavioral-Environmental Outcomes:  None Identified   Food/Nutrient Intake Outcomes:  Food and Nutrient Intake, Supplement Intake  Physical Signs/Symptoms Outcomes:  Biochemical Data, Fluid Status or Edema, Skin, Weight     Discharge Planning:    Continue current diet           Jovani DIASN, RDN, LDN  Lead Clinical Dietitian  RD Office Phone [phone]

## 2021-11-19 ENCOUNTER — APPOINTMENT (OUTPATIENT)
Dept: GENERAL RADIOLOGY | Age: 66
DRG: 177 | End: 2021-11-19
Payer: MEDICARE

## 2021-11-19 LAB
ABSOLUTE EOS #: 0.11 K/UL (ref 0–0.44)
ABSOLUTE IMMATURE GRANULOCYTE: 0.43 K/UL (ref 0–0.3)
ABSOLUTE LYMPH #: 1.21 K/UL (ref 1.1–3.7)
ABSOLUTE MONO #: 0.79 K/UL (ref 0.1–1.2)
ANION GAP SERPL CALCULATED.3IONS-SCNC: 13 MMOL/L (ref 9–17)
BASOPHILS # BLD: 1 % (ref 0–2)
BASOPHILS ABSOLUTE: 0.06 K/UL (ref 0–0.2)
BUN BLDV-MCNC: 33 MG/DL (ref 8–23)
BUN/CREAT BLD: 61 (ref 9–20)
CALCIUM SERPL-MCNC: 8.6 MG/DL (ref 8.6–10.4)
CHLORIDE BLD-SCNC: 100 MMOL/L (ref 98–107)
CO2: 21 MMOL/L (ref 20–31)
CREAT SERPL-MCNC: 0.54 MG/DL (ref 0.5–0.9)
DIFFERENTIAL TYPE: ABNORMAL
EOSINOPHILS RELATIVE PERCENT: 1 % (ref 1–4)
GFR AFRICAN AMERICAN: >60 ML/MIN
GFR NON-AFRICAN AMERICAN: >60 ML/MIN
GFR SERPL CREATININE-BSD FRML MDRD: ABNORMAL ML/MIN/{1.73_M2}
GFR SERPL CREATININE-BSD FRML MDRD: ABNORMAL ML/MIN/{1.73_M2}
GLUCOSE BLD-MCNC: 138 MG/DL (ref 65–105)
GLUCOSE BLD-MCNC: 140 MG/DL (ref 70–99)
GLUCOSE BLD-MCNC: 204 MG/DL (ref 65–105)
GLUCOSE BLD-MCNC: 204 MG/DL (ref 65–105)
GLUCOSE BLD-MCNC: 271 MG/DL (ref 65–105)
HCT VFR BLD CALC: 45.1 % (ref 36.3–47.1)
HEMOGLOBIN: 15 G/DL (ref 11.9–15.1)
IMMATURE GRANULOCYTES: 4 %
LYMPHOCYTES # BLD: 10 % (ref 24–43)
MCH RBC QN AUTO: 30.7 PG (ref 25.2–33.5)
MCHC RBC AUTO-ENTMCNC: 33.3 G/DL (ref 28.4–34.8)
MCV RBC AUTO: 92.4 FL (ref 82.6–102.9)
MONOCYTES # BLD: 7 % (ref 3–12)
NRBC AUTOMATED: 0 PER 100 WBC
PDW BLD-RTO: 12.4 % (ref 11.8–14.4)
PLATELET # BLD: 275 K/UL (ref 138–453)
PLATELET ESTIMATE: ABNORMAL
PMV BLD AUTO: 10.1 FL (ref 8.1–13.5)
POTASSIUM SERPL-SCNC: 4.7 MMOL/L (ref 3.7–5.3)
RBC # BLD: 4.88 M/UL (ref 3.95–5.11)
RBC # BLD: ABNORMAL 10*6/UL
SEG NEUTROPHILS: 77 % (ref 36–65)
SEGMENTED NEUTROPHILS ABSOLUTE COUNT: 9.47 K/UL (ref 1.5–8.1)
SODIUM BLD-SCNC: 134 MMOL/L (ref 135–144)
WBC # BLD: 12.1 K/UL (ref 3.5–11.3)
WBC # BLD: ABNORMAL 10*3/UL

## 2021-11-19 PROCEDURE — 6370000000 HC RX 637 (ALT 250 FOR IP): Performed by: NURSE PRACTITIONER

## 2021-11-19 PROCEDURE — 2700000000 HC OXYGEN THERAPY PER DAY

## 2021-11-19 PROCEDURE — 80048 BASIC METABOLIC PNL TOTAL CA: CPT

## 2021-11-19 PROCEDURE — 6370000000 HC RX 637 (ALT 250 FOR IP): Performed by: INTERNAL MEDICINE

## 2021-11-19 PROCEDURE — 2000000000 HC ICU R&B

## 2021-11-19 PROCEDURE — 94660 CPAP INITIATION&MGMT: CPT

## 2021-11-19 PROCEDURE — 6360000002 HC RX W HCPCS: Performed by: INTERNAL MEDICINE

## 2021-11-19 PROCEDURE — 6370000000 HC RX 637 (ALT 250 FOR IP): Performed by: FAMILY MEDICINE

## 2021-11-19 PROCEDURE — 82947 ASSAY GLUCOSE BLOOD QUANT: CPT

## 2021-11-19 PROCEDURE — 05HY33Z INSERTION OF INFUSION DEVICE INTO UPPER VEIN, PERCUTANEOUS APPROACH: ICD-10-PCS | Performed by: RADIOLOGY

## 2021-11-19 PROCEDURE — 97530 THERAPEUTIC ACTIVITIES: CPT

## 2021-11-19 PROCEDURE — 99232 SBSQ HOSP IP/OBS MODERATE 35: CPT | Performed by: FAMILY MEDICINE

## 2021-11-19 PROCEDURE — 6360000002 HC RX W HCPCS: Performed by: NURSE PRACTITIONER

## 2021-11-19 PROCEDURE — 85025 COMPLETE CBC W/AUTO DIFF WBC: CPT

## 2021-11-19 PROCEDURE — 2580000003 HC RX 258: Performed by: NURSE PRACTITIONER

## 2021-11-19 PROCEDURE — 94761 N-INVAS EAR/PLS OXIMETRY MLT: CPT

## 2021-11-19 PROCEDURE — 71045 X-RAY EXAM CHEST 1 VIEW: CPT

## 2021-11-19 PROCEDURE — 94640 AIRWAY INHALATION TREATMENT: CPT

## 2021-11-19 PROCEDURE — 6370000000 HC RX 637 (ALT 250 FOR IP): Performed by: STUDENT IN AN ORGANIZED HEALTH CARE EDUCATION/TRAINING PROGRAM

## 2021-11-19 PROCEDURE — 97535 SELF CARE MNGMENT TRAINING: CPT

## 2021-11-19 PROCEDURE — 36415 COLL VENOUS BLD VENIPUNCTURE: CPT

## 2021-11-19 RX ORDER — FUROSEMIDE 10 MG/ML
20 INJECTION INTRAMUSCULAR; INTRAVENOUS ONCE
Status: COMPLETED | OUTPATIENT
Start: 2021-11-19 | End: 2021-11-19

## 2021-11-19 RX ADMIN — ANTI-FUNGAL POWDER MICONAZOLE NITRATE TALC FREE: 1.42 POWDER TOPICAL at 09:19

## 2021-11-19 RX ADMIN — METOPROLOL SUCCINATE 25 MG: 25 TABLET, EXTENDED RELEASE ORAL at 09:19

## 2021-11-19 RX ADMIN — INSULIN LISPRO 3 UNITS: 100 INJECTION, SOLUTION INTRAVENOUS; SUBCUTANEOUS at 21:35

## 2021-11-19 RX ADMIN — DEXAMETHASONE SODIUM PHOSPHATE 10 MG: 10 INJECTION, SOLUTION INTRAMUSCULAR; INTRAVENOUS at 15:03

## 2021-11-19 RX ADMIN — MIRTAZAPINE 7.5 MG: 15 TABLET, FILM COATED ORAL at 21:35

## 2021-11-19 RX ADMIN — PREGABALIN 50 MG: 50 CAPSULE ORAL at 09:19

## 2021-11-19 RX ADMIN — BUDESONIDE 500 MCG: 0.5 SUSPENSION RESPIRATORY (INHALATION) at 07:20

## 2021-11-19 RX ADMIN — ARFORMOTEROL TARTRATE 15 MCG: 15 SOLUTION RESPIRATORY (INHALATION) at 07:20

## 2021-11-19 RX ADMIN — SODIUM CHLORIDE, PRESERVATIVE FREE 10 ML: 5 INJECTION INTRAVENOUS at 09:19

## 2021-11-19 RX ADMIN — BUDESONIDE 500 MCG: 0.5 SUSPENSION RESPIRATORY (INHALATION) at 20:05

## 2021-11-19 RX ADMIN — POLYETHYLENE GLYCOL 3350 17 G: 17 POWDER, FOR SOLUTION ORAL at 09:18

## 2021-11-19 RX ADMIN — Medication 2000 UNITS: at 09:19

## 2021-11-19 RX ADMIN — PREGABALIN 50 MG: 50 CAPSULE ORAL at 21:35

## 2021-11-19 RX ADMIN — SODIUM CHLORIDE, PRESERVATIVE FREE 10 ML: 5 INJECTION INTRAVENOUS at 21:35

## 2021-11-19 RX ADMIN — IPRATROPIUM BROMIDE AND ALBUTEROL SULFATE 1 AMPULE: .5; 2.5 SOLUTION RESPIRATORY (INHALATION) at 20:05

## 2021-11-19 RX ADMIN — DOCUSATE SODIUM 100 MG: 100 CAPSULE, LIQUID FILLED ORAL at 09:19

## 2021-11-19 RX ADMIN — IPRATROPIUM BROMIDE AND ALBUTEROL SULFATE 1 AMPULE: .5; 2.5 SOLUTION RESPIRATORY (INHALATION) at 11:00

## 2021-11-19 RX ADMIN — ENOXAPARIN SODIUM 30 MG: 100 INJECTION SUBCUTANEOUS at 09:18

## 2021-11-19 RX ADMIN — LEVOFLOXACIN 750 MG: 500 TABLET, FILM COATED ORAL at 09:18

## 2021-11-19 RX ADMIN — DEXAMETHASONE SODIUM PHOSPHATE 10 MG: 10 INJECTION, SOLUTION INTRAMUSCULAR; INTRAVENOUS at 03:23

## 2021-11-19 RX ADMIN — FUROSEMIDE 20 MG: 10 INJECTION, SOLUTION INTRAMUSCULAR; INTRAVENOUS at 15:03

## 2021-11-19 RX ADMIN — ENOXAPARIN SODIUM 30 MG: 100 INJECTION SUBCUTANEOUS at 21:35

## 2021-11-19 RX ADMIN — INSULIN LISPRO 4 UNITS: 100 INJECTION, SOLUTION INTRAVENOUS; SUBCUTANEOUS at 13:18

## 2021-11-19 RX ADMIN — ARFORMOTEROL TARTRATE 15 MCG: 15 SOLUTION RESPIRATORY (INHALATION) at 20:05

## 2021-11-19 RX ADMIN — ANTI-FUNGAL POWDER MICONAZOLE NITRATE TALC FREE: 1.42 POWDER TOPICAL at 21:41

## 2021-11-19 RX ADMIN — DOCUSATE SODIUM 100 MG: 100 CAPSULE, LIQUID FILLED ORAL at 21:35

## 2021-11-19 RX ADMIN — INSULIN LISPRO 4 UNITS: 100 INJECTION, SOLUTION INTRAVENOUS; SUBCUTANEOUS at 09:20

## 2021-11-19 RX ADMIN — IPRATROPIUM BROMIDE AND ALBUTEROL SULFATE 1 AMPULE: .5; 2.5 SOLUTION RESPIRATORY (INHALATION) at 07:20

## 2021-11-19 RX ADMIN — IPRATROPIUM BROMIDE AND ALBUTEROL SULFATE 1 AMPULE: .5; 2.5 SOLUTION RESPIRATORY (INHALATION) at 16:02

## 2021-11-19 ASSESSMENT — ENCOUNTER SYMPTOMS
DIARRHEA: 0
VOMITING: 0
RHINORRHEA: 0
CHEST TIGHTNESS: 0
CONSTIPATION: 0
ABDOMINAL PAIN: 0
SHORTNESS OF BREATH: 1
COUGH: 1
BLOOD IN STOOL: 0
WHEEZING: 0
NAUSEA: 0

## 2021-11-19 ASSESSMENT — PAIN SCALES - GENERAL
PAINLEVEL_OUTOF10: 0

## 2021-11-19 NOTE — CARE COORDINATION
Discharge [address] from [place] has received 's signature on appeal for admit to [place]. She will start the appeal process today and it can take up to 72 hrs for a response.

## 2021-11-19 NOTE — PLAN OF CARE
Problem: Falls - Risk of:  Goal: Will remain free from falls  Description: Will remain free from falls  Outcome: Ongoing     Problem: Pain:  Goal: Pain level will decrease  Description: Pain level will decrease  Outcome: Ongoing     Problem: Gas Exchange - Impaired  Goal: Absence of hypoxia  Outcome: Ongoing     Problem: Nutrition Deficits  Goal: Optimize nutritional status  Outcome: Ongoing     Problem: Skin Integrity:  Goal: Will show no infection signs and symptoms  Description: Will show no infection signs and symptoms  Outcome: Ongoing

## 2021-11-19 NOTE — PROGRESS NOTES
ANTIMICROBIAL STEWARDSHIP  Upon review of the patient's chart, the committee has the following recommendation for your consideration:    Indication: secondary bacterial pneumonia/COVID19+  Day of Antimicrobial Therapy: 12  Recommendation   Patient has received 11 days of levofloxacin that would be sufficient to treat the indication above. Afebrile       We recommend discontinuing levofloxacin. If not, please consult ID service to evaluate the need of prolonged antibiotic therapy. Recent Labs     11/18/21  0620 11/19/21  0500   WBC 12.9* 12.1*     No results for input(s): PROCAL in the last 72 hours. Unnecessary or inappropriate antimicrobial use increases the risk of subsequent infections with resistant bacterial infections and drug-associated toxicities including C. difficile infection. Thank you. Neha Mtz, [facility], PharmD  11/19/2021  8:15 AM    The Antimicrobial Stewardship Committee at [facility] is led by  Foster Ruiz MD, Infectious Diseases Section Chair.

## 2021-11-19 NOTE — PROGRESS NOTES
[facility]  Office: [address], DO, Hattie Montalvo, DO, Kylee Dry, DO, Minda Beckfordu Blood, DO, Asmita Grijalva MD, Umm Washington MD, Jose Luis Vizcaino MD, Lester Ng MD, Yara Garrido MD, Ruth Hallman MD, Lisa Underwood MD, Claudell Orion, DO, Barbara Mondragon, DO, Alexei Monterroso MD,  Rosa Cornejo, DO, Calvin Newberry MD, Ariane Gomez MD, Micky Muhammad MD, Ad Ledbetter MD, Jesús Soto MD, Chandan Brooks MD, Sammy Smith MD, Sirisha Greco, [facility], [facility], CNP, Sharan Lawton, CNP, Sandeep Sheriff, CNS, Tanmay Muniz, CNP, Harvinder Goodman, CNP, Jesus Shea, CNP, Janneth Reyes, CNP, Clay Walls, CNP, Pippa Oviedo PA-C, Jessica Orr, [facility], Ebonie Chester, CNP, Romie Carrillo, CNP, Berlinda Boast, CNP, Lakshmi Turpin, CNP, Cindi Mercado, CNP, Greer Durant, CNP, Renetta Richmond, [facility]      Daily Progress Note     Admit Date: 11/3/2021  Bed/Room No.  1102/1102-01  Admitting Physician : Jose Luis Vizcaino MD  Code Status :Full Code  Hospital Day:  LOS: 15 days   Chief Complaint:     Chief Complaint   Patient presents with    Abdominal Pain     lower    Nausea    Back Pain    Headache     Principal Problem:    Pneumonia due to COVID-19 virus  Active Problems:    Essential hypertension    Prediabetes    Morbid obesity with BMI of 40.0-44.9, adult ([facility] [address]    Hyperglycemia    Acute respiratory failure with hypoxia ([facility] [address]    Acute urinary retention  Resolved Problems:    * No resolved hospital problems. *    Subjective : Interval History/Significant events :  11/19/21    Patient is currently on high flow 35 LPM, 60% FiO2. Patient is afebrile. She denies any cough, expectoration. She reported that appetite is improving slowly. Patient continues to have generalized fatigue. Vitals - Unstable afebrile  Labs -hyponatremia 134, leukocytosis 12.1. BUN 33, creatinine 0.54. Nursing notes , Consults notes reviewed.  Overnight events and updates discussed with Nursing staff . Background History:         Paola Venegas is 74 y.o. female  Who was admitted to the hospital on 11/3/2021 for treatment of Pneumonia due to COVID-19 virus. Patient presented to emergency room with headache, abdominal pain, nausea, fever T-max 102. 2. [place] Patient tested positive for COVID-19. Chest x-ray showed bibasilar opacities consistent with COVID-19 pneumonia. She has underlying history of obesity, fibromyalgia, chronic pain, hypertension. Patient was not vaccinated against COVID-19. CT chest was negative for PE. Patient had acute respiratory failure was requiring O2 support. Breathing status worsened and required BiPAP with 100% FiO2 and was moved to ICU on 11/10/2021. PMH:  Past Medical History:   Diagnosis Date    Anxiety     Arthritis     Bell's palsy     Chronic neck pain     posterior neck since fall April 2014    Chronic pain     low back and bilateral lower extremities    Fibromyalgia     HA (headache)     Headache     off/on since fall April 2014    HTN (hypertension)     Hypertension       Allergies:    Allergies   Allergen Reactions    Other Hives    Ceclor [Cefaclor]     Food      All melons      Sulfa Antibiotics      Projectile vomiting       Medications :  metoprolol succinate, 25 mg, Oral, Daily  Arformoterol Tartrate, 15 mcg, Nebulization, BID  budesonide, 0.5 mg, Nebulization, BID  pregabalin, 50 mg, Oral, BID  polyethylene glycol, 17 g, Oral, Daily  docusate sodium, 100 mg, Oral, BID  insulin lispro, 0-12 Units, SubCUTAneous, TID WC  insulin lispro, 0-6 Units, SubCUTAneous, Nightly  levoFLOXacin, 750 mg, Oral, Daily  ipratropium-albuterol, 1 ampule, Inhalation, Q4H WA  dexamethasone, 10 mg, IntraVENous, Q12H  miconazole, , Topical, BID  mirtazapine, 7.5 mg, Oral, Nightly  sodium chloride flush, 5-40 mL, IntraVENous, 2 times per day  enoxaparin, 30 mg, SubCUTAneous, BID  Vitamin D, 2,000 Units, Oral, Daily  influenza virus vaccine, 0.5 mL, IntraMUSCular, Prior to discharge        Review of Systems   Review of Systems   Constitutional: Positive for activity change, appetite change and fatigue. Negative for fever and unexpected weight change. HENT: Negative for congestion, rhinorrhea and sneezing. Eyes: Negative for visual disturbance. Respiratory: Positive for cough and shortness of breath. Negative for chest tightness and wheezing. Cardiovascular: Negative for chest pain and palpitations. Gastrointestinal: Negative for abdominal pain, blood in stool, constipation, diarrhea, nausea and vomiting. Genitourinary: Negative for dysuria, enuresis, frequency and hematuria. Musculoskeletal: Negative for arthralgias and myalgias. Skin: Negative for rash. Neurological: Negative for dizziness, weakness, light-headedness and headaches. Hematological: Does not bruise/bleed easily. Psychiatric/Behavioral: Negative for dysphoric mood and sleep disturbance.      Objective :      Current Vitals : Temp: 95 °F (35 °C),  Pulse: 66, Resp: 21, BP: 125/71, SpO2: 93 %  Last 24 Hrs Vitals   Patient Vitals for the past 24 hrs:   BP Temp Temp src Pulse Resp SpO2 Height   11/19/21 0729 -- -- -- -- 21 93 % --   11/19/21 0720 -- -- -- -- 27 95 % --   11/19/21 0600 -- -- -- 66 21 91 % --   11/19/21 0500 125/71 -- -- 66 23 97 % --   11/19/21 0410 -- 95 °F (35 °C) Temporal -- -- -- --   11/19/21 0400 113/73 98.6 °F (37 °C) Axillary 65 15 96 % --   11/19/21 0333 -- -- -- -- 20 99 % --   11/19/21 0300 113/65 -- -- 65 15 95 % --   11/19/21 0200 132/80 -- -- 65 21 100 % --   11/19/21 0100 (!) 105/93 -- -- 68 23 96 % --   11/19/21 0000 131/83 97.8 °F (36.6 °C) Axillary 73 24 96 % --   11/18/21 2340 -- -- -- -- 22 -- --   11/18/21 2300 117/77 -- -- 83 27 (!) 89 % --   11/18/21 2200 111/71 -- -- 77 23 95 % --   11/18/21 2128 114/65 -- -- 81 23 -- --   11/18/21 2127 113/68 -- -- 84 24 -- --   11/18/21 2100 -- -- -- 85 25 95 % --   11/18/21 2013 -- -- -- -- 23 93 % heard.      Pulmonary:      Effort: Pulmonary effort is normal.      Breath sounds: Normal breath sounds. No wheezing or rales. Abdominal:      Palpations: Abdomen is soft. There is no mass. Tenderness: There is no abdominal tenderness. Musculoskeletal:      Cervical back: Full passive range of motion without pain and neck supple. Lymphadenopathy:      Head:      Right side of head: No submandibular adenopathy. Left side of head: No submandibular adenopathy. Cervical: No cervical adenopathy. Skin:     General: Skin is warm. Neurological:      Mental Status: She is alert and oriented to person, place, and time. Motor: No tremor. Psychiatric:         Behavior: Behavior is cooperative. Laboratory findings:    Recent Labs     11/17/21  0501 11/18/21  0620 11/19/21  0500   WBC 13.6* 12.9* 12.1*   HGB 15.8* 15.7* 15.0   HCT 48.1* 48.3* 45.1    278 275     Recent Labs     11/17/21  0501 11/17/21  0501 11/17/21  1825 11/18/21  0620 11/19/21  0500   *  --   --  134* 134*   K 5.4*   < > 4.3 5.0 4.7     --   --  98 100   CO2 20  --   --  22 21   GLUCOSE 154*  --   --  165* 140*   BUN 39*  --   --  42* 33*   CREATININE 0.54  --   --  0.63 0.54   CALCIUM 8.9  --   --  8.8 8.6    < > = values in this interval not displayed.             Specific Gravity, UA   Date Value Ref Range Status   11/10/2021 1.020 1.005 - 1.030 Final     Protein, UA   Date Value Ref Range Status   11/10/2021 NEGATIVE NEGATIVE Final     RBC, UA   Date Value Ref Range Status   11/02/2021 2 TO 5 0 - 2 /HPF Final     Bacteria, UA   Date Value Ref Range Status   11/02/2021 NOT REPORTED None Final     Nitrite, Urine   Date Value Ref Range Status   11/10/2021 NEGATIVE NEGATIVE Final     WBC, UA   Date Value Ref Range Status   11/02/2021 2 TO 5 0 - 5 /HPF Final     Leukocyte Esterase, Urine   Date Value Ref Range Status   11/10/2021 NEGATIVE NEGATIVE Final       Imaging / Clinical Data :-   XR CHEST PORTABLE    Result Date: 11/14/2021  Moderate bilateral parenchymal opacities. Minimally increased parenchymal density on the right compared to prior studies. XR CHEST PORTABLE    Result Date: 11/12/2021  Moderate bilateral pneumonia, similar to the previous exam.     XR CHEST PORTABLE    Result Date: 11/11/2021  Findings remain compatible with moderate COVID-19 pneumonitis. No significant interval changes. XR CHEST PORTABLE    Result Date: 11/9/2021  Multifocal pulmonary opacities suggest significantly worsened pulmonary edema and or multifocal pneumonia. Recommend continued follow-up to resolution. CT CHEST PULMONARY EMBOLISM W CONTRAST    Result Date: 11/9/2021  No evidence of pulmonary embolism. Extensive pulmonary abnormalities most compatible with COVID-19 pneumonia; other processes such as influenza pneumonia and organizing pneumonia, as can be seen with drug toxicity and connective tissue disease, can cause a similar imaging pattern. Mild adenopathy, probably reactive. Clinical Course : unchanged  Assessment and Plan  :        1. Acute respiratory failure with hypoxia secondary to COVID-19 pneumonia - on high flow. Continue dexamethasone high-dose, Actemra given on 11/10/2021. Continue bronchodilator therapy. Patient completed 8-day course of Levaquin. Lasix as needed. 2. Morbid obesity BMI 41  3. Essential hypertension - patient has low blood pressure and  prinzide held. Decrease Lopressor dose. 4. Prediabetes worsened with steroid-induced hyperglycemia - A1C 5.8. Humalog as needed. -   5. Hyperkalemia - Kayexalate - hold lisinopril . Repeat potassium. Encourage incentive spirometry. PT OT   Monitor in ICU tonight. Continue to monitor vitals , Intake / output ,  Cell count , HGB , Kidney function, oxygenation  as indicated . Plan and updates discussed with patient ,  answers  explained to satisfaction.    Plan discussed with Staff Valeri DAVIS     (Please note that portions of this note were completed with a voice recognition program. Efforts were made to edit the dictations but occasionally words are mis-transcribed.)      Susan Fletcher MD  11/19/2021

## 2021-11-19 NOTE — PROGRESS NOTES
Physical Therapy  Facility/Department: [facility] ICU  Daily Treatment Note  NAME: Jaquan Sotomayor  : 1955  MRN: [id number]    Date of Service: 2021    Discharge Recommendations:  Patient would benefit from continued therapy after discharge      Assessment   Body structures, Functions, Activity limitations: Decreased functional mobility ; Decreased ADL status; Decreased strength; Decreased endurance; Decreased balance  Assessment: Pt with improved tolerance to mobiltiy this date with abiltiy to be OOB >4 hours and able to take steps wtih r.walker. Will continue to mobiltize as tolerated. Specific instructions for Next Treatment: reza steze; pressure relief exercises  PT Education: PT Role; Plan of Care; General Safety; Energy Conservation  Patient Education: reviewed pressure relief exercises and performed. REQUIRES PT FOLLOW UP: Yes  Activity Tolerance  Activity Tolerance: Patient Tolerated treatment well     Patient Diagnosis(es): The encounter diagnosis was COVID-19.     has a past medical history of Anxiety, Arthritis, Bell's palsy, Chronic neck pain, Chronic pain, Fibromyalgia, HA (headache), Headache, HTN (hypertension), and Hypertension. has a past surgical history that includes  section; shoulder surgery; Urethra dilation; Arm Surgery; [place] tooth extraction; joint replacement (Left); Cholecystectomy, laparoscopic (N/A, 2020); Cholecystectomy (2020); and IR INSERT PICC VAD W SQ PORT >5 YEARS (2021). Restrictions  Restrictions/Precautions  Restrictions/Precautions: General Precautions, Fall Risk  Required Braces or Orthoses?: No  Position Activity Restriction  Other position/activity restrictions: Up as tolerated, telemetry, Droplet+ for covid, RUE IV, high flow O2  Subjective   General  Chart Reviewed: Yes  Additional Pertinent Hx: [place] palsy, fibromyalgia, BMI 43  Response To Previous Treatment: Patient with no complaints from previous session.   Family / Caregiver Present: No  Subjective  Subjective: Pt reports feeling amazing today. She is currently exhausted from sitting up in chair x 4 hours. Pt reports she is feeling stronger and her goal is to get back to baseline with mobility. Pain Screening  Patient Currently in Pain: Denies  Vital Signs  Patient Currently in Pain: Denies       Orientation  Orientation  Overall Orientation Status: Within Normal Limits  Cognition      Objective   Bed mobility  Rolling to Right: Stand by assistance  Sit to Supine: Moderate assistance  Scooting: Moderate assistance  Transfers  Sit to Stand: Moderate Assistance; 2 Person Assistance  Stand to sit: Moderate Assistance; 2 Person Assistance  Bed to Chair:  (used reza stedy due to exhaustion from being up for 4 hours.)  Ambulation  Ambulation?: No (pt was able to use r.walker and take steps from bed to chair w/ OT this am.  Too exhausted this pm.)     Balance  Posture: Fair  Sitting - Static: Good  Sitting - Dynamic: Good  Standing - Static: Fair  Standing - Dynamic: Fair  Exercises  Comments: reviewed all self ROM LE exercises while supine to perform once recovered from up to chair. AM-PAC Score 14/24     Goals  Short term goals  Time Frame for Short term goals: 12 visits  Short term goal 1: Patient will be indep with bed mobility and transfers. Short term goal 2: Patient will amb 100 feet indep with appropriate AD. Short term goal 3: Patient will negotiate 4 stairs with rails and supervision. Short term goal 4:  Inc standing balance to good with device to facilitate pt independence for performance of ADL's & functional mobility, & reduce fall risk  Short term goal 5: Pt able to tolerate 30 min of activity to include ex, breathing techniques & endurance training with pt demonstrating ability to perform energy conservation strategies during functional activity to self-regulate pacing & breathing techniques to avoid SOB & maintain saO2 in safe range  Short term goal 6: Ed pt on home ex's, optimal breathing techniques, safety & energy principles, endurance training, Covid 19 pt education & issue written pt education  Patient Goals   Patient goals : Improve breathing and walking    Plan    Plan  Times per week: 1-2x/d, 5-6 d/wk  Specific instructions for Next Treatment: reza moore; pressure relief exercises  Current Treatment Recommendations: Strengthening, Balance Training, Functional Mobility Training, Transfer Training, Endurance Training, Gait Training, Stair training, Patient/Caregiver Education & Training, Safety Education & Training, Home Exercise Program, Positioning  Safety Devices  Type of devices: Bed alarm in place, Call light within reach, Nurse notified  Restraints  Initially in place: No     Therapy Time   Individual Concurrent Group Co-treatment   Time In 1430         Time Out 1445         Minutes 15                 CHAYO HUA, PT

## 2021-11-19 NOTE — PLAN OF CARE
Problem: Falls - Risk of:  Goal: Will remain free from falls  Description: Will remain free from falls  Outcome: Ongoing  Goal: Absence of physical injury  Description: Absence of physical injury  Outcome: Ongoing     Problem: Pain:  Goal: Pain level will decrease  Description: Pain level will decrease  Outcome: Ongoing  Goal: Control of acute pain  Description: Control of acute pain  Outcome: Ongoing  Goal: Control of chronic pain  Description: Control of chronic pain  Outcome: Ongoing     Problem: Airway Clearance - Ineffective  Goal: Achieve or maintain patent airway  Outcome: Ongoing     Problem: Gas Exchange - Impaired  Goal: Absence of hypoxia  Outcome: Ongoing  Goal: Promote optimal lung function  Outcome: Ongoing     Problem: Breathing Pattern - Ineffective  Goal: Ability to achieve and maintain a regular respiratory rate  Outcome: Ongoing

## 2021-11-19 NOTE — PROGRESS NOTES
Occupational Therapy  Facility/Department: [facility] ICU  Daily Treatment Note  NAME: Ruby Dillard  : 1955  MRN: [id number]    Date of Service: 2021    Discharge Recommendations:  Patient would benefit from continued therapy after discharge       Assessment   Performance deficits / Impairments: Decreased functional mobility ; Decreased ADL status; Decreased strength; Decreased endurance; Decreased high-level IADLs; Decreased balance; Decreased posture  Assessment: Pt. completed UE/LE ADLS. Pt. completed UE bathing/dressing with min assist for thoroughness. Pt. max assist to wash hair at bedside with shower cap. LE ADLS min/mod assist for thoroughness. Grooming completed with set up and supervision. Functional transfer completed with min/mod assist x2 for safety with use of RW. Pt. eager to get hair washed and sit up in bedside chair. O2 stats remained at 94-95% during ADL skills. With mobility, O2 stats decreased to 84-85% saturation but recovered quickly with rest break. Pt. on highflow entire treatment and respiratory therapist present for functional transfer. Skilled OT warranted to promote I/safety to return pt. to prior living arrangement with assist as needed. Prognosis: Good  OT Education: OT Role; Energy Conservation; Plan of Care; Precautions  Patient Education: Pt. educated on proper breathing tech and transfer safety with use of RW  REQUIRES OT FOLLOW UP: Yes  Activity Tolerance  Activity Tolerance: Patient limited by fatigue; Patient Tolerated treatment well  Activity Tolerance: fair  Safety Devices  Safety Devices in place: Yes  Type of devices: All fall risk precautions in place; Call light within reach; Chair alarm in place; Nurse notified;  Left in chair; Patient at risk for falls; Gait belt         Patient Diagnosis(es): The encounter diagnosis was COVID-19.      has a past medical history of Anxiety, Arthritis, Bell's palsy, Chronic neck pain, Chronic pain, Fibromyalgia, HA (headache), Headache, HTN (hypertension), and Hypertension. has a past surgical history that includes  section; shoulder surgery; Urethra dilation; Arm Surgery; Handley tooth extraction; joint replacement (Left); Cholecystectomy, laparoscopic (N/A, 2020); Cholecystectomy (2020); and IR INSERT PICC VAD W SQ PORT >5 YEARS (2021). Restrictions  Restrictions/Precautions  Restrictions/Precautions: General Precautions, Fall Risk  Required Braces or Orthoses?: No  Position Activity Restriction  Other position/activity restrictions: Up as tolerated, telemetry, Droplet+ for covid, bipap, RUE IV, high flow O2  Subjective   General  Chart Reviewed: Yes  Patient assessed for rehabilitation services?: Yes  Additional Pertinent Hx: Pt. agreeable for treatment  Response to previous treatment: Patient with no complaints from previous session  Family / Caregiver Present: No  Subjective  Subjective: \"Do you think we can wash my hair?'  General Comment  Comments: RN ok'ed treatment      Orientation  Orientation  Overall Orientation Status: Within Functional Limits  Objective    ADL  Feeding: Setup  Grooming: Setup; Supervision (Pt. completed simple grooming task with set up at bedside to wash face and brush teeth)  UE Bathing: Setup; Minimal assistance (Min assist for thorough bathing of hair, back and chest around wires)  LE Bathing: Moderate assistance (Mod assist with LE bathing for thoroughness)  UE Dressing: Minimal assistance; Setup  LE Dressing: Maximum assistance (Max assist for socks)  Toileting: None        Balance  Sitting Balance: Supervision  Standing Balance: Moderate assistance (mod assist to insure safety with use of walker and maintain balance)  Standing Balance  Time: 1 min  Activity: ADL transfer from EOB to bedside chair  Comment: O2 stats decreased with mobility to 85% but recovered quickly to 91-95 % saturation.  Respiratory staff present during treatment and provided pt. with breathing treatment once settled in bedside chair. Functional Mobility  Functional - Mobility Device: Rolling Walker  Assist Level: Minimal assistance  Functional Mobility Comments: Pt. required verbal/tactile cues for hand placement on walker and cues for reaching back for chair and squaring self up to sitting surface. Bed mobility  Rolling to Left: Stand by assistance  Rolling to Right: Stand by assistance  Supine to Sit: Moderate assistance (Mod assist with use of side rails and time to rest from fatigue)  Sit to Supine: Moderate assistance  Scooting: Moderate assistance (mod assist x1)  Comment: Pt. moved from supine to sit on EOB with O2 monitored and stats decreased to 84% saturation but recovered quickly to 91% with time to rest from fatigue. Transfers  Stand Step Transfers: 2 Person assistance; Minimal assistance  Sit to stand: Moderate assistance; 2 Person assistance (with use of walker)  Stand to sit: Moderate assistance; 2 Person assistance (with verbal cues to reach back for chair and follow .)        Cognition  Overall Cognitive Status: [facility]  Cognition Comment: Pt. very talkative today telling stories about family members     Perception  Overall Perceptual Status: [facility]  Times per week: 4-5x/wk 1x/day as rufina  Times per day: Daily  Current Treatment Recommendations: Strengthening, Endurance Training, Patient/Caregiver Education & Training, Equipment Evaluation, Education, & procurement, Self-Care / ADL, Home Management Training, Functional Mobility Training, Safety Education & Training  Plan Comment: Cont.  OT with stated POC      AM-PAC Score        AM-PAC Inpatient Daily Activity Raw Score: 13 (11/19/21 1207)  AM-PAC Inpatient ADL T-Scale Score : 32.03 (11/19/21 1207)  ADL Inpatient CMS 0-100% Score: 63.03 (11/19/21 1207)  ADL Inpatient CMS G-Code Modifier : CL (11/19/21 1207)    Goals  Short term goals  Time Frame for Short term goals: By discharge, pt to demo  Short term goal 1: ADL transfers and functional mobility to SBA with use of AD as needed. Short term goal 2: UB ADLS to Set up and LB ADLs SBA with use of AD/AE as needed. Short term goal 3: toileting to SBA with use of AD/grab bars as needed. Short term goal 4: increased B UE strength by 1/2 grade to assist with functional tasks/I with B UE HEP with use of handouts as needed. Short term goal 5: bed mobility to Mod I with use of bedrails as needed. Long term goals  Long term goal 1: Pt to be I with fall prevention educaiton, Covid specific education, EC/WS tech, recommendations for AE with use of handouts as needed. Patient Goals   Patient goals : To feel better! Therapy Time   Individual Concurrent Group Co-treatment   Time In 3916         Time Out 1125         Minutes 62              RN reports patient is medically stable for therapy treatment this date. Chart reviewed prior to treatment and patient is agreeable for therapy. All lines intact and patient positioned comfortably at end of treatment. All patient needs addressed prior to ending therapy session.       Kae PresidentGURVINDER

## 2021-11-19 NOTE — PROGRESS NOTES
Pulmonary Critical Care Progress Note  Colt Lui MD     Patient seen for the follow up of acute hypoxic respiratory failure, asthma extubation, Pneumonia due to COVID-19 virus     Subjective:  She had an uneventful night. She remains on high flow, 55 L /65 % FiO2. Wore BiPAP overnight. No significant change in her shortness of breath. She has productive cough with yellow sputum. She is feeling better today. She sat up in the chair for a few hours. She is tolerating orals. Examination:  Vitals: /77   Pulse 83   Temp 97.5 °F (36.4 °C) (Temporal)   Resp 21   Ht 5' 2\" (1.575 m)   Wt 230 lb 9.6 oz (104.6 kg)   SpO2 95%   BMI 42.18 kg/m²   General appearance:  alert and cooperative with exam, resting in bed  Neck: No JVD  Lungs: Bilateral crackles, no wheezing, moderate air exchange  Heart: regular rate and rhythm, S1, S2 normal, no gallop  Abdomen: Soft, non tender, + BS  Extremities: no cyanosis or clubbing.  No significant edema    LABs:  CBC:   Recent Labs     11/18/21  0620 11/19/21  0500   WBC 12.9* 12.1*   HGB 15.7* 15.0   HCT 48.3* 45.1    275     BMP:   Recent Labs     11/18/21  0620 11/19/21  0500   * 134*   K 5.0 4.7   CO2 22 21   BUN 42* 33*   CREATININE 0.63 0.54   LABGLOM >60 >60   GLUCOSE 165* 140*     Radiology:  X-ray chest: 11/19/2021  Bilateral pulmonary infiltrate      Impression:  · Acute hypoxic respiratory failure  · COVID-19 pneumonia with possible bacterial coinfection  · Mild adenopathy, likely reactive  · History of asthma  · Suspected obstructive sleep apnea/Obesity  · Arthritis, hypertension, fibromyalgia    Recommendations:  · Continue airborne isolation  · Prone as tolerated  · Incentive spirometry every hour while awake  · Continue BiPAP support  · Oxygen via high flow nasal cannula as tolerated, keep SPO2 90% or greater   · Continue Decadron to IV 10 mg every 12 hours  · S/p Actemra  · Continue albuterol and Ipratropium Q 4 hours and prn  · Charanjit Winchester aerosol treatment  · Budesonide aerosol treatment  · 8-day course of Levaquin 750 mg daily  · X-ray chest in the morning  · Labs: CBC and BMP in am  · DVT prophylaxis with low molecular weight heparin  · LTAC evaluation  · Discussed with RN and RT  · Will follow with you    Ashish Vera MD, [facility]  Pulmonary Critical Care and Sleep Medicine,  [facility]  Cell: [phone]  Office: [phone]

## 2021-11-20 ENCOUNTER — APPOINTMENT (OUTPATIENT)
Dept: GENERAL RADIOLOGY | Age: 66
DRG: 177 | End: 2021-11-20
Payer: MEDICARE

## 2021-11-20 LAB
ABSOLUTE EOS #: 0.12 K/UL (ref 0–0.4)
ABSOLUTE IMMATURE GRANULOCYTE: 0.58 K/UL (ref 0–0.3)
ABSOLUTE LYMPH #: 1.27 K/UL (ref 1–4.8)
ABSOLUTE MONO #: 0.92 K/UL (ref 0.2–0.8)
ANION GAP SERPL CALCULATED.3IONS-SCNC: 11 MMOL/L (ref 9–17)
BASOPHILS # BLD: 1 %
BASOPHILS ABSOLUTE: 0.12 K/UL (ref 0–0.2)
BUN BLDV-MCNC: 31 MG/DL (ref 8–23)
BUN/CREAT BLD: 60 (ref 9–20)
CALCIUM SERPL-MCNC: 8.3 MG/DL (ref 8.6–10.4)
CHLORIDE BLD-SCNC: 99 MMOL/L (ref 98–107)
CO2: 23 MMOL/L (ref 20–31)
CREAT SERPL-MCNC: 0.52 MG/DL (ref 0.5–0.9)
DIFFERENTIAL TYPE: ABNORMAL
EOSINOPHILS RELATIVE PERCENT: 1 % (ref 1–4)
GFR AFRICAN AMERICAN: >60 ML/MIN
GFR NON-AFRICAN AMERICAN: >60 ML/MIN
GFR SERPL CREATININE-BSD FRML MDRD: ABNORMAL ML/MIN/{1.73_M2}
GFR SERPL CREATININE-BSD FRML MDRD: ABNORMAL ML/MIN/{1.73_M2}
GLUCOSE BLD-MCNC: 137 MG/DL (ref 70–99)
GLUCOSE BLD-MCNC: 173 MG/DL (ref 65–105)
GLUCOSE BLD-MCNC: 240 MG/DL (ref 65–105)
GLUCOSE BLD-MCNC: 243 MG/DL (ref 65–105)
GLUCOSE BLD-MCNC: 260 MG/DL (ref 65–105)
HCT VFR BLD CALC: 42.6 % (ref 36.3–47.1)
HEMOGLOBIN: 14.3 G/DL (ref 11.9–15.1)
IMMATURE GRANULOCYTES: 5 %
LYMPHOCYTES # BLD: 11 % (ref 24–44)
MCH RBC QN AUTO: 31 PG (ref 25.2–33.5)
MCHC RBC AUTO-ENTMCNC: 33.6 G/DL (ref 28.4–34.8)
MCV RBC AUTO: 92.2 FL (ref 82.6–102.9)
MONOCYTES # BLD: 8 % (ref 1–7)
MORPHOLOGY: ABNORMAL
NRBC AUTOMATED: 0 PER 100 WBC
PDW BLD-RTO: 12.5 % (ref 11.8–14.4)
PLATELET # BLD: 258 K/UL (ref 138–453)
PLATELET ESTIMATE: ABNORMAL
PMV BLD AUTO: 10.5 FL (ref 8.1–13.5)
POTASSIUM SERPL-SCNC: 4.1 MMOL/L (ref 3.7–5.3)
RBC # BLD: 4.62 M/UL (ref 3.95–5.11)
RBC # BLD: ABNORMAL 10*6/UL
SEG NEUTROPHILS: 74 % (ref 36–66)
SEGMENTED NEUTROPHILS ABSOLUTE COUNT: 8.49 K/UL (ref 1.8–7.7)
SODIUM BLD-SCNC: 133 MMOL/L (ref 135–144)
WBC # BLD: 11.5 K/UL (ref 3.5–11.3)
WBC # BLD: ABNORMAL 10*3/UL

## 2021-11-20 PROCEDURE — 99232 SBSQ HOSP IP/OBS MODERATE 35: CPT | Performed by: FAMILY MEDICINE

## 2021-11-20 PROCEDURE — 85025 COMPLETE CBC W/AUTO DIFF WBC: CPT

## 2021-11-20 PROCEDURE — 6360000002 HC RX W HCPCS: Performed by: NURSE PRACTITIONER

## 2021-11-20 PROCEDURE — 80048 BASIC METABOLIC PNL TOTAL CA: CPT

## 2021-11-20 PROCEDURE — 82947 ASSAY GLUCOSE BLOOD QUANT: CPT

## 2021-11-20 PROCEDURE — 6370000000 HC RX 637 (ALT 250 FOR IP): Performed by: NURSE PRACTITIONER

## 2021-11-20 PROCEDURE — 2700000000 HC OXYGEN THERAPY PER DAY

## 2021-11-20 PROCEDURE — 36415 COLL VENOUS BLD VENIPUNCTURE: CPT

## 2021-11-20 PROCEDURE — 94660 CPAP INITIATION&MGMT: CPT

## 2021-11-20 PROCEDURE — 6370000000 HC RX 637 (ALT 250 FOR IP): Performed by: STUDENT IN AN ORGANIZED HEALTH CARE EDUCATION/TRAINING PROGRAM

## 2021-11-20 PROCEDURE — 6370000000 HC RX 637 (ALT 250 FOR IP): Performed by: FAMILY MEDICINE

## 2021-11-20 PROCEDURE — 2580000003 HC RX 258: Performed by: NURSE PRACTITIONER

## 2021-11-20 PROCEDURE — 71045 X-RAY EXAM CHEST 1 VIEW: CPT

## 2021-11-20 PROCEDURE — 94640 AIRWAY INHALATION TREATMENT: CPT

## 2021-11-20 PROCEDURE — 94761 N-INVAS EAR/PLS OXIMETRY MLT: CPT

## 2021-11-20 PROCEDURE — 6370000000 HC RX 637 (ALT 250 FOR IP): Performed by: INTERNAL MEDICINE

## 2021-11-20 PROCEDURE — 6360000002 HC RX W HCPCS: Performed by: INTERNAL MEDICINE

## 2021-11-20 PROCEDURE — 2060000000 HC ICU INTERMEDIATE R&B

## 2021-11-20 RX ORDER — BUDESONIDE 0.5 MG/2ML
0.5 INHALANT ORAL 2 TIMES DAILY
Status: DISCONTINUED | OUTPATIENT
Start: 2021-11-21 | End: 2021-11-23

## 2021-11-20 RX ORDER — INSULIN GLARGINE 100 [IU]/ML
12 INJECTION, SOLUTION SUBCUTANEOUS NIGHTLY
Status: DISCONTINUED | OUTPATIENT
Start: 2021-11-20 | End: 2021-11-21

## 2021-11-20 RX ADMIN — INSULIN GLARGINE 12 UNITS: 100 INJECTION, SOLUTION SUBCUTANEOUS at 21:34

## 2021-11-20 RX ADMIN — Medication 2000 UNITS: at 09:54

## 2021-11-20 RX ADMIN — BUDESONIDE 500 MCG: 0.5 SUSPENSION RESPIRATORY (INHALATION) at 06:11

## 2021-11-20 RX ADMIN — DOCUSATE SODIUM 100 MG: 100 CAPSULE, LIQUID FILLED ORAL at 09:54

## 2021-11-20 RX ADMIN — ARFORMOTEROL TARTRATE 15 MCG: 15 SOLUTION RESPIRATORY (INHALATION) at 06:11

## 2021-11-20 RX ADMIN — MIRTAZAPINE 7.5 MG: 15 TABLET, FILM COATED ORAL at 21:35

## 2021-11-20 RX ADMIN — BUDESONIDE 500 MCG: 0.5 SUSPENSION RESPIRATORY (INHALATION) at 18:11

## 2021-11-20 RX ADMIN — DEXAMETHASONE SODIUM PHOSPHATE 10 MG: 10 INJECTION, SOLUTION INTRAMUSCULAR; INTRAVENOUS at 15:15

## 2021-11-20 RX ADMIN — METOPROLOL SUCCINATE 25 MG: 25 TABLET, EXTENDED RELEASE ORAL at 09:55

## 2021-11-20 RX ADMIN — PREGABALIN 50 MG: 50 CAPSULE ORAL at 21:35

## 2021-11-20 RX ADMIN — SODIUM CHLORIDE, PRESERVATIVE FREE 10 ML: 5 INJECTION INTRAVENOUS at 21:35

## 2021-11-20 RX ADMIN — INSULIN LISPRO 3 UNITS: 100 INJECTION, SOLUTION INTRAVENOUS; SUBCUTANEOUS at 21:35

## 2021-11-20 RX ADMIN — INSULIN LISPRO 4 UNITS: 100 INJECTION, SOLUTION INTRAVENOUS; SUBCUTANEOUS at 12:26

## 2021-11-20 RX ADMIN — ENOXAPARIN SODIUM 30 MG: 100 INJECTION SUBCUTANEOUS at 09:54

## 2021-11-20 RX ADMIN — IPRATROPIUM BROMIDE AND ALBUTEROL SULFATE 1 AMPULE: .5; 2.5 SOLUTION RESPIRATORY (INHALATION) at 11:09

## 2021-11-20 RX ADMIN — DEXAMETHASONE SODIUM PHOSPHATE 10 MG: 10 INJECTION, SOLUTION INTRAMUSCULAR; INTRAVENOUS at 04:00

## 2021-11-20 RX ADMIN — ANTI-FUNGAL POWDER MICONAZOLE NITRATE TALC FREE: 1.42 POWDER TOPICAL at 09:56

## 2021-11-20 RX ADMIN — INSULIN LISPRO 2 UNITS: 100 INJECTION, SOLUTION INTRAVENOUS; SUBCUTANEOUS at 09:54

## 2021-11-20 RX ADMIN — SODIUM CHLORIDE, PRESERVATIVE FREE 10 ML: 5 INJECTION INTRAVENOUS at 09:55

## 2021-11-20 RX ADMIN — IPRATROPIUM BROMIDE AND ALBUTEROL SULFATE 1 AMPULE: .5; 2.5 SOLUTION RESPIRATORY (INHALATION) at 14:55

## 2021-11-20 RX ADMIN — IPRATROPIUM BROMIDE AND ALBUTEROL SULFATE 1 AMPULE: .5; 2.5 SOLUTION RESPIRATORY (INHALATION) at 06:11

## 2021-11-20 RX ADMIN — SODIUM CHLORIDE, PRESERVATIVE FREE 10 ML: 5 INJECTION INTRAVENOUS at 15:16

## 2021-11-20 RX ADMIN — ARFORMOTEROL TARTRATE 15 MCG: 15 SOLUTION RESPIRATORY (INHALATION) at 18:16

## 2021-11-20 RX ADMIN — INSULIN LISPRO 4 UNITS: 100 INJECTION, SOLUTION INTRAVENOUS; SUBCUTANEOUS at 16:55

## 2021-11-20 RX ADMIN — IPRATROPIUM BROMIDE AND ALBUTEROL SULFATE 1 AMPULE: .5; 2.5 SOLUTION RESPIRATORY (INHALATION) at 18:11

## 2021-11-20 RX ADMIN — ENOXAPARIN SODIUM 30 MG: 100 INJECTION SUBCUTANEOUS at 21:35

## 2021-11-20 RX ADMIN — PREGABALIN 50 MG: 50 CAPSULE ORAL at 09:54

## 2021-11-20 RX ADMIN — ANTI-FUNGAL POWDER MICONAZOLE NITRATE TALC FREE: 1.42 POWDER TOPICAL at 21:35

## 2021-11-20 ASSESSMENT — PAIN SCALES - GENERAL
PAINLEVEL_OUTOF10: 0

## 2021-11-20 ASSESSMENT — ENCOUNTER SYMPTOMS
CONSTIPATION: 0
WHEEZING: 0
VOMITING: 0
NAUSEA: 0
CHEST TIGHTNESS: 0
ABDOMINAL PAIN: 0
RHINORRHEA: 0
SHORTNESS OF BREATH: 1
COUGH: 1
DIARRHEA: 0
BLOOD IN STOOL: 0

## 2021-11-20 NOTE — PROGRESS NOTES
Writer in to see patient, patient has boxed lunch open and partially eaten. Patient asked if she needs anything, patient asks to hold writer's hands. Writer holds hands with patient. Patient requests for nursing to officially update  tomorrow even though she is in contact with him daily. Writer states she will ask dayshift nurse to update. Patient asked if she wants Zoom call, becomes tearful and states she does not. No other needs offered at this time, VSS.

## 2021-11-20 NOTE — PLAN OF CARE
Problem: Falls - Risk of:  Goal: Will remain free from falls  Description: Will remain free from falls  11/20/2021 1126 by Gentry West RN  Outcome: Ongoing   Pt remains free from falls and in fall precautions at this time. Bed is in lowest position with all wheels locked and 3/4 side rails up. Call light, bedside table, and personal belongings within reach of pt. Nurse educated on proper use of call light. Pt acknowledged teaching. Pt wearing nonskid socks when out of bed and has steady gait with assistance. Nurse will continue to assess and monitor pt with hourly rounds and offer toileting. Problem: Falls - Risk of:  Goal: Absence of physical injury  Description: Absence of physical injury  11/20/2021 1126 by Gentry West RN  Outcome: Ongoing   Pt remains free from any physical injuries at this time. Will continue to provide a safe environment for pt. Will continue to assess and monitor pt with hourly rounds. Problem: Pain:  Goal: Control of acute pain  Description: Control of acute pain  11/20/2021 1126 by Gentry West RN  Outcome: Ongoing   Pt medicated with pain medication prn. Assessed all pain characteristics including level, type, location, frequency, and onset. Non-pharmacologic interventions offered to pt as well. Pt states pain is tolerable at this time. Will continue to monitor. Problem: Airway Clearance - Ineffective  Goal: Achieve or maintain patent airway  11/20/2021 1126 by Gentry West RN  Outcome: Ongoing     Problem: Gas Exchange - Impaired  Goal: Absence of hypoxia  11/20/2021 1126 by Gentry West RN  Outcome: Ongoing     Problem: Breathing Pattern - Ineffective  Goal: Ability to achieve and maintain a regular respiratory rate  11/20/2021 1126 by Gentry West RN  Outcome: Ongoing   Patients respiratory status stable at this time. No signs or symptoms of distress noted. Respirations non-labored and regular.  02 via heated high-flow nasal cannula and bipap in place as needed to maintain sp02>90% or per md order. Continuous SpO2 monitoring in place. Problem: Skin Integrity:  Goal: Absence of new skin breakdown  Description: Absence of new skin breakdown  11/20/2021 1126 by Ceci Cheung RN  Outcome: Ongoing   Continuing to monitor for skin integrity risks. Patient intervention includes turn and position every 2 hours. Turning/repositioning encouraged at least once every 2 hrs, and prn basis. Hygiene care being completed dependently per nursing staff. No evidence of any new skin integrity issues noted.

## 2021-11-20 NOTE — PROGRESS NOTES
Pulmonary Critical Care Progress Note       Patient seen for the follow up of acute hypoxic respiratory failure, asthma extubation, Pneumonia due to COVID-19 virus     Subjective:  She had an uneventful night. She remains on high flow, 55 L /55 % FiO2. Wore BiPAP overnight at 65%. No sig she has improved shortness of breath. . She has mostly none productive cough. She denies chest pain she is tolerating orals. Examination:  Vitals: /81   Pulse 96   Temp 97 °F (36.1 °C) (Axillary)   Resp 24   Ht 5' 2\" (1.575 m)   Wt 229 lb 0.9 oz (103.9 kg)   SpO2 92%   BMI 41.90 kg/m²   General appearance:  alert and cooperative with exam, resting in bed  Neck: No JVD  Lungs: Mild decreased breath sound bibasilar crackles  Heart: regular rate and rhythm, S1, S2 normal, no gallop  Abdomen: Soft, non tender, + BS  Extremities: no cyanosis or clubbing. No significant edema    LABs:  CBC:   Recent Labs     11/19/21  0500 11/20/21  0523   WBC 12.1* 11.5*   HGB 15.0 14.3   HCT 45.1 42.6    258     BMP:   Recent Labs     11/19/21  0500 11/20/21  0523   * 133*   K 4.7 4.1   CO2 21 23   BUN 33* 31*   CREATININE 0.54 0.52   LABGLOM >60 >60   GLUCOSE 140* 137*     Radiology:  Chest x-ray 11/20  No significant interval change in the bilateral airspace disease, when   compared to the previous study performed 1 day earlier.              X-ray chest: 11/19/2021        Impression:  · Acute hypoxic respiratory failure  · COVID-19 pneumonia with possible bacterial coinfection  · Mild adenopathy, likely reactive  · History of asthma  · Suspected obstructive sleep apnea/Obesity  · Arthritis, hypertension, fibromyalgia    Recommendations:  · Oxygen high flow by nasal cannula wean to saturation above 90%  · Prone as tolerated  · Incentive spirometry every hour while awake  · Continue BiPAP support  · Continue albuterol and Ipratropium Q 4 hours and prn  · Brovana aerosol treatment  · Budesonide aerosol treatment  · O Airborne isolation  · Continue Decadron to IV 10 mg every 12 hours  · S/p Actemra  · Finished Levaquin 750 mg daily  · X-ray chest in the morning  · LTAC evaluation  · Discussed with RN and RT  · DVT prophylaxis Lovenox 30 every 12 hours    Nkechi Vasquez MD, [facility]  Pulmonary Critical Care and Sleep Medicine,  [facility] [place]: [phone]

## 2021-11-20 NOTE — PLAN OF CARE
Problem: Falls - Risk of:  Goal: Will remain free from falls  Description: Will remain free from falls  11/19/2021 2225 by Kiara Chahal RN  Outcome: Ongoing  11/19/2021 1722 by Regan Paul RN  Outcome: Ongoing  Goal: Absence of physical injury  Description: Absence of physical injury  11/19/2021 2225 by Kiara Chahal RN  Outcome: Ongoing  11/19/2021 1722 by Regan Paul RN  Outcome: Ongoing     Problem: IP BALANCE  Goal: LTG - Patient will maintain balance to allow for safe/functional mobility  11/19/2021 2225 by Kiara Chahal RN  Outcome: Ongoing  11/19/2021 1722 by Regan Paul RN  Outcome: Ongoing     Problem: Pain:  Goal: Pain level will decrease  Description: Pain level will decrease  11/19/2021 2225 by Kiara Chahal RN  Outcome: Ongoing  11/19/2021 1722 by Regan Paul RN  Outcome: Ongoing  Goal: Control of acute pain  Description: Control of acute pain  11/19/2021 2225 by Kiara Chahal RN  Outcome: Ongoing  11/19/2021 1722 by Regan Paul RN  Outcome: Ongoing  Goal: Control of chronic pain  Description: Control of chronic pain  11/19/2021 2225 by Kiara Chahal RN  Outcome: Ongoing  11/19/2021 1722 by Regan Paul RN  Outcome: Ongoing     Problem: Airway Clearance - Ineffective  Goal: Achieve or maintain patent airway  11/19/2021 2225 by Kiara Chahal RN  Outcome: Ongoing  11/19/2021 1722 by Regan Paul RN  Outcome: Ongoing     Problem: Gas Exchange - Impaired  Goal: Absence of hypoxia  11/19/2021 2225 by Kiara Chahal RN  Outcome: Ongoing  11/19/2021 1722 by Regan Paul RN  Outcome: Ongoing  Goal: Promote optimal lung function  11/19/2021 2225 by Kiara Chahal RN  Outcome: Ongoing  11/19/2021 1722 by Regan Paul RN  Outcome: Ongoing     Problem: Breathing Pattern - Ineffective  Goal: Ability to achieve and maintain a regular respiratory rate  11/19/2021 2225 by Kiara Chahal RN  Outcome: Ongoing  11/19/2021 1722 by Regan Reges, RN  Outcome: Ongoing     Problem: Body Temperature -  Risk of, Imbalanced  Goal: Ability to maintain a body temperature within defined limits  11/19/2021 2225 by Everardo Joyner RN  Outcome: Ongoing  11/19/2021 1722 by Amber Lagos RN  Outcome: Ongoing  Goal: Will regain or maintain usual level of consciousness  11/19/2021 2225 by Everardo Joyner RN  Outcome: Ongoing  11/19/2021 1722 by Amber Lagos RN  Outcome: Ongoing  Goal: Complications related to the disease process, condition or treatment will be avoided or minimized  11/19/2021 2225 by Everardo Joyner RN  Outcome: Ongoing  11/19/2021 1722 by Amber Lagos RN  Outcome: Ongoing     Problem: Isolation Precautions - Risk of Spread of Infection  Goal: Prevent transmission of infection  11/19/2021 2225 by Everardo Joyner RN  Outcome: Ongoing  11/19/2021 1722 by Amber Lagos RN  Outcome: Ongoing     Problem: Nutrition Deficits  Goal: Optimize nutritional status  11/19/2021 2225 by Everardo Joyner RN  Outcome: Ongoing  11/19/2021 1722 by Amber Lagos RN  Outcome: Ongoing     Problem: Loneliness or Risk for Loneliness  Goal: Demonstrate positive use of time alone when socialization is not possible  11/19/2021 2225 by Everardo Joyner RN  Outcome: Ongoing  11/19/2021 1722 by Amber Lagos RN  Outcome: Ongoing     Problem: Fatigue  Goal: Verbalize increase energy and improved vitality  11/19/2021 2225 by Everardo Joyner RN  Outcome: Ongoing  11/19/2021 1722 by Amber Lagos RN  Outcome: Ongoing       Problem: Skin Integrity:  Goal: Will show no infection signs and symptoms  Description: Will show no infection signs and symptoms  11/19/2021 2225 by Everardo Joyner RN  Outcome: Ongoing  11/19/2021 1722 by Amber Lagos RN  Outcome: Ongoing  Goal: Absence of new skin breakdown  Description: Absence of new skin breakdown  11/19/2021 2225 by Everardo Joyner RN  Outcome: Ongoing  11/19/2021 1722 by Amber Lagos RN  Outcome: Ongoing     Problem: Nutrition  Goal: Optimal nutrition therapy  11/19/2021 2225 by Debera Hammans, RN  Outcome: Ongoing  11/19/2021 1722 by Galileo Bishop RN  Outcome: Ongoing

## 2021-11-20 NOTE — PROGRESS NOTES
Writer in to do alexis care, asked patient when last BM was. Patient states last BM was approximately 5 days ago when she got medications to help her have a bowel movement. Some stool found near alexis, patient states she has difficulty differentiating sensations with alexis in place. Will inform dayshift that patient may not call out for elimination needs.

## 2021-11-20 NOTE — PROGRESS NOTES
[facility]  Office: [address], DO, Aram Guzman, DO, Melchordwain Wilson, DO, Valerydorothy Shea Blood, DO, Carlota Chavez MD, Kiran Omer MD, Shelby Willingham MD, Katrina Mock MD, Lilian Tate MD, Maite Alvarez MD, Lavell Armstrong MD, Melisa Meraz, DO, Frank Simmonds, DO, Wellington Cohen MD,  Nazanin Lim, DO, Nelly Zapata MD, Lei Diaz MD, Oliva Arias MD, Glen Scruggs MD, Marciano Bernheim, MD, Amor Bautista MD, Inderjit Tejeda MD, Мария Shields, [facility], [facility], [facility], Marlys Ngo, CNP, Jose Francisco Gotti, CNS, Viktor Montiel, CNP, Elizabeth Bear, CNP, Garima Arias, CNP, Danae Chadwick, CNP, Johnny Cho, CNP, Kylee Stevens PA-C, Anamika Benson, [facility], Anitra Godinez, CNP, Woodward Brittle, CNP, Breann Quezada, CNP, Maggi Amos, CNP, Nayan Duval, CNP, Mary Cullen, [facility], [place], [address] [place] [address]      Daily Progress Note     Admit Date: 11/3/2021  Bed/Room No.  1102/1102-01  Admitting Physician : Shelby Willingham MD  Code Status :Full Code  Hospital Day:  LOS: 16 days   Chief Complaint:     Chief Complaint   Patient presents with    Abdominal Pain     lower    Nausea    Back Pain    Headache     Principal Problem:    Pneumonia due to COVID-19 virus  Active Problems:    Essential hypertension    Prediabetes    Morbid obesity with BMI of 40.0-44.9, adult ([facility] [address]    Hyperglycemia    Acute respiratory failure with hypoxia ([facility] [address]    Acute urinary retention  Resolved Problems:    * No resolved hospital problems. *    Subjective : Interval History/Significant events :  11/20/21    Patient reports progress to slow improvement in breathing. She has been stable on high flow. Patient did not require BiPAP. She is eating well. Appetite is slowly improving. She denies fever, nausea, vomiting, diarrhea. Patient is currently on 55 LPM, 55% FiO2. She is alert, oriented. Vitals - Unstable afebrile  Labs -hyponatremia 133, BUN 31, creatinine 0.5, WBC 11.5. Hyperglycemia. Nursing notes , Consults notes reviewed. Overnight events and updates discussed with Nursing staff . Background History:         Jaquan Sotomayor is 74 y.o. female  Who was admitted to the hospital on 11/3/2021 for treatment of Pneumonia due to COVID-19 virus. Patient presented to emergency room with headache, abdominal pain, nausea, fever T-max 102. 2. Jory Trevizo Patient tested positive for COVID-19. Chest x-ray showed bibasilar opacities consistent with COVID-19 pneumonia. She has underlying history of obesity, fibromyalgia, chronic pain, hypertension. Patient was not vaccinated against COVID-19. CT chest was negative for PE. Patient had acute respiratory failure was requiring O2 support. Breathing status worsened and required BiPAP with 100% FiO2 and was moved to ICU on 11/10/2021. PMH:  Past Medical History:   Diagnosis Date    Anxiety     Arthritis     Bell's palsy     Chronic neck pain     posterior neck since fall April 2014    Chronic pain     low back and bilateral lower extremities    Fibromyalgia     HA (headache)     Headache     off/on since fall April 2014    HTN (hypertension)     Hypertension       Allergies:    Allergies   Allergen Reactions    Other Hives    Ceclor [Cefaclor]     Food      All melons      Sulfa Antibiotics      Projectile vomiting       Medications :  metoprolol succinate, 25 mg, Oral, Daily  Arformoterol Tartrate, 15 mcg, Nebulization, BID  budesonide, 0.5 mg, Nebulization, BID  pregabalin, 50 mg, Oral, BID  polyethylene glycol, 17 g, Oral, Daily  docusate sodium, 100 mg, Oral, BID  insulin lispro, 0-12 Units, SubCUTAneous, TID WC  insulin lispro, 0-6 Units, SubCUTAneous, Nightly  ipratropium-albuterol, 1 ampule, Inhalation, Q4H WA  dexamethasone, 10 mg, IntraVENous, Q12H  miconazole, , Topical, BID  mirtazapine, 7.5 mg, Oral, Nightly  sodium chloride flush, 5-40 mL, IntraVENous, 2 times per day  enoxaparin, 30 mg, SubCUTAneous, BID  Vitamin D, 2,000 Units, Oral, Daily  influenza virus vaccine, 0.5 mL, IntraMUSCular, Prior to discharge        Review of Systems   Review of Systems   Constitutional: Positive for activity change, appetite change and fatigue. Negative for fever and unexpected weight change. HENT: Negative for congestion, rhinorrhea and sneezing. Eyes: Negative for visual disturbance. Respiratory: Positive for cough and shortness of breath. Negative for chest tightness and wheezing. Cardiovascular: Negative for chest pain and palpitations. Gastrointestinal: Negative for abdominal pain, blood in stool, constipation, diarrhea, nausea and vomiting. Genitourinary: Negative for dysuria, enuresis, frequency and hematuria. Musculoskeletal: Negative for arthralgias and myalgias. Skin: Negative for rash. Neurological: Negative for dizziness, weakness, light-headedness and headaches. Hematological: Does not bruise/bleed easily. Psychiatric/Behavioral: Negative for dysphoric mood and sleep disturbance.      Objective :      Current Vitals : Temp: 97 °F (36.1 °C),  Pulse: 80, Resp: 25, BP: 124/84, SpO2: 95 %  Last 24 Hrs Vitals   Patient Vitals for the past 24 hrs:   BP Temp Temp src Pulse Resp SpO2 Weight   11/20/21 0800 124/84 -- -- 80 25 95 % --   11/20/21 0700 121/65 -- -- 79 24 (!) 82 % --   11/20/21 0611 -- -- -- -- 23 -- --   11/20/21 0600 99/70 -- -- 72 23 96 % 229 lb 0.9 oz (103.9 kg)   11/20/21 0400 123/80 97 °F (36.1 °C) Axillary 66 15 99 % --   11/20/21 0359 -- -- -- -- 16 -- --   11/20/21 0300 98/67 -- -- 67 14 96 % --   11/20/21 0200 110/67 -- -- 73 18 98 % --   11/20/21 0100 97/61 -- -- 75 17 95 % --   11/20/21 0000 (!) 108/55 97.4 °F (36.3 °C) Axillary 75 26 91 % --   11/19/21 2352 -- -- -- -- 24 -- --   11/19/21 2300 117/73 -- -- 83 29 94 % --   11/19/21 2200 114/72 -- -- 81 21 96 % --   11/19/21 2100 -- -- -- 78 20 95 % --   11/19/21 2005 -- -- -- -- -- 95 % --   11/19/21 2000 113/61 99.4 °F (37.4 °C) Axillary 79 22 94 % --   11/19/21 1900 113/61 -- -- 87 22 94 % --   11/19/21 1800 109/60 -- -- 101 27 -- --   11/19/21 1600 118/78 97.5 °F (36.4 °C) -- 79 21 93 % --   11/19/21 1100 -- -- -- -- 21 95 % --   11/19/21 1000 122/77 -- -- 83 20 -- --   11/19/21 0900 111/68 -- -- 70 19 -- --     Intake / output   11/19 0701 - 11/20 0700  In: 56 [P.O.:600; I.V.:10]  Out: 1625 [Urine:1625]  Physical Exam:  Physical Exam  Vitals and nursing note reviewed. Constitutional:       General: She is not in acute distress. Appearance: She is not diaphoretic. Interventions: Nasal cannula in place. Comments: On High Flow    HENT:      Head: Normocephalic and atraumatic. Nose:      Right Sinus: No maxillary sinus tenderness or frontal sinus tenderness. Left Sinus: No maxillary sinus tenderness or frontal sinus tenderness. Mouth/Throat:      Pharynx: No oropharyngeal exudate. Eyes:      General: No scleral icterus. Conjunctiva/sclera: Conjunctivae normal.      Pupils: Pupils are equal, round, and reactive to light. Neck:      Thyroid: No thyromegaly. Vascular: No JVD. Cardiovascular:      Rate and Rhythm: Normal rate and regular rhythm. Pulses:           Dorsalis pedis pulses are 2+ on the right side and 2+ on the left side. Heart sounds: Normal heart sounds. No murmur heard. Pulmonary:      Effort: Pulmonary effort is normal.      Breath sounds: Normal breath sounds. No wheezing or rales. Abdominal:      Palpations: Abdomen is soft. There is no mass. Tenderness: There is no abdominal tenderness. Musculoskeletal:      Cervical back: Full passive range of motion without pain and neck supple. Lymphadenopathy:      Head:      Right side of head: No submandibular adenopathy. Left side of head: No submandibular adenopathy. Cervical: No cervical adenopathy. Skin:     General: Skin is warm.    Neurological:      Mental Status: She is alert and oriented to person, place, and time.      Motor: No tremor. Psychiatric:         Behavior: Behavior is cooperative. Laboratory findings:    Recent Labs     11/18/21  0620 11/19/21  0500 11/20/21  0523   WBC 12.9* 12.1* 11.5*   HGB 15.7* 15.0 14.3   HCT 48.3* 45.1 42.6    275 258     Recent Labs     11/18/21  0620 11/19/21  0500 11/20/21  0523   * 134* 133*   K 5.0 4.7 4.1   CL 98 100 99   CO2 22 21 23   GLUCOSE 165* 140* 137*   BUN 42* 33* 31*   CREATININE 0.63 0.54 0.52   CALCIUM 8.8 8.6 8.3*            Specific Gravity, UA   Date Value Ref Range Status   11/10/2021 1.020 1.005 - 1.030 Final     Protein, UA   Date Value Ref Range Status   11/10/2021 NEGATIVE NEGATIVE Final     RBC, UA   Date Value Ref Range Status   11/02/2021 2 TO 5 0 - 2 /HPF Final     Bacteria, UA   Date Value Ref Range Status   11/02/2021 NOT REPORTED None Final     Nitrite, Urine   Date Value Ref Range Status   11/10/2021 NEGATIVE NEGATIVE Final     WBC, UA   Date Value Ref Range Status   11/02/2021 2 TO 5 0 - 5 /HPF Final     Leukocyte Esterase, Urine   Date Value Ref Range Status   11/10/2021 NEGATIVE NEGATIVE Final       Imaging / Clinical Data :-   XR CHEST PORTABLE    Result Date: 11/14/2021  Moderate bilateral parenchymal opacities. Minimally increased parenchymal density on the right compared to prior studies. XR CHEST PORTABLE    Result Date: 11/12/2021  Moderate bilateral pneumonia, similar to the previous exam.     XR CHEST PORTABLE    Result Date: 11/11/2021  Findings remain compatible with moderate COVID-19 pneumonitis. No significant interval changes. XR CHEST PORTABLE    Result Date: 11/9/2021  Multifocal pulmonary opacities suggest significantly worsened pulmonary edema and or multifocal pneumonia. Recommend continued follow-up to resolution. CT CHEST PULMONARY EMBOLISM W CONTRAST    Result Date: 11/9/2021  No evidence of pulmonary embolism.  Extensive pulmonary abnormalities most compatible with COVID-19 pneumonia;

## 2021-11-21 ENCOUNTER — APPOINTMENT (OUTPATIENT)
Dept: GENERAL RADIOLOGY | Age: 66
DRG: 177 | End: 2021-11-21
Payer: MEDICARE

## 2021-11-21 LAB
ABSOLUTE EOS #: 0.13 K/UL (ref 0–0.4)
ABSOLUTE IMMATURE GRANULOCYTE: 0.66 K/UL (ref 0–0.3)
ABSOLUTE LYMPH #: 1.18 K/UL (ref 1–4.8)
ABSOLUTE MONO #: 0.79 K/UL (ref 0.2–0.8)
ANION GAP SERPL CALCULATED.3IONS-SCNC: 11 MMOL/L (ref 9–17)
BASOPHILS # BLD: 1 %
BASOPHILS ABSOLUTE: 0.13 K/UL (ref 0–0.2)
BUN BLDV-MCNC: 29 MG/DL (ref 8–23)
BUN/CREAT BLD: 51 (ref 9–20)
CALCIUM SERPL-MCNC: 8.5 MG/DL (ref 8.6–10.4)
CHLORIDE BLD-SCNC: 99 MMOL/L (ref 98–107)
CO2: 25 MMOL/L (ref 20–31)
CREAT SERPL-MCNC: 0.57 MG/DL (ref 0.5–0.9)
DIFFERENTIAL TYPE: ABNORMAL
EOSINOPHILS RELATIVE PERCENT: 1 % (ref 1–4)
GFR AFRICAN AMERICAN: >60 ML/MIN
GFR NON-AFRICAN AMERICAN: >60 ML/MIN
GFR SERPL CREATININE-BSD FRML MDRD: ABNORMAL ML/MIN/{1.73_M2}
GFR SERPL CREATININE-BSD FRML MDRD: ABNORMAL ML/MIN/{1.73_M2}
GLUCOSE BLD-MCNC: 143 MG/DL (ref 65–105)
GLUCOSE BLD-MCNC: 168 MG/DL (ref 70–99)
GLUCOSE BLD-MCNC: 175 MG/DL (ref 65–105)
GLUCOSE BLD-MCNC: 200 MG/DL (ref 65–105)
GLUCOSE BLD-MCNC: 210 MG/DL (ref 65–105)
HCT VFR BLD CALC: 43.4 % (ref 36.3–47.1)
HEMOGLOBIN: 14.4 G/DL (ref 11.9–15.1)
IMMATURE GRANULOCYTES: 5 %
LYMPHOCYTES # BLD: 9 % (ref 24–44)
MCH RBC QN AUTO: 30.8 PG (ref 25.2–33.5)
MCHC RBC AUTO-ENTMCNC: 33.2 G/DL (ref 28.4–34.8)
MCV RBC AUTO: 92.7 FL (ref 82.6–102.9)
MONOCYTES # BLD: 6 % (ref 1–7)
MORPHOLOGY: ABNORMAL
NRBC AUTOMATED: 0 PER 100 WBC
PDW BLD-RTO: 12.7 % (ref 11.8–14.4)
PLATELET # BLD: 241 K/UL (ref 138–453)
PLATELET ESTIMATE: ABNORMAL
PMV BLD AUTO: 10.2 FL (ref 8.1–13.5)
POTASSIUM SERPL-SCNC: 4.4 MMOL/L (ref 3.7–5.3)
RBC # BLD: 4.68 M/UL (ref 3.95–5.11)
RBC # BLD: ABNORMAL 10*6/UL
SEG NEUTROPHILS: 78 % (ref 36–66)
SEGMENTED NEUTROPHILS ABSOLUTE COUNT: 10.21 K/UL (ref 1.8–7.7)
SODIUM BLD-SCNC: 135 MMOL/L (ref 135–144)
WBC # BLD: 13.1 K/UL (ref 3.5–11.3)
WBC # BLD: ABNORMAL 10*3/UL

## 2021-11-21 PROCEDURE — 99232 SBSQ HOSP IP/OBS MODERATE 35: CPT | Performed by: FAMILY MEDICINE

## 2021-11-21 PROCEDURE — 82947 ASSAY GLUCOSE BLOOD QUANT: CPT

## 2021-11-21 PROCEDURE — 6370000000 HC RX 637 (ALT 250 FOR IP): Performed by: STUDENT IN AN ORGANIZED HEALTH CARE EDUCATION/TRAINING PROGRAM

## 2021-11-21 PROCEDURE — 85025 COMPLETE CBC W/AUTO DIFF WBC: CPT

## 2021-11-21 PROCEDURE — 80048 BASIC METABOLIC PNL TOTAL CA: CPT

## 2021-11-21 PROCEDURE — 6360000002 HC RX W HCPCS: Performed by: INTERNAL MEDICINE

## 2021-11-21 PROCEDURE — 6360000002 HC RX W HCPCS: Performed by: NURSE PRACTITIONER

## 2021-11-21 PROCEDURE — 2060000000 HC ICU INTERMEDIATE R&B

## 2021-11-21 PROCEDURE — 94660 CPAP INITIATION&MGMT: CPT

## 2021-11-21 PROCEDURE — 6370000000 HC RX 637 (ALT 250 FOR IP): Performed by: INTERNAL MEDICINE

## 2021-11-21 PROCEDURE — 36415 COLL VENOUS BLD VENIPUNCTURE: CPT

## 2021-11-21 PROCEDURE — 6370000000 HC RX 637 (ALT 250 FOR IP): Performed by: NURSE PRACTITIONER

## 2021-11-21 PROCEDURE — 2700000000 HC OXYGEN THERAPY PER DAY

## 2021-11-21 PROCEDURE — 94640 AIRWAY INHALATION TREATMENT: CPT

## 2021-11-21 PROCEDURE — 6370000000 HC RX 637 (ALT 250 FOR IP): Performed by: FAMILY MEDICINE

## 2021-11-21 PROCEDURE — 2580000003 HC RX 258: Performed by: NURSE PRACTITIONER

## 2021-11-21 PROCEDURE — 94761 N-INVAS EAR/PLS OXIMETRY MLT: CPT

## 2021-11-21 PROCEDURE — 71045 X-RAY EXAM CHEST 1 VIEW: CPT

## 2021-11-21 RX ORDER — INSULIN GLARGINE 100 [IU]/ML
12 INJECTION, SOLUTION SUBCUTANEOUS NIGHTLY
Status: DISCONTINUED | OUTPATIENT
Start: 2021-11-21 | End: 2021-11-21

## 2021-11-21 RX ORDER — DEXAMETHASONE SODIUM PHOSPHATE 10 MG/ML
6 INJECTION, SOLUTION INTRAMUSCULAR; INTRAVENOUS EVERY 24 HOURS
Status: DISCONTINUED | OUTPATIENT
Start: 2021-11-22 | End: 2021-11-24

## 2021-11-21 RX ADMIN — SODIUM CHLORIDE, PRESERVATIVE FREE 10 ML: 5 INJECTION INTRAVENOUS at 08:41

## 2021-11-21 RX ADMIN — BUDESONIDE 500 MCG: 0.5 SUSPENSION RESPIRATORY (INHALATION) at 18:22

## 2021-11-21 RX ADMIN — BUDESONIDE 500 MCG: 0.5 SUSPENSION RESPIRATORY (INHALATION) at 06:22

## 2021-11-21 RX ADMIN — PREGABALIN 50 MG: 50 CAPSULE ORAL at 08:40

## 2021-11-21 RX ADMIN — IPRATROPIUM BROMIDE AND ALBUTEROL SULFATE 1 AMPULE: .5; 2.5 SOLUTION RESPIRATORY (INHALATION) at 06:22

## 2021-11-21 RX ADMIN — IPRATROPIUM BROMIDE AND ALBUTEROL SULFATE 1 AMPULE: .5; 2.5 SOLUTION RESPIRATORY (INHALATION) at 14:11

## 2021-11-21 RX ADMIN — Medication 2000 UNITS: at 10:47

## 2021-11-21 RX ADMIN — ENOXAPARIN SODIUM 30 MG: 100 INJECTION SUBCUTANEOUS at 21:21

## 2021-11-21 RX ADMIN — DEXAMETHASONE SODIUM PHOSPHATE 10 MG: 10 INJECTION, SOLUTION INTRAMUSCULAR; INTRAVENOUS at 04:02

## 2021-11-21 RX ADMIN — INSULIN LISPRO 2 UNITS: 100 INJECTION, SOLUTION INTRAVENOUS; SUBCUTANEOUS at 13:35

## 2021-11-21 RX ADMIN — METOPROLOL SUCCINATE 25 MG: 25 TABLET, EXTENDED RELEASE ORAL at 08:40

## 2021-11-21 RX ADMIN — MIRTAZAPINE 7.5 MG: 15 TABLET, FILM COATED ORAL at 21:18

## 2021-11-21 RX ADMIN — SODIUM CHLORIDE, PRESERVATIVE FREE 10 ML: 5 INJECTION INTRAVENOUS at 21:23

## 2021-11-21 RX ADMIN — IPRATROPIUM BROMIDE AND ALBUTEROL SULFATE 1 AMPULE: .5; 2.5 SOLUTION RESPIRATORY (INHALATION) at 18:22

## 2021-11-21 RX ADMIN — ANTI-FUNGAL POWDER MICONAZOLE NITRATE TALC FREE: 1.42 POWDER TOPICAL at 10:46

## 2021-11-21 RX ADMIN — IPRATROPIUM BROMIDE AND ALBUTEROL SULFATE 1 AMPULE: .5; 2.5 SOLUTION RESPIRATORY (INHALATION) at 09:55

## 2021-11-21 RX ADMIN — OXYCODONE AND ACETAMINOPHEN 1 TABLET: 5; 325 TABLET ORAL at 10:10

## 2021-11-21 RX ADMIN — INSULIN LISPRO 4 UNITS: 100 INJECTION, SOLUTION INTRAVENOUS; SUBCUTANEOUS at 08:41

## 2021-11-21 RX ADMIN — ARFORMOTEROL TARTRATE 15 MCG: 15 SOLUTION RESPIRATORY (INHALATION) at 06:22

## 2021-11-21 RX ADMIN — ARFORMOTEROL TARTRATE 15 MCG: 15 SOLUTION RESPIRATORY (INHALATION) at 18:28

## 2021-11-21 RX ADMIN — ENOXAPARIN SODIUM 30 MG: 100 INJECTION SUBCUTANEOUS at 08:40

## 2021-11-21 RX ADMIN — ANTI-FUNGAL POWDER MICONAZOLE NITRATE TALC FREE: 1.42 POWDER TOPICAL at 21:35

## 2021-11-21 RX ADMIN — PREGABALIN 50 MG: 50 CAPSULE ORAL at 21:18

## 2021-11-21 RX ADMIN — INSULIN LISPRO 2 UNITS: 100 INJECTION, SOLUTION INTRAVENOUS; SUBCUTANEOUS at 17:36

## 2021-11-21 RX ADMIN — INSULIN LISPRO 2 UNITS: 100 INJECTION, SOLUTION INTRAVENOUS; SUBCUTANEOUS at 21:21

## 2021-11-21 ASSESSMENT — ENCOUNTER SYMPTOMS
WHEEZING: 0
ABDOMINAL PAIN: 0
NAUSEA: 0
DIARRHEA: 0
CHEST TIGHTNESS: 0
CONSTIPATION: 0
COUGH: 0
BLOOD IN STOOL: 0
VOMITING: 0
RHINORRHEA: 0
SHORTNESS OF BREATH: 1

## 2021-11-21 ASSESSMENT — PAIN SCALES - GENERAL
PAINLEVEL_OUTOF10: 0
PAINLEVEL_OUTOF10: 7
PAINLEVEL_OUTOF10: 0
PAINLEVEL_OUTOF10: 0

## 2021-11-21 ASSESSMENT — PAIN DESCRIPTION - LOCATION: LOCATION: BACK;SHOULDER

## 2021-11-21 ASSESSMENT — PAIN DESCRIPTION - PAIN TYPE: TYPE: ACUTE PAIN;CHRONIC PAIN

## 2021-11-21 NOTE — PROGRESS NOTES
Writer in to see patient. Patient has no needs other than being boosted in bed and a [employer]. Both provided, patient satisfied. VSS.

## 2021-11-21 NOTE — PLAN OF CARE
Problem: Falls - Risk of:  Goal: Will remain free from falls  Description: Will remain free from falls  Outcome: Ongoing  Goal: Absence of physical injury  Description: Absence of physical injury  Outcome: Ongoing     Problem: IP BALANCE  Goal: LTG - Patient will maintain balance to allow for safe/functional mobility  Outcome: Ongoing     Problem: Pain:  Goal: Pain level will decrease  Description: Pain level will decrease  Outcome: Ongoing  Goal: Control of acute pain  Description: Control of acute pain  Outcome: Ongoing  Goal: Control of chronic pain  Description: Control of chronic pain  Outcome: Ongoing     Problem: Airway Clearance - Ineffective  Goal: Achieve or maintain patent airway  Outcome: Ongoing     Problem: Gas Exchange - Impaired  Goal: Absence of hypoxia  Outcome: Ongoing  Goal: Promote optimal lung function  Outcome: Ongoing     Problem: Breathing Pattern - Ineffective  Goal: Ability to achieve and maintain a regular respiratory rate  Outcome: Ongoing     Problem:  Body Temperature -  Risk of, Imbalanced  Goal: Ability to maintain a body temperature within defined limits  Outcome: Ongoing  Goal: Will regain or maintain usual level of consciousness  Outcome: Ongoing  Goal: Complications related to the disease process, condition or treatment will be avoided or minimized  Outcome: Ongoing     Problem: Isolation Precautions - Risk of Spread of Infection  Goal: Prevent transmission of infection  Outcome: Ongoing     Problem: Nutrition Deficits  Goal: Optimize nutritional status  Outcome: Ongoing     Problem: Loneliness or Risk for Loneliness  Goal: Demonstrate positive use of time alone when socialization is not possible  Outcome: Ongoing     Problem: Fatigue  Goal: Verbalize increase energy and improved vitality  Outcome: Ongoing     Problem: Patient Education: Go to Patient Education Activity  Goal: Patient/Family Education  Outcome: Ongoing     Problem: Skin Integrity:  Goal: Will show no infection signs and symptoms  Description: Will show no infection signs and symptoms  Outcome: Ongoing  Goal: Absence of new skin breakdown  Description: Absence of new skin breakdown  Outcome: Ongoing     Problem: Nutrition  Goal: Optimal nutrition therapy  Outcome: Ongoing

## 2021-11-21 NOTE — PROGRESS NOTES
[facility]  Office: [address], DO, Lorcarlota Din, DO, Darius Acron, DO, Erica Velasco Blood, DO, Diego Wheeler MD, Viktor Boggs MD, Kiki Dash MD, Shelbi Persaud MD, Cyrus Bettencourt MD, Darshan Tariq MD, Oneyda Green MD, Edmond Quiñones, DO, Nima Gore, DO, Paulette Lara MD,  Curtis Ghosh, DO, Valente Eagle MD, Sara Mart MD, Harika Valente MD, Titi Molina MD, Bennie Ambrosio MD, Karlene Alvarez MD, Da Gallo MD, Anita Oakes, [facility], [facility], CNP, Ayesha Correa, CNP, Binta Maurer, CNS, Twila Ibrahim, CNP, Adelia Alpers, CNP, Oni Romero, CNP, Greta Gonzales, CNP, Lynnette Brower, CNP, Amor Chong PA-C, Christelle Gtz, [facility], Justin Claire, CNP, Lyle Guzmán, CNP, Keisha Hilton, CNP, Pat Guthrie, CNP, Juan Bermudez, CNP, Nayan Munson, CNP, Eunice Navas, [facility]      Daily Progress Note     Admit Date: 11/3/2021  Bed/Room No.  1102/1102-01  Admitting Physician : Kiki Dash MD  Code Status :Full Code  Hospital Day:  LOS: 17 days   Chief Complaint:     Chief Complaint   Patient presents with    Abdominal Pain     lower    Nausea    Back Pain    Headache     Principal Problem:    Pneumonia due to COVID-19 virus  Active Problems:    Essential hypertension    Prediabetes    Morbid obesity with BMI of 40.0-44.9, adult ([facility] [address]    Hyperglycemia    Acute respiratory failure with hypoxia ([facility] [address]    Acute urinary retention  Resolved Problems:    * No resolved hospital problems. *    Subjective : Interval History/Significant events :  11/21/21    Patient reports that she is feeling much better. She has been dependent on High flow. She is eating better and appetite has improved. She is currently coloring pictures and sitting in bed. Vitals - Unstable afebrile. Patient is on 45 LPM , 50 % FiO2 and saturating 94 %. Labs -hyponatremia 133, BUN 31, creatinine 0.5, WBC 11.5. Hyperglycemia.     Nursing notes , Consults notes reviewed. Overnight events and updates discussed with Nursing staff . Background History:         Camron Love is 74 y.o. female  Who was admitted to the hospital on 11/3/2021 for treatment of Pneumonia due to COVID-19 virus. Patient presented to emergency room with headache, abdominal pain, nausea, fever T-max 102. 2. Gumaro LovePerkins Patient tested positive for COVID-19. Chest x-ray showed bibasilar opacities consistent with COVID-19 pneumonia. She has underlying history of obesity, fibromyalgia, chronic pain, hypertension. Patient was not vaccinated against COVID-19. CT chest was negative for PE. Patient had acute respiratory failure was requiring O2 support. Breathing status worsened and required BiPAP with 100% FiO2 and was moved to ICU on 11/10/2021. PMH:  Past Medical History:   Diagnosis Date    Anxiety     Arthritis     Bell's palsy     Chronic neck pain     posterior neck since fall April 2014    Chronic pain     low back and bilateral lower extremities    Fibromyalgia     HA (headache)     Headache     off/on since fall April 2014    HTN (hypertension)     Hypertension       Allergies:    Allergies   Allergen Reactions    Other Hives    Ceclor [Cefaclor]     Food      All melons      Sulfa Antibiotics      Projectile vomiting       Medications :  insulin glargine, 12 Units, SubCUTAneous, Nightly  budesonide, 0.5 mg, Nebulization, BID  metoprolol succinate, 25 mg, Oral, Daily  Arformoterol Tartrate, 15 mcg, Nebulization, BID  pregabalin, 50 mg, Oral, BID  polyethylene glycol, 17 g, Oral, Daily  docusate sodium, 100 mg, Oral, BID  insulin lispro, 0-12 Units, SubCUTAneous, TID WC  insulin lispro, 0-6 Units, SubCUTAneous, Nightly  ipratropium-albuterol, 1 ampule, Inhalation, Q4H WA  dexamethasone, 10 mg, IntraVENous, Q12H  miconazole, , Topical, BID  mirtazapine, 7.5 mg, Oral, Nightly  sodium chloride flush, 5-40 mL, IntraVENous, 2 times per day  enoxaparin, 30 mg, SubCUTAneous, BID  Vitamin D, 2,000 Units, Oral, Daily  influenza virus vaccine, 0.5 mL, IntraMUSCular, Prior to discharge        Review of Systems   Review of Systems   Constitutional: Positive for activity change, appetite change and fatigue. Negative for fever and unexpected weight change. HENT: Negative for congestion, rhinorrhea and sneezing. Eyes: Negative for visual disturbance. Respiratory: Positive for shortness of breath. Negative for cough, chest tightness and wheezing. Cardiovascular: Negative for chest pain and palpitations. Gastrointestinal: Negative for abdominal pain, blood in stool, constipation, diarrhea, nausea and vomiting. Genitourinary: Negative for dysuria, enuresis, frequency and hematuria. Musculoskeletal: Negative for arthralgias and myalgias. Skin: Negative for rash. Neurological: Negative for dizziness, weakness, light-headedness and headaches. Hematological: Does not bruise/bleed easily. Psychiatric/Behavioral: Negative for dysphoric mood and sleep disturbance.      Objective :      Current Vitals : Temp: 97.1 °F (36.2 °C),  Pulse: 78, Resp: 21, BP: 126/79, SpO2: 95 %  Last 24 Hrs Vitals   Patient Vitals for the past 24 hrs:   BP Temp Temp src Pulse Resp SpO2   11/21/21 0700 -- -- -- -- 21 95 %   11/21/21 0622 -- -- -- -- 21 --   11/21/21 0400 -- 97.1 °F (36.2 °C) Temporal -- -- --   11/21/21 0319 -- -- -- -- 19 --   11/21/21 0000 126/79 98.6 °F (37 °C) Axillary 78 19 95 %   11/20/21 2358 -- -- -- -- 20 --   11/20/21 2300 133/79 -- -- 87 25 93 %   11/20/21 2200 129/82 -- -- 86 20 94 %   11/20/21 2100 136/81 -- -- 83 24 94 %   11/20/21 2000 136/81 98.5 °F (36.9 °C) Oral 100 15 92 %   11/20/21 1900 -- -- -- 86 21 91 %   11/20/21 1813 -- -- -- -- 19 93 %   11/20/21 1800 128/77 -- -- 89 30 94 %   11/20/21 1700 120/78 -- -- 92 19 91 %   11/20/21 1600 127/80 98.5 °F (36.9 °C) Oral 97 24 92 %   11/20/21 1500 130/75 -- -- 104 22 93 %   11/20/21 1400 126/68 -- -- 106 17 95 %   11/20/21 1300 122/79 -- -- 87 21 95 %   11/20/21 1200 127/81 98.5 °F (36.9 °C) Oral 96 24 92 %   11/20/21 1110 -- -- -- -- 18 96 %   11/20/21 1100 112/70 -- -- 96 22 96 %   11/20/21 1000 (!) 118/92 -- -- 88 15 95 %   11/20/21 0955 124/81 -- -- 85 25 95 %   11/20/21 0900 124/81 -- -- 82 24 97 %     Intake / output   11/20 0701 - 11/21 0700  In: -   Out: 300 [Urine:300]  Physical Exam:  Physical Exam  Vitals and nursing note reviewed. Constitutional:       General: She is not in acute distress. Appearance: She is not diaphoretic. Interventions: Nasal cannula in place. Comments: On High Flow    HENT:      Head: Normocephalic and atraumatic. Nose:      Right Sinus: No maxillary sinus tenderness or frontal sinus tenderness. Left Sinus: No maxillary sinus tenderness or frontal sinus tenderness. Mouth/Throat:      Pharynx: No oropharyngeal exudate. Eyes:      General: No scleral icterus. Conjunctiva/sclera: Conjunctivae normal.      Pupils: Pupils are equal, round, and reactive to light. Neck:      Thyroid: No thyromegaly. Vascular: No JVD. Cardiovascular:      Rate and Rhythm: Normal rate and regular rhythm. Pulses:           Dorsalis pedis pulses are 2+ on the right side and 2+ on the left side. Heart sounds: Normal heart sounds. No murmur heard. Pulmonary:      Effort: Pulmonary effort is normal.      Breath sounds: Normal breath sounds. No wheezing or rales. Abdominal:      Palpations: Abdomen is soft. There is no mass. Tenderness: There is no abdominal tenderness. Musculoskeletal:      Cervical back: Full passive range of motion without pain and neck supple. Lymphadenopathy:      Head:      Right side of head: No submandibular adenopathy. Left side of head: No submandibular adenopathy. Cervical: No cervical adenopathy. Skin:     General: Skin is warm. Neurological:      Mental Status: She is alert and oriented to person, place, and time. Motor: No tremor. Psychiatric:         Behavior: Behavior is cooperative. Laboratory findings:    Recent Labs     11/19/21  0500 11/20/21  0523 11/21/21  0703   WBC 12.1* 11.5* 13.1*   HGB 15.0 14.3 14.4   HCT 45.1 42.6 43.4    258 241     Recent Labs     11/19/21  0500 11/20/21  0523 11/21/21  0703   * 133* 135   K 4.7 4.1 4.4    99 99   CO2 21 23 25   GLUCOSE 140* 137* 168*   BUN 33* 31* 29*   CREATININE 0.54 0.52 0.57   CALCIUM 8.6 8.3* 8.5*            Specific Gravity, UA   Date Value Ref Range Status   11/10/2021 1.020 1.005 - 1.030 Final     Protein, UA   Date Value Ref Range Status   11/10/2021 NEGATIVE NEGATIVE Final     RBC, UA   Date Value Ref Range Status   11/02/2021 2 TO 5 0 - 2 /HPF Final     Bacteria, UA   Date Value Ref Range Status   11/02/2021 NOT REPORTED None Final     Nitrite, Urine   Date Value Ref Range Status   11/10/2021 NEGATIVE NEGATIVE Final     WBC, UA   Date Value Ref Range Status   11/02/2021 2 TO 5 0 - 5 /HPF Final     Leukocyte Esterase, Urine   Date Value Ref Range Status   11/10/2021 NEGATIVE NEGATIVE Final       Imaging / Clinical Data :-   XR CHEST PORTABLE    Result Date: 11/14/2021  Moderate bilateral parenchymal opacities. Minimally increased parenchymal density on the right compared to prior studies. XR CHEST PORTABLE    Result Date: 11/12/2021  Moderate bilateral pneumonia, similar to the previous exam.     XR CHEST PORTABLE    Result Date: 11/11/2021  Findings remain compatible with moderate COVID-19 pneumonitis. No significant interval changes. XR CHEST PORTABLE    Result Date: 11/9/2021  Multifocal pulmonary opacities suggest significantly worsened pulmonary edema and or multifocal pneumonia. Recommend continued follow-up to resolution. CT CHEST PULMONARY EMBOLISM W CONTRAST    Result Date: 11/9/2021  No evidence of pulmonary embolism.  Extensive pulmonary abnormalities most compatible with COVID-19 pneumonia; other processes such as influenza pneumonia and organizing pneumonia, as can be seen with drug toxicity and connective tissue disease, can cause a similar imaging pattern. Mild adenopathy, probably reactive. Clinical Course : unchanged  Assessment and Plan  :        1. Acute respiratory failure with hypoxia secondary to COVID-19 pneumonia - on high flow. Continue dexamethasone high-dose, Actemra given on 11/10/2021. Continue bronchodilator therapy. Patient completed 8-day course of Levaquin. Lasix as needed. 2. Morbid obesity BMI 41  3. Essential hypertension -continue to hold Prinzide. Continue Toprol XL 25 mg daily. .  4. Prediabetes worsened with steroid-induced hyperglycemia - A1C 5.8. Humalog as needed. 5. Hyperkalemia - resolved with  Kayexalate -    Transfer to stepdown. Encourage incentive spirometry   Rohini Mclaughlin  was called -  no answer       Continue to monitor vitals , Intake / output ,  Cell count , HGB , Kidney function, oxygenation  as indicated . Plan and updates discussed with patient ,  answers  explained to satisfaction.    Plan discussed with Staff  RN     (Please note that portions of this note were completed with a voice recognition program. Efforts were made to edit the dictations but occasionally words are mis-transcribed.)      Marino Sadler MD  11/21/2021

## 2021-11-21 NOTE — PROGRESS NOTES
Patient placed on bipap, FiO2 55%. Patient is satting 96% on 55% FiO2 and is comfortable. No other requests at this time.

## 2021-11-21 NOTE — PROGRESS NOTES
Pulmonary Critical Care Progress Note       Patient seen for the follow up of acute hypoxic respiratory failure, asthma extubation, Pneumonia due to COVID-19 virus     Subjective:  She had an uneventful night. She remains on high flow, 50 L /50 % FiO2. She tolerated BiPAP overnight at 65% 18/12. she has improved shortness of breath. . She has mostly none productive cough. She denies chest pain she is tolerating orals. Examination:  Vitals: BP (!) 114/103   Pulse 76   Temp 97.8 °F (36.6 °C) (Oral)   Resp 19   Ht 5' 2\" (1.575 m)   Wt 229 lb 0.9 oz (103.9 kg)   SpO2 94%   BMI 41.90 kg/m²   General appearance:  alert and cooperative with exam, resting in bed  Neck: No JVD  Lungs: Mild decreased breath sound bibasilar crackles  Heart: regular rate and rhythm, S1, S2 normal, no gallop  Abdomen: Soft, non tender, + BS  Extremities: no cyanosis or clubbing.  No significant edema    LABs:  CBC:   Recent Labs     11/20/21  0523 11/21/21  0703   WBC 11.5* 13.1*   HGB 14.3 14.4   HCT 42.6 43.4    241     BMP:   Recent Labs     11/20/21  0523 11/21/21  0703   * 135   K 4.1 4.4   CO2 23 25   BUN 31* 29*   CREATININE 0.52 0.57   LABGLOM >60 >60   GLUCOSE 137* 168*     Radiology:  X-ray 11/21  No significant interval change, when compared to the previous study performed   1 day earlier           Chest x-ray 11/20                    Impression:  · Acute hypoxic respiratory failure  · COVID-19 pneumonia with possible bacterial coinfection  · Mild adenopathy, likely reactive  · History of asthma  · Suspected obstructive sleep apnea/Obesity  · Arthritis, hypertension, fibromyalgia    Recommendations:  · Oxygen high flow by nasal cannula wean to saturation above 90%  · Prone as tolerated  · Incentive spirometry every hour while awake  · Continue BiPAP support  · Continue albuterol and Ipratropium Q 4 hours and prn  · Brovana aerosol treatment  · Budesonide aerosol treatment  · O Airborne isolation  · make

## 2021-11-22 ENCOUNTER — APPOINTMENT (OUTPATIENT)
Dept: GENERAL RADIOLOGY | Age: 66
DRG: 177 | End: 2021-11-22
Payer: MEDICARE

## 2021-11-22 LAB
-: NORMAL
ABSOLUTE EOS #: 0.42 K/UL (ref 0–0.4)
ABSOLUTE IMMATURE GRANULOCYTE: 0.53 K/UL (ref 0–0.3)
ABSOLUTE LYMPH #: 2.12 K/UL (ref 1–4.8)
ABSOLUTE MONO #: 0.85 K/UL (ref 0.2–0.8)
ANION GAP SERPL CALCULATED.3IONS-SCNC: 9 MMOL/L (ref 9–17)
BASOPHILS # BLD: 1 %
BASOPHILS ABSOLUTE: 0.11 K/UL (ref 0–0.2)
BUN BLDV-MCNC: 29 MG/DL (ref 8–23)
BUN/CREAT BLD: 54 (ref 9–20)
CALCIUM SERPL-MCNC: 8.5 MG/DL (ref 8.6–10.4)
CHLORIDE BLD-SCNC: 100 MMOL/L (ref 98–107)
CO2: 25 MMOL/L (ref 20–31)
CREAT SERPL-MCNC: 0.54 MG/DL (ref 0.5–0.9)
DIFFERENTIAL TYPE: ABNORMAL
EOSINOPHILS RELATIVE PERCENT: 4 % (ref 1–4)
GFR AFRICAN AMERICAN: >60 ML/MIN
GFR NON-AFRICAN AMERICAN: >60 ML/MIN
GFR SERPL CREATININE-BSD FRML MDRD: ABNORMAL ML/MIN/{1.73_M2}
GFR SERPL CREATININE-BSD FRML MDRD: ABNORMAL ML/MIN/{1.73_M2}
GLUCOSE BLD-MCNC: 103 MG/DL (ref 70–99)
GLUCOSE BLD-MCNC: 104 MG/DL (ref 65–105)
GLUCOSE BLD-MCNC: 178 MG/DL (ref 65–105)
GLUCOSE BLD-MCNC: 199 MG/DL (ref 65–105)
GLUCOSE BLD-MCNC: 228 MG/DL (ref 65–105)
HCT VFR BLD CALC: 42.4 % (ref 36.3–47.1)
HEMOGLOBIN: 14 G/DL (ref 11.9–15.1)
IMMATURE GRANULOCYTES: 5 %
LYMPHOCYTES # BLD: 20 % (ref 24–44)
MCH RBC QN AUTO: 31.5 PG (ref 25.2–33.5)
MCHC RBC AUTO-ENTMCNC: 33 G/DL (ref 28.4–34.8)
MCV RBC AUTO: 95.3 FL (ref 82.6–102.9)
MONOCYTES # BLD: 8 % (ref 1–7)
MORPHOLOGY: ABNORMAL
NRBC AUTOMATED: 0 PER 100 WBC
PDW BLD-RTO: 12.7 % (ref 11.8–14.4)
PLATELET # BLD: 295 K/UL (ref 138–453)
PLATELET ESTIMATE: ABNORMAL
PMV BLD AUTO: 11.4 FL (ref 8.1–13.5)
POTASSIUM SERPL-SCNC: 4.2 MMOL/L (ref 3.7–5.3)
RBC # BLD: 4.45 M/UL (ref 3.95–5.11)
RBC # BLD: ABNORMAL 10*6/UL
REASON FOR REJECTION: NORMAL
SEG NEUTROPHILS: 62 % (ref 36–66)
SEGMENTED NEUTROPHILS ABSOLUTE COUNT: 6.57 K/UL (ref 1.8–7.7)
SODIUM BLD-SCNC: 134 MMOL/L (ref 135–144)
WBC # BLD: 10.6 K/UL (ref 3.5–11.3)
WBC # BLD: ABNORMAL 10*3/UL
ZZ NTE CLEAN UP: ORDERED TEST: NORMAL
ZZ NTE WITH NAME CLEAN UP: SPECIMEN SOURCE: NORMAL

## 2021-11-22 PROCEDURE — 99232 SBSQ HOSP IP/OBS MODERATE 35: CPT | Performed by: FAMILY MEDICINE

## 2021-11-22 PROCEDURE — 6370000000 HC RX 637 (ALT 250 FOR IP): Performed by: STUDENT IN AN ORGANIZED HEALTH CARE EDUCATION/TRAINING PROGRAM

## 2021-11-22 PROCEDURE — 97535 SELF CARE MNGMENT TRAINING: CPT

## 2021-11-22 PROCEDURE — 71045 X-RAY EXAM CHEST 1 VIEW: CPT

## 2021-11-22 PROCEDURE — 6370000000 HC RX 637 (ALT 250 FOR IP): Performed by: NURSE PRACTITIONER

## 2021-11-22 PROCEDURE — 85025 COMPLETE CBC W/AUTO DIFF WBC: CPT

## 2021-11-22 PROCEDURE — 6360000002 HC RX W HCPCS: Performed by: FAMILY MEDICINE

## 2021-11-22 PROCEDURE — 6360000002 HC RX W HCPCS: Performed by: NURSE PRACTITIONER

## 2021-11-22 PROCEDURE — 6370000000 HC RX 637 (ALT 250 FOR IP): Performed by: FAMILY MEDICINE

## 2021-11-22 PROCEDURE — 94660 CPAP INITIATION&MGMT: CPT

## 2021-11-22 PROCEDURE — 94761 N-INVAS EAR/PLS OXIMETRY MLT: CPT

## 2021-11-22 PROCEDURE — 97110 THERAPEUTIC EXERCISES: CPT

## 2021-11-22 PROCEDURE — 6370000000 HC RX 637 (ALT 250 FOR IP): Performed by: INTERNAL MEDICINE

## 2021-11-22 PROCEDURE — 6360000002 HC RX W HCPCS: Performed by: INTERNAL MEDICINE

## 2021-11-22 PROCEDURE — 2580000003 HC RX 258: Performed by: NURSE PRACTITIONER

## 2021-11-22 PROCEDURE — 94640 AIRWAY INHALATION TREATMENT: CPT

## 2021-11-22 PROCEDURE — 36415 COLL VENOUS BLD VENIPUNCTURE: CPT

## 2021-11-22 PROCEDURE — 2700000000 HC OXYGEN THERAPY PER DAY

## 2021-11-22 PROCEDURE — 97530 THERAPEUTIC ACTIVITIES: CPT

## 2021-11-22 PROCEDURE — 2060000000 HC ICU INTERMEDIATE R&B

## 2021-11-22 PROCEDURE — 80048 BASIC METABOLIC PNL TOTAL CA: CPT

## 2021-11-22 PROCEDURE — 82947 ASSAY GLUCOSE BLOOD QUANT: CPT

## 2021-11-22 RX ADMIN — DOCUSATE SODIUM 100 MG: 100 CAPSULE, LIQUID FILLED ORAL at 20:13

## 2021-11-22 RX ADMIN — INSULIN LISPRO 2 UNITS: 100 INJECTION, SOLUTION INTRAVENOUS; SUBCUTANEOUS at 13:00

## 2021-11-22 RX ADMIN — INSULIN LISPRO 4 UNITS: 100 INJECTION, SOLUTION INTRAVENOUS; SUBCUTANEOUS at 18:03

## 2021-11-22 RX ADMIN — ENOXAPARIN SODIUM 30 MG: 100 INJECTION SUBCUTANEOUS at 20:13

## 2021-11-22 RX ADMIN — ENOXAPARIN SODIUM 30 MG: 100 INJECTION SUBCUTANEOUS at 09:12

## 2021-11-22 RX ADMIN — DOCUSATE SODIUM 100 MG: 100 CAPSULE, LIQUID FILLED ORAL at 08:56

## 2021-11-22 RX ADMIN — DEXAMETHASONE SODIUM PHOSPHATE 6 MG: 10 INJECTION, SOLUTION INTRAMUSCULAR; INTRAVENOUS at 08:57

## 2021-11-22 RX ADMIN — IPRATROPIUM BROMIDE AND ALBUTEROL SULFATE 1 AMPULE: .5; 2.5 SOLUTION RESPIRATORY (INHALATION) at 11:06

## 2021-11-22 RX ADMIN — BUDESONIDE 500 MCG: 0.5 SUSPENSION RESPIRATORY (INHALATION) at 20:52

## 2021-11-22 RX ADMIN — ALPRAZOLAM 0.25 MG: 0.25 TABLET ORAL at 16:20

## 2021-11-22 RX ADMIN — MIRTAZAPINE 7.5 MG: 15 TABLET, FILM COATED ORAL at 20:13

## 2021-11-22 RX ADMIN — INSULIN LISPRO 2 UNITS: 100 INJECTION, SOLUTION INTRAVENOUS; SUBCUTANEOUS at 20:25

## 2021-11-22 RX ADMIN — PREGABALIN 50 MG: 50 CAPSULE ORAL at 20:13

## 2021-11-22 RX ADMIN — IPRATROPIUM BROMIDE AND ALBUTEROL SULFATE 1 AMPULE: .5; 2.5 SOLUTION RESPIRATORY (INHALATION) at 07:26

## 2021-11-22 RX ADMIN — PREGABALIN 50 MG: 50 CAPSULE ORAL at 08:56

## 2021-11-22 RX ADMIN — SODIUM CHLORIDE, PRESERVATIVE FREE 10 ML: 5 INJECTION INTRAVENOUS at 20:14

## 2021-11-22 RX ADMIN — ARFORMOTEROL TARTRATE 15 MCG: 15 SOLUTION RESPIRATORY (INHALATION) at 07:26

## 2021-11-22 RX ADMIN — SODIUM CHLORIDE, PRESERVATIVE FREE 10 ML: 5 INJECTION INTRAVENOUS at 09:01

## 2021-11-22 RX ADMIN — ARFORMOTEROL TARTRATE 15 MCG: 15 SOLUTION RESPIRATORY (INHALATION) at 20:52

## 2021-11-22 RX ADMIN — METOPROLOL SUCCINATE 25 MG: 25 TABLET, EXTENDED RELEASE ORAL at 08:56

## 2021-11-22 RX ADMIN — BUDESONIDE 500 MCG: 0.5 SUSPENSION RESPIRATORY (INHALATION) at 07:26

## 2021-11-22 RX ADMIN — OXYCODONE AND ACETAMINOPHEN 1 TABLET: 5; 325 TABLET ORAL at 10:19

## 2021-11-22 RX ADMIN — ANTI-FUNGAL POWDER MICONAZOLE NITRATE TALC FREE: 1.42 POWDER TOPICAL at 08:55

## 2021-11-22 RX ADMIN — Medication 2000 UNITS: at 08:57

## 2021-11-22 RX ADMIN — IPRATROPIUM BROMIDE AND ALBUTEROL SULFATE 1 AMPULE: .5; 2.5 SOLUTION RESPIRATORY (INHALATION) at 20:52

## 2021-11-22 RX ADMIN — IPRATROPIUM BROMIDE AND ALBUTEROL SULFATE 1 AMPULE: .5; 2.5 SOLUTION RESPIRATORY (INHALATION) at 15:22

## 2021-11-22 ASSESSMENT — ENCOUNTER SYMPTOMS
CHEST TIGHTNESS: 0
RHINORRHEA: 0
COUGH: 0
NAUSEA: 0
CONSTIPATION: 0
VOMITING: 0
SHORTNESS OF BREATH: 1
ABDOMINAL PAIN: 0
WHEEZING: 0
DIARRHEA: 0
BLOOD IN STOOL: 0

## 2021-11-22 ASSESSMENT — PAIN SCALES - GENERAL
PAINLEVEL_OUTOF10: 0
PAINLEVEL_OUTOF10: 6
PAINLEVEL_OUTOF10: 0

## 2021-11-22 NOTE — CARE COORDINATION
Appeal denied. Patient is currently on 45%/40L HF. Will speak with Master on next step and SW in regards to SNF? ?

## 2021-11-22 NOTE — PROGRESS NOTES
is 74 y.o. female  Who was admitted to the hospital on 11/3/2021 for treatment of Pneumonia due to COVID-19 virus. Patient presented to emergency room with headache, abdominal pain, nausea, fever T-max 102. 2. Henrik Tellez Patient tested positive for COVID-19. Chest x-ray showed bibasilar opacities consistent with COVID-19 pneumonia. She has underlying history of obesity, fibromyalgia, chronic pain, hypertension. Patient was not vaccinated against COVID-19. CT chest was negative for PE. Patient had acute respiratory failure was requiring O2 support. Breathing status worsened and required BiPAP with 100% FiO2 and was moved to ICU on 11/10/2021. PMH:  Past Medical History:   Diagnosis Date    Anxiety     Arthritis     Bell's palsy     Chronic neck pain     posterior neck since fall April 2014    Chronic pain     low back and bilateral lower extremities    Fibromyalgia     HA (headache)     Headache     off/on since fall April 2014    HTN (hypertension)     Hypertension       Allergies:    Allergies   Allergen Reactions    Other Hives    Ceclor [Cefaclor]     Food      All melons      Sulfa Antibiotics      Projectile vomiting       Medications :  dexamethasone, 6 mg, IntraVENous, Q24H  budesonide, 0.5 mg, Nebulization, BID  metoprolol succinate, 25 mg, Oral, Daily  Arformoterol Tartrate, 15 mcg, Nebulization, BID  pregabalin, 50 mg, Oral, BID  polyethylene glycol, 17 g, Oral, Daily  docusate sodium, 100 mg, Oral, BID  insulin lispro, 0-12 Units, SubCUTAneous, TID WC  insulin lispro, 0-6 Units, SubCUTAneous, Nightly  ipratropium-albuterol, 1 ampule, Inhalation, Q4H WA  miconazole, , Topical, BID  mirtazapine, 7.5 mg, Oral, Nightly  sodium chloride flush, 5-40 mL, IntraVENous, 2 times per day  enoxaparin, 30 mg, SubCUTAneous, BID  Vitamin D, 2,000 Units, Oral, Daily  influenza virus vaccine, 0.5 mL, IntraMUSCular, Prior to discharge        Review of Systems   Review of Systems   Constitutional: Positive for activity change, appetite change and fatigue. Negative for fever and unexpected weight change. HENT: Negative for congestion, rhinorrhea and sneezing. Eyes: Negative for visual disturbance. Respiratory: Positive for shortness of breath. Negative for cough, chest tightness and wheezing. Cardiovascular: Negative for chest pain and palpitations. Gastrointestinal: Negative for abdominal pain, blood in stool, constipation, diarrhea, nausea and vomiting. Genitourinary: Negative for dysuria, enuresis, frequency and hematuria. Musculoskeletal: Negative for arthralgias and myalgias. Skin: Negative for rash. Neurological: Negative for dizziness, weakness, light-headedness and headaches. Hematological: Does not bruise/bleed easily. Psychiatric/Behavioral: Negative for dysphoric mood and sleep disturbance.      Objective :      Current Vitals : Temp: 97.1 °F (36.2 °C),  Pulse: 68, Resp: 24, BP: 103/73, SpO2: 95 %  Last 24 Hrs Vitals   Patient Vitals for the past 24 hrs:   BP Temp Temp src Pulse Resp SpO2 Weight   11/22/21 0727 -- -- -- -- 24 95 % --   11/22/21 0600 -- -- -- -- -- -- 229 lb 0.9 oz (103.9 kg)   11/22/21 0500 103/73 -- -- 68 17 97 % --   11/22/21 0400 108/69 97.1 °F (36.2 °C) Temporal 65 14 98 % --   11/22/21 0331 -- 97.1 °F (36.2 °C) Temporal -- -- -- --   11/22/21 0323 -- -- -- -- 21 98 % --   11/22/21 0300 104/64 -- -- 74 18 99 % --   11/22/21 0200 102/68 -- -- 68 15 98 % --   11/22/21 0100 117/77 -- -- 74 23 95 % --   11/22/21 0047 -- 97.3 °F (36.3 °C) Temporal -- -- -- --   11/22/21 0000 105/70 97.3 °F (36.3 °C) Temporal 71 15 96 % --   11/21/21 2332 -- -- -- -- 17 95 % --   11/21/21 2300 116/72 -- -- 74 22 94 % --   11/21/21 2230 -- -- -- 79 22 94 % --   11/21/21 2200 117/71 -- -- 84 18 94 % --   11/21/21 2100 113/70 -- -- 90 19 93 % --   11/21/21 2003 -- 97.9 °F (36.6 °C) Oral -- -- -- --   11/21/21 2000 128/72 97.9 °F (36.6 °C) Oral 90 27 94 % --   11/21/21 1900 125/69 -- -- 86 20 94 % --   11/21/21 1824 -- -- -- -- 27 95 % --   11/21/21 1800 123/79 -- -- 88 22 94 % --   11/21/21 1700 121/81 98.3 °F (36.8 °C) Oral 87 23 92 % --   11/21/21 1600 110/72 -- -- 77 16 98 % --   11/21/21 1500 -- -- -- 97 23 93 % --   11/21/21 1411 -- -- -- -- 23 94 % --   11/21/21 1400 -- -- -- 85 24 92 % --   11/21/21 1300 124/69 98.4 °F (36.9 °C) Oral 76 17 95 % --   11/21/21 1200 (!) 117/90 -- -- 90 21 90 % --   11/21/21 1100 109/65 -- -- 78 16 92 % --   11/21/21 1000 128/75 -- -- 88 (!) 31 94 % --   11/21/21 0955 -- -- -- -- 19 94 % --   11/21/21 0900 -- -- -- 86 16 90 % --   11/21/21 0840 (!) 114/103 -- -- 76 -- -- --   11/21/21 0835 (!) 114/103 97.8 °F (36.6 °C) Oral 74 20 91 % --   11/21/21 0800 -- -- -- 74 24 95 % --     Intake / output   11/21 0701 - 11/22 0700  In: 1240 [P.O.:1200; I.V.:40]  Out: 800 [Urine:800]  Physical Exam:  Physical Exam  Vitals and nursing note reviewed. Constitutional:       General: She is not in acute distress. Appearance: She is not diaphoretic. Interventions: Nasal cannula in place. Comments: On High Flow    HENT:      Head: Normocephalic and atraumatic. Nose:      Right Sinus: No maxillary sinus tenderness or frontal sinus tenderness. Left Sinus: No maxillary sinus tenderness or frontal sinus tenderness. Mouth/Throat:      Pharynx: No oropharyngeal exudate. Eyes:      General: No scleral icterus. Conjunctiva/sclera: Conjunctivae normal.      Pupils: Pupils are equal, round, and reactive to light. Neck:      Thyroid: No thyromegaly. Vascular: No JVD. Cardiovascular:      Rate and Rhythm: Normal rate and regular rhythm. Pulses:           Dorsalis pedis pulses are 2+ on the right side and 2+ on the left side. Heart sounds: Normal heart sounds. No murmur heard. Pulmonary:      Effort: Pulmonary effort is normal.      Breath sounds: Normal breath sounds. No wheezing or rales. Abdominal:      Palpations: Abdomen is soft. There is no mass. Tenderness: There is no abdominal tenderness. Musculoskeletal:      Cervical back: Full passive range of motion without pain and neck supple. Lymphadenopathy:      Head:      Right side of head: No submandibular adenopathy. Left side of head: No submandibular adenopathy. Cervical: No cervical adenopathy. Skin:     General: Skin is warm. Neurological:      Mental Status: She is alert and oriented to person, place, and time. Motor: No tremor. Psychiatric:         Behavior: Behavior is cooperative. Laboratory findings:    Recent Labs     11/20/21  0523 11/21/21  0703 11/22/21  0520   WBC 11.5* 13.1* 10.6   HGB 14.3 14.4 14.0   HCT 42.6 43.4 42.4    241 295     Recent Labs     11/20/21  0523 11/21/21  0703 11/22/21  0604   * 135 134*   K 4.1 4.4 4.2   CL 99 99 100   CO2 23 25 25   GLUCOSE 137* 168* 103*   BUN 31* 29* 29*   CREATININE 0.52 0.57 0.54   CALCIUM 8.3* 8.5* 8.5*            Specific Gravity, UA   Date Value Ref Range Status   11/10/2021 1.020 1.005 - 1.030 Final     Protein, UA   Date Value Ref Range Status   11/10/2021 NEGATIVE NEGATIVE Final     RBC, UA   Date Value Ref Range Status   11/02/2021 2 TO 5 0 - 2 /HPF Final     Bacteria, UA   Date Value Ref Range Status   11/02/2021 NOT REPORTED None Final     Nitrite, Urine   Date Value Ref Range Status   11/10/2021 NEGATIVE NEGATIVE Final     WBC, UA   Date Value Ref Range Status   11/02/2021 2 TO 5 0 - 5 /HPF Final     Leukocyte Esterase, Urine   Date Value Ref Range Status   11/10/2021 NEGATIVE NEGATIVE Final       Imaging / Clinical Data :-   XR CHEST PORTABLE    Result Date: 11/14/2021  Moderate bilateral parenchymal opacities. Minimally increased parenchymal density on the right compared to prior studies.      XR CHEST PORTABLE    Result Date: 11/12/2021  Moderate bilateral pneumonia, similar to the previous exam.     XR CHEST PORTABLE    Result Date: 11/11/2021  Findings remain compatible with moderate COVID-19 pneumonitis. No significant interval changes. XR CHEST PORTABLE    Result Date: 11/9/2021  Multifocal pulmonary opacities suggest significantly worsened pulmonary edema and or multifocal pneumonia. Recommend continued follow-up to resolution. CT CHEST PULMONARY EMBOLISM W CONTRAST    Result Date: 11/9/2021  No evidence of pulmonary embolism. Extensive pulmonary abnormalities most compatible with COVID-19 pneumonia; other processes such as influenza pneumonia and organizing pneumonia, as can be seen with drug toxicity and connective tissue disease, can cause a similar imaging pattern. Mild adenopathy, probably reactive. Clinical Course : unchanged  Assessment and Plan  :        1. Acute respiratory failure with hypoxia secondary to COVID-19 pneumonia - on high flow. Wean as tolerated to nasal cannula. Continue dexamethasone high-dose, Actemra given on 11/10/2021. Continue bronchodilator therapy. Patient completed 8-day course of Levaquin. Lasix as needed. 2. Morbid obesity BMI 41  3. Essential hypertension -continue to hold Prinzide. Continue Toprol XL 25 mg daily. .  4. Prediabetes worsened with steroid-induced hyperglycemia - A1C 5.8. Humalog as needed. 5. Hyperkalemia - resolved with  Kayexalate -    Continue current care. Encourage physical therapy, incentive spirometry. Continue to monitor vitals , Intake / output ,  Cell count , HGB , Kidney function, oxygenation  as indicated . Plan and updates discussed with patient ,  answers  explained to satisfaction.    Plan discussed with Staff Tess DAVIS     (Please note that portions of this note were completed with a voice recognition program. Efforts were made to edit the dictations but occasionally words are mis-transcribed.)      Milvia Still MD  11/22/2021

## 2021-11-22 NOTE — PROGRESS NOTES
Pulmonary Critical Care Progress Note       Patient seen for the follow up of acute hypoxic respiratory failure, asthma extubation, Pneumonia due to COVID-19 virus     Subjective:  She has improved shortness of breath. She was weaned off high flow 6 L oxygen nasal cannula. .  She has been off BiPAP   she is sitting in a chair. .. She has mostly none productive cough. She denies chest pain she is tolerating orals. Examination:  Vitals: /68   Pulse 94   Temp 98.6 °F (37 °C) (Axillary)   Resp 20   Ht 5' 2\" (1.575 m)   Wt 229 lb 0.9 oz (103.9 kg)   SpO2 91%   BMI 41.90 kg/m²   General appearance:  alert and cooperative with exam  Neck: No JVD  Lungs: Mild decreased breath sound bibasilar crackles  Heart: regular rate and rhythm, S1, S2 normal, no gallop  Abdomen: Soft, non tender, + BS  Extremities: no cyanosis or clubbing.  No significant edema    LABs:  CBC:   Recent Labs     11/21/21  0703 11/22/21  0520   WBC 13.1* 10.6   HGB 14.4 14.0   HCT 43.4 42.4    295     BMP:   Recent Labs     11/21/21  0703 11/22/21  0604    134*   K 4.4 4.2   CO2 25 25   BUN 29* 29*   CREATININE 0.57 0.54   LABGLOM >60 >60   GLUCOSE 168* 103*     Radiology:  Chest x-ray 11/22      Moderate bilateral hazy parenchymal opacities, similar to the prior day's   examination.  Slightly worsened compared to 11/19/2021           Impression:  · Acute hypoxic respiratory failure  · COVID-19 pneumonia with possible bacterial coinfection  · Mild adenopathy, likely reactive  · History of asthma  · Suspected obstructive sleep apnea/Obesity  · Arthritis, hypertension, fibromyalgia    Recommendations:  · Oxygen high flow by nasal cannula wean to saturation above 90%  · Prone as tolerated  · Incentive spirometry every hour while awake  · Continue BiPAP support  · Continue albuterol and Ipratropium Q 4 hours and prn  · Brovana aerosol treatment  · Budesonide aerosol treatment  ·  Airborne isolation  · make Decadron 6 mg every 24 hours  · S/p Actemra  · Finished Levaquin 750 mg daily  · LTAC evaluation  · Discussed with RN   · Chente for out of ICU  · DVT prophylaxis Lovenox 30 every 12 hours    Héctor Mcneil MD, [facility]  Pulmonary Critical Care and Sleep Medicine,  [facility]: [phone]

## 2021-11-22 NOTE — PLAN OF CARE
Problem: Falls - Risk of:  Goal: Will remain free from falls  Description: Will remain free from falls  11/22/2021 0534 by Vanda Juarez RN  Outcome: Ongoing  11/21/2021 1938 by Yoseph Mckeon RN  Outcome: Ongoing  Goal: Absence of physical injury  Description: Absence of physical injury  11/22/2021 0534 by Vanda Juarez RN  Outcome: Ongoing  11/21/2021 1938 by Yoseph Mckeon RN  Outcome: Ongoing     Problem: IP BALANCE  Goal: LTG - Patient will maintain balance to allow for safe/functional mobility  11/22/2021 0534 by Vanda Juarez RN  Outcome: Ongoing  11/21/2021 1938 by Yoseph Mckeon RN  Outcome: Ongoing     Problem: Pain:  Goal: Pain level will decrease  Description: Pain level will decrease  11/22/2021 0534 by Vanda Juarez RN  Outcome: Ongoing  11/21/2021 1938 by Yoseph Mckeon RN  Outcome: Ongoing  Goal: Control of acute pain  Description: Control of acute pain  11/22/2021 0534 by Vanda Juarez RN  Outcome: Ongoing  11/21/2021 1938 by Yoseph Mckeon RN  Outcome: Ongoing  Goal: Control of chronic pain  Description: Control of chronic pain  11/22/2021 0534 by Vanda Juarez RN  Outcome: Ongoing  11/21/2021 1938 by Yoseph Mckeon RN  Outcome: Ongoing     Problem: Airway Clearance - Ineffective  Goal: Achieve or maintain patent airway  11/22/2021 0534 by Vanda Juarez RN  Outcome: Ongoing  11/21/2021 1938 by Yoseph Mckeon RN  Outcome: Ongoing     Problem: Gas Exchange - Impaired  Goal: Absence of hypoxia  11/22/2021 0534 by Vanda Juarez RN  Outcome: Ongoing  11/21/2021 1938 by Yoseph Mckeon RN  Outcome: Ongoing  Goal: Promote optimal lung function  11/22/2021 0534 by Vanda Juarez RN  Outcome: Ongoing  11/21/2021 1938 by Yoseph Mckeon RN  Outcome: Ongoing     Problem: Breathing Pattern - Ineffective  Goal: Ability to achieve and maintain a regular respiratory rate  11/22/2021 0534 by Vanda Juarez RN  Outcome: Ongoing  11/21/2021 1938 by Yoseph Mckeon RN  Outcome: Ongoing Problem:  Body Temperature -  Risk of, Imbalanced  Goal: Ability to maintain a body temperature within defined limits  11/22/2021 0534 by Ramos Cavanaugh RN  Outcome: Ongoing  11/21/2021 1938 by Rachel Guzman RN  Outcome: Ongoing  Goal: Will regain or maintain usual level of consciousness  11/22/2021 0534 by Ramos Cavanaugh RN  Outcome: Ongoing  11/21/2021 1938 by Rachel Guzman RN  Outcome: Ongoing  Goal: Complications related to the disease process, condition or treatment will be avoided or minimized  11/22/2021 0534 by Ramos Cavanaugh RN  Outcome: Ongoing  11/21/2021 1938 by Rachel Gzuman RN  Outcome: Ongoing     Problem: Isolation Precautions - Risk of Spread of Infection  Goal: Prevent transmission of infection  11/22/2021 0534 by Ramos Cavanaugh RN  Outcome: Ongoing  11/21/2021 1938 by Rachel Guzman RN  Outcome: Ongoing     Problem: Nutrition Deficits  Goal: Optimize nutritional status  11/22/2021 0534 by Ramos Cavanaugh RN  Outcome: Ongoing  11/21/2021 1938 by Rachel Guzman RN  Outcome: Ongoing     Problem: Loneliness or Risk for Loneliness  Goal: Demonstrate positive use of time alone when socialization is not possible  11/22/2021 0534 by Ramos Cavanaugh RN  Outcome: Ongoing  11/21/2021 1938 by Rachel Guzman RN  Outcome: Ongoing     Problem: Fatigue  Goal: Verbalize increase energy and improved vitality  11/22/2021 0534 by Ramos Cavanaugh RN  Outcome: Ongoing  11/21/2021 1938 by Rachel Guzman RN  Outcome: Ongoing     Problem: Patient Education: Go to Patient Education Activity  Goal: Patient/Family Education  11/22/2021 0534 by Ramos Cavanaugh RN  Outcome: Ongoing  11/21/2021 1938 by Rachel Guzman RN  Outcome: Ongoing     Problem: Skin Integrity:  Goal: Will show no infection signs and symptoms  Description: Will show no infection signs and symptoms  11/22/2021 0534 by Ramos Cavanaugh RN  Outcome: Ongoing  11/21/2021 1938 by Rachel Guzman RN  Outcome: Ongoing  Goal: Absence of new skin breakdown  Description: Absence of new skin breakdown  11/22/2021 0534 by Israel Thomas RN  Outcome: Ongoing  11/21/2021 1938 by Rohan Figueroa RN  Outcome: Ongoing     Problem: Nutrition  Goal: Optimal nutrition therapy  11/22/2021 0534 by Israel Thomas RN  Outcome: Ongoing  11/21/2021 1938 by Rohan Figueroa RN  Outcome: Ongoing

## 2021-11-22 NOTE — PLAN OF CARE
Problem: Falls - Risk of:  Goal: Will remain free from falls  Description: Will remain free from falls  Outcome: Ongoing   Patient has remained free from falls or injury this shift. Bed low and locked. 2 person assist with walker for ambulation. Problem: Falls - Risk of:  Goal: Absence of physical injury  Description: Absence of physical injury  Outcome: Ongoing  Patient has remained safe from harm or injury during shift and this admission. Bed locked and in low position. Call light with in reach. Problem: Pain:  Description: Pain management should include both nonpharmacologic and pharmacologic interventions. Goal: Pain level will decrease  Description: Pain level will decrease  Outcome: Ongoing   Patient did complain of back pain earlier in day, Percocet given with relief of pain. Problem: Airway Clearance - Ineffective  Goal: Achieve or maintain patent airway  Outcome: Ongoing   Patient has maintained oxygen saturation above 90% on high flow nasal canula, patient started the day on 55% Fio2 and 55 liters and by end of shift patient was on 45% Fio2 and 45 liters. Breathing has been unlabored and patient denied shortness of breath except with exertion/ambulation    Problem: Body Temperature -  Risk of, Imbalanced  Goal: Ability to maintain a body temperature within defined limits  Outcome: Ongoing  Patient has remained afebrile this shift. Problem: Isolation Precautions - Risk of Spread of Infection  Goal: Prevent transmission of infection  Outcome: Ongoing  Patient placed is Droplet isolation due to Covid 19+. Staff wearing appropriate PPE when entering the room. Problem: Nutrition Deficits  Goal: Optimize nutritional status  Outcome: Ongoing  Patient has been eating % of her meals all shift. No Nausea or vomiting noted this shift.      Problem: Skin Integrity:  Goal: Absence of new skin breakdown  Description: Absence of new skin breakdown  Outcome: Ongoing  Patient skin is being assess for skin breakdown or for being at risk for skin breakdown every shift. Patient is being turned every 2 hours if unable to turn self. Will continue to monitor skin daily. Andreina Wellington

## 2021-11-22 NOTE — PROGRESS NOTES
Physical Therapy  Facility/Department: [facility] ICU  Daily Treatment Note  NAME: Ruby Dillard  : 1955  MRN: [id number]    Date of Service: 2021    Discharge Recommendations:  Patient would benefit from continued therapy after discharge        Assessment   Body structures, Functions, Activity limitations: Decreased functional mobility ; Decreased ADL status; Decreased strength; Decreased endurance; Decreased balance  Assessment: Pt with significant imporvemnt in global strength and improved quad control with stance phase. Pt with improved aerobic capacity and continues to increase tolerance to OOB. Will continue to progress. Specific instructions for Next Treatment: reza moore; pressure relief exercises  Prognosis: Good  PT Education: PT Role; Plan of Care; General Safety; Energy Conservation  Patient Education: reviewed pressure relief exercises and performed. REQUIRES PT FOLLOW UP: Yes     Patient Diagnosis(es): The encounter diagnosis was COVID-19.     has a past medical history of Anxiety, Arthritis, Bell's palsy, Chronic neck pain, Chronic pain, Fibromyalgia, HA (headache), Headache, HTN (hypertension), and Hypertension. has a past surgical history that includes  section; shoulder surgery; Urethra dilation; Arm Surgery; [place] tooth extraction; joint replacement (Left); Cholecystectomy, laparoscopic (N/A, 2020); Cholecystectomy (2020); and IR INSERT PICC VAD W SQ PORT >5 YEARS (2021). Restrictions  Restrictions/Precautions  Restrictions/Precautions: General Precautions, Fall Risk  Required Braces or Orthoses?: No  Position Activity Restriction  Other position/activity restrictions: Up as tolerated, telemetry, Droplet+ for covid, RUE IV, nasal cannula at 6 lpm  Subjective   General  Chart Reviewed: Yes  Additional Pertinent Hx: Creighton palsy, fibromyalgia, BMI 43  Response To Previous Treatment: Patient with no complaints from previous session.   Family / Caregiver Present: No  Subjective  Subjective: Pt reports feeling great today. .  Pt reports she is feeling stronger and her goal is to get back to baseline with mobility. She states she feels free being on n.c vs being on high flow. General Comment  Comments: RN ok for treatment  Pain Screening  Patient Currently in Pain: Denies  Vital Signs  Patient Currently in Pain: Denies       Orientation  Orientation  Overall Orientation Status: Within Normal Limits  Cognition      Objective   Bed mobility  Rolling to Right: Supervision  Sit to Supine: Contact guard assistance  Scooting: Stand by assistance  Transfers  Sit to Stand: Moderate Assistance  Stand to sit: Moderate Assistance  Bed to Chair: Moderate assistance  Comment: pt up to chair for most of the day - reports fatigue. Attempted longer distance for gait/ marching - pt only able to march 4 reps and then step 4 steps prior to fatigue. Ambulation  Ambulation?: Yes  Ambulation 1  Surface: level tile  Device: Rolling Walker  Other Apparatus: O2  Assistance: Moderate assistance  Quality of Gait: shuffle steps; good quad control this date;  Gait Deviations: Slow Sharona; Decreased step length; Decreased step height  Distance: 8 steps  Comments: to bed. Balance  Posture: Fair  Sitting - Static: Good  Sitting - Dynamic: Good  Standing - Static: Fair; +  Standing - Dynamic: Fair  Exercises  Comments: circulation exercises x 14 reps; bridging x 2 w/ scooting towards head of bed. Heel slides x 10 reps; UE bike x 1 minute. AM-PAC Score 15/24     Goals  Short term goals  Time Frame for Short term goals: 12 visits  Short term goal 1: Patient will be indep with bed mobility and transfers. Short term goal 2: Patient will amb 100 feet indep with appropriate AD. Short term goal 3: Patient will negotiate 4 stairs with rails and supervision. Short term goal 4:  Inc standing balance to good with device to facilitate pt independence for performance of ADL's & functional mobility, & reduce fall risk  Short term goal 5: Pt able to tolerate 30 min of activity to include ex, breathing techniques & endurance training with pt demonstrating ability to perform energy conservation strategies during functional activity to self-regulate pacing & breathing techniques to avoid SOB & maintain saO2 in safe range  Short term goal 6: Ed pt on home ex's, optimal breathing techniques, safety & energy principles, endurance training, Covid 19 pt education & issue written pt education  Patient Goals   Patient goals : Improve breathing and walking    Plan    Plan  Times per week: 1-2x/d, 5-6 d/wk  Specific instructions for Next Treatment: reza moore; pressure relief exercises  Current Treatment Recommendations: Strengthening, Balance Training, Functional Mobility Training, Transfer Training, Endurance Training, Gait Training, Stair training, Patient/Caregiver Education & Training, Safety Education & Training, Home Exercise Program, Positioning  Safety Devices  Type of devices: Bed alarm in place, Call light within reach, Nurse notified  Restraints  Initially in place: No     Therapy Time   Individual Concurrent Group Co-treatment   Time In 1351         Time Out 1419         Minutes 28            CHAYO HUA, PT

## 2021-11-22 NOTE — PROGRESS NOTES
laparoscopic (N/A, 4/6/2020); Cholecystectomy (04/2020); and IR INSERT PICC VAD W SQ PORT >5 YEARS (11/16/2021). Restrictions  Restrictions/Precautions  Restrictions/Precautions: General Precautions, Fall Risk  Required Braces or Orthoses?: No  Position Activity Restriction  Other position/activity restrictions: Up as tolerated, telemetry, Droplet+ for covid, RUE IV, high flow O2  Subjective   General  Chart Reviewed: Yes  Patient assessed for rehabilitation services?: Yes  Additional Pertinent Hx: Pt. agreeable for treatment  Response to previous treatment: Patient with no complaints from previous session  Family / Caregiver Present: No  Subjective  Subjective: \"I feel better today\"  General Comment  Comments: RN ok'ed treatment      Orientation  Orientation  Overall Orientation Status: Within Functional Limits  Objective    ADL  Grooming: Setup; Supervision  UE Bathing: Setup; Minimal assistance  LE Bathing: Moderate assistance; Setup  UE Dressing: Minimal assistance; Setup  LE Dressing: Maximum assistance  Additional Comments: Pt. completed UE/LE bathing at bedside. Balance  Sitting Balance: Supervision  Standing Balance: Moderate assistance (x1)  Standing Balance  Time: 1 min  Activity: ADL transfer from EOB to bedside chair  Comment: O2 monitored and decreased with mobility and increased with rest break to healthy levels throughout treatment. Functional Mobility  Functional - Mobility Device: Rolling Walker  Activity:  (to bedside chair from EOB)  Bed mobility  Bridging: Stand by assistance  Rolling to Left: Supervision  Rolling to Right: Supervision  Supine to Sit: Stand by assistance  Sit to Supine:  (not tested. Pt. moved form EOB to bedside chair.)  Scooting: Stand by assistance  Comment: Pt. moved from supine to sit on EOB with O2 monitored and stats decreased to 82% saturation but quickly recovered to 93% after short rest break sitting. Transfers  Stand Step Transfers:  Moderate assistance (x1)  Sit to stand: Moderate assistance (x1)  Stand to sit: Moderate assistance (x1)  Transfer Comments: Pt. completed transfer from EOB to bedside chair with mod assist x1. O2 stats decreased to 82% with mobility and quickly increased with rest while sitting in chair to 93% saturation. Cognition  Overall Cognitive Status: [facility]  Cognition Comment: Pt. in good spirits and motivated for treatment     Perception  Overall Perceptual Status: [facility]         Plan   Plan  Times per week: 4-5x/wk 1x/day as rufina  Times per day: Daily  Current Treatment Recommendations: Strengthening, Endurance Training, Patient/Caregiver Education & Training, Equipment Evaluation, Education, & procurement, Self-Care / ADL, Home Management Training, Functional Mobility Training, Safety Education & Training  Plan Comment: Cont. OT with stated POC    AM-PAC Score        AM-PAC Inpatient Daily Activity Raw Score: 13 (11/22/21 1047)  AM-PAC Inpatient ADL T-Scale Score : 32.03 (11/22/21 1047)  ADL Inpatient CMS 0-100% Score: 63.03 (11/22/21 1047)  ADL Inpatient CMS G-Code Modifier : CL (11/22/21 1047)    Goals  Short term goals  Time Frame for Short term goals: By discharge, pt to demo  Short term goal 1: ADL transfers and functional mobility to SBA with use of AD as needed. Short term goal 2: UB ADLS to Set up and LB ADLs SBA with use of AD/AE as needed. Short term goal 3: toileting to SBA with use of AD/grab bars as needed. Short term goal 4: increased B UE strength by 1/2 grade to assist with functional tasks/I with B UE HEP with use of handouts as needed. Short term goal 5: bed mobility to Mod I with use of bedrails as needed. Long term goals  Long term goal 1: Pt to be I with fall prevention educaimargie, Covid specific education, EC/WS tech, recommendations for AE with use of handouts as needed. Patient Goals   Patient goals : To feel better!        Therapy Time   Individual Concurrent Group Co-treatment   Time In 0916         Time Out 1012 Minutes 64              RN reports patient is medically stable for therapy treatment this date. Chart reviewed prior to treatment and patient is agreeable for therapy. All lines intact and patient positioned comfortably at end of treatment. All patient needs addressed prior to ending therapy session.       GURVINDER Argueta

## 2021-11-22 NOTE — CARE COORDINATION
PT/OT to eval. Today. Patient is currently on HF at 45%/40L. [employer] notified social work and patient information was faxed to [employer]. Waiting on appeal determination. Continue to follow.

## 2021-11-22 NOTE — FLOWSHEET NOTE
Pt in COVID isolation, sitting up and coloring. No acute needs/distress noted. Chaplains will remain available to offer spiritual and emotional support as needed.        11/22/21 7251   Encounter Summary   Services provided to: Patient not available   Referral/Consult From: Surya   Continue Visiting   (11/22/21 COVID not available)   Complexity of Encounter Low   Routine   Type Follow up     Electronically signed by Colleen Mc, on 11/22/2021 at 4:39 PM.  Jessie  [phone]

## 2021-11-22 NOTE — CARE COORDINATION
Social work: t/c from [employer], requesting updated clinicals for expedited appeal.  JOHAN faxed clinicals to [phone] Ref: [id number].

## 2021-11-23 LAB
ABSOLUTE EOS #: 0.25 K/UL (ref 0–0.44)
ABSOLUTE IMMATURE GRANULOCYTE: 0.64 K/UL (ref 0–0.3)
ABSOLUTE LYMPH #: 1.65 K/UL (ref 1.1–3.7)
ABSOLUTE MONO #: 0.89 K/UL (ref 0.1–1.2)
ANION GAP SERPL CALCULATED.3IONS-SCNC: 9 MMOL/L (ref 9–17)
BASOPHILS # BLD: 1 % (ref 0–2)
BASOPHILS ABSOLUTE: 0.13 K/UL (ref 0–0.2)
BUN BLDV-MCNC: 29 MG/DL (ref 8–23)
BUN/CREAT BLD: 55 (ref 9–20)
CALCIUM SERPL-MCNC: 8.6 MG/DL (ref 8.6–10.4)
CHLORIDE BLD-SCNC: 102 MMOL/L (ref 98–107)
CO2: 26 MMOL/L (ref 20–31)
CREAT SERPL-MCNC: 0.53 MG/DL (ref 0.5–0.9)
DIFFERENTIAL TYPE: ABNORMAL
EOSINOPHILS RELATIVE PERCENT: 2 % (ref 1–4)
GFR AFRICAN AMERICAN: >60 ML/MIN
GFR NON-AFRICAN AMERICAN: >60 ML/MIN
GFR SERPL CREATININE-BSD FRML MDRD: ABNORMAL ML/MIN/{1.73_M2}
GFR SERPL CREATININE-BSD FRML MDRD: ABNORMAL ML/MIN/{1.73_M2}
GLUCOSE BLD-MCNC: 117 MG/DL (ref 65–105)
GLUCOSE BLD-MCNC: 126 MG/DL (ref 70–99)
GLUCOSE BLD-MCNC: 171 MG/DL (ref 65–105)
GLUCOSE BLD-MCNC: 186 MG/DL (ref 65–105)
GLUCOSE BLD-MCNC: 258 MG/DL (ref 65–105)
HCT VFR BLD CALC: 41.6 % (ref 36.3–47.1)
HEMOGLOBIN: 13.7 G/DL (ref 11.9–15.1)
IMMATURE GRANULOCYTES: 5 %
LYMPHOCYTES # BLD: 13 % (ref 24–43)
MCH RBC QN AUTO: 30.6 PG (ref 25.2–33.5)
MCHC RBC AUTO-ENTMCNC: 32.9 G/DL (ref 28.4–34.8)
MCV RBC AUTO: 93.1 FL (ref 82.6–102.9)
MONOCYTES # BLD: 7 % (ref 3–12)
NRBC AUTOMATED: 0 PER 100 WBC
PDW BLD-RTO: 12.8 % (ref 11.8–14.4)
PLATELET # BLD: 232 K/UL (ref 138–453)
PLATELET ESTIMATE: ABNORMAL
PMV BLD AUTO: 10.5 FL (ref 8.1–13.5)
POTASSIUM SERPL-SCNC: 4.3 MMOL/L (ref 3.7–5.3)
RBC # BLD: 4.47 M/UL (ref 3.95–5.11)
RBC # BLD: ABNORMAL 10*6/UL
SEG NEUTROPHILS: 72 % (ref 36–65)
SEGMENTED NEUTROPHILS ABSOLUTE COUNT: 9.14 K/UL (ref 1.5–8.1)
SODIUM BLD-SCNC: 137 MMOL/L (ref 135–144)
WBC # BLD: 12.7 K/UL (ref 3.5–11.3)
WBC # BLD: ABNORMAL 10*3/UL

## 2021-11-23 PROCEDURE — 6370000000 HC RX 637 (ALT 250 FOR IP): Performed by: FAMILY MEDICINE

## 2021-11-23 PROCEDURE — 2580000003 HC RX 258: Performed by: NURSE PRACTITIONER

## 2021-11-23 PROCEDURE — 6370000000 HC RX 637 (ALT 250 FOR IP): Performed by: NURSE PRACTITIONER

## 2021-11-23 PROCEDURE — 6370000000 HC RX 637 (ALT 250 FOR IP): Performed by: INTERNAL MEDICINE

## 2021-11-23 PROCEDURE — 2060000000 HC ICU INTERMEDIATE R&B

## 2021-11-23 PROCEDURE — 93005 ELECTROCARDIOGRAM TRACING: CPT | Performed by: INTERNAL MEDICINE

## 2021-11-23 PROCEDURE — 97530 THERAPEUTIC ACTIVITIES: CPT

## 2021-11-23 PROCEDURE — 82947 ASSAY GLUCOSE BLOOD QUANT: CPT

## 2021-11-23 PROCEDURE — 80048 BASIC METABOLIC PNL TOTAL CA: CPT

## 2021-11-23 PROCEDURE — 6360000002 HC RX W HCPCS: Performed by: NURSE PRACTITIONER

## 2021-11-23 PROCEDURE — 6360000002 HC RX W HCPCS: Performed by: INTERNAL MEDICINE

## 2021-11-23 PROCEDURE — 97530 THERAPEUTIC ACTIVITIES: CPT | Performed by: NURSE PRACTITIONER

## 2021-11-23 PROCEDURE — 2700000000 HC OXYGEN THERAPY PER DAY

## 2021-11-23 PROCEDURE — 94761 N-INVAS EAR/PLS OXIMETRY MLT: CPT

## 2021-11-23 PROCEDURE — 94640 AIRWAY INHALATION TREATMENT: CPT

## 2021-11-23 PROCEDURE — 6370000000 HC RX 637 (ALT 250 FOR IP): Performed by: STUDENT IN AN ORGANIZED HEALTH CARE EDUCATION/TRAINING PROGRAM

## 2021-11-23 PROCEDURE — 36415 COLL VENOUS BLD VENIPUNCTURE: CPT

## 2021-11-23 PROCEDURE — 99232 SBSQ HOSP IP/OBS MODERATE 35: CPT | Performed by: INTERNAL MEDICINE

## 2021-11-23 PROCEDURE — 85025 COMPLETE CBC W/AUTO DIFF WBC: CPT

## 2021-11-23 RX ORDER — BUDESONIDE AND FORMOTEROL FUMARATE DIHYDRATE 160; 4.5 UG/1; UG/1
2 AEROSOL RESPIRATORY (INHALATION) 2 TIMES DAILY
Status: DISCONTINUED | OUTPATIENT
Start: 2021-11-23 | End: 2021-11-27 | Stop reason: HOSPADM

## 2021-11-23 RX ADMIN — INSULIN LISPRO 6 UNITS: 100 INJECTION, SOLUTION INTRAVENOUS; SUBCUTANEOUS at 17:12

## 2021-11-23 RX ADMIN — IPRATROPIUM BROMIDE AND ALBUTEROL SULFATE 1 AMPULE: .5; 2.5 SOLUTION RESPIRATORY (INHALATION) at 20:48

## 2021-11-23 RX ADMIN — ENOXAPARIN SODIUM 30 MG: 100 INJECTION SUBCUTANEOUS at 09:21

## 2021-11-23 RX ADMIN — DEXAMETHASONE SODIUM PHOSPHATE 6 MG: 10 INJECTION, SOLUTION INTRAMUSCULAR; INTRAVENOUS at 09:21

## 2021-11-23 RX ADMIN — Medication 2000 UNITS: at 09:19

## 2021-11-23 RX ADMIN — INSULIN LISPRO 2 UNITS: 100 INJECTION, SOLUTION INTRAVENOUS; SUBCUTANEOUS at 12:00

## 2021-11-23 RX ADMIN — IPRATROPIUM BROMIDE AND ALBUTEROL SULFATE 1 AMPULE: .5; 2.5 SOLUTION RESPIRATORY (INHALATION) at 15:13

## 2021-11-23 RX ADMIN — SODIUM CHLORIDE, PRESERVATIVE FREE 10 ML: 5 INJECTION INTRAVENOUS at 09:21

## 2021-11-23 RX ADMIN — ENOXAPARIN SODIUM 30 MG: 100 INJECTION SUBCUTANEOUS at 21:26

## 2021-11-23 RX ADMIN — ARFORMOTEROL TARTRATE 15 MCG: 15 SOLUTION RESPIRATORY (INHALATION) at 07:36

## 2021-11-23 RX ADMIN — ANTI-FUNGAL POWDER MICONAZOLE NITRATE TALC FREE: 1.42 POWDER TOPICAL at 09:22

## 2021-11-23 RX ADMIN — MIRTAZAPINE 7.5 MG: 15 TABLET, FILM COATED ORAL at 21:26

## 2021-11-23 RX ADMIN — INSULIN LISPRO 1 UNITS: 100 INJECTION, SOLUTION INTRAVENOUS; SUBCUTANEOUS at 21:54

## 2021-11-23 RX ADMIN — ANTI-FUNGAL POWDER MICONAZOLE NITRATE TALC FREE: 1.42 POWDER TOPICAL at 21:28

## 2021-11-23 RX ADMIN — PREGABALIN 50 MG: 50 CAPSULE ORAL at 09:19

## 2021-11-23 RX ADMIN — IPRATROPIUM BROMIDE AND ALBUTEROL SULFATE 1 AMPULE: .5; 2.5 SOLUTION RESPIRATORY (INHALATION) at 07:36

## 2021-11-23 RX ADMIN — SODIUM CHLORIDE, PRESERVATIVE FREE 10 ML: 5 INJECTION INTRAVENOUS at 21:25

## 2021-11-23 RX ADMIN — BUDESONIDE 500 MCG: 0.5 SUSPENSION RESPIRATORY (INHALATION) at 07:37

## 2021-11-23 RX ADMIN — METOPROLOL SUCCINATE 25 MG: 25 TABLET, EXTENDED RELEASE ORAL at 09:19

## 2021-11-23 RX ADMIN — IPRATROPIUM BROMIDE AND ALBUTEROL SULFATE 1 AMPULE: .5; 2.5 SOLUTION RESPIRATORY (INHALATION) at 11:46

## 2021-11-23 RX ADMIN — BUDESONIDE AND FORMOTEROL FUMARATE DIHYDRATE 2 PUFF: 160; 4.5 AEROSOL RESPIRATORY (INHALATION) at 20:48

## 2021-11-23 RX ADMIN — PREGABALIN 50 MG: 50 CAPSULE ORAL at 21:26

## 2021-11-23 RX ADMIN — DOCUSATE SODIUM 100 MG: 100 CAPSULE, LIQUID FILLED ORAL at 21:26

## 2021-11-23 ASSESSMENT — ENCOUNTER SYMPTOMS
SHORTNESS OF BREATH: 1
RHINORRHEA: 0
NAUSEA: 0
COUGH: 0
WHEEZING: 0
ABDOMINAL PAIN: 0
VOMITING: 0
BLOOD IN STOOL: 0
CONSTIPATION: 0
DIARRHEA: 0
CHEST TIGHTNESS: 0

## 2021-11-23 ASSESSMENT — PAIN SCALES - GENERAL
PAINLEVEL_OUTOF10: 0

## 2021-11-23 ASSESSMENT — PAIN DESCRIPTION - PROGRESSION
CLINICAL_PROGRESSION: NOT CHANGED

## 2021-11-23 NOTE — PROGRESS NOTES
Patient noted to have a heart rate in the 160s, then her heart rate return the low 100s. Patient was given scheduled Toprolol XL 25 mg. Patient blood pressure is 105/61 and patient is asymptomatic. Dr. Juliocesar Fields was notified. EKG ordered. EKG showed sinus tach. Dr. Juliocesar Fields coming to the unit.

## 2021-11-23 NOTE — PLAN OF CARE
Problem: Falls - Risk of:  Goal: Will remain free from falls  Description: Will remain free from falls  11/23/2021 1232 by Missy Wise RN  Outcome: Ongoing  Note: Patient remains free from falls and injuries. Call light with in reach and patient close to nurse station. Will continue to round hourly and more often as needed.      11/22/2021 2314 by Marisela Pate RN  Outcome: Ongoing  Goal: Absence of physical injury  Description: Absence of physical injury  11/22/2021 2314 by Marisela Pate RN  Outcome: Ongoing

## 2021-11-23 NOTE — PROGRESS NOTES
Pulmonary Critical Care Progress Note       Patient seen for the follow up of acute hypoxic respiratory failure, asthma extubation, Pneumonia due to COVID-19 virus     Subjective:  She has improved shortness of breath. She now requires oxygen at 4 L oxygen nasal cannula. .  She has been off BiPAP   she is sitting in a chair. .. She has mostly none productive cough. She denies chest pain she is tolerating orals. Examination:  Vitals: /80   Pulse 119   Temp 98 °F (36.7 °C)   Resp (!) 32   Ht 5' 2\" (1.575 m)   Wt 229 lb 0.9 oz (103.9 kg)   SpO2 (!) 88%   BMI 41.90 kg/m²   General appearance:  alert and cooperative with exam  Neck: No JVD  Lungs: Mild decreased breath sound bibasilar crackles  Heart: regular rate and rhythm, S1, S2 normal, no gallop  Abdomen: Soft, non tender, + BS  Extremities: no cyanosis or clubbing.  No significant edema    LABs:  CBC:   Recent Labs     11/22/21  0520 11/23/21  0406   WBC 10.6 12.7*   HGB 14.0 13.7   HCT 42.4 41.6    232     BMP:   Recent Labs     11/22/21  0604 11/23/21  0406   * 137   K 4.2 4.3   CO2 25 26   BUN 29* 29*   CREATININE 0.54 0.53   LABGLOM >60 >60   GLUCOSE 103* 126*     Radiology:  Chest x-ray 11/22      Moderate bilateral hazy parenchymal opacities, similar to the prior day's   examination.  Slightly worsened compared to 11/19/2021            Impression:  · Acute hypoxic respiratory failure  · COVID-19 pneumonia with possible bacterial coinfection  · Mild adenopathy, likely reactive  · History of asthma  · Suspected obstructive sleep apnea/Obesity  · Arthritis, hypertension, fibromyalgia    Recommendations:  · Oxygen high flow by nasal cannula wean to saturation above 90%  · Prone as tolerated  · Incentive spirometry every hour while awake  · Continue BiPAP support  · Continue albuterol and Ipratropium Q 4 hours and prn  · Discontinue Brovana aerosol treatment  · Discontinue budesonide aerosol treatment  · Symbicort 160 twice daily  · Airborne isolation  · make Decadron 6 mg every 24 hours  · S/p Actemra  · Finished Levaquin 750 mg daily  · Rehab discharge planning  · Discussed with RN   · Physical therapy  · DVT prophylaxis Lovenox 30 every 12 hours    Nader Park MD, [facility]  Pulmonary Critical Care and Sleep Medicine,  [facility]: [phone]

## 2021-11-23 NOTE — FLOWSHEET NOTE
[address]  COVID-19+ PATIENT      Room # 1102/1102-01   Name: Henderson Gilford            Baptist: Zoroastrian     Reason for visit: Routine    I called the patient in room. Admit Date & Time: 11/3/2021 10:05 PM    Assessment:  Henderson Gilford is a 74 y.o. female in the hospital because of COVID-19. Upon calling the pt, she seemed to be in good spirits and feeling positively about her condition. Pt states she is just on \"nose oxygen\" and has \"gotten alexis out\" so she has [place] lot more freedom. \"  Pt states she hopes to go home soon with the help of a home health nurse. Intervention:  I introduced myself and my title as  I offered space for the pt  to express feelings, needs, and concerns and provided a ministry presence. Outcome: The pt was grateful and receptive. Plan:  Chaplains will remain available to offer spiritual and emotional support as needed by phone/virtually. 11/23/21 6160   Encounter Summary   Services provided to: Patient   Referral/Consult From: Rounding   Continue Visiting   (11/23/21 COVID)   Complexity of Encounter Low   Routine   Type Follow up   Assessment Calm; Approachable; Hopeful; Coping   Intervention Active listening; Explored feelings, thoughts, concerns; Nurtured hope; Sustaining presence/ Ministry of presence; Discussed illness/injury and it's impact; Develop care plan   Outcome Acceptance; Comfort; Expressed gratitude; Engaged in conversation; Expressed feelings/needs/concerns; Coping; Encouraged;  Hopeful; Receptive       Electronically signed by Gris Rene on 11/23/2021 at 2:53 PM.  Jessie

## 2021-11-23 NOTE — PROGRESS NOTES
Comprehensive Nutrition Assessment    Type and Reason for Visit:  Reassess    Nutrition Recommendations/Plan:   1. Continue ADULT DIET; Regular; Low Sodium (2 gm); Low Potassium (Less than 3000 mg/day); 1500 ml  2. Continue Glucerna 2x/day  3. Monitor p.o intakes and labs    Nutrition Assessment:  Patient remains in droplet plus isolation for COVID-19 virus. Patient is eating well at meals. Continue current diet and oral nutrition supplement. Monitor p.o intakes and labs. Malnutrition Assessment:  Malnutrition Status: At risk for malnutrition (Comment)      Estimated Daily Nutrient Needs:  Energy (kcal):  8455-0267 (15-18 kcal/kg); Weight Used for Energy Requirements:  Current     Protein (g):  65-75 gm of protein (1.3-1.5 gm/kg); Weight Used for Protein Requirements:  Ideal          Nutrition Related Findings:  Edema: +1 non-pitting,BUE, trace BLE. Active bowel sounds. Wounds:  None       Current Nutrition Therapies:    ADULT DIET; Regular; Low Sodium (2 gm);  Low Potassium (Less than 3000 mg/day); 1500 ml  ADULT ORAL NUTRITION SUPPLEMENT; Breakfast, Dinner; Diabetic Oral Supplement    Anthropometric Measures:  · Height: 5' 2\" (157.5 cm)  · Current Body Weight: 229 lb (103.9 kg)   · Ideal Body Weight: 110 lbs; % Ideal Body Weight 208.2 %   · BMI: 41.9  · BMI Categories: Obese Class 3 (BMI 40.0 or greater)       Nutrition Diagnosis:   · Predicted inadequate energy intake related to inadequate protein-energy intake as evidenced by intake 0-25%, intake 26-50%      Nutrition Interventions:   Food and/or Nutrient Delivery:  Continue Current Diet, Continue Oral Nutrition Supplement  Nutrition Education/Counseling:  Education not indicated   Coordination of Nutrition Care:  Continue to monitor while inpatient    Goals:  PO intakes are greater than 75% at meals       Nutrition Monitoring and Evaluation:   Behavioral-Environmental Outcomes:  None Identified   Food/Nutrient Intake Outcomes:  Food and Nutrient Intake, Supplement Intake  Physical Signs/Symptoms Outcomes:  Biochemical Data, Fluid Status or Edema, Skin, Weight     Discharge Planning:    Continue current diet         Prudy Danielle DIASN, RDN, LDN  Lead Clinical Dietitian  RD Office Phone [phone]

## 2021-11-23 NOTE — PLAN OF CARE
treatment will be avoided or minimized  11/22/2021 2314 by Mariat Roque RN  Outcome: Ongoing     Problem: Isolation Precautions - Risk of Spread of Infection  Goal: Prevent transmission of infection  11/22/2021 2314 by Marita Roque RN  Outcome: Ongoing     Problem: Nutrition Deficits  Goal: Optimize nutritional status  11/22/2021 2314 by Marita Roque RN  Outcome: Ongoing     Problem: Loneliness or Risk for Loneliness  Goal: Demonstrate positive use of time alone when socialization is not possible  11/22/2021 2314 by Marita Roque RN  Outcome: Ongoing     Problem: Fatigue  Goal: Verbalize increase energy and improved vitality  11/22/2021 2314 by Marita Roque RN  Outcome: Ongoing     Problem: Patient Education: Go to Patient Education Activity  Goal: Patient/Family Education  11/22/2021 2314 by Marita Roque RN  Outcome: Ongoing     Problem: Skin Integrity:  Goal: Will show no infection signs and symptoms  Description: Will show no infection signs and symptoms  11/22/2021 2314 by Marita Roque RN  Outcome: Ongoing     Problem: Skin Integrity:  Goal: Absence of new skin breakdown  Description: Absence of new skin breakdown  11/22/2021 2314 by Marita Roque RN  Outcome: Ongoing     Problem: Skin Integrity:  Goal: Absence of new skin breakdown  Description: Absence of new skin breakdown  11/22/2021 2314 by Marita Roque RN  Outcome: Ongoing     Problem: Nutrition  Goal: Optimal nutrition therapy  11/22/2021 2314 by Marita Roque RN  Outcome: Ongoing

## 2021-11-23 NOTE — PLAN OF CARE
Nutrition Problem #1: Predicted inadequate energy intake  Intervention: Food and/or Nutrient Delivery: Continue Current Diet, Continue Oral Nutrition Supplement  Nutritional Goals: PO intakes are greater than 75% at meals

## 2021-11-23 NOTE — PROGRESS NOTES
Occupational Therapy  Facility/Department: [facility] ICU  Daily Treatment Note  NAME: Edis Severino  : 1955  MRN: [id number]    Date of Service: 2021    Discharge Recommendations:  Patient would benefit from continued therapy after discharge    Pt currently functioning below baseline. Would suggest additional therapy at time of discharge to maximize long term outcomes and prevent re-admission. Please refer to AM-PAC score for current level of function. Assessment   Performance deficits / Impairments: Decreased functional mobility ; Decreased ADL status; Decreased strength; Decreased endurance; Decreased high-level IADLs; Decreased balance; Decreased posture  Assessment: Pt would benefit from continued skilled OT services to address deficits in areas of functional balance, functional reach, ADL completion, safety awareness, transfers, UE strength and functional mobility, all limiting safe return home to [facility]. Prognosis: Good  OT Education: OT Role; Energy Conservation; Plan of Care; Precautions; Transfer Training  Patient Education: Breathing tech  REQUIRES OT FOLLOW UP: Yes  Activity Tolerance  Activity Tolerance: Treatment limited secondary to medical complications (free text) (Pt desating)  Safety Devices  Safety Devices in place: Yes  Type of devices: All fall risk precautions in place; Call light within reach; Nurse notified; Patient at risk for falls; Left in bed         Patient Diagnosis(es): The encounter diagnosis was COVID-19.      has a past medical history of Anxiety, Arthritis, Bell's palsy, Chronic neck pain, Chronic pain, Fibromyalgia, HA (headache), Headache, HTN (hypertension), and Hypertension. has a past surgical history that includes  section; shoulder surgery; Urethra dilation; Arm Surgery; [place] tooth extraction; joint replacement (Left); Cholecystectomy, laparoscopic (N/A, 2020);  Cholecystectomy (2020); and IR INSERT PICC VAD W SQ PORT >5 YEARS (11/16/2021). Restrictions  Restrictions/Precautions  Restrictions/Precautions: General Precautions, Fall Risk  Required Braces or Orthoses?: No  Position Activity Restriction  Other position/activity restrictions: Up as tolerated, monitor SpO2, COVID+  Subjective   General  Chart Reviewed: Yes  Patient assessed for rehabilitation services?: Yes  Additional Pertinent Hx: Pt. agreeable for treatment  Response to previous treatment: Patient with no complaints from previous session  Family / Caregiver Present: No  Subjective  Subjective: \"I feel better today\"  General Comment  Comments: RN ok'ed treatment      Orientation  Orientation  Overall Orientation Status: Within Functional Limits  Objective             Balance  Sitting Balance: Minimal assistance (Pt seated EOB, pt desating to low 80s, unable to recover even with VC for breathing tech. Pt returned to supine, still only recovering to 83%, RN notified and O2 increased to 6L from 4L. )  Bed mobility  Rolling to Left: Minimal assistance  Rolling to Right: Minimal assistance  Supine to Sit: Maximum assistance  Sit to Supine: Maximum assistance  Comment: Supine to sit with HOB partially raised, use of bed rails, VC. Cognition  Overall Cognitive Status: [facility]       Plan   Plan  Times per week: 4-5x/wk 1x/day as rufina  Times per day: Daily  Current Treatment Recommendations: Strengthening, Endurance Training, Patient/Caregiver Education & Training, Equipment Evaluation, Education, & procurement, Self-Care / ADL, Home Management Training, Functional Mobility Training, Safety Education & Training  Plan Comment: Cont.  OT with stated POC    AM-PAC Score        AM-PAC Inpatient Daily Activity Raw Score: 13 (11/23/21 1429)  AM-PAC Inpatient ADL T-Scale Score : 32.03 (11/23/21 1429)  ADL Inpatient CMS 0-100% Score: 63.03 (11/23/21 1429)  ADL Inpatient CMS G-Code Modifier : CL (11/23/21 1429)    Goals  Short term goals  Time Frame for Short term goals: By discharge, pt to demo  Short term goal 1: ADL transfers and functional mobility to SBA with use of AD as needed. Short term goal 2: UB ADLS to Set up and LB ADLs SBA with use of AD/AE as needed. Short term goal 3: toileting to SBA with use of AD/grab bars as needed. Short term goal 4: increased B UE strength by 1/2 grade to assist with functional tasks/I with B UE HEP with use of handouts as needed. Short term goal 5: bed mobility to Mod I with use of bedrails as needed. Long term goals  Long term goal 1: Pt to be I with fall prevention educaiton, Covid specific education, EC/WS tech, recommendations for AE with use of handouts as needed. Patient Goals   Patient goals : To feel better! Therapy Time   Individual Concurrent Group Co-treatment   Time In 4128         Time Out 18         Minutes 34           RN reports patient is medically stable for therapy treatment this date. Chart reviewed prior to treatment and patient is agreeable for therapy. All lines intact and patient positioned comfortably at end of treatment, RN with pt at end of tx. All patient needs addressed prior to ending therapy session.            GURVINDER Shepard

## 2021-11-23 NOTE — PLAN OF CARE
Problem: Falls - Risk of:  Goal: Will remain free from falls  Description: Will remain free from falls  Outcome: Ongoing   Patient has remained free from falls or injury this shift. Bed low and locked. 2 person assist with walker for ambulation. Problem: Falls - Risk of:  Goal: Absence of physical injury  Description: Absence of physical injury  Outcome: Ongoing  Patient has remained safe from harm or injury during shift and this admission. Bed locked and in low position. Call light with in reach. Problem: Pain:  Description: Pain management should include both nonpharmacologic and pharmacologic interventions. Goal: Pain level will decrease  Description: Pain level will decrease  Outcome: Ongoing   Patient did complain of back pain earlier in day, Percocet given with relief of pain. Problem: Airway Clearance - Ineffective  Goal: Achieve or maintain patent airway  Outcome: Ongoing   Patient has maintained oxygen saturation above 90% on high flow nasal canula, and was able to graduate off high flow to 6 liters nasal canula. Breathing has been unlabored and patient denied shortness of breath except with exertion/ambulation     Problem: Body Temperature -  Risk of, Imbalanced  Goal: Ability to maintain a body temperature within defined limits  Outcome: Ongoing  Patient has remained afebrile this shift. Problem: Isolation Precautions - Risk of Spread of Infection  Goal: Prevent transmission of infection  Outcome: Ongoing  Patient placed is Droplet isolation due to Covid 19+. Staff wearing appropriate PPE when entering the room. Problem: Nutrition Deficits  Goal: Optimize nutritional status  Outcome: Ongoing  Patient has been eating % of her meals all shift. No Nausea or vomiting noted this shift.       Problem: Skin Integrity:  Goal: Absence of new skin breakdown  Description: Absence of new skin breakdown  Outcome: Ongoing  Patient skin is being assess for skin breakdown or for being at risk for skin breakdown every shift. Patient is being turned every 2 hours if unable to turn self. Will continue to monitor skin daily. Myrna Shannon

## 2021-11-23 NOTE — PROGRESS NOTES
[facility]  Office: [address], DO, Sisi Rohith, DO, Blacklick Mail, DO, Candice Has Blood, DO, Carlo Zurita MD, Obed Cordero MD, Carlin Knapp MD, Mireya Wall MD, Ayaan Candelario MD, Neal Gibbs MD, Elisha Pardo MD, Toni Courtney, DO, Ricco Patten, DO, Jose J Fabian MD,  Linette Doyle DO, Bc Burgess MD, Alanis Isaac MD, Elizabeth Cano MD, Jonathan Dennis MD, Jose Luis Magana MD, Morales Abraham MD, Tanisha Goodrich MD, Celeste Carrion, [facility], [facility], CNP, Yasir Padron, CNP, Yuni Petgary, CNS, Edwina Lindsay, CNP, Leticia Raw, CNP, Mary Jo Duran, CNP, Annabelle Danielson, CNP, Mitali Bellamy, CNP, Gael Costello PA-C, Leann Snyder, [facility], Alexandru Lewis, CNP, Job Staples, CNP, Hanane Beat, CNP, Sweta Standing, CNP, Silvio Cooks, CNP, Kaylyn Yanes, CNP, Cruzito Werner, [facility]      Daily Progress Note     Admit Date: 11/3/2021  Bed/Room No.  1102/1102-01  Admitting Physician : Alanis Isaac MD  Code Status :Full Code  Hospital Day:  LOS: 19 days   Chief Complaint:     Chief Complaint   Patient presents with    Abdominal Pain     lower    Nausea    Back Pain    Headache     Principal Problem:    Pneumonia due to COVID-19 virus  Active Problems:    Essential hypertension    Prediabetes    Morbid obesity with BMI of 40.0-44.9, adult ([address]    Hyperglycemia    Acute respiratory failure with hypoxia ([facility] [address]    Acute urinary retention  Resolved Problems:    * No resolved hospital problems. *    Subjective : Interval History/Significant events :  11/23/21    Patient was seen and evaluated at bedside this morning. She reports that she is feeling slightly better this morning. Patient is down to 6 L of oxygen via nasal cannula. Nursing notes , Consults notes reviewed. Overnight events and updates discussed with Nursing staff .    Background History:         Hi Wallis is 74 y.o. female  Who was admitted to the hospital on 11/3/2021 for treatment of Pneumonia due to COVID-19 virus. Patient presented to emergency room with headache, abdominal pain, nausea, fever T-max 102. 2. Sherryle Moder Patient tested positive for COVID-19. Chest x-ray showed bibasilar opacities consistent with COVID-19 pneumonia. She has underlying history of obesity, fibromyalgia, chronic pain, hypertension. Patient was not vaccinated against COVID-19. CT chest was negative for PE. Patient had acute respiratory failure was requiring O2 support. Breathing status worsened and required BiPAP with 100% FiO2 and was moved to ICU on 11/10/2021. PMH:  Past Medical History:   Diagnosis Date    Anxiety     Arthritis     Bell's palsy     Chronic neck pain     posterior neck since fall April 2014    Chronic pain     low back and bilateral lower extremities    Fibromyalgia     HA (headache)     Headache     off/on since fall April 2014    HTN (hypertension)     Hypertension       Allergies:    Allergies   Allergen Reactions    Other Hives    Ceclor [Cefaclor]     Food      All melons      Sulfa Antibiotics      Projectile vomiting       Medications :  alteplase, 1 mg, IntraCATHeter, Once  dexamethasone, 6 mg, IntraVENous, Q24H  budesonide, 0.5 mg, Nebulization, BID  metoprolol succinate, 25 mg, Oral, Daily  Arformoterol Tartrate, 15 mcg, Nebulization, BID  pregabalin, 50 mg, Oral, BID  polyethylene glycol, 17 g, Oral, Daily  docusate sodium, 100 mg, Oral, BID  insulin lispro, 0-12 Units, SubCUTAneous, TID WC  insulin lispro, 0-6 Units, SubCUTAneous, Nightly  ipratropium-albuterol, 1 ampule, Inhalation, Q4H WA  miconazole, , Topical, BID  mirtazapine, 7.5 mg, Oral, Nightly  sodium chloride flush, 5-40 mL, IntraVENous, 2 times per day  enoxaparin, 30 mg, SubCUTAneous, BID  Vitamin D, 2,000 Units, Oral, Daily  influenza virus vaccine, 0.5 mL, IntraMUSCular, Prior to discharge        Review of Systems   Review of Systems   Constitutional: Positive for activity change, appetite change and fatigue. Negative for fever and unexpected weight change. HENT: Negative for congestion, rhinorrhea and sneezing. Eyes: Negative for visual disturbance. Respiratory: Positive for shortness of breath. Negative for cough, chest tightness and wheezing. Cardiovascular: Negative for chest pain and palpitations. Gastrointestinal: Negative for abdominal pain, blood in stool, constipation, diarrhea, nausea and vomiting. Genitourinary: Negative for dysuria, enuresis, frequency and hematuria. Musculoskeletal: Negative for arthralgias and myalgias. Skin: Negative for rash. Neurological: Negative for dizziness, weakness, light-headedness and headaches. Syncope: Patient was seen and evaluated at bedside this morning. Patient with history. Hematological: Does not bruise/bleed easily. Psychiatric/Behavioral: Negative for dysphoric mood and sleep disturbance.      Objective :      Current Vitals : Temp: 98 °F (36.7 °C),  Pulse: 110, Resp: 23, BP: 115/72, SpO2: (!) 82 %  Last 24 Hrs Vitals   Patient Vitals for the past 24 hrs:   BP Temp Temp src Pulse Resp SpO2   11/23/21 1200 115/72 98 °F (36.7 °C) Oral 110 23 (!) 82 %   11/23/21 1130 -- -- -- -- 21 93 %   11/23/21 0800 108/70 98.3 °F (36.8 °C) Oral 98 24 91 %   11/23/21 0730 -- -- -- -- 19 95 %   11/23/21 0400 -- 97.9 °F (36.6 °C) Oral -- -- --   11/23/21 0330 -- -- -- -- 24 96 %   11/22/21 2300 -- 98.4 °F (36.9 °C) Oral -- -- --   11/22/21 2245 -- -- -- 102 17 (!) 89 %   11/22/21 2230 -- -- -- 108 17 93 %   11/22/21 2215 -- -- -- 109 17 92 %   11/22/21 2200 102/65 -- -- 112 23 (!) 88 %   11/22/21 2145 -- -- -- 107 -- 93 %   11/22/21 2130 -- -- -- 107 20 91 %   11/22/21 2115 -- -- -- 97 22 93 %   11/22/21 2100 (!) 103/51 -- -- 103 23 94 %   11/22/21 2050 -- -- -- -- 25 94 %   11/22/21 2045 -- -- -- 89 23 94 %   11/22/21 2030 -- -- -- 86 29 93 %   11/22/21 2015 -- -- -- 111 20 91 %   11/22/21 2000 94/66 98.7 °F (37.1 °C) Oral 91 25 93 %   11/22/21 1945 -- -- -- 95 21 94 %   11/22/21 1930 -- -- -- 99 21 93 %   11/22/21 1915 -- -- -- 102 19 93 %   11/22/21 1900 103/65 -- -- 110 26 93 %   11/22/21 1800 105/69 -- -- 100 21 92 %   11/22/21 1700 88/61 -- -- 101 22 93 %   11/22/21 1600 105/68 98.6 °F (37 °C) Axillary 94 20 91 %   11/22/21 1522 -- -- -- -- 27 95 %   11/22/21 1500 -- -- -- 86 15 94 %   11/22/21 1400 107/78 -- -- 118 29 (!) 89 %     Intake / output   11/22 0701 - 11/23 0700  In: 926 [P.O.:857; I.V.:30]  Out: 1200 [Urine:1200]  Physical Exam:  Physical Exam  Vitals and nursing note reviewed. Constitutional:       General: She is not in acute distress. Appearance: She is not diaphoretic. Interventions: Nasal cannula in place. Comments: On High Flow    HENT:      Head: Normocephalic and atraumatic. Nose:      Right Sinus: No maxillary sinus tenderness or frontal sinus tenderness. Left Sinus: No maxillary sinus tenderness or frontal sinus tenderness. Mouth/Throat:      Pharynx: No oropharyngeal exudate. Eyes:      General: No scleral icterus. Conjunctiva/sclera: Conjunctivae normal.      Pupils: Pupils are equal, round, and reactive to light. Neck:      Thyroid: No thyromegaly. Vascular: No JVD. Cardiovascular:      Rate and Rhythm: Normal rate and regular rhythm. Pulses:           Dorsalis pedis pulses are 2+ on the right side and 2+ on the left side. Heart sounds: Normal heart sounds. No murmur heard. Pulmonary:      Effort: Pulmonary effort is normal.      Breath sounds: Normal breath sounds. No wheezing or rales. Abdominal:      Palpations: Abdomen is soft. There is no mass. Tenderness: There is no abdominal tenderness. Musculoskeletal:      Cervical back: Full passive range of motion without pain and neck supple. Lymphadenopathy:      Head:      Right side of head: No submandibular adenopathy. Left side of head: No submandibular adenopathy.       Cervical: No cervical adenopathy. Skin:     General: Skin is warm. Neurological:      Mental Status: She is alert and oriented to person, place, and time. Motor: No tremor. Psychiatric:         Behavior: Behavior is cooperative. Laboratory findings:    Recent Labs     11/21/21  0703 11/22/21  0520 11/23/21  0406   WBC 13.1* 10.6 12.7*   HGB 14.4 14.0 13.7   HCT 43.4 42.4 41.6    295 232     Recent Labs     11/21/21  0703 11/22/21  0604 11/23/21  0406    134* 137   K 4.4 4.2 4.3   CL 99 100 102   CO2 25 25 26   GLUCOSE 168* 103* 126*   BUN 29* 29* 29*   CREATININE 0.57 0.54 0.53   CALCIUM 8.5* 8.5* 8.6            Specific Gravity, UA   Date Value Ref Range Status   11/10/2021 1.020 1.005 - 1.030 Final     Protein, UA   Date Value Ref Range Status   11/10/2021 NEGATIVE NEGATIVE Final     RBC, UA   Date Value Ref Range Status   11/02/2021 2 TO 5 0 - 2 /HPF Final     Bacteria, UA   Date Value Ref Range Status   11/02/2021 NOT REPORTED None Final     Nitrite, Urine   Date Value Ref Range Status   11/10/2021 NEGATIVE NEGATIVE Final     WBC, UA   Date Value Ref Range Status   11/02/2021 2 TO 5 0 - 5 /HPF Final     Leukocyte Esterase, Urine   Date Value Ref Range Status   11/10/2021 NEGATIVE NEGATIVE Final       Imaging / Clinical Data :-   XR CHEST PORTABLE    Result Date: 11/14/2021  Moderate bilateral parenchymal opacities. Minimally increased parenchymal density on the right compared to prior studies. XR CHEST PORTABLE    Result Date: 11/12/2021  Moderate bilateral pneumonia, similar to the previous exam.     XR CHEST PORTABLE    Result Date: 11/11/2021  Findings remain compatible with moderate COVID-19 pneumonitis. No significant interval changes. XR CHEST PORTABLE    Result Date: 11/9/2021  Multifocal pulmonary opacities suggest significantly worsened pulmonary edema and or multifocal pneumonia. Recommend continued follow-up to resolution.      CT CHEST PULMONARY EMBOLISM W CONTRAST    Result Date: 11/9/2021  No evidence of pulmonary embolism. Extensive pulmonary abnormalities most compatible with COVID-19 pneumonia; other processes such as influenza pneumonia and organizing pneumonia, as can be seen with drug toxicity and connective tissue disease, can cause a similar imaging pattern. Mild adenopathy, probably reactive. Clinical Course : unchanged  Assessment and Plan  :        1. Acute respiratory failure with hypoxia secondary to COVID-19 pneumonia -off high flow. Continue dexamethasone high-dose, Actemra given on 11/10/2021. Continue bronchodilator therapy. Patient completed 8-day course of Levaquin. Lasix as needed. 2. Morbid obesity BMI 41. Encourage weight loss  3. Essential hypertension -continue to hold Prinzide. Continue Toprol XL 25 mg daily. .  4. Prediabetes worsened with steroid-induced hyperglycemia - A1C 5.8. Humalog as needed. 5. Hyperkalemia - resolved with  Kayexalate -    Continue current care. Encourage physical therapy, incentive spirometry. Continue to monitor vitals , Intake / output ,  Cell count , HGB , Kidney function, oxygenation  as indicated . Plan and updates discussed with patient ,  answers  explained to satisfaction.    Plan discussed with Staff RN     (Please note that portions of this note were completed with a voice recognition program. Efforts were made to edit the dictations but occasionally words are mis-transcribed.)      Ar Cuba MD  11/23/2021

## 2021-11-23 NOTE — CARE COORDINATION
Social work: Spoke with Maribel/spouse regarding [employer]. He does not want patient going to a skilled nursing facility, plan is home. He is agreeable to home care. Patient will need home O2 eval prior to dc.

## 2021-11-23 NOTE — CARE COORDINATION
Writer spoke with [facility] care [facility]. and they have accepted the patient but cannot start care until Saturday. Writer notified Dali from [facility] and she is checking to see if they can start care tomorrow. Patient will also have a home O2 eval. Waiting on results and will send to [facility].

## 2021-11-23 NOTE — PROGRESS NOTES
Patient was working with therapy and sat at the edge of the bed, she became hypoxic in the low 80s on 4L NC and was having a difficult time recovering. O2 was increased to 6L NC and patient was layed down to her left side. O2 is coming up.

## 2021-11-23 NOTE — PROGRESS NOTES
Physical Therapy  Facility/Department: [facility] ICU  Daily Treatment Note  NAME: Dereck Ribera  : 1955  MRN: [id number]    Date of Service: 2021    Discharge Recommendations:  Patient would benefit from continued therapy after discharge      Assessment   Body structures, Functions, Activity limitations: Decreased functional mobility ; Decreased ADL status; Decreased strength; Decreased endurance; Decreased balance  Assessment: Pt with improved mental stabilty and able to walk some (short distance this date) w/ fair tolerance to mobiltiy. will need to monitor  Specific instructions for Next Treatment: r.walker  pressure relief exercises  Prognosis: Good  Clinical Presentation: evolving  PT Education: PT Role; Plan of Care; General Safety; Energy Conservation  REQUIRES PT FOLLOW UP: Yes  Activity Tolerance  Activity Tolerance: Treatment limited secondary to medical complications (free text)  Activity Tolerance: HR tachy and O2 drops     Patient Diagnosis(es): The encounter diagnosis was COVID-19.     has a past medical history of Anxiety, Arthritis, Bell's palsy, Chronic neck pain, Chronic pain, Fibromyalgia, HA (headache), Headache, HTN (hypertension), and Hypertension. has a past surgical history that includes  section; shoulder surgery; Urethra dilation; Arm Surgery; [place] tooth extraction; joint replacement (Left); Cholecystectomy, laparoscopic (N/A, 2020); Cholecystectomy (2020); and IR INSERT PICC VAD W SQ PORT >5 YEARS (2021). Restrictions  Restrictions/Precautions  Restrictions/Precautions: General Precautions, Fall Risk  Required Braces or Orthoses?: No  Position Activity Restriction  Other position/activity restrictions: Up as tolerated, monitor SpO2, COVID+; HR tachy w/ light activiyt; now on 4 lpm of n.c. Subjective   General  Chart Reviewed:  Yes  Additional Pertinent Hx: [place] palsy, fibromyalgia, BMI 43  Response To Previous Treatment: Patient with no complaints from previous session. Family / Caregiver Present: No  Subjective  Subjective: Pt rocking out to music and working on her coloring books. Pt in much better spirits with goal of d/c home on Saturday          Orientation  Orientation  Overall Orientation Status: Within Normal Limits  Cognition      Objective      Transfers  Sit to Stand: Contact guard assistance  Stand to sit: Contact guard assistance  Bed to Chair: Contact guard assistance  Ambulation  Ambulation?: Yes  Ambulation 1  Surface: level tile  Device: Rolling Walker  Other Apparatus: O2  Assistance: Minimal assistance  Distance: 4 ft forward and 4 feet back  Comments: pt w/ very limited tolerance to activyt -> dizzy and HR > 150's. Comment: standing balance. AM-PAC Score  16/24   Goals  Short term goals  Time Frame for Short term goals: 12 visits  Short term goal 1: Patient will be indep with bed mobility and transfers. Short term goal 2: Patient will amb 100 feet indep with appropriate AD. Short term goal 3: Patient will negotiate 4 stairs with rails and supervision. Short term goal 4:  Inc standing balance to good with device to facilitate pt independence for performance of ADL's & functional mobility, & reduce fall risk  Short term goal 5: Pt able to tolerate 30 min of activity to include ex, breathing techniques & endurance training with pt demonstrating ability to perform energy conservation strategies during functional activity to self-regulate pacing & breathing techniques to avoid SOB & maintain saO2 in safe range  Short term goal 6: Ed pt on home ex's, optimal breathing techniques, safety & energy principles, endurance training, Covid 19 pt education & issue written pt education  Patient Goals   Patient goals : Improve breathing and walking    Plan    Plan  Times per week: 1-2x/d, 5-6 d/wk  Specific instructions for Next Treatment: rEricawalker  pressure relief exercises  Current Treatment Recommendations: Strengthening, Balance Training, Functional Mobility Training, Transfer Training, Endurance Training, Gait Training, Stair training, Patient/Caregiver Education & Training, Safety Education & Training, Home Exercise Program, Positioning  Safety Devices  Type of devices: Call light within reach, Nurse notified, Chair alarm in place  Restraints  Initially in place: No     Therapy Time   Individual   Time In 1550   Time Out 1603   Minutes 13        Manuel, PT

## 2021-11-24 LAB
ABSOLUTE EOS #: 0.42 K/UL (ref 0–0.44)
ABSOLUTE IMMATURE GRANULOCYTE: 1.47 K/UL (ref 0–0.3)
ABSOLUTE LYMPH #: 3.15 K/UL (ref 1.1–3.7)
ABSOLUTE MONO #: 1.89 K/UL (ref 0.1–1.2)
ANION GAP SERPL CALCULATED.3IONS-SCNC: 11 MMOL/L (ref 9–17)
BASOPHILS # BLD: 1 % (ref 0–2)
BASOPHILS ABSOLUTE: 0.21 K/UL (ref 0–0.2)
BUN BLDV-MCNC: 28 MG/DL (ref 8–23)
BUN/CREAT BLD: 54 (ref 9–20)
CALCIUM SERPL-MCNC: 8.8 MG/DL (ref 8.6–10.4)
CHLORIDE BLD-SCNC: 104 MMOL/L (ref 98–107)
CO2: 23 MMOL/L (ref 20–31)
CREAT SERPL-MCNC: 0.52 MG/DL (ref 0.5–0.9)
DIFFERENTIAL TYPE: ABNORMAL
EKG ATRIAL RATE: 102 BPM
EKG P AXIS: 7 DEGREES
EKG P-R INTERVAL: 132 MS
EKG Q-T INTERVAL: 342 MS
EKG QRS DURATION: 70 MS
EKG QTC CALCULATION (BAZETT): 445 MS
EKG R AXIS: -21 DEGREES
EKG T AXIS: 16 DEGREES
EKG VENTRICULAR RATE: 102 BPM
EOSINOPHILS RELATIVE PERCENT: 2 % (ref 1–4)
GFR AFRICAN AMERICAN: >60 ML/MIN
GFR NON-AFRICAN AMERICAN: >60 ML/MIN
GFR SERPL CREATININE-BSD FRML MDRD: ABNORMAL ML/MIN/{1.73_M2}
GFR SERPL CREATININE-BSD FRML MDRD: ABNORMAL ML/MIN/{1.73_M2}
GLUCOSE BLD-MCNC: 167 MG/DL (ref 65–105)
GLUCOSE BLD-MCNC: 225 MG/DL (ref 65–105)
GLUCOSE BLD-MCNC: 305 MG/DL (ref 65–105)
GLUCOSE BLD-MCNC: 51 MG/DL (ref 70–99)
GLUCOSE BLD-MCNC: 69 MG/DL (ref 65–105)
GLUCOSE BLD-MCNC: 85 MG/DL (ref 65–105)
HCT VFR BLD CALC: 41 % (ref 36.3–47.1)
HEMOGLOBIN: 13.8 G/DL (ref 11.9–15.1)
IMMATURE GRANULOCYTES: 7 %
LYMPHOCYTES # BLD: 15 % (ref 24–43)
MCH RBC QN AUTO: 31.2 PG (ref 25.2–33.5)
MCHC RBC AUTO-ENTMCNC: 33.7 G/DL (ref 28.4–34.8)
MCV RBC AUTO: 92.6 FL (ref 82.6–102.9)
MONOCYTES # BLD: 9 % (ref 3–12)
MORPHOLOGY: ABNORMAL
NRBC AUTOMATED: 0 PER 100 WBC
PDW BLD-RTO: 13 % (ref 11.8–14.4)
PLATELET # BLD: 305 K/UL (ref 138–453)
PLATELET ESTIMATE: ABNORMAL
PMV BLD AUTO: 10.5 FL (ref 8.1–13.5)
POTASSIUM SERPL-SCNC: 3.8 MMOL/L (ref 3.7–5.3)
RBC # BLD: 4.43 M/UL (ref 3.95–5.11)
RBC # BLD: ABNORMAL 10*6/UL
SEG NEUTROPHILS: 66 % (ref 36–65)
SEGMENTED NEUTROPHILS ABSOLUTE COUNT: 13.86 K/UL (ref 1.5–8.1)
SODIUM BLD-SCNC: 138 MMOL/L (ref 135–144)
WBC # BLD: 21 K/UL (ref 3.5–11.3)
WBC # BLD: ABNORMAL 10*3/UL

## 2021-11-24 PROCEDURE — 6360000002 HC RX W HCPCS: Performed by: INTERNAL MEDICINE

## 2021-11-24 PROCEDURE — 97535 SELF CARE MNGMENT TRAINING: CPT

## 2021-11-24 PROCEDURE — 94640 AIRWAY INHALATION TREATMENT: CPT

## 2021-11-24 PROCEDURE — 99233 SBSQ HOSP IP/OBS HIGH 50: CPT | Performed by: INTERNAL MEDICINE

## 2021-11-24 PROCEDURE — 93010 ELECTROCARDIOGRAM REPORT: CPT | Performed by: INTERNAL MEDICINE

## 2021-11-24 PROCEDURE — 80048 BASIC METABOLIC PNL TOTAL CA: CPT

## 2021-11-24 PROCEDURE — 6370000000 HC RX 637 (ALT 250 FOR IP): Performed by: INTERNAL MEDICINE

## 2021-11-24 PROCEDURE — 36415 COLL VENOUS BLD VENIPUNCTURE: CPT

## 2021-11-24 PROCEDURE — 6360000002 HC RX W HCPCS: Performed by: NURSE PRACTITIONER

## 2021-11-24 PROCEDURE — 2060000000 HC ICU INTERMEDIATE R&B

## 2021-11-24 PROCEDURE — 6370000000 HC RX 637 (ALT 250 FOR IP): Performed by: NURSE PRACTITIONER

## 2021-11-24 PROCEDURE — 82947 ASSAY GLUCOSE BLOOD QUANT: CPT

## 2021-11-24 PROCEDURE — 6370000000 HC RX 637 (ALT 250 FOR IP): Performed by: FAMILY MEDICINE

## 2021-11-24 PROCEDURE — 97530 THERAPEUTIC ACTIVITIES: CPT

## 2021-11-24 PROCEDURE — 85025 COMPLETE CBC W/AUTO DIFF WBC: CPT

## 2021-11-24 PROCEDURE — 2580000003 HC RX 258: Performed by: NURSE PRACTITIONER

## 2021-11-24 PROCEDURE — 97116 GAIT TRAINING THERAPY: CPT

## 2021-11-24 PROCEDURE — 6370000000 HC RX 637 (ALT 250 FOR IP): Performed by: STUDENT IN AN ORGANIZED HEALTH CARE EDUCATION/TRAINING PROGRAM

## 2021-11-24 RX ORDER — DEXAMETHASONE 4 MG/1
6 TABLET ORAL DAILY
Status: DISCONTINUED | OUTPATIENT
Start: 2021-11-25 | End: 2021-11-27 | Stop reason: HOSPADM

## 2021-11-24 RX ADMIN — ANTI-FUNGAL POWDER MICONAZOLE NITRATE TALC FREE: 1.42 POWDER TOPICAL at 20:25

## 2021-11-24 RX ADMIN — INSULIN LISPRO 2 UNITS: 100 INJECTION, SOLUTION INTRAVENOUS; SUBCUTANEOUS at 12:30

## 2021-11-24 RX ADMIN — DOCUSATE SODIUM 100 MG: 100 CAPSULE, LIQUID FILLED ORAL at 09:00

## 2021-11-24 RX ADMIN — Medication 2000 UNITS: at 08:59

## 2021-11-24 RX ADMIN — SODIUM CHLORIDE, PRESERVATIVE FREE 10 ML: 5 INJECTION INTRAVENOUS at 09:00

## 2021-11-24 RX ADMIN — INSULIN LISPRO 4 UNITS: 100 INJECTION, SOLUTION INTRAVENOUS; SUBCUTANEOUS at 17:17

## 2021-11-24 RX ADMIN — INSULIN LISPRO 3 UNITS: 100 INJECTION, SOLUTION INTRAVENOUS; SUBCUTANEOUS at 21:00

## 2021-11-24 RX ADMIN — IPRATROPIUM BROMIDE AND ALBUTEROL SULFATE 1 AMPULE: .5; 2.5 SOLUTION RESPIRATORY (INHALATION) at 20:56

## 2021-11-24 RX ADMIN — BUDESONIDE AND FORMOTEROL FUMARATE DIHYDRATE 2 PUFF: 160; 4.5 AEROSOL RESPIRATORY (INHALATION) at 07:36

## 2021-11-24 RX ADMIN — IPRATROPIUM BROMIDE AND ALBUTEROL SULFATE 1 AMPULE: .5; 2.5 SOLUTION RESPIRATORY (INHALATION) at 15:02

## 2021-11-24 RX ADMIN — DEXAMETHASONE SODIUM PHOSPHATE 6 MG: 10 INJECTION, SOLUTION INTRAMUSCULAR; INTRAVENOUS at 09:00

## 2021-11-24 RX ADMIN — METOPROLOL SUCCINATE 25 MG: 25 TABLET, EXTENDED RELEASE ORAL at 09:00

## 2021-11-24 RX ADMIN — IPRATROPIUM BROMIDE AND ALBUTEROL SULFATE 1 AMPULE: .5; 2.5 SOLUTION RESPIRATORY (INHALATION) at 11:26

## 2021-11-24 RX ADMIN — SODIUM CHLORIDE, PRESERVATIVE FREE 10 ML: 5 INJECTION INTRAVENOUS at 20:25

## 2021-11-24 RX ADMIN — PREGABALIN 50 MG: 50 CAPSULE ORAL at 09:00

## 2021-11-24 RX ADMIN — DOCUSATE SODIUM 100 MG: 100 CAPSULE, LIQUID FILLED ORAL at 20:15

## 2021-11-24 RX ADMIN — IPRATROPIUM BROMIDE AND ALBUTEROL SULFATE 1 AMPULE: .5; 2.5 SOLUTION RESPIRATORY (INHALATION) at 07:35

## 2021-11-24 RX ADMIN — MIRTAZAPINE 7.5 MG: 15 TABLET, FILM COATED ORAL at 20:15

## 2021-11-24 RX ADMIN — ANTI-FUNGAL POWDER MICONAZOLE NITRATE TALC FREE: 1.42 POWDER TOPICAL at 09:01

## 2021-11-24 RX ADMIN — PREGABALIN 50 MG: 50 CAPSULE ORAL at 20:18

## 2021-11-24 RX ADMIN — ENOXAPARIN SODIUM 30 MG: 100 INJECTION SUBCUTANEOUS at 09:00

## 2021-11-24 RX ADMIN — ENOXAPARIN SODIUM 30 MG: 100 INJECTION SUBCUTANEOUS at 20:40

## 2021-11-24 RX ADMIN — BUDESONIDE AND FORMOTEROL FUMARATE DIHYDRATE 2 PUFF: 160; 4.5 AEROSOL RESPIRATORY (INHALATION) at 20:56

## 2021-11-24 ASSESSMENT — ENCOUNTER SYMPTOMS
ABDOMINAL PAIN: 0
DIARRHEA: 0
BLOOD IN STOOL: 0
RHINORRHEA: 0
COUGH: 0
CONSTIPATION: 0
WHEEZING: 0
NAUSEA: 0
SHORTNESS OF BREATH: 1
CHEST TIGHTNESS: 0
VOMITING: 0

## 2021-11-24 ASSESSMENT — PAIN DESCRIPTION - PROGRESSION
CLINICAL_PROGRESSION: NOT CHANGED

## 2021-11-24 ASSESSMENT — PAIN SCALES - GENERAL
PAINLEVEL_OUTOF10: 0

## 2021-11-24 NOTE — PROGRESS NOTES
Occupational Therapy  Facility/Department: [facility] ICU  Daily Treatment Note  NAME: Milady Aquino  : 1955  MRN: [id number]    Date of Service: 2021    Discharge Recommendations:  Patient would benefit from continued therapy after discharge       Assessment   Performance deficits / Impairments: Decreased functional mobility ; Decreased ADL status; Decreased strength; Decreased endurance; Decreased high-level IADLs; Decreased balance; Decreased posture  Assessment: Pt. completed ADL transfer from EOB to bedside commode with mod assist x2. Pt. O2 stats decreased with mobility requiring O2 to be increase from 3L to 6L. Pt. returned to bed and O2 stats returned to 88% supine on left side. Skilled OT warranted to increase I/safety to return pt. to prior living arrangement with assist as needed. Prognosis: Good  OT Education: OT Role; Energy Conservation; Plan of Care; Precautions; Transfer Training  Patient Education: Pt. educated on proper use of bedrails, RW and breathing tech with mobility and rest to maintain healthy O2 levels. REQUIRES OT FOLLOW UP: Yes  Activity Tolerance  Activity Tolerance: Treatment limited secondary to medical complications (free text) (de stating)  Activity Tolerance: fair  Safety Devices  Safety Devices in place: Yes  Type of devices: All fall risk precautions in place; Call light within reach; Nurse notified; Patient at risk for falls; Left in bed         Patient Diagnosis(es): The encounter diagnosis was COVID-19.      has a past medical history of Anxiety, Arthritis, Bell's palsy, Chronic neck pain, Chronic pain, Fibromyalgia, HA (headache), Headache, HTN (hypertension), and Hypertension. has a past surgical history that includes  section; shoulder surgery; Urethra dilation; Arm Surgery; Davy tooth extraction; joint replacement (Left); Cholecystectomy, laparoscopic (N/A, 2020);  Cholecystectomy (2020); and IR INSERT PICC VAD W SQ PORT >5 YEARS (11/16/2021). Restrictions  Restrictions/Precautions  Restrictions/Precautions: General Precautions, Fall Risk  Required Braces or Orthoses?: No  Position Activity Restriction  Other position/activity restrictions: Up as tolerated, monitor SpO2, COVID+; HR tachy w/ light activiyt; now on 3 Lpm of n.c. Subjective   General  Chart Reviewed: Yes  Patient assessed for rehabilitation services?: Yes  Additional Pertinent Hx: Pt. agreeable for treatment  Response to previous treatment: Patient with no complaints from previous session  Family / Caregiver Present: No  Subjective  Subjective: \"I'm happy to see you today\"  General Comment  Comments: Pt. motivated and friendly      Orientation  Orientation  Overall Orientation Status: Within Functional Limits  Objective    ADL  Toileting: Moderate assistance (x2)  Additional Comments: Pt. completed toileting transfer to bedside commode. Balance  Sitting Balance: Minimal assistance  Standing Balance: Moderate assistance (x2)  Standing Balance  Time: 1min  Activity: ADL transfer from EOB to bedside commode  Comment: O2 monitored and decreased with mobility requiring an increase to 6 L and pt. to be returned to bed. Functional Mobility  Functional - Mobility Device: Rolling Walker  Activity:  (to and from bedside commode)  Assist Level: Moderate assistance  Functional Mobility Comments: Verbal cues for proper hand placement on walker and reaching back for sitting surface. Toilet Transfers  Toilet - Technique: To right  Equipment Used: Standard bedside commode  Toilet Transfer: Moderate assistance; 2 Person assistance  Bed mobility  Bridging: Stand by assistance  Rolling to Left: Minimal assistance  Rolling to Right: Minimal assistance  Supine to Sit: Moderate assistance  Sit to Supine: Moderate assistance  Scooting: Stand by assistance  Comment: Pt. used bedrails to assist with positioning, pt followed cues on first command.   Transfers  Stand Step Transfers: 2 Person assistance; Moderate assistance (to insure safety and pivot transfer from EOB to bedside commode)  Stand Pivot Transfers: Moderate assistance; 2 Person assistance  Sit to stand: Moderate assistance; 2 Person assistance  Stand to sit: Moderate assistance; 2 Person assistance  Transfer Comments: Pt. completed functional transfer with mod assist x2 with use of RW to pivot to bedside commode. O2 levels while sitting EOB 94-96%, when transfer completed O2 level decreased to 70% requiring O2 to be increased to 6 L. Pt. returned to bed after use of toilet and O2 stats increased to 88% laying on left side supine. Cognition  Overall Cognitive Status: [facility]  Cognition Comment: Pt. in good spirits and motivated for treatment     Perception  Overall Perceptual Status: [facility]         Plan   Plan  Times per week: 4-5x/wk 1x/day as rufina  Times per day: Daily  Current Treatment Recommendations: Strengthening, Endurance Training, Patient/Caregiver Education & Training, Equipment Evaluation, Education, & procurement, Self-Care / ADL, Home Management Training, Functional Mobility Training, Safety Education & Training  Plan Comment: Cont. OT with stated POC           AM-PAC Score        AM-[facility] Inpatient Daily Activity Raw Score: 13 (11/24/21 1245)  AM-PAC Inpatient ADL T-Scale Score : 32.03 (11/24/21 1245)  ADL Inpatient CMS 0-100% Score: 63.03 (11/24/21 1245)  ADL Inpatient CMS G-Code Modifier : CL (11/24/21 1245)    Goals  Short term goals  Time Frame for Short term goals: By discharge, pt to demo  Short term goal 1: ADL transfers and functional mobility to SBA with use of AD as needed. Short term goal 2: UB ADLS to Set up and LB ADLs SBA with use of AD/AE as needed. Short term goal 3: toileting to SBA with use of AD/grab bars as needed. Short term goal 4: increased B UE strength by 1/2 grade to assist with functional tasks/I with B UE HEP with use of handouts as needed.   Short term goal 5: bed mobility to Mod I with use of bedrails as needed. Long term goals  Long term goal 1: Pt to be I with fall prevention educaiton, Covid specific education, EC/WS tech, recommendations for AE with use of handouts as needed. Patient Goals   Patient goals : To feel better! Therapy Time   Co-tx Concurrent Group Co-treatment   Time In 2685         Time Out 1104         Minutes 25              Co-treatment with PT warranted secondary to decreased safety and independence requiring 2 skilled therapy professionals to address individual discipline's goals. OT addressing preparation for ADL transfer, sitting balance for increased ADL performance, environmental safety/scanning, functional mobility for ADL transfers and ability to sequence and follow directions.     GURVINDER Forte

## 2021-11-24 NOTE — CARE COORDINATION
[facility] cannot take the patient. Writer will attempt other home care agencies. [facility], is going to check on determination for start of care. Waiting on a return phone call. [facility] accepted the patient. Plan is home spouse after home O2 eval is completed.

## 2021-11-24 NOTE — PLAN OF CARE
Problem: Falls - Risk of:  Goal: Will remain free from falls  Description: Will remain free from falls,Fall risk assessment completed. Patient instructed to use call light. Bed locked and in lowest position, side rails up 2/4, call light and bedside table within reach, clutter removed, and non-skid footwear on when pt out of bed. Hourly rounds will continue.    Outcome: Ongoing     Problem: Breathing Pattern - Ineffective  Goal: Ability to achieve and maintain a regular respiratory rate  Outcome: Ongoing

## 2021-11-24 NOTE — PROGRESS NOTES
Patient has been weaned down to 4L NC and was up in the chair today. Pt worked with PT/OT, she desated to 80% early this morning, but tolerated therapy in the afternoon. [place] was removed and patient has voided. Call light within reach and bed in lowest position and locked. RN updated the patient's .

## 2021-11-24 NOTE — PROGRESS NOTES
PICC line removed. Patient tolerated well. See chart for more details. Verbal order from Dr. Valerio thurston to not send tip for cultures.

## 2021-11-24 NOTE — FLOWSHEET NOTE
12 hour shift lucero, pt alert/o x4, pt has a cold sore on her upper lip, covid 19 precautions, blood glucose 171 with 1 unit os ssc given, pt have several hs snacks, pt up brp x1 with walker and steady gait, pt denies pain, call light in reach and bed alarm on, pt repositioned several times per pt request.

## 2021-11-24 NOTE — PROGRESS NOTES
[facility]  Office: [address], DO, Poly Shan, DO, Samy Cait, DO, Poly Megan Sevilla, DO, Frieda Bass MD, Dimas Patel MD, Lisa Hodge MD, Teodoro Doty MD, Rosie Lester MD, Leeann Alvarez MD, Aziza Owens MD, Porfirio Wallace, DO, Nancy Hernandez DO, Brittany Ziegler MD,  Serene Castellanos, DO, Rosy Schirmer, MD, Rustam Mulligan MD, Julienne Oswald MD, Liseth Jarvis MD, Jamar Cyr MD, Davey Becerril MD, Velta Bernheim, MD, Anette Adam, [facility], [facility], CNP, Kentrell Gallegos, CNP, Wellington Rai, CNS, Kenny Mcdaniels, CNP, Giuseppe Méndez, CNP, Rebecca Ramsey, CNP, Monisha Garcia, CNP, Ekaterina Flor, CNP, Phoenix Delgado PA-C, Darline Navarrete, PAULO, Tiffanie Landry, CNP, Anika Petty, CNP, Cherelle Ricci, CNP, Orlando Sultana, CNP, Rowan Key, CNP, Yaneth Hobbs, CNP, Kaykay Scruggs, [facility]      Daily Progress Note     Admit Date: 11/3/2021  Bed/Room No.  1102/1102-01  Admitting Physician : Rustam Mulligan MD  Code Status :Full Code  Hospital Day:  LOS: 20 days   Chief Complaint:     Chief Complaint   Patient presents with    Abdominal Pain     lower    Nausea    Back Pain    Headache     Principal Problem:    Pneumonia due to COVID-19 virus  Active Problems:    Essential hypertension    Prediabetes    Morbid obesity with BMI of 40.0-44.9, adult ([address]    Hyperglycemia    Acute respiratory failure with hypoxia ([address]    Acute urinary retention  Resolved Problems:    * No resolved hospital problems. *    Subjective : Interval History/Significant events :  11/24/21    Patient was seen and evaluated at bedside this morning. She reports that she is feeling slightly better this morning. Patient is down to 3 L of oxygen via nasal cannula. Patient was initially down to 2 L of oxygen via nasal cannula at 1 time however with any activity she desats very quickly. She had to be bumped up back to 6 L of oxygen and I will try to work her down.   Currently she is down to 3 L of oxygen. Case was discussed with patient's  over the phone as well and he seemed unhappy stating that nobody has told him what type of diagnosis his wife has over time of treatment she is getting. Of note patient's  also mentioned that I am the first person stating that patient is being treated for Covid. This was discussed with the nursing staff and as expected patient's  has been notified in the past that patient is being treated for COVID-19 pneumonia. Nursing notes , Consults notes reviewed. Overnight events and updates discussed with Nursing staff . Background History:         Padmini Onofre is 74 y.o. female  Who was admitted to the hospital on 11/3/2021 for treatment of Pneumonia due to COVID-19 virus. Patient presented to emergency room with headache, abdominal pain, nausea, fever T-max 102. 2. [facility] Patient tested positive for COVID-19. Chest x-ray showed bibasilar opacities consistent with COVID-19 pneumonia. She has underlying history of obesity, fibromyalgia, chronic pain, hypertension. Patient was not vaccinated against COVID-19. CT chest was negative for PE. Patient had acute respiratory failure was requiring O2 support. Breathing status worsened and required BiPAP with 100% FiO2 and was moved to ICU on 11/10/2021. PMH:  Past Medical History:   Diagnosis Date    Anxiety     Arthritis     Bell's palsy     Chronic neck pain     posterior neck since fall April 2014    Chronic pain     low back and bilateral lower extremities    Fibromyalgia     HA (headache)     Headache     off/on since fall April 2014    HTN (hypertension)     Hypertension       Allergies:    Allergies   Allergen Reactions    Other Hives    Ceclor [Cefaclor]     Food      All melons      Sulfa Antibiotics      Projectile vomiting       Medications :  budesonide-formoterol, 2 puff, Inhalation, BID  alteplase, 1 mg, IntraCATHeter, Once  dexamethasone, 6 mg, IntraVENous, Q24H  metoprolol succinate, 25 mg, Oral, Daily  pregabalin, 50 mg, Oral, BID  polyethylene glycol, 17 g, Oral, Daily  docusate sodium, 100 mg, Oral, BID  insulin lispro, 0-12 Units, SubCUTAneous, TID WC  insulin lispro, 0-6 Units, SubCUTAneous, Nightly  ipratropium-albuterol, 1 ampule, Inhalation, Q4H WA  miconazole, , Topical, BID  mirtazapine, 7.5 mg, Oral, Nightly  sodium chloride flush, 5-40 mL, IntraVENous, 2 times per day  enoxaparin, 30 mg, SubCUTAneous, BID  Vitamin D, 2,000 Units, Oral, Daily  influenza virus vaccine, 0.5 mL, IntraMUSCular, Prior to discharge        Review of Systems   Review of Systems   Constitutional: Positive for activity change, appetite change and fatigue. Negative for fever and unexpected weight change. HENT: Negative for congestion, rhinorrhea and sneezing. Eyes: Negative for visual disturbance. Respiratory: Positive for shortness of breath. Negative for cough, chest tightness and wheezing. Cardiovascular: Negative for chest pain and palpitations. Gastrointestinal: Negative for abdominal pain, blood in stool, constipation, diarrhea, nausea and vomiting. Genitourinary: Negative for dysuria, enuresis, frequency and hematuria. Musculoskeletal: Negative for arthralgias and myalgias. Skin: Negative for rash. Neurological: Negative for dizziness, weakness, light-headedness and headaches. Syncope: Patient was seen and evaluated at bedside this morning. Patient with history. Hematological: Does not bruise/bleed easily. Psychiatric/Behavioral: Negative for dysphoric mood and sleep disturbance.      Objective :      Current Vitals : Temp: 97.6 °F (36.4 °C),  Pulse: 119, Resp: 24, BP: 119/79, SpO2: (!) 74 %  Last 24 Hrs Vitals   Patient Vitals for the past 24 hrs:   BP Temp Temp src Pulse Resp SpO2 Height Weight   11/24/21 1200 119/79 -- -- 119 24 (!) 74 % -- --   11/24/21 1130 -- -- -- -- 23 92 % -- --   11/24/21 1121 113/76 -- -- 118 18 93 % -- --   11/24/21 0900 (!) 153/99 -- -- 95 20 97 % -- --   11/24/21 0737 -- -- -- -- 16 95 % -- --   11/24/21 0730 -- -- -- -- 13 97 % -- --   11/24/21 0700 134/87 97.6 °F (36.4 °C) Oral 89 24 97 % -- --   11/24/21 0600 -- -- -- -- -- -- -- 229 lb 4.5 oz (104 kg)   11/24/21 0500 111/63 97.9 °F (36.6 °C) Oral 68 18 -- -- --   11/24/21 0100 -- 97.9 °F (36.6 °C) -- -- -- -- -- --   11/24/21 0000 -- 97.9 °F (36.6 °C) Oral -- -- -- -- --   11/23/21 2053 -- -- -- -- 20 96 % -- --   11/23/21 2046 -- -- -- -- 25 93 % -- --   11/23/21 2000 -- 98.4 °F (36.9 °C) Oral -- -- -- -- --   11/23/21 1621 -- -- -- -- -- -- 5' 2\" (1.575 m) --   11/23/21 1600 112/80 98 °F (36.7 °C) -- 119 (!) 32 (!) 88 % -- --   11/23/21 1500 -- -- -- -- 30 (!) 87 % -- --     Intake / output   11/23 0701 - 11/24 0700  In: 850 [P.O.:850]  Out: 800 [Urine:800]  Physical Exam:  Physical Exam  Vitals and nursing note reviewed. Constitutional:       General: She is not in acute distress. Appearance: She is not diaphoretic. Interventions: Nasal cannula in place. Comments: On High Flow    HENT:      Head: Normocephalic and atraumatic. Nose:      Right Sinus: No maxillary sinus tenderness or frontal sinus tenderness. Left Sinus: No maxillary sinus tenderness or frontal sinus tenderness. Mouth/Throat:      Pharynx: No oropharyngeal exudate. Eyes:      General: No scleral icterus. Conjunctiva/sclera: Conjunctivae normal.      Pupils: Pupils are equal, round, and reactive to light. Neck:      Thyroid: No thyromegaly. Vascular: No JVD. Cardiovascular:      Rate and Rhythm: Normal rate and regular rhythm. Pulses:           Dorsalis pedis pulses are 2+ on the right side and 2+ on the left side. Heart sounds: Normal heart sounds. No murmur heard. Pulmonary:      Effort: Pulmonary effort is normal.      Breath sounds: Normal breath sounds. No wheezing or rales. Abdominal:      Palpations: Abdomen is soft. There is no mass. Tenderness: There is no abdominal tenderness. Musculoskeletal:      Cervical back: Full passive range of motion without pain and neck supple. Lymphadenopathy:      Head:      Right side of head: No submandibular adenopathy. Left side of head: No submandibular adenopathy. Cervical: No cervical adenopathy. Skin:     General: Skin is warm. Neurological:      Mental Status: She is alert and oriented to person, place, and time. Motor: No tremor. Psychiatric:         Behavior: Behavior is cooperative. Laboratory findings:    Recent Labs     11/22/21  0520 11/23/21  0406 11/24/21  0526   WBC 10.6 12.7* 21.0*   HGB 14.0 13.7 13.8   HCT 42.4 41.6 41.0    232 305     Recent Labs     11/22/21  0604 11/23/21  0406 11/24/21  0526   * 137 138   K 4.2 4.3 3.8    102 104   CO2 25 26 23   GLUCOSE 103* 126* 51*   BUN 29* 29* 28*   CREATININE 0.54 0.53 0.52   CALCIUM 8.5* 8.6 8.8            Specific Gravity, UA   Date Value Ref Range Status   11/10/2021 1.020 1.005 - 1.030 Final     Protein, UA   Date Value Ref Range Status   11/10/2021 NEGATIVE NEGATIVE Final     RBC, UA   Date Value Ref Range Status   11/02/2021 2 TO 5 0 - 2 /HPF Final     Bacteria, UA   Date Value Ref Range Status   11/02/2021 NOT REPORTED None Final     Nitrite, Urine   Date Value Ref Range Status   11/10/2021 NEGATIVE NEGATIVE Final     WBC, UA   Date Value Ref Range Status   11/02/2021 2 TO 5 0 - 5 /HPF Final     Leukocyte Esterase, Urine   Date Value Ref Range Status   11/10/2021 NEGATIVE NEGATIVE Final       Imaging / Clinical Data :-   XR CHEST PORTABLE    Result Date: 11/14/2021  Moderate bilateral parenchymal opacities. Minimally increased parenchymal density on the right compared to prior studies.      XR CHEST PORTABLE    Result Date: 11/12/2021  Moderate bilateral pneumonia, similar to the previous exam.     XR CHEST PORTABLE    Result Date: 11/11/2021  Findings remain compatible with moderate COVID-19 pneumonitis. No significant interval changes. XR CHEST PORTABLE    Result Date: 11/9/2021  Multifocal pulmonary opacities suggest significantly worsened pulmonary edema and or multifocal pneumonia. Recommend continued follow-up to resolution. CT CHEST PULMONARY EMBOLISM W CONTRAST    Result Date: 11/9/2021  No evidence of pulmonary embolism. Extensive pulmonary abnormalities most compatible with COVID-19 pneumonia; other processes such as influenza pneumonia and organizing pneumonia, as can be seen with drug toxicity and connective tissue disease, can cause a similar imaging pattern. Mild adenopathy, probably reactive. Clinical Course : unchanged  Assessment and Plan  :        1. Acute respiratory failure with hypoxia secondary to COVID-19 pneumonia -off high flow. Continue dexamethasone high-dose, Actemra given on 11/10/2021. Continue bronchodilator therapy. Patient completed 8-day course of Levaquin. Lasix as needed. Wean down on oxygen as tolerated. 2. Morbid obesity BMI 41. Encourage weight loss  3. Essential hypertension -continue to hold Prinzide. Continue Toprol XL 25 mg daily. .  4. Prediabetes worsened with steroid-induced hyperglycemia - A1C 5.8. Humalog as needed. 5. Hyperkalemia - resolved with  Kayexalate -    Continue current care. Encourage physical therapy, incentive spirometry. Continue to monitor vitals , Intake / output ,  Cell count , HGB , Kidney function, oxygenation  as indicated . Plan and updates discussed with patient ,  answers  explained to satisfaction. Plan discussed with Staff RN     Case was discussed with patient's  over the phone. He continues to disregard recommendation of patient going to SNF before going home due to her acute desaturation with any activity. We will try to address this with patient directly and see what she thinks of going to SNF prior to going home with home care.     (Please note that portions of this note were completed with a voice recognition program. Efforts were made to edit the dictations but occasionally words are mis-transcribed.)      Danae Mejia MD  11/24/2021

## 2021-11-24 NOTE — PROGRESS NOTES
Physical Therapy  Facility/Department: [facility] ICU  Daily Treatment Note  NAME: Reinier Samuels  : 1955  MRN: [id number]    Date of Service: 2021    Discharge Recommendations:  Patient would benefit from continued therapy after discharge     Pt currently functioning below baseline. Would suggest additional therapy at time of discharge to maximize long term outcomes and prevent re-admission. Please refer to AM-PAC score for current level of function. Assessment   Body structures, Functions, Activity limitations: Decreased functional mobility ; Decreased ADL status; Decreased strength; Decreased endurance; Decreased balance; Decreased safe awareness  Assessment: Patient demo decreased awareness of elevated HR and low SP02, requires frequent cues for slow deep breathing and breathing techniques. Patient will benefit from skilled PT to improve gait, transfers, balance, and strength. Prognosis: Good  Decision Making: Medium Complexity  PT Education: General Safety; Transfer Training  REQUIRES PT FOLLOW UP: Yes  Activity Tolerance  Activity Tolerance: Patient limited by endurance  Activity Tolerance: LImited by elevated HR and low SP02 with minimal activity     Patient Diagnosis(es): The encounter diagnosis was COVID-19.     has a past medical history of Anxiety, Arthritis, Bell's palsy, Chronic neck pain, Chronic pain, Fibromyalgia, HA (headache), Headache, HTN (hypertension), and Hypertension. has a past surgical history that includes  section; shoulder surgery; Urethra dilation; Arm Surgery; [place] tooth extraction; joint replacement (Left); Cholecystectomy, laparoscopic (N/A, 2020); Cholecystectomy (2020); and IR INSERT PICC VAD W SQ PORT >5 YEARS (2021).     Restrictions  Restrictions/Precautions  Restrictions/Precautions: General Precautions, Fall Risk  Required Braces or Orthoses?: No  Position Activity Restriction  Other position/activity restrictions: Up as tolerated, monitor SpO2, COVID+; HR tachy w/ light activiyt; now on 3 Lpm of n.c. Subjective              Orientation  Orientation  Overall Orientation Status: Within Functional Limits  Cognition   Cognition  Overall Cognitive Status: WFL  Objective   Bed mobility  Rolling to Left: Minimal assistance  Rolling to Right: Minimal assistance  Sit to Supine: Moderate assistance  Scooting: Stand by assistance  Comment: Verbal cues for hand placement  Transfers  Sit to Stand: Moderate Assistance; 2 Person Assistance  Stand to sit: Moderate Assistance; 2 Person Assistance  Bed to Chair: Moderate assistance; 2 Person Assistance  Stand Pivot Transfers: Moderate Assistance; 2 Person Assistance  Comment: Patient requires verbal cues for hand placement, 2 assist required due to respiratory status, multiple lines and cords. Ambulation  Ambulation?: Yes  Ambulation 1  Surface: level tile  Device: Rolling Walker  Other Apparatus: O2  Assistance: Moderate assistance; 2 Person assistance  Quality of Gait: small shuffling steps, Sp02 up to 140s with activity. Patient on 3 liters 02, Sp02 94% at rest, initally 96% sitting EOB, down to 70% sitting on commode trying to urinate, SP02 turned up to 6 liters, after 10 minutes Sp02 up to 88%. RN notified of vitals and that patient was able to urinate on commode. Gait Deviations: Slow [place]; Decreased step length; Decreased step height  Distance: 4 feet bed to commode and back.      Balance  Posture: Fair  Sitting - Static: Good  Sitting - Dynamic: Good  Standing - Static: Fair (RW)  Standing - Dynamic: Fair (RW)      OutComes Score    AM-PAC Score  AM-PAC Inpatient Mobility Raw Score : 11 (11/24/21 1301)  AM-PAC Inpatient T-Scale Score : 33.86 (11/24/21 1301)  Mobility Inpatient CMS 0-100% Score: 72.57 (11/24/21 1301)  Mobility Inpatient CMS G-Code Modifier : CL (11/24/21 1301)          Goals  Short term goals  Time Frame for Short term goals: 12 visits  Short term goal 1: Patient will be indep with bed mobility and transfers. Short term goal 2: Patient will amb 100 feet indep with appropriate AD. Short term goal 3: Patient will negotiate 4 stairs with rails and supervision. Short term goal 4: Inc standing balance to good with device to facilitate pt independence for performance of ADL's & functional mobility, & reduce fall risk  Short term goal 5: Pt able to tolerate 30 min of activity to include ex, breathing techniques & endurance training with pt demonstrating ability to perform energy conservation strategies during functional activity to self-regulate pacing & breathing techniques to avoid SOB & maintain saO2 in safe range  Short term goal 6: Ed pt on home ex's, optimal breathing techniques, safety & energy principles, endurance training, Covid 19 pt education & issue written pt education  Patient Goals   Patient goals : Improve breathing and walking    Plan    Plan  Times per week: 1-2x/d, 5-6 d/wk  Specific instructions for Next Treatment: r.walker  pressure relief exercises  Current Treatment Recommendations: Strengthening, Balance Training, Functional Mobility Training, Transfer Training, Endurance Training, Gait Training, Stair training, Patient/Caregiver Education & Training, Safety Education & Training, Home Exercise Program, Positioning  Safety Devices  Type of devices: Call light within reach, Nurse notified, Left in bed, Gait belt, All fall risk precautions in place  Restraints  Initially in place: No   Co-treatment with OT warranted secondary to decreased safety and independence requiring 2 skilled therapy professionals to address individual discipline's goals. PT addressing pre gait trunk strengthening, weight shifting prior to transfers and transfer training.   Therapy Time   Individual Concurrent Group Co-treatment   Time In 0951         Time Out 1104         Minutes 25                 [place], [place]

## 2021-11-25 LAB
ABSOLUTE EOS #: 0.62 K/UL (ref 0–0.44)
ABSOLUTE IMMATURE GRANULOCYTE: 0.74 K/UL (ref 0–0.3)
ABSOLUTE LYMPH #: 2.09 K/UL (ref 1.1–3.7)
ABSOLUTE MONO #: 0.74 K/UL (ref 0.1–1.2)
ANION GAP SERPL CALCULATED.3IONS-SCNC: 10 MMOL/L (ref 9–17)
BASOPHILS # BLD: 1 % (ref 0–2)
BASOPHILS ABSOLUTE: 0.12 K/UL (ref 0–0.2)
BUN BLDV-MCNC: 26 MG/DL (ref 8–23)
BUN/CREAT BLD: 41 (ref 9–20)
CALCIUM SERPL-MCNC: 8.7 MG/DL (ref 8.6–10.4)
CHLORIDE BLD-SCNC: 101 MMOL/L (ref 98–107)
CO2: 25 MMOL/L (ref 20–31)
CREAT SERPL-MCNC: 0.64 MG/DL (ref 0.5–0.9)
DIFFERENTIAL TYPE: ABNORMAL
EOSINOPHILS RELATIVE PERCENT: 5 % (ref 1–4)
GFR AFRICAN AMERICAN: >60 ML/MIN
GFR NON-AFRICAN AMERICAN: >60 ML/MIN
GFR SERPL CREATININE-BSD FRML MDRD: ABNORMAL ML/MIN/{1.73_M2}
GFR SERPL CREATININE-BSD FRML MDRD: ABNORMAL ML/MIN/{1.73_M2}
GLUCOSE BLD-MCNC: 102 MG/DL (ref 70–99)
GLUCOSE BLD-MCNC: 166 MG/DL (ref 65–105)
GLUCOSE BLD-MCNC: 236 MG/DL (ref 65–105)
GLUCOSE BLD-MCNC: 260 MG/DL (ref 65–105)
GLUCOSE BLD-MCNC: 94 MG/DL (ref 65–105)
HCT VFR BLD CALC: 40.8 % (ref 36.3–47.1)
HEMOGLOBIN: 13.2 G/DL (ref 11.9–15.1)
IMMATURE GRANULOCYTES: 6 %
LYMPHOCYTES # BLD: 17 % (ref 24–43)
MCH RBC QN AUTO: 30.4 PG (ref 25.2–33.5)
MCHC RBC AUTO-ENTMCNC: 32.4 G/DL (ref 28.4–34.8)
MCV RBC AUTO: 94 FL (ref 82.6–102.9)
MONOCYTES # BLD: 6 % (ref 3–12)
NRBC AUTOMATED: 0 PER 100 WBC
PDW BLD-RTO: 13.2 % (ref 11.8–14.4)
PLATELET # BLD: 185 K/UL (ref 138–453)
PLATELET ESTIMATE: ABNORMAL
PMV BLD AUTO: 10.4 FL (ref 8.1–13.5)
POTASSIUM SERPL-SCNC: 3.8 MMOL/L (ref 3.7–5.3)
RBC # BLD: 4.34 M/UL (ref 3.95–5.11)
RBC # BLD: ABNORMAL 10*6/UL
SEG NEUTROPHILS: 65 % (ref 36–65)
SEGMENTED NEUTROPHILS ABSOLUTE COUNT: 7.99 K/UL (ref 1.5–8.1)
SODIUM BLD-SCNC: 136 MMOL/L (ref 135–144)
WBC # BLD: 12.3 K/UL (ref 3.5–11.3)
WBC # BLD: ABNORMAL 10*3/UL

## 2021-11-25 PROCEDURE — 36415 COLL VENOUS BLD VENIPUNCTURE: CPT

## 2021-11-25 PROCEDURE — 2060000000 HC ICU INTERMEDIATE R&B

## 2021-11-25 PROCEDURE — 6370000000 HC RX 637 (ALT 250 FOR IP): Performed by: NURSE PRACTITIONER

## 2021-11-25 PROCEDURE — 6360000002 HC RX W HCPCS: Performed by: NURSE PRACTITIONER

## 2021-11-25 PROCEDURE — 80048 BASIC METABOLIC PNL TOTAL CA: CPT

## 2021-11-25 PROCEDURE — 6370000000 HC RX 637 (ALT 250 FOR IP): Performed by: INTERNAL MEDICINE

## 2021-11-25 PROCEDURE — 93005 ELECTROCARDIOGRAM TRACING: CPT | Performed by: INTERNAL MEDICINE

## 2021-11-25 PROCEDURE — 6370000000 HC RX 637 (ALT 250 FOR IP): Performed by: STUDENT IN AN ORGANIZED HEALTH CARE EDUCATION/TRAINING PROGRAM

## 2021-11-25 PROCEDURE — 6360000002 HC RX W HCPCS: Performed by: INTERNAL MEDICINE

## 2021-11-25 PROCEDURE — 99232 SBSQ HOSP IP/OBS MODERATE 35: CPT | Performed by: INTERNAL MEDICINE

## 2021-11-25 PROCEDURE — 6370000000 HC RX 637 (ALT 250 FOR IP): Performed by: FAMILY MEDICINE

## 2021-11-25 PROCEDURE — 82947 ASSAY GLUCOSE BLOOD QUANT: CPT

## 2021-11-25 PROCEDURE — 94640 AIRWAY INHALATION TREATMENT: CPT

## 2021-11-25 PROCEDURE — 94761 N-INVAS EAR/PLS OXIMETRY MLT: CPT

## 2021-11-25 PROCEDURE — 85025 COMPLETE CBC W/AUTO DIFF WBC: CPT

## 2021-11-25 PROCEDURE — 2700000000 HC OXYGEN THERAPY PER DAY

## 2021-11-25 PROCEDURE — 2580000003 HC RX 258: Performed by: NURSE PRACTITIONER

## 2021-11-25 RX ORDER — METOPROLOL SUCCINATE 50 MG/1
100 TABLET, EXTENDED RELEASE ORAL DAILY
Status: DISCONTINUED | OUTPATIENT
Start: 2021-11-25 | End: 2021-11-27 | Stop reason: HOSPADM

## 2021-11-25 RX ADMIN — PREGABALIN 50 MG: 50 CAPSULE ORAL at 19:46

## 2021-11-25 RX ADMIN — POLYETHYLENE GLYCOL 3350 17 G: 17 POWDER, FOR SOLUTION ORAL at 09:11

## 2021-11-25 RX ADMIN — DEXAMETHASONE 6 MG: 4 TABLET ORAL at 09:12

## 2021-11-25 RX ADMIN — INSULIN LISPRO 4 UNITS: 100 INJECTION, SOLUTION INTRAVENOUS; SUBCUTANEOUS at 16:59

## 2021-11-25 RX ADMIN — INSULIN LISPRO 2 UNITS: 100 INJECTION, SOLUTION INTRAVENOUS; SUBCUTANEOUS at 11:47

## 2021-11-25 RX ADMIN — ENOXAPARIN SODIUM 30 MG: 100 INJECTION SUBCUTANEOUS at 09:12

## 2021-11-25 RX ADMIN — IPRATROPIUM BROMIDE AND ALBUTEROL SULFATE 1 AMPULE: .5; 2.5 SOLUTION RESPIRATORY (INHALATION) at 10:11

## 2021-11-25 RX ADMIN — IPRATROPIUM BROMIDE AND ALBUTEROL SULFATE 1 AMPULE: .5; 2.5 SOLUTION RESPIRATORY (INHALATION) at 14:34

## 2021-11-25 RX ADMIN — DOCUSATE SODIUM 100 MG: 100 CAPSULE, LIQUID FILLED ORAL at 09:12

## 2021-11-25 RX ADMIN — IPRATROPIUM BROMIDE AND ALBUTEROL SULFATE 1 AMPULE: .5; 2.5 SOLUTION RESPIRATORY (INHALATION) at 06:13

## 2021-11-25 RX ADMIN — DOCUSATE SODIUM 100 MG: 100 CAPSULE, LIQUID FILLED ORAL at 19:46

## 2021-11-25 RX ADMIN — METOPROLOL SUCCINATE 100 MG: 50 TABLET, EXTENDED RELEASE ORAL at 09:12

## 2021-11-25 RX ADMIN — SODIUM CHLORIDE, PRESERVATIVE FREE 10 ML: 5 INJECTION INTRAVENOUS at 19:46

## 2021-11-25 RX ADMIN — ALPRAZOLAM 0.25 MG: 0.25 TABLET ORAL at 09:12

## 2021-11-25 RX ADMIN — MIRTAZAPINE 7.5 MG: 15 TABLET, FILM COATED ORAL at 19:46

## 2021-11-25 RX ADMIN — Medication 2000 UNITS: at 09:13

## 2021-11-25 RX ADMIN — BUDESONIDE AND FORMOTEROL FUMARATE DIHYDRATE 2 PUFF: 160; 4.5 AEROSOL RESPIRATORY (INHALATION) at 17:55

## 2021-11-25 RX ADMIN — ENOXAPARIN SODIUM 30 MG: 100 INJECTION SUBCUTANEOUS at 19:45

## 2021-11-25 RX ADMIN — INSULIN LISPRO 3 UNITS: 100 INJECTION, SOLUTION INTRAVENOUS; SUBCUTANEOUS at 19:45

## 2021-11-25 RX ADMIN — ALPRAZOLAM 0.25 MG: 0.25 TABLET ORAL at 22:33

## 2021-11-25 RX ADMIN — PREGABALIN 50 MG: 50 CAPSULE ORAL at 09:12

## 2021-11-25 RX ADMIN — ANTI-FUNGAL POWDER MICONAZOLE NITRATE TALC FREE: 1.42 POWDER TOPICAL at 09:13

## 2021-11-25 RX ADMIN — BUDESONIDE AND FORMOTEROL FUMARATE DIHYDRATE 2 PUFF: 160; 4.5 AEROSOL RESPIRATORY (INHALATION) at 06:14

## 2021-11-25 RX ADMIN — ANTI-FUNGAL POWDER MICONAZOLE NITRATE TALC FREE: 1.42 POWDER TOPICAL at 19:50

## 2021-11-25 RX ADMIN — ACETAMINOPHEN 650 MG: 325 TABLET ORAL at 01:26

## 2021-11-25 RX ADMIN — IPRATROPIUM BROMIDE AND ALBUTEROL SULFATE 1 AMPULE: .5; 2.5 SOLUTION RESPIRATORY (INHALATION) at 17:52

## 2021-11-25 RX ADMIN — SODIUM CHLORIDE, PRESERVATIVE FREE 10 ML: 5 INJECTION INTRAVENOUS at 09:13

## 2021-11-25 ASSESSMENT — PAIN DESCRIPTION - PROGRESSION

## 2021-11-25 ASSESSMENT — ENCOUNTER SYMPTOMS
COUGH: 0
CONSTIPATION: 0
SHORTNESS OF BREATH: 1
RHINORRHEA: 0
CHEST TIGHTNESS: 0
DIARRHEA: 0
NAUSEA: 0
BLOOD IN STOOL: 0
WHEEZING: 0
VOMITING: 0
ABDOMINAL PAIN: 0

## 2021-11-25 ASSESSMENT — PAIN SCALES - GENERAL
PAINLEVEL_OUTOF10: 0
PAINLEVEL_OUTOF10: 4

## 2021-11-25 NOTE — CONSULTS
[facility]   Consult Note         Today's Date: 2021  Patient Name: Juan Haywood  Date of admission: 11/3/2021 10:05 PM  Patient's age: 74 y.o., 1955  Admission Dx: [id number] [U07.1]    Reason for Consult:  Cardiac evaluation    Requesting Physician: Justin Saucedo MD    REASON FOR CONSULT:  COVID PNA, tachycardia    History Obtained From:  Patient, chart, staff, records    HISTORY OF PRESENT ILLNESS:      The patient is a 74 y.o. female who is admitted to the hospital for covid PNA. Noted to be tachycardic. Past Medical History:   has a past medical history of Anxiety, Arthritis, Bell's palsy, Chronic neck pain, Chronic pain, Fibromyalgia, HA (headache), Headache, HTN (hypertension), and Hypertension. Past Surgical History:   has a past surgical history that includes  section; shoulder surgery; Urethra dilation; Arm Surgery; [place] tooth extraction; joint replacement (Left); Cholecystectomy, laparoscopic (N/A, 2020); Cholecystectomy (2020); and IR INSERT PICC VAD W SQ PORT >5 YEARS (2021). Home Medications:    Prior to Admission medications    Medication Sig Start Date End Date Taking?  Authorizing Provider   ondansetron (ZOFRAN ODT) 4 MG disintegrating tablet Take 1 tablet by mouth every 8 hours as needed for Nausea 21   Tara Vazquez PA-C   albuterol sulfate HFA (VENTOLIN HFA) 108 (90 Base) MCG/ACT inhaler Inhale 2 puffs into the lungs 4 times daily as needed for Wheezing 10/4/21   Gladys Locke, DO   acetaminophen (TYLENOL) 500 MG tablet Take 500 mg by mouth every 6 hours as needed for Pain    Historical Provider, MD   lisinopril-hydroCHLOROthiazide (PRINZIDE;ZESTORETIC) 20-12.5 MG per tablet TAKE 1 TABLET DAILY 21   Cece Hogue DO   metoprolol succinate (TOPROL XL) 100 MG extended release tablet TAKE 1 TABLET DAILY 21   Gladys Locke,    cetirizine (ZYRTEC) 10 MG tablet Take 10 mg by mouth as needed for Allergies Historical Provider, MD   Multiple Vitamins-Minerals (MULTIVITAMIN ADULT PO) Take 1 tablet by mouth daily     Historical Provider, MD       metoprolol succinate, 100 mg, Oral, Daily    dexamethasone, 6 mg, Oral, Daily    budesonide-formoterol, 2 puff, Inhalation, BID    alteplase, 1 mg, IntraCATHeter, Once    pregabalin, 50 mg, Oral, BID    polyethylene glycol, 17 g, Oral, Daily    docusate sodium, 100 mg, Oral, BID    insulin lispro, 0-12 Units, SubCUTAneous, TID WC    insulin lispro, 0-6 Units, SubCUTAneous, Nightly    ipratropium-albuterol, 1 ampule, Inhalation, Q4H WA    miconazole, , Topical, BID    mirtazapine, 7.5 mg, Oral, Nightly    sodium chloride flush, 5-40 mL, IntraVENous, 2 times per day    enoxaparin, 30 mg, SubCUTAneous, BID    Vitamin D, 2,000 Units, Oral, Daily    influenza virus vaccine, 0.5 mL, IntraMUSCular, Prior to discharge      Allergies: Other, Ceclor [cefaclor], Food, and Sulfa antibiotics    Social History:   reports that she has never smoked. She has never used smokeless tobacco. She reports that she does not drink alcohol and does not use drugs. Family History: family history includes Cancer in her brother and maternal grandmother; Diabetes in her brother, father, and mother; Other in her father and mother; Thyroid Disease in her sister. No h/o sudden cardiac death.     REVIEW OF SYSTEMS:    Deferred due to covid    PHYSICAL EXAM:      BP (!) 135/97   Pulse 98   Temp 98.7 °F (37.1 °C) (Oral)   Resp 21   Ht 5' 2\" (1.575 m)   Wt 227 lb (103 kg)   SpO2 92%   BMI 41.52 kg/m²    Deferred due to covid      EKG:    Date: 11/25/21  Reading: No acute ischemia      LAST ECHO:  Date:  Findings Summary:      LAST Stress Test:   Date of last ST:  Major Findings:    LAST Cardiac Angiography:.  Date:  Findings:      Labs:     CBC:   Recent Labs     11/24/21  0526 11/25/21  0532   WBC 21.0* 12.3*   HGB 13.8 13.2   HCT 41.0 40.8    185     BMP:   Recent Labs 11/24/21  0526 11/25/21  0532    136   K 3.8 3.8   CO2 23 25   BUN 28* 26*   CREATININE 0.52 0.64   LABGLOM >60 >60   GLUCOSE 51* 102*     BNP: No results for input(s): BNP in the last 72 hours. PT/INR: No results for input(s): PROTIME, INR in the last 72 hours. APTT:No results for input(s): APTT in the last 72 hours. CARDIAC ENZYMES:No results for input(s): CKTOTAL, CKMB, CKMBINDEX, TROPONINI in the last 72 hours. FASTING LIPID PANEL:  Lab Results   Component Value Date    HDL 45 08/09/2021    TRIG 140 08/09/2021     LIVER PROFILE:No results for input(s): AST, ALT, LABALBU in the last 72 hours. Troponins: Invalid input(s): TROPONIN     Other Current Problems  Patient Active Problem List   Diagnosis    Essential hypertension    Fibromyalgia    Edema extremities    Obesity, Class III, BMI 40-49.9 (morbid obesity) ([address]    Prediabetes    Arthritis    Extrinsic asthma without complication    Chronic migraine    Bilateral chronic knee pain    Morbid obesity with BMI of 40.0-44.9, adult (HCC)    Gastric ulcer    Hyperglycemia    Pneumonia due to COVID-19 virus    Acute respiratory failure with hypoxia ([address]    Acute urinary retention           IMPRESSION & Recommendations:      Tachycardia- optimize meds. Can use BB  Acute respiratory failure  Covid PNA  HTN- optimize meds  DM  Hyperkalemia    Discussed with patient, family, and Nurse. Electronically signed by Adriana Ortiz DO on 11/25/2021 at 2:14 PM    Adriana Ortiz, [id number] [facility]  [facility] [employer]  [phone]

## 2021-11-25 NOTE — FLOWSHEET NOTE
12 hour shift lucero, pt alert/o x 3. Pt up to bsc, pt stating she will be going home,pt encouraged with wipe and clean her self post void, pt covid 19 isolation precautions  Relaxed, pt denies pain, pt noted very sob with activity, multi bruised areas to arms and abdomen. Call light in reach.

## 2021-11-25 NOTE — PROGRESS NOTES
Pt tachycardic, pt asleep on assessment, Patient woke up and became SR again. tachycardia reached into 180's. Pt reported some mild chest pain.  Performed EKG and notified physician

## 2021-11-25 NOTE — PROGRESS NOTES
Pulmonary Critical Care Progress Note  Marge Altman MD     Patient seen for the follow up of acute hypoxic respiratory failure, asthma extubation, Pneumonia due to COVID-19 virus     Subjective:  She had an uneventful night. She is on oxygen by nasal cannula at 4 L/min. She has not used BiPAP for last few nights. She is ambulating to bedside commode. Her shortness of breath is getting better. She has significant oxyhemoglobin desaturations with minimal ambulation. She denies any chest pain. She is tolerating orals fairly well. Examination:  Vitals: /82   Pulse 94   Temp 98.2 °F (36.8 °C) (Oral)   Resp 21   Ht 5' 2\" (1.575 m)   Wt 227 lb (103 kg)   SpO2 95%   BMI 41.52 kg/m²   General appearance:  alert and cooperative with exam, resting in bed  Neck: No JVD  Lungs: Bilateral crackles, no wheezing, moderate air exchange  Heart: regular rate and rhythm, S1, S2 normal, no gallop  Abdomen: Soft, non tender, + BS  Extremities: no cyanosis or clubbing. No significant edema    LABs:  CBC:   Recent Labs     11/24/21  0526 11/25/21  0532   WBC 21.0* 12.3*   HGB 13.8 13.2   HCT 41.0 40.8    185     BMP:   Recent Labs     11/24/21 0526 11/25/21  0532    136   K 3.8 3.8   CO2 23 25   BUN 28* 26*   CREATININE 0.52 0.64   LABGLOM >60 >60   GLUCOSE 51* 102*     Radiology:  X-ray chest: 11/22/2021  Bilateral pulmonary infiltrate      Impression:  · Acute hypoxic respiratory failure  · COVID-19 pneumonia with possible bacterial coinfection  · Mild adenopathy, likely reactive  · History of asthma  · Suspected obstructive sleep apnea/Obesity  · Arthritis, hypertension, fibromyalgia    Recommendations:  · Off airborne isolation  · Prone as tolerated  · Incentive spirometry every hour while awake  · Oxygen by nasal cannula, wean as tolerated to keep oxygen saturation above 90%  · Continue oral Decadron  · PICC line has been discontinued, tip was not sent for cultures.   · S/p Actemra  · Continue albuterol and Ipratropium Q 4 hours and prn  · Symbicort 160 2 puffs twice daily  · Finish Levaquin course.   · Labs: CBC and BMP in am  · DVT prophylaxis with low molecular weight heparin  · Discussed with RN   · Patient is tachycardic even at rest.  Work-up per primary team  · Will follow with you    Colt Lui MD, [facility]  Pulmonary Critical Care and Sleep Medicine,  [facility]  Cell: [phone]  Office: [phone]

## 2021-11-25 NOTE — PROGRESS NOTES
[facility]  Office: [address], DO, Mauri Connell, DO, Angela Prasad, DO, [facility], DO, Brittny Bravo MD, Radha Peacock MD, Kennis Osler, MD, Brandi Nogueira MD, Angelique Bright MD, Erin Lutz MD, Maida Carter MD, Alexandru Tapia, DO, Makenna Perea, DO, Madeleine Ross MD,  Олег Walton DO, Enrique Bay MD, Kat Muniz MD, Gennaro Peters MD, Gabbie Coelho MD, Cristiane Russo MD, Becky Christian MD, Toshia Corey MD, Betty Jerez [facility], [facility], CNP, Catherine Shankar, CNP, Lela Paz, CNS, Randell Bryant, CNP, Chang Rajan, CNP, Jordana Ny, CNP, Giovany Juárez, CNP, Pawel Palma, CNP, Stefanie Romano PA-C, Mg Saavedra, [facility], Jemima Durán, CNP, Casey Acevedo, CNP, Viv Morales, CNP, Marilyn Concepcion, CNP, Susanna Ashley, CNP, Saúl Kim, CNP, Pam Field, [address]      Daily Progress Note     Admit Date: 11/3/2021  Bed/Room No.  1102/1102-01  Admitting Physician : Kat Muniz MD  Code Status :Full Code  Hospital Day:  LOS: 21 days   Chief Complaint:     Chief Complaint   Patient presents with    Abdominal Pain     lower    Nausea    Back Pain    Headache     Principal Problem:    Pneumonia due to COVID-19 virus  Active Problems:    Essential hypertension    Prediabetes    Morbid obesity with BMI of 40.0-44.9, adult ([address]    Hyperglycemia    Acute respiratory failure with hypoxia ([facility] [address]    Acute urinary retention  Resolved Problems:    * No resolved hospital problems. *    Subjective : Interval History/Significant events :  11/25/21    Patient was seen and evaluated at bedside this morning. Patient reports that she is having some chest heaviness. She was noted to be tachycardic. Patient continues to be on 3 L of oxygen at rest however she did desaturate very fast with any sort of exertion. Patient has been continued to decline to go to skilled nursing facility and are adamant on going home when ready.     Nursing notes , Consults notes reviewed. Overnight events and updates discussed with Nursing staff . Background History:         Li Moran is 74 y.o. female  Who was admitted to the hospital on 11/3/2021 for treatment of Pneumonia due to COVID-19 virus. Patient presented to emergency room with headache, abdominal pain, nausea, fever T-max 102. 2. Andreina Wellington Patient tested positive for COVID-19. Chest x-ray showed bibasilar opacities consistent with COVID-19 pneumonia. She has underlying history of obesity, fibromyalgia, chronic pain, hypertension. Patient was not vaccinated against COVID-19. CT chest was negative for PE. Patient had acute respiratory failure was requiring O2 support. Breathing status worsened and required BiPAP with 100% FiO2 and was moved to ICU on 11/10/2021. PMH:  Past Medical History:   Diagnosis Date    Anxiety     Arthritis     Bell's palsy     Chronic neck pain     posterior neck since fall April 2014    Chronic pain     low back and bilateral lower extremities    Fibromyalgia     HA (headache)     Headache     off/on since fall April 2014    HTN (hypertension)     Hypertension       Allergies:    Allergies   Allergen Reactions    Other Hives    Ceclor [Cefaclor]     Food      All melons      Sulfa Antibiotics      Projectile vomiting       Medications :  metoprolol succinate, 100 mg, Oral, Daily  dexamethasone, 6 mg, Oral, Daily  budesonide-formoterol, 2 puff, Inhalation, BID  alteplase, 1 mg, IntraCATHeter, Once  pregabalin, 50 mg, Oral, BID  polyethylene glycol, 17 g, Oral, Daily  docusate sodium, 100 mg, Oral, BID  insulin lispro, 0-12 Units, SubCUTAneous, TID WC  insulin lispro, 0-6 Units, SubCUTAneous, Nightly  ipratropium-albuterol, 1 ampule, Inhalation, Q4H WA  miconazole, , Topical, BID  mirtazapine, 7.5 mg, Oral, Nightly  sodium chloride flush, 5-40 mL, IntraVENous, 2 times per day  enoxaparin, 30 mg, SubCUTAneous, BID  Vitamin D, 2,000 Units, Oral, Daily  influenza virus vaccine, 0.5 mL, IntraMUSCular, Prior to discharge        Review of Systems   Review of Systems   Constitutional: Positive for activity change, appetite change and fatigue. Negative for fever and unexpected weight change. HENT: Negative for congestion, rhinorrhea and sneezing. Eyes: Negative for visual disturbance. Respiratory: Positive for shortness of breath. Negative for cough, chest tightness and wheezing. Cardiovascular: Negative for chest pain and palpitations. Gastrointestinal: Negative for abdominal pain, blood in stool, constipation, diarrhea, nausea and vomiting. Genitourinary: Negative for dysuria, enuresis, frequency and hematuria. Musculoskeletal: Negative for arthralgias and myalgias. Skin: Negative for rash. Neurological: Negative for dizziness, weakness, light-headedness and headaches. Syncope: Patient was seen and evaluated at bedside this morning. Patient with history. Hematological: Does not bruise/bleed easily. Psychiatric/Behavioral: Negative for dysphoric mood and sleep disturbance.      Objective :      Current Vitals : Temp: 98.7 °F (37.1 °C),  Pulse: 98, Resp: 21, BP: (!) 135/97, SpO2: 92 %  Last 24 Hrs Vitals   Patient Vitals for the past 24 hrs:   BP Temp Temp src Pulse Resp SpO2 Weight   11/25/21 1300 (!) 135/97 98.7 °F (37.1 °C) Oral 98 21 92 % --   11/25/21 1200 129/80 98.5 °F (36.9 °C) Oral 135 20 94 % --   11/25/21 1100 (!) 135/90 97.9 °F (36.6 °C) Oral 131 22 94 % --   11/25/21 1012 -- -- -- -- 21 95 % --   11/25/21 1000 (!) 137/96 98.5 °F (36.9 °C) Oral 134 21 96 % --   11/25/21 0900 124/82 -- -- 94 15 96 % --   11/25/21 0800 (!) 150/92 98.2 °F (36.8 °C) Oral 86 23 97 % --   11/25/21 0715 -- -- -- 103 -- -- --   11/25/21 0700 (!) 139/92 -- -- 105 21 95 % --   11/25/21 0621 -- -- -- -- 24 98 % --   11/25/21 0614 -- -- -- -- 21 98 % --   11/25/21 0600 (!) 126/95 -- -- 79 24 97 % 227 lb (103 kg)   11/25/21 0500 (!) 141/85 98.2 °F (36.8 °C) Oral 75 16 99 % --   11/25/21 0400 -- -- -- 86 14 98 % --   11/25/21 0300 -- -- -- 87 14 98 % --   11/25/21 0220 -- -- -- -- 18 93 % --   11/24/21 2300 -- -- -- 106 24 97 % --   11/24/21 2200 (!) 92/37 96.8 °F (36 °C) Oral 126 21 97 % --   11/24/21 2100 -- -- -- 97 24 97 % --   11/24/21 2059 -- -- -- -- 24 94 % --   11/24/21 2045 -- -- -- -- 19 96 % --   11/24/21 2000 -- -- -- 97 26 (!) 89 % --   11/24/21 1500 128/82 98.2 °F (36.8 °C) Oral 128 28 (!) 83 % --   11/24/21 1445 -- -- -- 125 25 (!) 79 % --     Intake / output   11/24 0701 - 11/25 0700  In: 1300 [P.O.:1300]  Out: 1000 [Urine:1000]  Physical Exam:  Physical Exam  Vitals and nursing note reviewed. Constitutional:       General: She is not in acute distress. Appearance: She is not diaphoretic. Interventions: Nasal cannula in place. Comments: On High Flow    HENT:      Head: Normocephalic and atraumatic. Nose:      Right Sinus: No maxillary sinus tenderness or frontal sinus tenderness. Left Sinus: No maxillary sinus tenderness or frontal sinus tenderness. Mouth/Throat:      Pharynx: No oropharyngeal exudate. Eyes:      General: No scleral icterus. Conjunctiva/sclera: Conjunctivae normal.      Pupils: Pupils are equal, round, and reactive to light. Neck:      Thyroid: No thyromegaly. Vascular: No JVD. Cardiovascular:      Rate and Rhythm: Normal rate and regular rhythm. Pulses:           Dorsalis pedis pulses are 2+ on the right side and 2+ on the left side. Heart sounds: Normal heart sounds. No murmur heard. Pulmonary:      Effort: Pulmonary effort is normal.      Breath sounds: Normal breath sounds. No wheezing or rales. Abdominal:      Palpations: Abdomen is soft. There is no mass. Tenderness: There is no abdominal tenderness. Musculoskeletal:      Cervical back: Full passive range of motion without pain and neck supple. Lymphadenopathy:      Head:      Right side of head: No submandibular adenopathy. Left side of head: No submandibular adenopathy. Cervical: No cervical adenopathy. Skin:     General: Skin is warm. Neurological:      Mental Status: She is alert and oriented to person, place, and time. Motor: No tremor. Psychiatric:         Behavior: Behavior is cooperative. Laboratory findings:    Recent Labs     11/23/21  0406 11/24/21  0526 11/25/21  0532   WBC 12.7* 21.0* 12.3*   HGB 13.7 13.8 13.2   HCT 41.6 41.0 40.8    305 185     Recent Labs     11/23/21  0406 11/24/21  0526 11/25/21  0532    138 136   K 4.3 3.8 3.8    104 101   CO2 26 23 25   GLUCOSE 126* 51* 102*   BUN 29* 28* 26*   CREATININE 0.53 0.52 0.64   CALCIUM 8.6 8.8 8.7            Specific Gravity, UA   Date Value Ref Range Status   11/10/2021 1.020 1.005 - 1.030 Final     Protein, UA   Date Value Ref Range Status   11/10/2021 NEGATIVE NEGATIVE Final     RBC, UA   Date Value Ref Range Status   11/02/2021 2 TO 5 0 - 2 /HPF Final     Bacteria, UA   Date Value Ref Range Status   11/02/2021 NOT REPORTED None Final     Nitrite, Urine   Date Value Ref Range Status   11/10/2021 NEGATIVE NEGATIVE Final     WBC, UA   Date Value Ref Range Status   11/02/2021 2 TO 5 0 - 5 /HPF Final     Leukocyte Esterase, Urine   Date Value Ref Range Status   11/10/2021 NEGATIVE NEGATIVE Final       Imaging / Clinical Data :-   XR CHEST PORTABLE    Result Date: 11/14/2021  Moderate bilateral parenchymal opacities. Minimally increased parenchymal density on the right compared to prior studies. XR CHEST PORTABLE    Result Date: 11/12/2021  Moderate bilateral pneumonia, similar to the previous exam.     XR CHEST PORTABLE    Result Date: 11/11/2021  Findings remain compatible with moderate COVID-19 pneumonitis. No significant interval changes. XR CHEST PORTABLE    Result Date: 11/9/2021  Multifocal pulmonary opacities suggest significantly worsened pulmonary edema and or multifocal pneumonia.   Recommend continued follow-up to resolution. CT CHEST PULMONARY EMBOLISM W CONTRAST    Result Date: 11/9/2021  No evidence of pulmonary embolism. Extensive pulmonary abnormalities most compatible with COVID-19 pneumonia; other processes such as influenza pneumonia and organizing pneumonia, as can be seen with drug toxicity and connective tissue disease, can cause a similar imaging pattern. Mild adenopathy, probably reactive. Clinical Course : unchanged  Assessment and Plan  :        Acute respiratory failure with hypoxia secondary to COVID-19 pneumonia -off high flow. Continue dexamethasone high-dose, Actemra given on 11/10/2021. Continue bronchodilator therapy. Patient completed 8-day course of Levaquin. Lasix as needed. Wean down on oxygen as tolerated. Morbid obesity BMI 41. Encourage weight loss  Essential hypertension -continue to hold Prinzide. Continue Toprol XL 25 mg daily. .  Prediabetes worsened with steroid-induced hyperglycemia - A1C 5.8. Humalog as needed. Hyperkalemia - resolved with  Kayexalate -    Continue current care. Encourage physical therapy, incentive spirometry. Continue to monitor vitals , Intake / output ,  Cell count , HGB , Kidney function, oxygenation  as indicated . Plan and updates discussed with patient ,  answers  explained to satisfaction.    Plan discussed with Staff RN         (Please note that portions of this note were completed with a voice recognition program. Efforts were made to edit the dictations but occasionally words are mis-transcribed.)      Bridgette Davidson MD  11/25/2021

## 2021-11-26 LAB
ABSOLUTE EOS #: 0.44 K/UL (ref 0–0.4)
ABSOLUTE IMMATURE GRANULOCYTE: 0.67 K/UL (ref 0–0.3)
ABSOLUTE LYMPH #: 1.89 K/UL (ref 1–4.8)
ABSOLUTE MONO #: 0.78 K/UL (ref 0.2–0.8)
ANION GAP SERPL CALCULATED.3IONS-SCNC: 10 MMOL/L (ref 9–17)
BASOPHILS # BLD: 0 %
BASOPHILS ABSOLUTE: 0 K/UL (ref 0–0.2)
BUN BLDV-MCNC: 25 MG/DL (ref 8–23)
BUN/CREAT BLD: 35 (ref 9–20)
CALCIUM SERPL-MCNC: 8.4 MG/DL (ref 8.6–10.4)
CHLORIDE BLD-SCNC: 102 MMOL/L (ref 98–107)
CO2: 26 MMOL/L (ref 20–31)
CREAT SERPL-MCNC: 0.71 MG/DL (ref 0.5–0.9)
DIFFERENTIAL TYPE: ABNORMAL
EKG ATRIAL RATE: 108 BPM
EKG P AXIS: 30 DEGREES
EKG P-R INTERVAL: 142 MS
EKG Q-T INTERVAL: 330 MS
EKG QRS DURATION: 70 MS
EKG QTC CALCULATION (BAZETT): 442 MS
EKG R AXIS: -16 DEGREES
EKG T AXIS: 11 DEGREES
EKG VENTRICULAR RATE: 108 BPM
EOSINOPHILS RELATIVE PERCENT: 4 % (ref 1–4)
GFR AFRICAN AMERICAN: >60 ML/MIN
GFR NON-AFRICAN AMERICAN: >60 ML/MIN
GFR SERPL CREATININE-BSD FRML MDRD: ABNORMAL ML/MIN/{1.73_M2}
GFR SERPL CREATININE-BSD FRML MDRD: ABNORMAL ML/MIN/{1.73_M2}
GLUCOSE BLD-MCNC: 102 MG/DL (ref 65–105)
GLUCOSE BLD-MCNC: 128 MG/DL (ref 70–99)
GLUCOSE BLD-MCNC: 138 MG/DL (ref 65–105)
GLUCOSE BLD-MCNC: 184 MG/DL (ref 65–105)
GLUCOSE BLD-MCNC: 187 MG/DL (ref 65–105)
HCT VFR BLD CALC: 39.1 % (ref 36.3–47.1)
HEMOGLOBIN: 12.7 G/DL (ref 11.9–15.1)
IMMATURE GRANULOCYTES: 6 %
LYMPHOCYTES # BLD: 17 % (ref 24–44)
MCH RBC QN AUTO: 30.7 PG (ref 25.2–33.5)
MCHC RBC AUTO-ENTMCNC: 32.5 G/DL (ref 28.4–34.8)
MCV RBC AUTO: 94.4 FL (ref 82.6–102.9)
MONOCYTES # BLD: 7 % (ref 1–7)
NRBC AUTOMATED: 0 PER 100 WBC
PDW BLD-RTO: 13.4 % (ref 11.8–14.4)
PLATELET # BLD: 176 K/UL (ref 138–453)
PLATELET ESTIMATE: ABNORMAL
PMV BLD AUTO: 10.6 FL (ref 8.1–13.5)
POTASSIUM SERPL-SCNC: 4 MMOL/L (ref 3.7–5.3)
RBC # BLD: 4.14 M/UL (ref 3.95–5.11)
RBC # BLD: ABNORMAL 10*6/UL
SEG NEUTROPHILS: 66 % (ref 36–66)
SEGMENTED NEUTROPHILS ABSOLUTE COUNT: 7.32 K/UL (ref 1.8–7.7)
SODIUM BLD-SCNC: 138 MMOL/L (ref 135–144)
WBC # BLD: 11.1 K/UL (ref 3.5–11.3)
WBC # BLD: ABNORMAL 10*3/UL

## 2021-11-26 PROCEDURE — 97530 THERAPEUTIC ACTIVITIES: CPT

## 2021-11-26 PROCEDURE — 6370000000 HC RX 637 (ALT 250 FOR IP): Performed by: STUDENT IN AN ORGANIZED HEALTH CARE EDUCATION/TRAINING PROGRAM

## 2021-11-26 PROCEDURE — 6370000000 HC RX 637 (ALT 250 FOR IP): Performed by: INTERNAL MEDICINE

## 2021-11-26 PROCEDURE — 6370000000 HC RX 637 (ALT 250 FOR IP): Performed by: FAMILY MEDICINE

## 2021-11-26 PROCEDURE — 97535 SELF CARE MNGMENT TRAINING: CPT

## 2021-11-26 PROCEDURE — 6360000002 HC RX W HCPCS: Performed by: INTERNAL MEDICINE

## 2021-11-26 PROCEDURE — 85025 COMPLETE CBC W/AUTO DIFF WBC: CPT

## 2021-11-26 PROCEDURE — 93010 ELECTROCARDIOGRAM REPORT: CPT | Performed by: INTERNAL MEDICINE

## 2021-11-26 PROCEDURE — 97116 GAIT TRAINING THERAPY: CPT

## 2021-11-26 PROCEDURE — 80048 BASIC METABOLIC PNL TOTAL CA: CPT

## 2021-11-26 PROCEDURE — 94761 N-INVAS EAR/PLS OXIMETRY MLT: CPT

## 2021-11-26 PROCEDURE — 6370000000 HC RX 637 (ALT 250 FOR IP): Performed by: NURSE PRACTITIONER

## 2021-11-26 PROCEDURE — 6360000002 HC RX W HCPCS: Performed by: NURSE PRACTITIONER

## 2021-11-26 PROCEDURE — 82947 ASSAY GLUCOSE BLOOD QUANT: CPT

## 2021-11-26 PROCEDURE — 99232 SBSQ HOSP IP/OBS MODERATE 35: CPT | Performed by: INTERNAL MEDICINE

## 2021-11-26 PROCEDURE — 97112 NEUROMUSCULAR REEDUCATION: CPT

## 2021-11-26 PROCEDURE — 94640 AIRWAY INHALATION TREATMENT: CPT

## 2021-11-26 PROCEDURE — 2700000000 HC OXYGEN THERAPY PER DAY

## 2021-11-26 PROCEDURE — 36415 COLL VENOUS BLD VENIPUNCTURE: CPT

## 2021-11-26 PROCEDURE — 2580000003 HC RX 258: Performed by: NURSE PRACTITIONER

## 2021-11-26 PROCEDURE — 2060000000 HC ICU INTERMEDIATE R&B

## 2021-11-26 RX ORDER — AMIODARONE HYDROCHLORIDE 200 MG/1
200 TABLET ORAL 2 TIMES DAILY
Status: DISCONTINUED | OUTPATIENT
Start: 2021-11-26 | End: 2021-11-27 | Stop reason: HOSPADM

## 2021-11-26 RX ADMIN — IPRATROPIUM BROMIDE AND ALBUTEROL SULFATE 1 AMPULE: .5; 2.5 SOLUTION RESPIRATORY (INHALATION) at 14:38

## 2021-11-26 RX ADMIN — SODIUM CHLORIDE, PRESERVATIVE FREE 10 ML: 5 INJECTION INTRAVENOUS at 09:11

## 2021-11-26 RX ADMIN — INSULIN LISPRO 1 UNITS: 100 INJECTION, SOLUTION INTRAVENOUS; SUBCUTANEOUS at 21:17

## 2021-11-26 RX ADMIN — PREGABALIN 50 MG: 50 CAPSULE ORAL at 09:11

## 2021-11-26 RX ADMIN — SODIUM CHLORIDE, PRESERVATIVE FREE 10 ML: 5 INJECTION INTRAVENOUS at 19:47

## 2021-11-26 RX ADMIN — ENOXAPARIN SODIUM 30 MG: 100 INJECTION SUBCUTANEOUS at 19:44

## 2021-11-26 RX ADMIN — IPRATROPIUM BROMIDE AND ALBUTEROL SULFATE 1 AMPULE: .5; 2.5 SOLUTION RESPIRATORY (INHALATION) at 11:12

## 2021-11-26 RX ADMIN — INSULIN LISPRO 2 UNITS: 100 INJECTION, SOLUTION INTRAVENOUS; SUBCUTANEOUS at 16:59

## 2021-11-26 RX ADMIN — BUDESONIDE AND FORMOTEROL FUMARATE DIHYDRATE 2 PUFF: 160; 4.5 AEROSOL RESPIRATORY (INHALATION) at 07:46

## 2021-11-26 RX ADMIN — ENOXAPARIN SODIUM 30 MG: 100 INJECTION SUBCUTANEOUS at 09:10

## 2021-11-26 RX ADMIN — ACETAMINOPHEN 650 MG: 325 TABLET ORAL at 02:15

## 2021-11-26 RX ADMIN — Medication 2000 UNITS: at 09:10

## 2021-11-26 RX ADMIN — DOCUSATE SODIUM 100 MG: 100 CAPSULE, LIQUID FILLED ORAL at 09:10

## 2021-11-26 RX ADMIN — DEXAMETHASONE 6 MG: 4 TABLET ORAL at 09:12

## 2021-11-26 RX ADMIN — MIRTAZAPINE 7.5 MG: 15 TABLET, FILM COATED ORAL at 19:43

## 2021-11-26 RX ADMIN — ANTI-FUNGAL POWDER MICONAZOLE NITRATE TALC FREE: 1.42 POWDER TOPICAL at 09:11

## 2021-11-26 RX ADMIN — AMIODARONE HYDROCHLORIDE 200 MG: 200 TABLET ORAL at 16:37

## 2021-11-26 RX ADMIN — METOPROLOL SUCCINATE 100 MG: 50 TABLET, EXTENDED RELEASE ORAL at 09:09

## 2021-11-26 RX ADMIN — PREGABALIN 50 MG: 50 CAPSULE ORAL at 19:44

## 2021-11-26 RX ADMIN — IPRATROPIUM BROMIDE AND ALBUTEROL SULFATE 1 AMPULE: .5; 2.5 SOLUTION RESPIRATORY (INHALATION) at 07:46

## 2021-11-26 ASSESSMENT — ENCOUNTER SYMPTOMS
RHINORRHEA: 0
VOMITING: 0
SHORTNESS OF BREATH: 1
WHEEZING: 0
NAUSEA: 0
BLOOD IN STOOL: 0
COUGH: 0
DIARRHEA: 0
ABDOMINAL PAIN: 0
CONSTIPATION: 0
CHEST TIGHTNESS: 0

## 2021-11-26 ASSESSMENT — PAIN SCALES - GENERAL
PAINLEVEL_OUTOF10: 4
PAINLEVEL_OUTOF10: 0

## 2021-11-26 NOTE — FLOWSHEET NOTE
Patient is sitting up in a chair while coloring and listening to music. Patient is approachable and engages in conversation. Patient reports that she has a spouse of 52 years and two children for support. Patient appears to be coping adequately and reports that she may be discharged in a few days. Patient requests a prayer and writer offers a prayer for continued healing, peace, and rest. Patient expresses appreciation for the visit. Spiritual Care will follow as needed. 11/26/21 1030   Encounter Summary   Services provided to: Patient   Referral/Consult From: [employer] Spouse; Children   Continue Visiting   (11/26/21)   Complexity of Encounter Moderate   Length of Encounter 15 minutes   Spiritual Assessment Completed Yes   Routine   Type Follow up   Assessment Calm;  Approachable   Intervention Active listening; Explored feelings, thoughts, concerns; Explored coping resources; Nurtured hope; Prayer   Outcome Expressed gratitude

## 2021-11-26 NOTE — PROGRESS NOTES
Occupational Therapy  Facility/Department: [facility] ICU  Daily Treatment Note  NAME: Coy Knight  : 1955  MRN: [id number]    Date of Service: 2021    Discharge Recommendations:  Patient would benefit from continued therapy after discharge       Assessment   Performance deficits / Impairments: Decreased functional mobility ; Decreased ADL status; Decreased strength; Decreased endurance; Decreased high-level IADLs; Decreased balance; Decreased posture  Assessment: Pt completed bedside ADLS with SBA for UE, Max assist for LE for donning socks. Pt. completed transfer from EOB to bedside chair with min assist x1 with use of RW. O2 monitored and while in bed O2 ranged from 88-90% saturation; O2 with activity 84-85%  and with sitting in bedside chair 88-90% with pursed lip breathing to recover. Pt. on 3L entire treatment. Skilled OT warranted to promote I/safety to return pt to prior living arrangement with assist as needed. Prognosis: Good  OT Education: OT Role; Energy Conservation; Plan of Care; Precautions; Transfer Training  Patient Education: Pt. educated on safety awareness with scanning environment with use of RW and importance of mobility. REQUIRES OT FOLLOW UP: Yes  Activity Tolerance  Activity Tolerance: Patient Tolerated treatment well; Treatment limited secondary to medical complications (free text) (pt is limited to activity d/t respiratory status)  Activity Tolerance: fair+  Safety Devices  Safety Devices in place: Yes  Type of devices: All fall risk precautions in place; Call light within reach; Chair alarm in place; Nurse notified; Left in chair; Gait belt; Patient at risk for falls         Patient Diagnosis(es): The encounter diagnosis was COVID-19.      has a past medical history of Anxiety, Arthritis, Bell's palsy, Chronic neck pain, Chronic pain, Fibromyalgia, HA (headache), Headache, HTN (hypertension), and Hypertension.    has a past surgical history that includes  section; shoulder surgery; Urethra dilation; Arm Surgery; Haddock tooth extraction; joint replacement (Left); Cholecystectomy, laparoscopic (N/A, 4/6/2020); Cholecystectomy (04/2020); and IR INSERT PICC VAD W SQ PORT >5 YEARS (11/16/2021). Restrictions  Restrictions/Precautions  Restrictions/Precautions: General Precautions, Fall Risk  Required Braces or Orthoses?: No  Position Activity Restriction  Other position/activity restrictions: Up as tolerated, monitor SpO2; HR tachy w/ light activity, now on 3 Lpm of n.c.   Subjective   General  Chart Reviewed: Yes  Patient assessed for rehabilitation services?: Yes  Additional Pertinent Hx: Pt. agreeable for treatment  Response to previous treatment: Patient with no complaints from previous session  Family / Caregiver Present: No  Subjective  Subjective: \"I feel much better I hope to go home monday\"  General Comment  Comments: Pt. motivated and friendly      Orientation  Orientation  Overall Orientation Status: Within Functional Limits  Objective    ADL  Feeding: Setup  Grooming: Setup  UE Bathing: Stand by assistance  LE Bathing: Minimal assistance  UE Dressing: Minimal assistance (min assist for line management)  LE Dressing: Maximum assistance (to rylie socks d/t destat of O2)  Toileting: Minimal assistance (min assist with bedside commode)        Balance  Sitting Balance: Supervision  Standing Balance: Minimal assistance  Standing Balance  Time: 1min  Activity: ADL transfer from EOB to bedside chair  Functional Mobility  Functional - Mobility Device: Rolling Walker  Assist Level: Minimal assistance  Functional Mobility Comments: Pt. completed functional transfer with min assist with use of RW requiring min verbal cues for safety awareness with line management  Toilet Transfers  Toilet Transfer:  (x1)  Toilet Transfers Comments: Pt. with no needs at this time  Bed mobility  Bridging: Supervision  Rolling to Left: Supervision  Rolling to Right: Supervision  Supine to Sit: Supervision  Sit to Supine: Supervision  Scooting: Supervision  Comment: Pt. completed bed mobility with supervision and without verbal cues  Transfers  Stand Step Transfers: Minimal assistance (x1)  Stand Pivot Transfers: Minimal assistance (x1)  Sit to stand: Minimal assistance (x1)  Stand to sit: Minimal assistance (x1)  Transfer Comments: Pt. completed transfer form EOB to bedside chair with min assist x1 with use of RW. O2 monitored and decreased to 84-85% and recovered to 88-90% after 3 minutes of pursed lip breathing. Cognition  Overall Cognitive Status: [facility]  Cognition Comment: Pt. in good spirits and motivated for treatment     Perception  Overall Perceptual Status: [facility]       Plan   Plan  Times per week: 4-5x/wk 1x/day as rfuina  Times per day: Daily  Current Treatment Recommendations: Strengthening, Endurance Training, Patient/Caregiver Education & Training, Equipment Evaluation, Education, & procurement, Self-Care / ADL, Home Management Training, Functional Mobility Training, Safety Education & Training  Plan Comment: Cont. OT with stated POC      AM-PAC Score        AM-PAC Inpatient Daily Activity Raw Score: 15 (11/26/21 0953)  AM-PAC Inpatient ADL T-Scale Score : 34.69 (11/26/21 0953)  ADL Inpatient CMS 0-100% Score: 56.46 (11/26/21 0953)  ADL Inpatient CMS G-Code Modifier : CK (11/26/21 0953)    Goals  Short term goals  Time Frame for Short term goals: By discharge, pt to demo  Short term goal 1: ADL transfers and functional mobility to SBA with use of AD as needed. Short term goal 2: UB ADLS to Set up and LB ADLs SBA with use of AD/AE as needed. Short term goal 3: toileting to SBA with use of AD/grab bars as needed. Short term goal 4: increased B UE strength by 1/2 grade to assist with functional tasks/I with B UE HEP with use of handouts as needed. Short term goal 5: bed mobility to Mod I with use of bedrails as needed.   Long term goals  Long term goal 1: Pt to be I with fall prevention educaiton, Covid specific education, EC/WS tech, recommendations for AE with use of handouts as needed. Patient Goals   Patient goals : To feel better! Therapy Time   Individual Concurrent Group Co-treatment   Time In 0855         Time Out 0936         Minutes 39              RN reports patient is medically stable for therapy treatment this date. Chart reviewed prior to treatment and patient is agreeable for therapy. All lines intact and patient positioned comfortably at end of treatment. All patient needs addressed prior to ending therapy session.       GURVINDER Forte

## 2021-11-26 NOTE — PROGRESS NOTES
[facility]  Office: [address], DO, Tom Beatriz, DO, Ehsan Baldwin, DO, Andrew Haywood Blood, DO, Nick Guy MD, Helena Chaudhry MD, Alisha Franks MD, Radha Kimball MD, Segundo Lainez MD, Brooklyn Dugan MD, Raquel Leal MD, Ness Gabriel, DO, Addie Washington, DO, Paul Woo MD,  Karlene Albarran, DO, Cristian Baez MD, Manuel Roman MD, Leticia Gomez MD, Parvez Yeh MD, Myriam Bone MD, Mariana Douglas MD, Chuy Stephens MD, Robert Fried, [facility], [facility], CNP, Gloria Zaldivar, CNP, Leeann Tavares, CNS, Glenna Peters, CNP, Adams Blair, CNP, Akanksha Hendrix, CNP, Bautista Nunez, CNP, Nini Bucio, CNP, Greta Desai PA-C, Charmel Osgood, [facility], Leydi Case, CNP, Estelle Carreno, CNP, Kay Albarran, CNP, Tiney Schilder, CNP, Lucía Emanuel, CNP, Marlyne Osgood, CNP, Sriram Vargas, [address] [place] [address]      Daily Progress Note     Admit Date: 11/3/2021  Bed/Room No.  1102/1102-01  Admitting Physician : Manuel Roman MD  Code Status :Full Code  Hospital Day:  LOS: 22 days   Chief Complaint:     Chief Complaint   Patient presents with    Abdominal Pain     lower    Nausea    Back Pain    Headache     Principal Problem:    Pneumonia due to COVID-19 virus  Active Problems:    Essential hypertension    Prediabetes    Morbid obesity with BMI of 40.0-44.9, adult ([facility] [address]    Hyperglycemia    Acute respiratory failure with hypoxia ([facility] [address]    Acute urinary retention  Resolved Problems:    * No resolved hospital problems. *    Subjective : Interval History/Significant events :  11/26/21    Patient was seen and evaluated at bedside this morning. Patient reports that her chest heaviness has resolved. Her tachycardia has improved. She continues to be on 4 L of oxygen via nasal cannula. Nursing notes , Consults notes reviewed. Overnight events and updates discussed with Nursing staff .    Background History:         Venu Tamez is 74 y.o. female  Who was admitted to Negative for fever and unexpected weight change. HENT: Negative for congestion, rhinorrhea and sneezing. Eyes: Negative for visual disturbance. Respiratory: Positive for shortness of breath. Negative for cough, chest tightness and wheezing. Cardiovascular: Negative for chest pain and palpitations. Gastrointestinal: Negative for abdominal pain, blood in stool, constipation, diarrhea, nausea and vomiting. Genitourinary: Negative for dysuria, enuresis, frequency and hematuria. Musculoskeletal: Negative for arthralgias and myalgias. Skin: Negative for rash. Neurological: Negative for dizziness, weakness, light-headedness and headaches. Syncope: Patient was seen and evaluated at bedside this morning. Patient with history. Hematological: Does not bruise/bleed easily. Psychiatric/Behavioral: Negative for dysphoric mood and sleep disturbance.      Objective :      Current Vitals : Temp: 98.6 °F (37 °C),  Pulse: 77, Resp: 23, BP: (!) 136/91, SpO2: 94 %  Last 24 Hrs Vitals   Patient Vitals for the past 24 hrs:   BP Temp Temp src Pulse Resp SpO2   11/26/21 1100 -- -- -- -- 23 94 %   11/26/21 0909 (!) 136/91 -- -- -- -- --   11/26/21 0748 -- -- -- -- 15 96 %   11/26/21 0742 -- 98.6 °F (37 °C) Oral -- -- --   11/26/21 0730 -- -- -- -- 14 94 %   11/26/21 0400 125/78 98.6 °F (37 °C) Oral 77 14 96 %   11/26/21 0000 (!) 122/90 97.6 °F (36.4 °C) Oral 90 18 95 %   11/25/21 2000 122/80 98 °F (36.7 °C) Oral 120 23 94 %   11/25/21 1752 -- -- -- -- 25 93 %   11/25/21 1700 124/89 98.1 °F (36.7 °C) Oral 99 23 95 %   11/25/21 1600 123/82 97.8 °F (36.6 °C) Oral 116 24 93 %   11/25/21 1500 126/89 98.3 °F (36.8 °C) Oral 150 30 95 %   11/25/21 1435 -- -- -- -- (!) 32 92 %   11/25/21 1300 (!) 135/97 98.7 °F (37.1 °C) Oral 98 21 92 %   11/25/21 1200 129/80 98.5 °F (36.9 °C) Oral 135 20 94 %     Intake / output   11/25 0701 - 11/26 0700  In: -   Out: 10[address] Quarles [Urine:875]  Physical Exam:  Physical Exam  Vitals and nursing note reviewed. Constitutional:       General: She is not in acute distress. Appearance: She is not diaphoretic. Interventions: Nasal cannula in place. Comments: On High Flow    HENT:      Head: Normocephalic and atraumatic. Nose:      Right Sinus: No maxillary sinus tenderness or frontal sinus tenderness. Left Sinus: No maxillary sinus tenderness or frontal sinus tenderness. Mouth/Throat:      Pharynx: No oropharyngeal exudate. Eyes:      General: No scleral icterus. Conjunctiva/sclera: Conjunctivae normal.      Pupils: Pupils are equal, round, and reactive to light. Neck:      Thyroid: No thyromegaly. Vascular: No JVD. Cardiovascular:      Rate and Rhythm: Normal rate and regular rhythm. Pulses:           Dorsalis pedis pulses are 2+ on the right side and 2+ on the left side. Heart sounds: Normal heart sounds. No murmur heard. Pulmonary:      Effort: Pulmonary effort is normal.      Breath sounds: Normal breath sounds. No wheezing or rales. Abdominal:      Palpations: Abdomen is soft. There is no mass. Tenderness: There is no abdominal tenderness. Musculoskeletal:      Cervical back: Full passive range of motion without pain and neck supple. Lymphadenopathy:      Head:      Right side of head: No submandibular adenopathy. Left side of head: No submandibular adenopathy. Cervical: No cervical adenopathy. Skin:     General: Skin is warm. Neurological:      Mental Status: She is alert and oriented to person, place, and time. Motor: No tremor. Psychiatric:         Behavior: Behavior is cooperative.            Laboratory findings:    Recent Labs     11/24/21  0526 11/25/21  0532 11/26/21  0452   WBC 21.0* 12.3* 11.1   HGB 13.8 13.2 12.7   HCT 41.0 40.8 39.1    185 176     Recent Labs     11/24/21  0526 11/25/21  0532 11/26/21  0452    136 138   K 3.8 3.8 4.0    101 102   CO2 23 25 26   GLUCOSE 51* 102* 128*   BUN 28* 26* 25*   CREATININE 0.52 0.64 0.71   CALCIUM 8.8 8.7 8.4*            Specific Gravity, UA   Date Value Ref Range Status   11/10/2021 1.020 1.005 - 1.030 Final     Protein, UA   Date Value Ref Range Status   11/10/2021 NEGATIVE NEGATIVE Final     RBC, UA   Date Value Ref Range Status   11/02/2021 2 TO 5 0 - 2 /HPF Final     Bacteria, UA   Date Value Ref Range Status   11/02/2021 NOT REPORTED None Final     Nitrite, Urine   Date Value Ref Range Status   11/10/2021 NEGATIVE NEGATIVE Final     WBC, UA   Date Value Ref Range Status   11/02/2021 2 TO 5 0 - 5 /HPF Final     Leukocyte Esterase, Urine   Date Value Ref Range Status   11/10/2021 NEGATIVE NEGATIVE Final       Imaging / Clinical Data :-   XR CHEST PORTABLE    Result Date: 11/14/2021  Moderate bilateral parenchymal opacities. Minimally increased parenchymal density on the right compared to prior studies. XR CHEST PORTABLE    Result Date: 11/12/2021  Moderate bilateral pneumonia, similar to the previous exam.     XR CHEST PORTABLE    Result Date: 11/11/2021  Findings remain compatible with moderate COVID-19 pneumonitis. No significant interval changes. XR CHEST PORTABLE    Result Date: 11/9/2021  Multifocal pulmonary opacities suggest significantly worsened pulmonary edema and or multifocal pneumonia. Recommend continued follow-up to resolution. CT CHEST PULMONARY EMBOLISM W CONTRAST    Result Date: 11/9/2021  No evidence of pulmonary embolism. Extensive pulmonary abnormalities most compatible with COVID-19 pneumonia; other processes such as influenza pneumonia and organizing pneumonia, as can be seen with drug toxicity and connective tissue disease, can cause a similar imaging pattern. Mild adenopathy, probably reactive. Clinical Course : unchanged  Assessment and Plan  :        Acute respiratory failure with hypoxia secondary to COVID-19 pneumonia -off high flow. Continue dexamethasone high-dose, Actemra given on 11/10/2021.   Continue bronchodilator therapy. Patient completed 8-day course of Levaquin. Lasix as needed. Wean down on oxygen as tolerated. Morbid obesity BMI 41. Encourage weight loss  Essential hypertension -continue to hold Prinzide. Increase metoprolol 200 mg daily. Prediabetes worsened with steroid-induced hyperglycemia - A1C 5.8. Humalog as needed. Hyperkalemia - resolved with  Kayexalate -    Continue current care. Encourage physical therapy, incentive spirometry. Continue to monitor vitals , Intake / output ,  Cell count , HGB , Kidney function, oxygenation  as indicated . Plan and updates discussed with patient ,  answers  explained to satisfaction.    Plan discussed with Staff RN         (Please note that portions of this note were completed with a voice recognition program. Efforts were made to edit the dictations but occasionally words are mis-transcribed.)      Justin Saucedo MD  11/26/2021

## 2021-11-26 NOTE — PROGRESS NOTES
[facility]        Date:   11/26/2021  Patient name: Ellie Arriaga  Date of admission:  11/3/2021 10:05 PM  MRN:   [id number]  YOB: 1955  PCP: Tonja Dorsey DO    Reason for Consult:       Subjective: No new cardiac issues. No overnight events. Chart reviewed. Physical Exam:   Vitals: /89   Pulse 95   Temp 96.7 °F (35.9 °C) (Axillary)   Resp 20   Ht 5' 2\" (1.575 m)   Wt 227 lb (103 kg)   SpO2 94%   BMI 41.52 kg/m²   Deferred due to covid and limiting exposure      Lab work, imaging, nursing, and chart reviewed extensively. Medications:   Scheduled Meds:   metoprolol succinate  100 mg Oral Daily    dexamethasone  6 mg Oral Daily    budesonide-formoterol  2 puff Inhalation BID    alteplase  1 mg IntraCATHeter Once    pregabalin  50 mg Oral BID    polyethylene glycol  17 g Oral Daily    docusate sodium  100 mg Oral BID    insulin lispro  0-12 Units SubCUTAneous TID WC    insulin lispro  0-6 Units SubCUTAneous Nightly    ipratropium-albuterol  1 ampule Inhalation Q4H WA    miconazole   Topical BID    mirtazapine  7.5 mg Oral Nightly    sodium chloride flush  5-40 mL IntraVENous 2 times per day    enoxaparin  30 mg SubCUTAneous BID    Vitamin D  2,000 Units Oral Daily    influenza virus vaccine  0.5 mL IntraMUSCular Prior to discharge     Continuous Infusions:   dextrose      sodium chloride           Labs:     CBC:   Recent Labs     11/24/21  0526 11/25/21  0532 11/26/21  0452   WBC 21.0* 12.3* 11.1   HGB 13.8 13.2 12.7    185 176     BMP:    Recent Labs     11/24/21  0526 11/25/21  0532 11/26/21  0452    136 138   K 3.8 3.8 4.0    101 102   CO2 23 25 26   BUN 28* 26* 25*   CREATININE 0.52 0.64 0.71   GLUCOSE 51* 102* 128*     Hepatic: No results for input(s): AST, ALT, ALB, BILITOT, ALKPHOS in the last 72 hours. Troponin: No results for input(s): TROPONINI in the last 72 hours.   BNP: No results for input(s): BNP in the last 72 hours. Lipids: No results for input(s): CHOL, HDL in the last 72 hours. Invalid input(s): LDLCALCU  INR: No results for input(s): INR in the last 72 hours. Other active acute problems:  Patient Active Problem List   Diagnosis    Essential hypertension    Fibromyalgia    Edema extremities    Obesity, Class III, BMI 40-49.9 (morbid obesity) ([facility] [address]    Prediabetes    Arthritis    Extrinsic asthma without complication    Chronic migraine    Bilateral chronic knee pain    Morbid obesity with BMI of 40.0-44.9, adult (HCC)    Gastric ulcer    Hyperglycemia    Pneumonia due to COVID-19 virus    Acute respiratory failure with hypoxia ([facility] [address]    Acute urinary retention         IMPRESSION & Recommendations:      Afib c RVR / Tachycardia- optimize meds. Add amio  Acute respiratory failure  Covid PNA  HTN- optimize meds  DM  Hyperkalemia  Stable from cardiac standpoint- can follow up as outpatient  Will follow peripherally, please call back if needed. Discussed with patient, family, and Nurse. Electronically signed by Naomi Contreras DO on 11/26/2021 at 3:58 PM    Naomi Contreras, [id number] [facility]  [facility]  [phone]

## 2021-11-26 NOTE — CARE COORDINATION
Patient is tachycardic at rest. Currently on 4L NC. Cardiology consulted. Pt. Refuses SNF placement. Plan is home with home O2 and [facility].

## 2021-11-26 NOTE — PROGRESS NOTES
Physical Therapy  Facility/Department: [facility] ICU  Daily Treatment Note  NAME: Ruby Dillard  : 1955  MRN: [id number]    Date of Service: 2021    Discharge Recommendations:  Patient would benefit from continued therapy after discharge     Pt currently functioning below baseline. Would suggest additional therapy at time of discharge to maximize long term outcomes and prevent re-admission. Please refer to AM-PAC score for current level of function. Assessment   Body structures, Functions, Activity limitations: Decreased functional mobility ; Decreased ADL status; Decreased strength; Decreased endurance; Decreased balance; Decreased safe awareness  Assessment: Patient demo decreased awareness of elevated HR and low SP02, requires frequent cues for slow deep breathing and breathing techniques. Patient will benefit from skilled PT to improve gait, transfers, balance, and strength. Prognosis: Good  Decision Making: Medium Complexity  PT Education: General Safety; Transfer Training  Patient Education: reviewed pressure relief exercises and performed. REQUIRES PT FOLLOW UP: Yes  Activity Tolerance  Activity Tolerance: Patient limited by endurance  Activity Tolerance: LImited by elevated HR and low SP02 with minimal activity     Patient Diagnosis(es): The encounter diagnosis was COVID-19.     has a past medical history of Anxiety, Arthritis, Bell's palsy, Chronic neck pain, Chronic pain, Fibromyalgia, HA (headache), Headache, HTN (hypertension), and Hypertension. has a past surgical history that includes  section; shoulder surgery; Urethra dilation; Arm Surgery; Rogers tooth extraction; joint replacement (Left); Cholecystectomy, laparoscopic (N/A, 2020); Cholecystectomy (2020); and IR INSERT PICC VAD W SQ PORT >5 YEARS (2021).     Restrictions  Restrictions/Precautions  Restrictions/Precautions: General Precautions, Fall Risk  Required Braces or Orthoses?: No  Position Activity Restriction  Other position/activity restrictions: Up as tolerated, monitor SpO2; HR tachy w/ light activity, now on 3 Lpm of n.c. Subjective   General  Additional Pertinent Hx: Seymour palsy, fibromyalgia, BMI 43  Response To Previous Treatment: Patient with no complaints from previous session. Family / Caregiver Present: No  Subjective  Subjective: Patient up in chair listening to music and coloroing. Patient agreeable for PT treatment. General Comment  Comments: RN ok for treatment          Orientation     Cognition   Cognition  Overall Cognitive Status: WFL  Cognition Comment: Pt. in good spirits and motivated for treatment  Objective   Bed mobility  Comment: Patient up in chair upon arrival.  Transfers  Sit to Stand: Minimal Assistance; Moderate Assistance  Stand to sit: Minimal Assistance; Moderate Assistance  Bed to Chair: Minimal assistance; Moderate assistance  Comment: Patient requires verbal cues for hand placement assist with multiple lines and cords. Ambulation  Ambulation?: Yes  More Ambulation?: No  Ambulation 1  Surface: level tile  Device: Rolling Walker  Other Apparatus: O2  Assistance: Moderate assistance  Quality of Gait: small shuffling steps, Sp02 up to 140s with activity. Patient on 3 liters 02 desat to 87% with slow recovery  Gait Deviations: Slow Sharona; Decreased step length; Decreased step height  Distance: 10' x 1  Comments: pt w/ very limited tolerance to activity by fatigue and incrased HR     Balance  Posture: Fair  Sitting - Static: Good  Sitting - Dynamic: Good  Standing - Static: Fair  Standing - Dynamic: Fair  Exercises  Comments: Seated jairo LE AROM x 10 reps with several rest breaks requried d/t decreased SpO2 to mid 80s with slow recovery and increased time for resting.      AM-PAC Score  AM-PAC Inpatient Mobility Raw Score : 11 (11/26/21 1728)  AM-PAC Inpatient T-Scale Score : 33.86 (11/26/21 1728)  Mobility Inpatient CMS 0-100% Score: 72.57 (11/26/21 1728)  Mobility Inpatient CMS G-Code Modifier : CL (11/26/21 8608)          Goals  Short term goals  Time Frame for Short term goals: 12 visits  Short term goal 1: Patient will be indep with bed mobility and transfers. Short term goal 2: Patient will amb 100 feet indep with appropriate AD. Short term goal 3: Patient will negotiate 4 stairs with rails and supervision. Short term goal 4:  Inc standing balance to good with device to facilitate pt independence for performance of ADL's & functional mobility, & reduce fall risk  Short term goal 5: Pt able to tolerate 30 min of activity to include ex, breathing techniques & endurance training with pt demonstrating ability to perform energy conservation strategies during functional activity to self-regulate pacing & breathing techniques to avoid SOB & maintain saO2 in safe range  Short term goal 6: Ed pt on home ex's, optimal breathing techniques, safety & energy principles, endurance training, Covid 19 pt education & issue written pt education  Patient Goals   Patient goals : Improve breathing and walking    Plan    Plan  Times per week: 1-2x/d, 5-6 d/wk  Specific instructions for Next Treatment: r.walker  pressure relief exercises  Current Treatment Recommendations: Strengthening, Balance Training, Functional Mobility Training, Transfer Training, Endurance Training, Gait Training, Stair training, Patient/Caregiver Education & Training, Safety Education & Training, Home Exercise Program, Positioning  Safety Devices  Type of devices: Call light within reach, Nurse notified, Left in bed, Gait belt, All fall risk precautions in place  Restraints  Initially in place: No     Therapy Time   Individual Concurrent Group Co-treatment   Time In 1141         Time Out 1222         Minutes [address] [place], [place]

## 2021-11-26 NOTE — PROGRESS NOTES
Pulmonary Critical Care Progress Note  Marylene Shaker, MD     Patient seen for the follow up of acute hypoxic respiratory failure, asthma extubation, Pneumonia due to COVID-19 virus     Subjective:  She had an uneventful night. She is on oxygen by nasal cannula at 3 L/min. She has not used BiPAP for last few nights. She is ambulating to bedside commode. Her shortness of breath is getting better. She has significant oxyhemoglobin desaturations with minimal ambulation. She denies any chest pain. She is tolerating orals fairly well. Examination:  Vitals: /89   Pulse 95   Temp 96.7 °F (35.9 °C) (Axillary)   Resp 23   Ht 5' 2\" (1.575 m)   Wt 227 lb (103 kg)   SpO2 94%   BMI 41.52 kg/m²   General appearance:  alert and cooperative with exam, resting in bed  Neck: No JVD  Lungs: Bilateral crackles, no wheezing, moderate air exchange  Heart: regular rate and rhythm, S1, S2 normal, no gallop  Abdomen: Soft, non tender, + BS  Extremities: no cyanosis or clubbing. No significant edema    LABs:  CBC:   Recent Labs     11/25/21  0532 11/26/21  0452   WBC 12.3* 11.1   HGB 13.2 12.7   HCT 40.8 39.1    176     BMP:   Recent Labs     11/25/21  0532 11/26/21  0452    138   K 3.8 4.0   CO2 25 26   BUN 26* 25*   CREATININE 0.64 0.71   LABGLOM >60 >60   GLUCOSE 102* 128*     Radiology:  X-ray chest: 11/22/2021  Bilateral pulmonary infiltrate      Impression:  · Acute hypoxic respiratory failure  · COVID-19 pneumonia with possible bacterial coinfection  · Mild adenopathy, likely reactive  · History of asthma  · Suspected obstructive sleep apnea/Obesity  · Arthritis, hypertension, fibromyalgia    Recommendations:  · Off airborne isolation  · Prone as tolerated  · Incentive spirometry every hour while awake  · Oxygen by nasal cannula, wean as tolerated to keep oxygen saturation above 90%  · Continue oral Decadron  · PICC line has been discontinued, tip was not sent for cultures.   · S/p Actemra  · Continue albuterol and Ipratropium Q 4 hours and prn  · Symbicort 160 2 puffs twice daily  · Finish Levaquin course.   · Labs: CBC and BMP in am  · DVT prophylaxis with low molecular weight heparin  · Discussed with RN   · Patient is tachycardic even at rest.  Work-up per primary team  · Will follow with you    Lori Vinson MD, [facility]  Pulmonary Critical Care and Sleep Medicine,  [facility]  Cell: [phone]  Office: [phone]

## 2021-11-27 ENCOUNTER — APPOINTMENT (OUTPATIENT)
Dept: GENERAL RADIOLOGY | Age: 66
DRG: 177 | End: 2021-11-27
Payer: MEDICARE

## 2021-11-27 VITALS
OXYGEN SATURATION: 93 % | WEIGHT: 227.1 LBS | BODY MASS INDEX: 41.79 KG/M2 | SYSTOLIC BLOOD PRESSURE: 111 MMHG | HEART RATE: 123 BPM | RESPIRATION RATE: 20 BRPM | HEIGHT: 62 IN | TEMPERATURE: 97.6 F | DIASTOLIC BLOOD PRESSURE: 98 MMHG

## 2021-11-27 PROBLEM — R33.8 ACUTE URINARY RETENTION: Status: RESOLVED | Noted: 2021-11-10 | Resolved: 2021-11-27

## 2021-11-27 LAB
ABSOLUTE EOS #: 0.37 K/UL (ref 0–0.44)
ABSOLUTE IMMATURE GRANULOCYTE: 0.74 K/UL (ref 0–0.3)
ABSOLUTE LYMPH #: 2.34 K/UL (ref 1.1–3.7)
ABSOLUTE MONO #: 0.86 K/UL (ref 0.1–1.2)
ANION GAP SERPL CALCULATED.3IONS-SCNC: 12 MMOL/L (ref 9–17)
BASOPHILS # BLD: 1 % (ref 0–2)
BASOPHILS ABSOLUTE: 0.12 K/UL (ref 0–0.2)
BUN BLDV-MCNC: 26 MG/DL (ref 8–23)
BUN/CREAT BLD: 51 (ref 9–20)
CALCIUM SERPL-MCNC: 8.8 MG/DL (ref 8.6–10.4)
CHLORIDE BLD-SCNC: 103 MMOL/L (ref 98–107)
CO2: 23 MMOL/L (ref 20–31)
CREAT SERPL-MCNC: 0.51 MG/DL (ref 0.5–0.9)
DIFFERENTIAL TYPE: ABNORMAL
EOSINOPHILS RELATIVE PERCENT: 3 % (ref 1–4)
GFR AFRICAN AMERICAN: >60 ML/MIN
GFR NON-AFRICAN AMERICAN: >60 ML/MIN
GFR SERPL CREATININE-BSD FRML MDRD: ABNORMAL ML/MIN/{1.73_M2}
GFR SERPL CREATININE-BSD FRML MDRD: ABNORMAL ML/MIN/{1.73_M2}
GLUCOSE BLD-MCNC: 126 MG/DL (ref 70–99)
GLUCOSE BLD-MCNC: 145 MG/DL (ref 65–105)
GLUCOSE BLD-MCNC: 187 MG/DL (ref 65–105)
GLUCOSE BLD-MCNC: 93 MG/DL (ref 65–105)
HCT VFR BLD CALC: 42.8 % (ref 36.3–47.1)
HEMOGLOBIN: 14.2 G/DL (ref 11.9–15.1)
IMMATURE GRANULOCYTES: 6 %
LYMPHOCYTES # BLD: 19 % (ref 24–43)
MCH RBC QN AUTO: 31.3 PG (ref 25.2–33.5)
MCHC RBC AUTO-ENTMCNC: 33.2 G/DL (ref 28.4–34.8)
MCV RBC AUTO: 94.3 FL (ref 82.6–102.9)
MONOCYTES # BLD: 7 % (ref 3–12)
NRBC AUTOMATED: 0 PER 100 WBC
PDW BLD-RTO: 13.7 % (ref 11.8–14.4)
PLATELET # BLD: 192 K/UL (ref 138–453)
PLATELET ESTIMATE: ABNORMAL
PMV BLD AUTO: 10.5 FL (ref 8.1–13.5)
POTASSIUM SERPL-SCNC: 3.8 MMOL/L (ref 3.7–5.3)
RBC # BLD: 4.54 M/UL (ref 3.95–5.11)
RBC # BLD: ABNORMAL 10*6/UL
SEG NEUTROPHILS: 64 % (ref 36–65)
SEGMENTED NEUTROPHILS ABSOLUTE COUNT: 7.87 K/UL (ref 1.5–8.1)
SODIUM BLD-SCNC: 138 MMOL/L (ref 135–144)
WBC # BLD: 12.3 K/UL (ref 3.5–11.3)
WBC # BLD: ABNORMAL 10*3/UL

## 2021-11-27 PROCEDURE — 80048 BASIC METABOLIC PNL TOTAL CA: CPT

## 2021-11-27 PROCEDURE — 6360000002 HC RX W HCPCS: Performed by: NURSE PRACTITIONER

## 2021-11-27 PROCEDURE — 6370000000 HC RX 637 (ALT 250 FOR IP): Performed by: INTERNAL MEDICINE

## 2021-11-27 PROCEDURE — 99239 HOSP IP/OBS DSCHRG MGMT >30: CPT | Performed by: INTERNAL MEDICINE

## 2021-11-27 PROCEDURE — 94640 AIRWAY INHALATION TREATMENT: CPT

## 2021-11-27 PROCEDURE — 6370000000 HC RX 637 (ALT 250 FOR IP): Performed by: FAMILY MEDICINE

## 2021-11-27 PROCEDURE — 82947 ASSAY GLUCOSE BLOOD QUANT: CPT

## 2021-11-27 PROCEDURE — 6370000000 HC RX 637 (ALT 250 FOR IP): Performed by: NURSE PRACTITIONER

## 2021-11-27 PROCEDURE — 6360000002 HC RX W HCPCS: Performed by: INTERNAL MEDICINE

## 2021-11-27 PROCEDURE — 71045 X-RAY EXAM CHEST 1 VIEW: CPT

## 2021-11-27 PROCEDURE — 36415 COLL VENOUS BLD VENIPUNCTURE: CPT

## 2021-11-27 PROCEDURE — 2700000000 HC OXYGEN THERAPY PER DAY

## 2021-11-27 PROCEDURE — 94761 N-INVAS EAR/PLS OXIMETRY MLT: CPT

## 2021-11-27 PROCEDURE — 2580000003 HC RX 258: Performed by: NURSE PRACTITIONER

## 2021-11-27 PROCEDURE — 85025 COMPLETE CBC W/AUTO DIFF WBC: CPT

## 2021-11-27 PROCEDURE — 6370000000 HC RX 637 (ALT 250 FOR IP): Performed by: STUDENT IN AN ORGANIZED HEALTH CARE EDUCATION/TRAINING PROGRAM

## 2021-11-27 RX ORDER — FUROSEMIDE 10 MG/ML
20 INJECTION INTRAMUSCULAR; INTRAVENOUS ONCE
Status: COMPLETED | OUTPATIENT
Start: 2021-11-27 | End: 2021-11-27

## 2021-11-27 RX ORDER — AMIODARONE HYDROCHLORIDE 200 MG/1
200 TABLET ORAL 2 TIMES DAILY
Qty: 30 TABLET | Refills: 0 | Status: SHIPPED | OUTPATIENT
Start: 2021-11-27 | End: 2021-12-23

## 2021-11-27 RX ADMIN — METOPROLOL SUCCINATE 100 MG: 50 TABLET, EXTENDED RELEASE ORAL at 10:35

## 2021-11-27 RX ADMIN — FUROSEMIDE 20 MG: 10 INJECTION, SOLUTION INTRAMUSCULAR; INTRAVENOUS at 12:38

## 2021-11-27 RX ADMIN — SODIUM CHLORIDE, PRESERVATIVE FREE 10 ML: 5 INJECTION INTRAVENOUS at 10:36

## 2021-11-27 RX ADMIN — ENOXAPARIN SODIUM 30 MG: 100 INJECTION SUBCUTANEOUS at 10:35

## 2021-11-27 RX ADMIN — IPRATROPIUM BROMIDE AND ALBUTEROL SULFATE 1 AMPULE: .5; 2.5 SOLUTION RESPIRATORY (INHALATION) at 15:04

## 2021-11-27 RX ADMIN — BUDESONIDE AND FORMOTEROL FUMARATE DIHYDRATE 2 PUFF: 160; 4.5 AEROSOL RESPIRATORY (INHALATION) at 09:20

## 2021-11-27 RX ADMIN — INSULIN LISPRO 2 UNITS: 100 INJECTION, SOLUTION INTRAVENOUS; SUBCUTANEOUS at 12:38

## 2021-11-27 RX ADMIN — IPRATROPIUM BROMIDE AND ALBUTEROL SULFATE 1 AMPULE: .5; 2.5 SOLUTION RESPIRATORY (INHALATION) at 11:32

## 2021-11-27 RX ADMIN — OXYCODONE AND ACETAMINOPHEN 1 TABLET: 5; 325 TABLET ORAL at 10:35

## 2021-11-27 RX ADMIN — PREGABALIN 50 MG: 50 CAPSULE ORAL at 10:36

## 2021-11-27 RX ADMIN — DEXAMETHASONE 6 MG: 4 TABLET ORAL at 10:36

## 2021-11-27 RX ADMIN — Medication 2000 UNITS: at 10:35

## 2021-11-27 RX ADMIN — IPRATROPIUM BROMIDE AND ALBUTEROL SULFATE 1 AMPULE: .5; 2.5 SOLUTION RESPIRATORY (INHALATION) at 09:24

## 2021-11-27 RX ADMIN — AMIODARONE HYDROCHLORIDE 200 MG: 200 TABLET ORAL at 10:35

## 2021-11-27 RX ADMIN — ANTI-FUNGAL POWDER MICONAZOLE NITRATE TALC FREE: 1.42 POWDER TOPICAL at 10:37

## 2021-11-27 ASSESSMENT — PAIN SCALES - GENERAL
PAINLEVEL_OUTOF10: 0
PAINLEVEL_OUTOF10: 8

## 2021-11-27 ASSESSMENT — PAIN DESCRIPTION - LOCATION: LOCATION: GENERALIZED

## 2021-11-27 ASSESSMENT — PAIN DESCRIPTION - FREQUENCY: FREQUENCY: CONTINUOUS

## 2021-11-27 ASSESSMENT — PAIN DESCRIPTION - PAIN TYPE: TYPE: ACUTE PAIN;CHRONIC PAIN

## 2021-11-27 ASSESSMENT — PAIN DESCRIPTION - DESCRIPTORS: DESCRIPTORS: ACHING

## 2021-11-27 NOTE — DISCHARGE INSTR - DIET
Good nutrition is important when healing from an illness, injury, or surgery. Follow any nutrition recommendations given to you during your hospital stay. If you were given an oral nutrition supplement while in the hospital, continue to take this supplement at home. You can take it with meals, in-between meals, and/or before bedtime. These supplements can be purchased at most local grocery stores, pharmacies, and chain [employer]-stores. If you have any questions about your diet or nutrition, call the hospital and ask for the dietitian.       Low potassium, low sodium diet

## 2021-11-27 NOTE — PROGRESS NOTES
Patient was evaluated today for the diagnosis of Covid. I entered a DME order for home oxygen because the diagnosis and testing requires the patient to have supplemental oxygen. Condition will improve or be benefited by oxygen use. The patient is not able to perform good mobility in a home setting and therefore does require the use of a portable oxygen system. The need for this equipment was discussed with the patient and she understands and is in agreement.

## 2021-11-27 NOTE — DISCHARGE SUMMARY
[facility]  Office: [address], DO, Juana Goncalves, DO, Axel Justin, DO, Archana Love Sevilla, DO, Tanja Clarke MD, Shari Ross MD, Linn Jain MD, Angelita Rodriguez MD, Argelia Shaw MD, Susanna Cantu MD, Gavin Su MD, Devan Montaño, DO, Damian Torres, DO, Daniel Davis MD,  Lm Mancia, DO, Lauren Samuels MD, Maik Blue MD, Fortino Segundo MD, Lieutenant Vasiliy MD, Carrie Ortiz MD, Francine Dash MD, James Norwood MD, Maycol Garrett, [facility], [facility], CNP, Herb Spring, CNP, Lc Rowan, CNS, Sathish Lucio, CNP, James Zelaya, CNP, John Pritchard, CNP, Iza Tam, CNP, Israel Granger, CNP, Conner Ritter PA-C, Bridgette Fontana, [facility], Sandrine Elder, CNP, Galileo Petersen, CNP, Pura Canela, CNP, Vy Ross, CNP, Fabien Murray, CNP, Rajiv Shah, CNP, [facility]ils[facility]    Discharge Summary     Patient ID: Nancy Mckinley  :  1955   MRN: [id number]     ACCOUNT:  [de-identified]   Patient's PCP: Quentin Kearney DO  Admit Date: 11/3/2021   Discharge Date: 2021     Length of Stay: 23  Code Status:  Full Code  Admitting Physician: Maik Blue MD  Discharge Physician: Maik Blue MD     Active Discharge Diagnoses:     Hospital Problem Lists:  Principal Problem:    Pneumonia due to COVID-19 virus  Active Problems:    Essential hypertension    Prediabetes    Morbid obesity with BMI of 40.0-44.9, adult ([facility] [address]    Hyperglycemia    Acute respiratory failure with hypoxia ([facility] [address]  Resolved Problems:    Acute urinary retention      Admission Condition:  poor     Discharged Condition: fair    Hospital Stay:     Hospital Course: This is a 70-year-old female who was admitted for treatment of COVID-19 pneumonia. Patient was admitted for 24 days in the hospital.  Patient was recommended to go to rehab facility after she was treated with Decadron and Actemra along with antibiotics.   Patient and her  were opposed to the idea of going to any nursing facility. We will continue to work with her to get her oxygen requirement to come down. Today she is down to 3 L of oxygen via nasal cannula. On home O2 evaluation patient is requiring 3 L of oxygen at all times. She will be discharged home with home care on 3 L of oxygen. Of note during the course of her hospital stay patient also was noted to be in sinus tachycardia and complained of chest pain intermittently. Cardiology was consulted. Patient has been started on amiodarone. No further cardiac work-up was recommended inpatient.       Significant therapeutic interventions: Decadron and Actemra    Significant Diagnostic Studies:   Labs / Micro:  CBC:   Lab Results   Component Value Date    WBC 12.3 11/27/2021    RBC 4.54 11/27/2021    HGB 14.2 11/27/2021    HCT 42.8 11/27/2021    MCV 94.3 11/27/2021    MCH 31.3 11/27/2021    MCHC 33.2 11/27/2021    RDW 13.7 11/27/2021     11/27/2021     BMP:    Lab Results   Component Value Date    GLUCOSE 126 11/27/2021     11/27/2021    K 3.8 11/27/2021     11/27/2021    CO2 23 11/27/2021    ANIONGAP 12 11/27/2021    BUN 26 11/27/2021    CREATININE 0.51 11/27/2021    BUNCRER 51 11/27/2021    CALCIUM 8.8 11/27/2021    LABGLOM >60 11/27/2021    GFRAA >60 11/27/2021    GFR      11/27/2021    GFR NOT REPORTED 11/27/2021     HFP:    Lab Results   Component Value Date    PROT 6.1 11/09/2021     CMP:    Lab Results   Component Value Date    GLUCOSE 126 11/27/2021     11/27/2021    K 3.8 11/27/2021     11/27/2021    CO2 23 11/27/2021    BUN 26 11/27/2021    CREATININE 0.51 11/27/2021    ANIONGAP 12 11/27/2021    ALKPHOS 67 11/09/2021    ALT 24 11/09/2021    AST 26 11/09/2021    BILITOT 0.43 11/09/2021    LABALBU 3.3 11/09/2021    ALBUMIN NOT REPORTED 11/09/2021    LABGLOM >60 11/27/2021    GFRAA >60 11/27/2021    GFR      11/27/2021    GFR NOT REPORTED 11/27/2021    PROT 6.1 11/09/2021 CALCIUM 8.8 11/27/2021     PT/INR:    Lab Results   Component Value Date    PROTIME 13.5 11/05/2021    INR 1.0 11/05/2021     PTT:   Lab Results   Component Value Date    APTT 30.4 11/05/2021     FLP:    Lab Results   Component Value Date    CHOL 195 08/09/2021    TRIG 140 08/09/2021    HDL 45 08/09/2021     U/A:    Lab Results   Component Value Date    COLORU Yellow 11/10/2021    TURBIDITY Clear 11/10/2021    SPECGRAV 1.020 11/10/2021    HGBUR NEGATIVE 11/10/2021    PHUR 5.5 11/10/2021    PROTEINU NEGATIVE 11/10/2021    GLUCOSEU NEGATIVE 11/10/2021    KETUA NEGATIVE 11/10/2021    BILIRUBINUR NEGATIVE 11/10/2021    UROBILINOGEN Normal 11/10/2021    NITRU NEGATIVE 11/10/2021    LEUKOCYTESUR NEGATIVE 11/10/2021     TSH:    Lab Results   Component Value Date    TSH 2.08 08/08/2019        Radiology:  XR CHEST PORTABLE    Result Date: 11/27/2021  Coarse bilateral reticular opacities with areas of alveolar consolidation. Findings are stable to minimally improved compared to the most recent comparison study. No new alveolar consolidation or pleural fluid. XR CHEST PORTABLE    Result Date: 11/22/2021  Moderate bilateral hazy parenchymal opacities, similar to the prior day's examination. Slightly worsened compared to 11/19/2021. XR CHEST PORTABLE    Result Date: 11/21/2021  No significant interval change, when compared to the previous study performed 1 day earlier. Consultations:    Consults:     Final Specialist Recommendations/Findings:   IP CONSULT TO HOSPITALIST  IP CONSULT TO PULMONOLOGY  IP CONSULT TO CARDIOLOGY      The patient was seen and examined on day of discharge and this discharge summary is in conjunction with any daily progress note from day of discharge.     Discharge plan:     Disposition: Home    Physician Follow Up:     [address]  [facility]  P: [phone]  F: Ernst Preciado       Requiring Further Evaluation/Follow Up POST HOSPITALIZATION/Incidental Findings: Follow-up with PCP within 1 week after discharge    Diet: cardiac diet    Activity: As tolerated    Instructions to Patient: Be compliant with your diet, medications, follow-up. Discharge Medications:      Medication List      START taking these medications    amiodarone 200 MG tablet  Commonly known as: CORDARONE  Take 1 tablet by mouth 2 times daily        CONTINUE taking these medications    acetaminophen 500 MG tablet  Commonly known as: TYLENOL     albuterol sulfate  (90 Base) MCG/ACT inhaler  Commonly known as: Ventolin HFA  Inhale 2 puffs into the lungs 4 times daily as needed for Wheezing     cetirizine 10 MG tablet  Commonly known as: ZYRTEC     lisinopril-hydroCHLOROthiazide 20-12.5 MG per tablet  Commonly known as: PRINZIDE;ZESTORETIC  TAKE 1 TABLET DAILY     metoprolol succinate 100 MG extended release tablet  Commonly known as: TOPROL XL  TAKE 1 TABLET DAILY     MULTIVITAMIN ADULT PO     ondansetron 4 MG disintegrating tablet  Commonly known as: Zofran ODT  Take 1 tablet by mouth every 8 hours as needed for Nausea        STOP taking these medications    HYDROcodone-acetaminophen 5-325 MG per tablet  Commonly known as: Norco           Where to Get Your Medications      These medications were sent to [facility] [address], [address] [address]    Phone: [phone]   · amiodarone 200 MG tablet         No discharge procedures on file. Time Spent on discharge is  40 mins in patient examination, evaluation, counseling as well as medication reconciliation, prescriptions for required medications, discharge plan and follow up. Electronically signed by   Sara Mart MD  11/27/2021  3:54 PM      Thank you Dr. Venessa Rich DO for the opportunity to be involved in this patient's care.

## 2021-11-27 NOTE — PROGRESS NOTES
Home Oxygen Evaluation    Home Oxygen Evaluation completed. Patient is on 3 liters per minute via NC. Resting SpO2 = 92%  Resting SpO2 on room air = 82%    SpO2 on room air with exercise = 80%  SpO2 on oxygen as above with exercise = 91%     Nocturnal Oximetry with patient on room air is recommended is SpO2 is between 89% and 95% (requires additional order).     Chirag Fuentes RN  2:59 PM

## 2021-11-27 NOTE — PROGRESS NOTES
Pulmonary Progress Note    Patient seen for the follow up of acute hypoxic respiratory failure, asthma extubation, Pneumonia due to COVID-19 virus     Subjective:  c/o SOB on and off. Feeling better overall     Examination:  Vitals: /83   Pulse 78   Temp 97.6 °F (36.4 °C) (Oral)   Resp 18   Ht 5' 2\" (1.575 m)   Wt 227 lb 1.6 oz (103 kg)   SpO2 96%   BMI 41.54 kg/m²   General appearance:  alert and cooperative with exam, sitting in chair having breakfast  Neck: No JVD  Lungs: fair AE, Bilbasilar crackles, 96% on 3 l nc  Heart: irregular 78  Abdomen: Soft, non tender, + BS  Extremities: no cyanosis or clubbing.  No significant edema    LABs:  CBC:   Recent Labs     11/26/21  0452 11/27/21  0549   WBC 11.1 12.3*   HGB 12.7 14.2   HCT 39.1 42.8    192     BMP:   Recent Labs     11/26/21 0452 11/27/21  0549    138   K 4.0 3.8   CO2 26 23   BUN 25* 26*   CREATININE 0.71 0.51   LABGLOM >60 >60   GLUCOSE 128* 126*     Radiology:  X-ray chest: 11/22/2021  Bilateral pulmonary infiltrate      Impression:  · Acute hypoxic respiratory failure  · COVID-19 pneumonia with possible bacterial coinfection  · Mild adenopathy, likely reactive  · History of asthma  · Suspected obstructive sleep apnea/Obesity  · Arthritis, hypertension, fibromyalgia    Recommendations:  · Off airborne isolation  · Prone as tolerated  · Incentive spirometry every hour while awake  · Oxygen by nasal cannula, wean as tolerated to keep oxygen saturation above 90%  · Continue oral Decadron  · S/p Actemra  · Continue albuterol and Ipratropium Q 4 hours and prn  · Symbicort 160 2 puffs twice daily  · Labs: CBC and BMP in am 11-28-21  · DVT prophylaxis with low molecular weight heparin  · Lasix 20 mg IV x 1 now, check CXR    Plan of care discussed with Dr Erica Israel, CNP

## 2021-11-27 NOTE — CARE COORDINATION
Discharge Planning:    Confirmed with Vesta/Ashleigh, that patient has O2 set up at home and portable tanks. Family who picks patient up will bring portable tank. Writer will fax new O2 eval testing and rx to [address] [place] [address] at [phone].       Confirmed with [facility] at [phone] that patient has been accepted and they are just waiting on dc paperwork. DACIA will need faxed to [facility] at [phone].

## 2021-11-27 NOTE — PROGRESS NOTES
Pt alert, oriented and medically stable for discharge. Pt oxygen tank dropped off by company. IV removed. Pt reviewed discharge instructions, new medications, side effects as well as follow up appointments. Pt verbalized understanding. Pt to be wheeled down to main entrance where  is taking her home.

## 2021-11-28 NOTE — PROGRESS NOTES
Patient's wallet found in room after discharge. TC to  at cell [phone] and informed.  states patient fell to floor and unable to get up. With low oxygen levels. Rescue squad is there assisting and will be transporting patient to ED.  Notified security to secure wallet

## 2021-11-28 NOTE — PROGRESS NOTES
11/27/21, Wilbert Velazco from security took wallet and placed in a sealed security bag and took down to security for patient/ to  tomorrow, 11/28. Left voicemail about security locking up wallet and to call them for it when he comes to main entrance tomorrow, 11/28.

## 2021-11-29 ENCOUNTER — CARE COORDINATION (OUTPATIENT)
Dept: CASE MANAGEMENT | Age: 66
End: 2021-11-29

## 2021-11-29 NOTE — CARE COORDINATION
[facility] Transitions Initial Follow Up Call    Call within 2 business days of discharge: Yes    Patient: Milady Aquino Patient : 1955   MRN: [id number]  Reason for Admission: COVID 19   Discharge Date: 21 RARS: Readmission Risk Score: 11.1 ( )      Last Discharge [facility]       Complaint Diagnosis Description Type Department Provider    11/3/21 Abdominal Pain; Nausea; Back Pain; Headache COVID-19 ED to Hosp-Admission (Discharged) (ADMITTED) MARIAELENA Rodriguez MD; Madonna Smith. .. Spoke with: Spouse    Facility: [address]    Non-face-to-face services provided:  Obtained and reviewed discharge summary and/or continuity of care documents      Spoke with spouse. He states when they brought her home, they could not get her in to the house. Patient was taken to [facility] and currently is admitted there. Episode resolved.       Follow Up  Future Appointments   Date Time Provider Beto Fontana   2/3/2022  1:30 PM DO ASHLI Chang [facility] None       Ryder Crockett, RN

## 2022-02-10 ENCOUNTER — HOSPITAL ENCOUNTER (OUTPATIENT)
Age: 67
Setting detail: SPECIMEN
Discharge: HOME | End: 2022-02-10

## 2022-02-10 DIAGNOSIS — R53.83 FATIGUE, UNSPECIFIED TYPE: ICD-10-CM

## 2022-02-10 DIAGNOSIS — E55.9 VITAMIN D DEFICIENCY: ICD-10-CM

## 2022-02-10 LAB
ABSOLUTE EOS #: 0.44 K/UL (ref 0–0.44)
ABSOLUTE IMMATURE GRANULOCYTE: 0.03 K/UL (ref 0–0.3)
ABSOLUTE LYMPH #: 3.06 K/UL (ref 1.1–3.7)
ABSOLUTE MONO #: 0.67 K/UL (ref 0.1–1.2)
ALBUMIN SERPL-MCNC: 4.4 G/DL (ref 3.5–5.2)
ALBUMIN/GLOBULIN RATIO: 1.6 (ref 1–2.5)
ALP BLD-CCNC: 75 U/L (ref 35–104)
ALT SERPL-CCNC: 19 U/L (ref 5–33)
ANION GAP SERPL CALCULATED.3IONS-SCNC: 11 MMOL/L (ref 9–17)
AST SERPL-CCNC: 17 U/L
BASOPHILS # BLD: 0 % (ref 0–2)
BASOPHILS ABSOLUTE: 0.03 K/UL (ref 0–0.2)
BILIRUB SERPL-MCNC: 0.45 MG/DL (ref 0.3–1.2)
BUN BLDV-MCNC: 19 MG/DL (ref 8–23)
BUN/CREAT BLD: ABNORMAL (ref 9–20)
CALCIUM SERPL-MCNC: 10.1 MG/DL (ref 8.6–10.4)
CHLORIDE BLD-SCNC: 102 MMOL/L (ref 98–107)
CO2: 28 MMOL/L (ref 20–31)
CREAT SERPL-MCNC: 0.67 MG/DL (ref 0.5–0.9)
DIFFERENTIAL TYPE: ABNORMAL
EOSINOPHILS RELATIVE PERCENT: 5 % (ref 1–4)
GFR AFRICAN AMERICAN: >60 ML/MIN
GFR NON-AFRICAN AMERICAN: >60 ML/MIN
GFR SERPL CREATININE-BSD FRML MDRD: ABNORMAL ML/MIN/{1.73_M2}
GFR SERPL CREATININE-BSD FRML MDRD: ABNORMAL ML/MIN/{1.73_M2}
GLUCOSE BLD-MCNC: 112 MG/DL (ref 70–99)
HCT VFR BLD CALC: 46.7 % (ref 36.3–47.1)
HEMOGLOBIN: 14.9 G/DL (ref 11.9–15.1)
IMMATURE GRANULOCYTES: 0 %
LYMPHOCYTES # BLD: 34 % (ref 24–43)
MCH RBC QN AUTO: 31.2 PG (ref 25.2–33.5)
MCHC RBC AUTO-ENTMCNC: 31.9 G/DL (ref 28.4–34.8)
MCV RBC AUTO: 97.9 FL (ref 82.6–102.9)
MONOCYTES # BLD: 8 % (ref 3–12)
NRBC AUTOMATED: 0 PER 100 WBC
PDW BLD-RTO: 13.6 % (ref 11.8–14.4)
PLATELET # BLD: 270 K/UL (ref 138–453)
PLATELET ESTIMATE: ABNORMAL
PMV BLD AUTO: 11.6 FL (ref 8.1–13.5)
POTASSIUM SERPL-SCNC: 5.2 MMOL/L (ref 3.7–5.3)
RBC # BLD: 4.77 M/UL (ref 3.95–5.11)
RBC # BLD: ABNORMAL 10*6/UL
SEG NEUTROPHILS: 53 % (ref 36–65)
SEGMENTED NEUTROPHILS ABSOLUTE COUNT: 4.72 K/UL (ref 1.5–8.1)
SODIUM BLD-SCNC: 141 MMOL/L (ref 135–144)
TOTAL PROTEIN: 7.1 G/DL (ref 6.4–8.3)
VITAMIN D 25-HYDROXY: 36.4 NG/ML (ref 30–100)
WBC # BLD: 9 K/UL (ref 3.5–11.3)
WBC # BLD: ABNORMAL 10*3/UL

## 2022-03-17 ENCOUNTER — HOSPITAL ENCOUNTER (OUTPATIENT)
Age: 67
Setting detail: SPECIMEN
Discharge: HOME | End: 2022-03-17

## 2022-03-17 DIAGNOSIS — L65.9 HAIR LOSS: ICD-10-CM

## 2022-03-18 LAB — TSH SERPL DL<=0.05 MIU/L-ACNC: 1.44 MIU/L (ref 0.3–5)

## 2022-08-29 ENCOUNTER — HOSPITAL ENCOUNTER (OUTPATIENT)
Age: 67
Setting detail: SPECIMEN
Discharge: HOME | End: 2022-08-29

## 2022-08-29 DIAGNOSIS — E61.1 IRON DEFICIENCY: ICD-10-CM

## 2022-08-29 DIAGNOSIS — R73.01 IMPAIRED FASTING GLUCOSE: ICD-10-CM

## 2022-08-29 DIAGNOSIS — E78.5 DYSLIPIDEMIA: ICD-10-CM

## 2022-08-29 DIAGNOSIS — I10 ESSENTIAL HYPERTENSION: ICD-10-CM

## 2022-08-29 LAB
ABSOLUTE EOS #: 0.21 K/UL (ref 0–0.44)
ABSOLUTE EOS #: 0.21 K/UL (ref 0–0.44)
ABSOLUTE IMMATURE GRANULOCYTE: 0.03 K/UL (ref 0–0.3)
ABSOLUTE IMMATURE GRANULOCYTE: 0.03 K/UL (ref 0–0.3)
ABSOLUTE LYMPH #: 3.03 K/UL (ref 1.1–3.7)
ABSOLUTE LYMPH #: 3.03 K/UL (ref 1.1–3.7)
ABSOLUTE MONO #: 0.54 K/UL (ref 0.1–1.2)
ABSOLUTE MONO #: 0.54 K/UL (ref 0.1–1.2)
ALBUMIN SERPL-MCNC: 4.3 G/DL (ref 3.5–5.2)
ALBUMIN/GLOBULIN RATIO: 1.7 (ref 1–2.5)
ALP BLD-CCNC: 80 U/L (ref 35–104)
ALT SERPL-CCNC: 20 U/L (ref 5–33)
ANION GAP SERPL CALCULATED.3IONS-SCNC: 10 MMOL/L (ref 9–17)
AST SERPL-CCNC: 16 U/L
BASOPHILS # BLD: 0 % (ref 0–2)
BASOPHILS # BLD: 0 % (ref 0–2)
BASOPHILS ABSOLUTE: 0.03 K/UL (ref 0–0.2)
BASOPHILS ABSOLUTE: 0.03 K/UL (ref 0–0.2)
BILIRUB SERPL-MCNC: 0.55 MG/DL (ref 0.3–1.2)
BUN BLDV-MCNC: 22 MG/DL (ref 8–23)
C-REACTIVE PROTEIN: <3 MG/L (ref 0–5)
CALCIUM SERPL-MCNC: 9.4 MG/DL (ref 8.6–10.4)
CHLORIDE BLD-SCNC: 101 MMOL/L (ref 98–107)
CHOLESTEROL/HDL RATIO: 4.7
CHOLESTEROL: 200 MG/DL
CO2: 29 MMOL/L (ref 20–31)
CREAT SERPL-MCNC: 0.7 MG/DL (ref 0.5–0.9)
EOSINOPHILS RELATIVE PERCENT: 3 % (ref 1–4)
EOSINOPHILS RELATIVE PERCENT: 3 % (ref 1–4)
ESTIMATED AVERAGE GLUCOSE: 128 MG/DL
FERRITIN: 326 NG/ML (ref 13–150)
GFR AFRICAN AMERICAN: >60 ML/MIN
GFR NON-AFRICAN AMERICAN: >60 ML/MIN
GFR SERPL CREATININE-BSD FRML MDRD: ABNORMAL ML/MIN/{1.73_M2}
GLUCOSE FASTING: 119 MG/DL (ref 70–99)
HBA1C MFR BLD: 6.1 % (ref 4–6)
HCT VFR BLD CALC: 47.9 % (ref 36.3–47.1)
HCT VFR BLD CALC: 47.9 % (ref 36.3–47.1)
HDLC SERPL-MCNC: 43 MG/DL
HEMOGLOBIN: 16.1 G/DL (ref 11.9–15.1)
HEMOGLOBIN: 16.1 G/DL (ref 11.9–15.1)
IMMATURE GRANULOCYTES: 0 %
IMMATURE GRANULOCYTES: 0 %
LDL CHOLESTEROL: 129 MG/DL (ref 0–130)
LYMPHOCYTES # BLD: 39 % (ref 24–43)
LYMPHOCYTES # BLD: 39 % (ref 24–43)
MCH RBC QN AUTO: 31.8 PG (ref 25.2–33.5)
MCH RBC QN AUTO: 31.8 PG (ref 25.2–33.5)
MCHC RBC AUTO-ENTMCNC: 33.6 G/DL (ref 28.4–34.8)
MCHC RBC AUTO-ENTMCNC: 33.6 G/DL (ref 28.4–34.8)
MCV RBC AUTO: 94.5 FL (ref 82.6–102.9)
MCV RBC AUTO: 94.5 FL (ref 82.6–102.9)
MONOCYTES # BLD: 7 % (ref 3–12)
MONOCYTES # BLD: 7 % (ref 3–12)
NRBC AUTOMATED: 0 PER 100 WBC
NRBC AUTOMATED: 0 PER 100 WBC
PDW BLD-RTO: 13.1 % (ref 11.8–14.4)
PDW BLD-RTO: 13.1 % (ref 11.8–14.4)
PLATELET # BLD: 251 K/UL (ref 138–453)
PLATELET # BLD: 251 K/UL (ref 138–453)
PMV BLD AUTO: 11.3 FL (ref 8.1–13.5)
PMV BLD AUTO: 11.3 FL (ref 8.1–13.5)
POTASSIUM SERPL-SCNC: 4.6 MMOL/L (ref 3.7–5.3)
RBC # BLD: 5.07 M/UL (ref 3.95–5.11)
RBC # BLD: 5.07 M/UL (ref 3.95–5.11)
RHEUMATOID FACTOR: <10 IU/ML
SEDIMENTATION RATE, ERYTHROCYTE: 6 MM/HR (ref 0–30)
SEG NEUTROPHILS: 50 % (ref 36–65)
SEG NEUTROPHILS: 51 % (ref 36–65)
SEGMENTED NEUTROPHILS ABSOLUTE COUNT: 3.89 K/UL (ref 1.5–8.1)
SEGMENTED NEUTROPHILS ABSOLUTE COUNT: 3.89 K/UL (ref 1.5–8.1)
SODIUM BLD-SCNC: 140 MMOL/L (ref 135–144)
TOTAL PROTEIN: 6.9 G/DL (ref 6.4–8.3)
TRIGL SERPL-MCNC: 142 MG/DL
WBC # BLD: 7.7 K/UL (ref 3.5–11.3)
WBC # BLD: 7.7 K/UL (ref 3.5–11.3)

## 2022-08-30 LAB
ANTI DNA DOUBLE STRANDED: 3.6 IU/ML
ANTI-NUCLEAR ANTIBODY (ANA): POSITIVE
CCP IGG ANTIBODIES: 1.5 U/ML (ref 0–7)
ENA ANTIBODIES SCREEN: 10 U/ML

## 2022-09-01 LAB
ANTI JO-1 IGG: <0.4 U/ML
ANTI SSA: 0.6 U/ML
ANTI SSB: <0.3 U/ML
ANTI-CENTROMERE: >240 U/ML
ANTI-RNP70: 0.7 U/ML
ANTI-SCLERODERMA: <0.6 U/ML
ANTI-SMITH: 2.1 U/ML
ANTI-U1RNP: 1 U/ML

## 2023-11-30 NOTE — PROGRESS NOTES
Physical Therapy  Facility/Department: [facility] ICU  Daily Treatment Note  NAME: Jaren Feldman  : 1955  MRN: [id number]    Date of Service: 2021    Discharge Recommendations:  Patient would benefit from continued therapy after discharge        Assessment   Body structures, Functions, Activity limitations: Decreased functional mobility ; Decreased ADL status; Decreased strength; Decreased endurance; Decreased balance  Assessment: Pt with deficits noted in bed mobility, transfers, ambulation, balance, and endurance this session(saO2 84-96% on HFNCO2), requires 2 assist for safety & management of lines. Pt requires continued PT to maximize independence with functional mobility, balance, safety awareness & activity tolerance to improve aerobic capacity & overall tolerance of I ADL's. Pt currently functioning far  below baseline. Would suggest additional therapy at time of discharge to maximize long term outcomes and prevent re-admission. Please refer to AM-PAC score for current level of function. Prognosis: Good  Exam: ROM, MMT, functional mobility, activity tolerance, Balance, & [employer] AM-PAC 6 Clicks Basic Mobility  Clinical Presentation: evolving  PT Education: PT Role; Plan of Care; Transfer Training; General Safety; Gait Training; Functional Mobility Training; Energy Conservation  Patient Education: posture, DB ex's & pursed lip breathing  REQUIRES PT FOLLOW UP: Yes  Activity Tolerance  Activity Tolerance: Patient limited by endurance  Activity Tolerance: saO2 84-96% with activity on HI FLOW.  patient requires increased time to maximize tolerance to positional changes and vc's for pursed lip breathing techniques to maximize respiratory function this date.  SaO2 improves in sitting & pt stabilized to allow mobility to chair     Patient Diagnosis(es): The encounter diagnosis was COVID-19.     has a past medical history of Anxiety, Arthritis, Bell's palsy, Chronic neck pain, Chronic pain, Fibromyalgia, HA (headache), Headache, HTN (hypertension), and Hypertension. has a past surgical history that includes  section; shoulder surgery; Urethra dilation; Arm Surgery; [place] tooth extraction; joint replacement (Left); Cholecystectomy, laparoscopic (N/A, 2020); and Cholecystectomy (2020). Restrictions  Restrictions/Precautions  Restrictions/Precautions: General Precautions, Fall Risk  Required Braces or Orthoses?: No  Position Activity Restriction  Other position/activity restrictions: Up as tolerated, telemetry, Droplet+ for Covid, HF NC O2, RUE IV  Subjective   General  Chart Reviewed: Yes  Additional Pertinent Hx: Skillman palsy, fibromyalgia, BMI 43  Response To Previous Treatment: Patient with no complaints from previous session. Family / Caregiver Present: No  Subjective  Subjective: Pt agreeable to PT  General Comment  Comments: RN obinna PT  Vital Signs  Level of Consciousness: Alert (0)       Orientation  Orientation  Overall Orientation Status: Within Normal Limits  Cognition      Objective   Bed mobility  Supine to Sit: Minimal assistance  Scooting: Minimal assistance  Comment: MOD cues for hand placement & use of UB, pacing & pursed lip breathing tech, and use of BUE's to scoot fully out to EOB as well as awareness/assist with lines to increase safety. Transfers  Sit to Stand: Moderate Assistance; 2 Person Assistance  Stand to sit: Moderate Assistance; 2 Person Assistance  Bed to Chair: Moderate assistance; 2 Person Assistance  Stand Pivot Transfers: Moderate Assistance; 2 Person Assistance  Comment: Ed + tactile assist on correct use of upper body for safe sit/stand + to back all way back to surface with walker close before she reaches from arms of walker to chair, & to exhale as she sits to chair  Ambulation  Ambulation?: Yes  Ambulation 1  Surface: level tile  Device: Rolling Walker  Other Apparatus: O2 (HFNC)  Assistance:  Moderate assistance; 2 Person assistance  Quality of Gait: step to pattern, mod cues for pacing, upright posture & pursed lip brathing + maxA to manage lines  Gait Deviations: Slow Sharona; Decreased step length; Decreased step height  Distance: 3ft  Comments: to chair     Balance  Posture: Fair  Sitting - Static: Good  Sitting - Dynamic: Good  Standing - Static: Fair; +  Standing - Dynamic: Fair (R/W)  Exercises  Comments: Pt performed seated LE ex's x 10 reps to promote mobility exercises and promote joint congruency, unsupported UB  sitting to improve core strength & stability                        G-Code     OutComes Score                                                     AM-PAC Score  AM-PAC Inpatient Mobility Raw Score : 19 (11/08/21 1413)  AM-PAC Inpatient T-Scale Score : 45.44 (11/08/21 1413)  Mobility Inpatient CMS 0-100% Score: 41.77 (11/08/21 1413)  Mobility Inpatient CMS G-Code Modifier : CK (11/08/21 1413)          Goals  Short term goals  Time Frame for Short term goals: 12 visits  Short term goal 1: Patient will be indep with bed mobility and transfers. Short term goal 2: Patient will amb 100 feet indep with appropriate AD. Short term goal 3: Patient will negotiate 4 stairs with rails and supervision. Short term goal 4:  Inc standing balance to good with device to facilitate pt independence for performance of ADL's & functional mobility, & reduce fall risk  Short term goal 5: Pt able to tolerate 30 min of activity to include ex, breathing techniques & endurance training with pt demonstrating ability to perform energy conservation strategies during functional activity to self-regulate pacing & breathing techniques to avoid SOB & maintain saO2 in safe range  Short term goal 6: Ed pt on home ex's, optimal breathing techniques, safety & energy principles, endurance training, Covid 19 pt education & issue written pt education  Patient Goals   Patient goals : Improve breathing and walking    Plan    Plan  Times per week: 1-2x/d, 5-6 d/wk  Current Treatment Recommendations: Strengthening, Balance Training, Functional Mobility Training, Transfer Training, Endurance Training, Gait Training, Stair training, Patient/Caregiver Education & Training, Safety Education & Training, Home Exercise Program  Safety Devices  Type of devices: All fall risk precautions in place, Gait belt, Call light within reach, Left in chair     Therapy Time   Individual Concurrent Group Co-treatment   Time In 0930         Time Out 1000         Minutes 27              Co-treatment with OT warranted secondary to decreased safety and independence requiring 2 skilled therapy professionals to address individual discipline's goals. PT addressing pre gait trunk strengthening, weight shifting prior to transfers, transfer training and postural control in sitting.       [address], PT no chest pain and no edema.

## (undated) DEVICE — TUBING, SUCTION, 1/4" X 12', STRAIGHT: Brand: MEDLINE

## (undated) DEVICE — TOTAL TRAY, DB, 100% SILI FOLEY, 16FR 10: Brand: MEDLINE

## (undated) DEVICE — DUAL LUMEN STOMACH TUBE: Brand: SALEM SUMP

## (undated) DEVICE — GLOVE SURG SZ 7 L12IN FNGR THK79MIL GRN LTX FREE

## (undated) DEVICE — SKIN PREP TRAY W/CHG: Brand: MEDLINE INDUSTRIES, INC.

## (undated) DEVICE — LUER-LOK 360°: Brand: CONNECTA, LUER-LOK

## (undated) DEVICE — TROCARS: Brand: KII® BALLOON BLUNT TIP SYSTEM

## (undated) DEVICE — INTENDED FOR TISSUE SEPARATION, AND OTHER PROCEDURES THAT REQUIRE A SHARP SURGICAL BLADE TO PUNCTURE OR CUT.: Brand: BARD-PARKER ® CARBON RIB-BACK BLADES

## (undated) DEVICE — SPONGE LAP W18XL18IN WHT COT 4 PLY FLD STRUNG RADPQ DISP ST

## (undated) DEVICE — SOLUTION IV 1000ML 0.9% SOD CHL PH 5 INJ USP VIAFLX PLAS

## (undated) DEVICE — GLOVE SURG SZ 7 CRM LTX FREE POLYISOPRENE POLYMER BEAD ANTI

## (undated) DEVICE — SCISSOR SURG CRV ENDOCUT TIP FOR LAP DISP

## (undated) DEVICE — PLUMEPORT LAPAROSCOPIC SMOKE FILTRATION DEVICE: Brand: PLUMEPORT ACTIV

## (undated) DEVICE — ADHESIVE SKIN CLSR 0.7ML TOP DERMBND ADV

## (undated) DEVICE — TROCAR: Brand: KII SHIELDED BLADED ACCESS SYSTEM

## (undated) DEVICE — INTRODUCER CATH L3.5IN DIA7.5FR FOR CHOLANGIOGRAPHY TAUT

## (undated) DEVICE — 1010 S-DRAPE TOWEL DRAPE 10/BX: Brand: STERI-DRAPE™

## (undated) DEVICE — TAPE ADH W1INXL10YD PLAS TRNSPAR H2O RESIST HYPOALRG CURAD

## (undated) DEVICE — NEEDLE HYPO 25GA L1.5IN BLU POLYPR HUB S STL REG BVL STR

## (undated) DEVICE — SET EXTN IV L30IN TBNG DIA0.1IN PRIMING 4ML MACBOR FEM ADPT

## (undated) DEVICE — YANKAUER,FLEXIBLE HANDLE,REGLR CAPACITY: Brand: MEDLINE INDUSTRIES, INC.

## (undated) DEVICE — ELECTRODE LAPAROSCOPIC LHOOK

## (undated) DEVICE — GOWN,SIRUS,NONRNF,SETINSLV,XL,20/CS: Brand: MEDLINE

## (undated) DEVICE — CATHETER CHOLGM 4.5FR L18IN W/ MTL SUPP TB

## (undated) DEVICE — DRAPE C ARM UNIV W41XL74IN CLR PLAS XR VELC CLSR POLY STRP

## (undated) DEVICE — Device

## (undated) DEVICE — GARMENT,MEDLINE,DVT,INT,CALF,MED, GEN2: Brand: MEDLINE

## (undated) DEVICE — KIT,ANTI FOG,W/SPONGE & FLUID,SOFT PACK: Brand: MEDLINE

## (undated) DEVICE — Z DISCONTINUED USE 2624853 GLOVE SURG SZ 75 L12IN THK91MIL BRN LTX FREE

## (undated) DEVICE — TROCAR: Brand: KII® SLEEVE

## (undated) DEVICE — TOWEL,OR,DSP,ST,BLUE,DLX,XR,4/PK,20PK/CS: Brand: MEDLINE

## (undated) DEVICE — BAG SPEC REM 224ML W4XL6IN DIA10MM 1 HND GYN DISP ENDOPCH

## (undated) DEVICE — SYRINGE 20ML LL S/C 50

## (undated) DEVICE — DRAPE,REIN 53X77,STERILE: Brand: MEDLINE

## (undated) DEVICE — APPLIER CLP M/L SHFT DIA5MM 15 LIG LIGAMAX 5

## (undated) DEVICE — BLANKET WRM W29.9XL79.1IN UP BODY FORC AIR MISTRAL-AIR